# Patient Record
Sex: MALE | Race: WHITE | NOT HISPANIC OR LATINO | Employment: OTHER | URBAN - METROPOLITAN AREA
[De-identification: names, ages, dates, MRNs, and addresses within clinical notes are randomized per-mention and may not be internally consistent; named-entity substitution may affect disease eponyms.]

---

## 2020-01-31 ENCOUNTER — TELEPHONE (OUTPATIENT)
Dept: GASTROENTEROLOGY | Facility: CLINIC | Age: 70
End: 2020-01-31

## 2021-11-12 ENCOUNTER — TELEPHONE (OUTPATIENT)
Dept: GASTROENTEROLOGY | Facility: CLINIC | Age: 71
End: 2021-11-12

## 2021-12-03 ENCOUNTER — PREP FOR SURGERY (OUTPATIENT)
Dept: OTHER | Facility: HOSPITAL | Age: 71
End: 2021-12-03

## 2021-12-03 DIAGNOSIS — Z86.010 PERSONAL HISTORY OF COLONIC POLYPS: Primary | ICD-10-CM

## 2021-12-03 DIAGNOSIS — Z01.818 OTHER SPECIFIED PRE-OPERATIVE EXAMINATION: ICD-10-CM

## 2021-12-03 DIAGNOSIS — Z80.0 FAMILY HISTORY OF COLON CANCER: ICD-10-CM

## 2021-12-06 PROBLEM — Z80.0 FAMILY HISTORY OF COLON CANCER: Status: ACTIVE | Noted: 2021-12-06

## 2021-12-06 PROBLEM — Z86.0100 PERSONAL HISTORY OF COLONIC POLYPS: Status: ACTIVE | Noted: 2021-12-06

## 2021-12-06 PROBLEM — Z86.010 PERSONAL HISTORY OF COLONIC POLYPS: Status: ACTIVE | Noted: 2021-12-06

## 2021-12-06 NOTE — TELEPHONE ENCOUNTER
CALL FROM PATIENT.  SCHEDULED AT [employer] ON 04/29/2022 AT 10:15AM - ARRIVE 9AM.  WILL MAIL INSTRUCTIONS.    COVID TEST ON 04/27/2022, WILL CALL WITH TIME.  NEED TO SELF QUARANTINE UNTIL AFTER PROCEDURE.  HE UNDERSTANDS.

## 2022-01-21 ENCOUNTER — APPOINTMENT (OUTPATIENT)
Dept: GENERAL RADIOLOGY | Facility: HOSPITAL | Age: 72
End: 2022-01-21

## 2022-01-21 ENCOUNTER — HOSPITAL ENCOUNTER (INPATIENT)
Facility: HOSPITAL | Age: 72
LOS: 12 days | Discharge: HOME HEALTH | End: 2022-02-02
Attending: EMERGENCY MEDICINE | Admitting: INTERNAL MEDICINE

## 2022-01-21 ENCOUNTER — APPOINTMENT (OUTPATIENT)
Dept: ULTRASOUND IMAGING | Facility: HOSPITAL | Age: 72
End: 2022-01-21

## 2022-01-21 ENCOUNTER — APPOINTMENT (OUTPATIENT)
Dept: CT IMAGING | Facility: HOSPITAL | Age: 72
End: 2022-01-21

## 2022-01-21 DIAGNOSIS — R09.02 HYPOXIA: ICD-10-CM

## 2022-01-21 DIAGNOSIS — J12.82 PNEUMONIA DUE TO COVID-19 VIRUS: Primary | ICD-10-CM

## 2022-01-21 DIAGNOSIS — U07.1 PNEUMONIA DUE TO COVID-19 VIRUS: Primary | ICD-10-CM

## 2022-01-21 DIAGNOSIS — G47.33 OSA (OBSTRUCTIVE SLEEP APNEA): ICD-10-CM

## 2022-01-21 LAB
ALBUMIN SERPL-MCNC: 3.5 G/DL (ref 3.5–5.2)
ALBUMIN/GLOB SERPL: 1.1 G/DL
ALP SERPL-CCNC: 35 U/L (ref 39–117)
ALT SERPL W P-5'-P-CCNC: 39 U/L (ref 1–41)
ANION GAP SERPL CALCULATED.3IONS-SCNC: 14.8 MMOL/L (ref 5–15)
AST SERPL-CCNC: 62 U/L (ref 1–40)
BACTERIA UR QL AUTO: ABNORMAL /HPF
BASOPHILS # BLD AUTO: 0.01 10*3/MM3 (ref 0–0.2)
BASOPHILS NFR BLD AUTO: 0.1 % (ref 0–1.5)
BILIRUB SERPL-MCNC: 0.6 MG/DL (ref 0–1.2)
BILIRUB UR QL STRIP: ABNORMAL
BUN SERPL-MCNC: 20 MG/DL (ref 8–23)
BUN/CREAT SERPL: 25.3 (ref 7–25)
CALCIUM SPEC-SCNC: 9.1 MG/DL (ref 8.6–10.5)
CHLORIDE SERPL-SCNC: 96 MMOL/L (ref 98–107)
CLARITY UR: CLEAR
CO2 SERPL-SCNC: 25.2 MMOL/L (ref 22–29)
COLOR UR: YELLOW
CREAT SERPL-MCNC: 0.79 MG/DL (ref 0.76–1.27)
D DIMER PPP FEU-MCNC: 1.27 MCGFEU/ML (ref 0–0.46)
D-LACTATE SERPL-SCNC: 1.1 MMOL/L (ref 0.5–2)
D-LACTATE SERPL-SCNC: 3 MMOL/L (ref 0.5–2)
DEPRECATED RDW RBC AUTO: 45.1 FL (ref 37–54)
EOSINOPHIL # BLD AUTO: 0 10*3/MM3 (ref 0–0.4)
EOSINOPHIL NFR BLD AUTO: 0 % (ref 0.3–6.2)
ERYTHROCYTE [DISTWIDTH] IN BLOOD BY AUTOMATED COUNT: 13.2 % (ref 12.3–15.4)
FERRITIN SERPL-MCNC: 1800 NG/ML (ref 30–400)
FLUAV AG NPH QL: NEGATIVE
FLUBV AG NPH QL IA: NEGATIVE
GFR SERPL CREATININE-BSD FRML MDRD: 97 ML/MIN/1.73
GLOBULIN UR ELPH-MCNC: 3.2 GM/DL
GLUCOSE SERPL-MCNC: 154 MG/DL (ref 65–99)
GLUCOSE UR STRIP-MCNC: NEGATIVE MG/DL
HBA1C MFR BLD: 5.6 % (ref 4.8–5.6)
HCT VFR BLD AUTO: 44.3 % (ref 37.5–51)
HGB BLD-MCNC: 14.6 G/DL (ref 13–17.7)
HGB UR QL STRIP.AUTO: ABNORMAL
HOLD SPECIMEN: NORMAL
HOLD SPECIMEN: NORMAL
HYALINE CASTS UR QL AUTO: ABNORMAL /LPF
IMM GRANULOCYTES # BLD AUTO: 0.04 10*3/MM3 (ref 0–0.05)
IMM GRANULOCYTES NFR BLD AUTO: 0.5 % (ref 0–0.5)
KETONES UR QL STRIP: ABNORMAL
LEUKOCYTE ESTERASE UR QL STRIP.AUTO: NEGATIVE
LYMPHOCYTES # BLD AUTO: 0.39 10*3/MM3 (ref 0.7–3.1)
LYMPHOCYTES NFR BLD AUTO: 5.1 % (ref 19.6–45.3)
MCH RBC QN AUTO: 30.3 PG (ref 26.6–33)
MCHC RBC AUTO-ENTMCNC: 33 G/DL (ref 31.5–35.7)
MCV RBC AUTO: 91.9 FL (ref 79–97)
MONOCYTES # BLD AUTO: 0.84 10*3/MM3 (ref 0.1–0.9)
MONOCYTES NFR BLD AUTO: 11 % (ref 5–12)
MUCOUS THREADS URNS QL MICRO: ABNORMAL /HPF
NEUTROPHILS NFR BLD AUTO: 6.34 10*3/MM3 (ref 1.7–7)
NEUTROPHILS NFR BLD AUTO: 83.3 % (ref 42.7–76)
NITRITE UR QL STRIP: NEGATIVE
NT-PROBNP SERPL-MCNC: 226.3 PG/ML (ref 0–900)
PH UR STRIP.AUTO: 6 [PH] (ref 4.5–8)
PLATELET # BLD AUTO: 241 10*3/MM3 (ref 140–450)
PMV BLD AUTO: 9.5 FL (ref 6–12)
POTASSIUM SERPL-SCNC: 3.7 MMOL/L (ref 3.5–5.2)
PROCALCITONIN SERPL-MCNC: 0.06 NG/ML (ref 0–0.25)
PROT SERPL-MCNC: 6.7 G/DL (ref 6–8.5)
PROT UR QL STRIP: ABNORMAL
QT INTERVAL: 377 MS
RBC # BLD AUTO: 4.82 10*6/MM3 (ref 4.14–5.8)
RBC # UR STRIP: ABNORMAL /HPF
REF LAB TEST METHOD: ABNORMAL
SODIUM SERPL-SCNC: 136 MMOL/L (ref 136–145)
SP GR UR STRIP: 1.02 (ref 1–1.03)
SQUAMOUS #/AREA URNS HPF: ABNORMAL /HPF
TROPONIN T SERPL-MCNC: <0.01 NG/ML (ref 0–0.03)
UROBILINOGEN UR QL STRIP: ABNORMAL
WBC # UR STRIP: ABNORMAL /HPF
WBC NRBC COR # BLD: 7.62 10*3/MM3 (ref 3.4–10.8)
WHOLE BLOOD HOLD SPECIMEN: NORMAL
WHOLE BLOOD HOLD SPECIMEN: NORMAL

## 2022-01-21 PROCEDURE — 83036 HEMOGLOBIN GLYCOSYLATED A1C: CPT | Performed by: NURSE PRACTITIONER

## 2022-01-21 PROCEDURE — 93010 ELECTROCARDIOGRAM REPORT: CPT | Performed by: INTERNAL MEDICINE

## 2022-01-21 PROCEDURE — G0378 HOSPITAL OBSERVATION PER HR: HCPCS

## 2022-01-21 PROCEDURE — 93005 ELECTROCARDIOGRAM TRACING: CPT | Performed by: EMERGENCY MEDICINE

## 2022-01-21 PROCEDURE — 0 IOPAMIDOL PER 1 ML: Performed by: EMERGENCY MEDICINE

## 2022-01-21 PROCEDURE — 87804 INFLUENZA ASSAY W/OPTIC: CPT | Performed by: EMERGENCY MEDICINE

## 2022-01-21 PROCEDURE — 99283 EMERGENCY DEPT VISIT LOW MDM: CPT | Performed by: EMERGENCY MEDICINE

## 2022-01-21 PROCEDURE — 71275 CT ANGIOGRAPHY CHEST: CPT

## 2022-01-21 PROCEDURE — 87040 BLOOD CULTURE FOR BACTERIA: CPT | Performed by: EMERGENCY MEDICINE

## 2022-01-21 PROCEDURE — 80053 COMPREHEN METABOLIC PANEL: CPT | Performed by: EMERGENCY MEDICINE

## 2022-01-21 PROCEDURE — 81001 URINALYSIS AUTO W/SCOPE: CPT | Performed by: EMERGENCY MEDICINE

## 2022-01-21 PROCEDURE — 84145 PROCALCITONIN (PCT): CPT | Performed by: EMERGENCY MEDICINE

## 2022-01-21 PROCEDURE — 76775 US EXAM ABDO BACK WALL LIM: CPT

## 2022-01-21 PROCEDURE — 94761 N-INVAS EAR/PLS OXIMETRY MLT: CPT

## 2022-01-21 PROCEDURE — 85025 COMPLETE CBC W/AUTO DIFF WBC: CPT | Performed by: EMERGENCY MEDICINE

## 2022-01-21 PROCEDURE — 25010000002 ENOXAPARIN PER 10 MG: Performed by: NURSE PRACTITIONER

## 2022-01-21 PROCEDURE — 82728 ASSAY OF FERRITIN: CPT | Performed by: NURSE PRACTITIONER

## 2022-01-21 PROCEDURE — XW033E5 INTRODUCTION OF REMDESIVIR ANTI-INFECTIVE INTO PERIPHERAL VEIN, PERCUTANEOUS APPROACH, NEW TECHNOLOGY GROUP 5: ICD-10-PCS | Performed by: INTERNAL MEDICINE

## 2022-01-21 PROCEDURE — 71045 X-RAY EXAM CHEST 1 VIEW: CPT

## 2022-01-21 PROCEDURE — 25010000002 DEXAMETHASONE PER 1 MG

## 2022-01-21 PROCEDURE — 83880 ASSAY OF NATRIURETIC PEPTIDE: CPT | Performed by: EMERGENCY MEDICINE

## 2022-01-21 PROCEDURE — 25010000005 REMDESIVIR 100 MG/20ML SOLUTION 1 EACH VIAL: Performed by: NURSE PRACTITIONER

## 2022-01-21 PROCEDURE — 99223 1ST HOSP IP/OBS HIGH 75: CPT | Performed by: NURSE PRACTITIONER

## 2022-01-21 PROCEDURE — 85379 FIBRIN DEGRADATION QUANT: CPT | Performed by: EMERGENCY MEDICINE

## 2022-01-21 PROCEDURE — 84484 ASSAY OF TROPONIN QUANT: CPT | Performed by: EMERGENCY MEDICINE

## 2022-01-21 PROCEDURE — 83605 ASSAY OF LACTIC ACID: CPT | Performed by: EMERGENCY MEDICINE

## 2022-01-21 PROCEDURE — 99284 EMERGENCY DEPT VISIT MOD MDM: CPT

## 2022-01-21 PROCEDURE — 36415 COLL VENOUS BLD VENIPUNCTURE: CPT

## 2022-01-21 RX ORDER — ONDANSETRON 2 MG/ML
4 INJECTION INTRAMUSCULAR; INTRAVENOUS EVERY 6 HOURS PRN
Status: DISCONTINUED | OUTPATIENT
Start: 2022-01-21 | End: 2022-02-02 | Stop reason: HOSPADM

## 2022-01-21 RX ORDER — DEXAMETHASONE SODIUM PHOSPHATE 4 MG/ML
INJECTION, SOLUTION INTRA-ARTICULAR; INTRALESIONAL; INTRAMUSCULAR; INTRAVENOUS; SOFT TISSUE
Status: COMPLETED
Start: 2022-01-21 | End: 2022-01-21

## 2022-01-21 RX ORDER — SODIUM CHLORIDE 0.9 % (FLUSH) 0.9 %
10 SYRINGE (ML) INJECTION EVERY 12 HOURS SCHEDULED
Status: DISCONTINUED | OUTPATIENT
Start: 2022-01-21 | End: 2022-02-02 | Stop reason: HOSPADM

## 2022-01-21 RX ORDER — ALBUTEROL SULFATE 90 UG/1
2 AEROSOL, METERED RESPIRATORY (INHALATION) EVERY 4 HOURS PRN
Status: DISCONTINUED | OUTPATIENT
Start: 2022-01-21 | End: 2022-02-02 | Stop reason: HOSPADM

## 2022-01-21 RX ORDER — GUAIFENESIN 600 MG/1
1200 TABLET, EXTENDED RELEASE ORAL 2 TIMES DAILY
Status: DISCONTINUED | OUTPATIENT
Start: 2022-01-21 | End: 2022-02-02 | Stop reason: HOSPADM

## 2022-01-21 RX ORDER — DEXAMETHASONE 4 MG/1
6 TABLET ORAL
Status: DISCONTINUED | OUTPATIENT
Start: 2022-01-22 | End: 2022-01-22

## 2022-01-21 RX ORDER — ATORVASTATIN CALCIUM 40 MG/1
40 TABLET, FILM COATED ORAL DAILY
Status: DISCONTINUED | OUTPATIENT
Start: 2022-01-21 | End: 2022-01-29

## 2022-01-21 RX ORDER — LISINOPRIL 10 MG/1
10 TABLET ORAL DAILY
COMMUNITY
End: 2022-02-02 | Stop reason: HOSPADM

## 2022-01-21 RX ORDER — ACETAMINOPHEN 160 MG/5ML
650 SOLUTION ORAL EVERY 4 HOURS PRN
Status: DISCONTINUED | OUTPATIENT
Start: 2022-01-21 | End: 2022-02-02 | Stop reason: HOSPADM

## 2022-01-21 RX ORDER — ACETAMINOPHEN 325 MG/1
650 TABLET ORAL EVERY 4 HOURS PRN
Status: DISCONTINUED | OUTPATIENT
Start: 2022-01-21 | End: 2022-02-02 | Stop reason: HOSPADM

## 2022-01-21 RX ORDER — ZINC SULFATE 50(220)MG
220 CAPSULE ORAL DAILY
Status: DISCONTINUED | OUTPATIENT
Start: 2022-01-21 | End: 2022-02-02 | Stop reason: HOSPADM

## 2022-01-21 RX ORDER — BENZONATATE 100 MG/1
200 CAPSULE ORAL 3 TIMES DAILY PRN
Status: DISCONTINUED | OUTPATIENT
Start: 2022-01-21 | End: 2022-01-26

## 2022-01-21 RX ORDER — ACETAMINOPHEN 650 MG/1
650 SUPPOSITORY RECTAL EVERY 4 HOURS PRN
Status: DISCONTINUED | OUTPATIENT
Start: 2022-01-21 | End: 2022-02-02 | Stop reason: HOSPADM

## 2022-01-21 RX ORDER — SODIUM CHLORIDE 0.9 % (FLUSH) 0.9 %
10 SYRINGE (ML) INJECTION AS NEEDED
Status: DISCONTINUED | OUTPATIENT
Start: 2022-01-21 | End: 2022-02-02 | Stop reason: HOSPADM

## 2022-01-21 RX ORDER — ATORVASTATIN CALCIUM 40 MG/1
40 TABLET, FILM COATED ORAL DAILY
COMMUNITY

## 2022-01-21 RX ORDER — CHOLECALCIFEROL (VITAMIN D3) 125 MCG
5 CAPSULE ORAL NIGHTLY PRN
Status: DISCONTINUED | OUTPATIENT
Start: 2022-01-21 | End: 2022-02-02 | Stop reason: HOSPADM

## 2022-01-21 RX ORDER — SODIUM CHLORIDE 9 MG/ML
40 INJECTION, SOLUTION INTRAVENOUS AS NEEDED
Status: DISCONTINUED | OUTPATIENT
Start: 2022-01-21 | End: 2022-02-02 | Stop reason: HOSPADM

## 2022-01-21 RX ORDER — MELATONIN
1000 DAILY
Status: DISCONTINUED | OUTPATIENT
Start: 2022-01-21 | End: 2022-02-02 | Stop reason: HOSPADM

## 2022-01-21 RX ORDER — ASCORBIC ACID 500 MG
500 TABLET ORAL DAILY
Status: DISCONTINUED | OUTPATIENT
Start: 2022-01-21 | End: 2022-02-02 | Stop reason: HOSPADM

## 2022-01-21 RX ORDER — ONDANSETRON 4 MG/1
4 TABLET, FILM COATED ORAL EVERY 6 HOURS PRN
Status: DISCONTINUED | OUTPATIENT
Start: 2022-01-21 | End: 2022-02-02 | Stop reason: HOSPADM

## 2022-01-21 RX ADMIN — IOPAMIDOL 100 ML: 755 INJECTION, SOLUTION INTRAVENOUS at 12:43

## 2022-01-21 RX ADMIN — SODIUM CHLORIDE, PRESERVATIVE FREE 10 ML: 5 INJECTION INTRAVENOUS at 20:54

## 2022-01-21 RX ADMIN — ENOXAPARIN SODIUM 40 MG: 40 INJECTION SUBCUTANEOUS at 17:06

## 2022-01-21 RX ADMIN — CHOLECALCIFEROL TAB 25 MCG (1000 UNIT) 1000 UNITS: 25 TAB at 17:05

## 2022-01-21 RX ADMIN — GUAIFENESIN 1200 MG: 600 TABLET, EXTENDED RELEASE ORAL at 20:51

## 2022-01-21 RX ADMIN — DEXAMETHASONE SODIUM PHOSPHATE 10 MG: 4 INJECTION, SOLUTION INTRAMUSCULAR; INTRAVENOUS at 11:13

## 2022-01-21 RX ADMIN — ZINC SULFATE 220 MG (50 MG) CAPSULE 220 MG: CAPSULE at 17:05

## 2022-01-21 RX ADMIN — REMDESIVIR 200 MG: 100 INJECTION, POWDER, LYOPHILIZED, FOR SOLUTION INTRAVENOUS at 17:05

## 2022-01-21 RX ADMIN — MELATONIN TAB 5 MG 5 MG: 5 TAB at 20:51

## 2022-01-21 RX ADMIN — OXYCODONE HYDROCHLORIDE AND ACETAMINOPHEN 500 MG: 500 TABLET ORAL at 17:05

## 2022-01-21 RX ADMIN — ATORVASTATIN CALCIUM 40 MG: 40 TABLET, FILM COATED ORAL at 22:04

## 2022-01-22 LAB
ALBUMIN SERPL-MCNC: 3.1 G/DL (ref 3.5–5.2)
ALBUMIN/GLOB SERPL: 1.2 G/DL
ALP SERPL-CCNC: 32 U/L (ref 39–117)
ALT SERPL W P-5'-P-CCNC: 40 U/L (ref 1–41)
ANION GAP SERPL CALCULATED.3IONS-SCNC: 10.8 MMOL/L (ref 5–15)
AST SERPL-CCNC: 55 U/L (ref 1–40)
BASOPHILS # BLD AUTO: 0.01 10*3/MM3 (ref 0–0.2)
BASOPHILS NFR BLD AUTO: 0.1 % (ref 0–1.5)
BILIRUB SERPL-MCNC: 0.4 MG/DL (ref 0–1.2)
BUN SERPL-MCNC: 28 MG/DL (ref 8–23)
BUN/CREAT SERPL: 47.5 (ref 7–25)
CALCIUM SPEC-SCNC: 8.6 MG/DL (ref 8.6–10.5)
CHLORIDE SERPL-SCNC: 103 MMOL/L (ref 98–107)
CO2 SERPL-SCNC: 26.2 MMOL/L (ref 22–29)
CREAT SERPL-MCNC: 0.59 MG/DL (ref 0.76–1.27)
CRP SERPL-MCNC: 9.25 MG/DL (ref 0–0.5)
DEPRECATED RDW RBC AUTO: 45.7 FL (ref 37–54)
EOSINOPHIL # BLD AUTO: 0 10*3/MM3 (ref 0–0.4)
EOSINOPHIL NFR BLD AUTO: 0 % (ref 0.3–6.2)
ERYTHROCYTE [DISTWIDTH] IN BLOOD BY AUTOMATED COUNT: 13.5 % (ref 12.3–15.4)
FERRITIN SERPL-MCNC: 1621 NG/ML (ref 30–400)
GFR SERPL CREATININE-BSD FRML MDRD: 135 ML/MIN/1.73
GLOBULIN UR ELPH-MCNC: 2.5 GM/DL
GLUCOSE SERPL-MCNC: 176 MG/DL (ref 65–99)
HCT VFR BLD AUTO: 40.1 % (ref 37.5–51)
HGB BLD-MCNC: 13.6 G/DL (ref 13–17.7)
IMM GRANULOCYTES # BLD AUTO: 0.04 10*3/MM3 (ref 0–0.05)
IMM GRANULOCYTES NFR BLD AUTO: 0.4 % (ref 0–0.5)
LYMPHOCYTES # BLD AUTO: 0.55 10*3/MM3 (ref 0.7–3.1)
LYMPHOCYTES NFR BLD AUTO: 5.3 % (ref 19.6–45.3)
MCH RBC QN AUTO: 30.9 PG (ref 26.6–33)
MCHC RBC AUTO-ENTMCNC: 33.9 G/DL (ref 31.5–35.7)
MCV RBC AUTO: 91.1 FL (ref 79–97)
MONOCYTES # BLD AUTO: 1.18 10*3/MM3 (ref 0.1–0.9)
MONOCYTES NFR BLD AUTO: 11.3 % (ref 5–12)
NEUTROPHILS NFR BLD AUTO: 8.63 10*3/MM3 (ref 1.7–7)
NEUTROPHILS NFR BLD AUTO: 82.9 % (ref 42.7–76)
NRBC BLD AUTO-RTO: 0 /100 WBC (ref 0–0.2)
PLATELET # BLD AUTO: 240 10*3/MM3 (ref 140–450)
PMV BLD AUTO: 9.9 FL (ref 6–12)
POTASSIUM SERPL-SCNC: 3.7 MMOL/L (ref 3.5–5.2)
PROT SERPL-MCNC: 5.6 G/DL (ref 6–8.5)
RBC # BLD AUTO: 4.4 10*6/MM3 (ref 4.14–5.8)
SODIUM SERPL-SCNC: 140 MMOL/L (ref 136–145)
WBC NRBC COR # BLD: 10.41 10*3/MM3 (ref 3.4–10.8)

## 2022-01-22 PROCEDURE — 25010000002 REMDESIVIR 100 MG/20ML SOLUTION 1 EACH VIAL: Performed by: NURSE PRACTITIONER

## 2022-01-22 PROCEDURE — 82728 ASSAY OF FERRITIN: CPT | Performed by: NURSE PRACTITIONER

## 2022-01-22 PROCEDURE — 99232 SBSQ HOSP IP/OBS MODERATE 35: CPT | Performed by: INTERNAL MEDICINE

## 2022-01-22 PROCEDURE — 25010000002 ENOXAPARIN PER 10 MG: Performed by: NURSE PRACTITIONER

## 2022-01-22 PROCEDURE — 85025 COMPLETE CBC W/AUTO DIFF WBC: CPT | Performed by: NURSE PRACTITIONER

## 2022-01-22 PROCEDURE — 94799 UNLISTED PULMONARY SVC/PX: CPT

## 2022-01-22 PROCEDURE — 94761 N-INVAS EAR/PLS OXIMETRY MLT: CPT

## 2022-01-22 PROCEDURE — 86140 C-REACTIVE PROTEIN: CPT | Performed by: NURSE PRACTITIONER

## 2022-01-22 PROCEDURE — 80053 COMPREHEN METABOLIC PANEL: CPT | Performed by: NURSE PRACTITIONER

## 2022-01-22 PROCEDURE — 63710000001 DEXAMETHASONE PER 0.25 MG: Performed by: INTERNAL MEDICINE

## 2022-01-22 RX ORDER — DEXAMETHASONE 4 MG/1
6 TABLET ORAL EVERY 12 HOURS SCHEDULED
Status: DISCONTINUED | OUTPATIENT
Start: 2022-01-22 | End: 2022-02-02

## 2022-01-22 RX ADMIN — ZINC SULFATE 220 MG (50 MG) CAPSULE 220 MG: CAPSULE at 09:28

## 2022-01-22 RX ADMIN — REMDESIVIR 100 MG: 100 INJECTION, POWDER, LYOPHILIZED, FOR SOLUTION INTRAVENOUS at 16:40

## 2022-01-22 RX ADMIN — GUAIFENESIN 1200 MG: 600 TABLET, EXTENDED RELEASE ORAL at 20:31

## 2022-01-22 RX ADMIN — GUAIFENESIN 1200 MG: 600 TABLET, EXTENDED RELEASE ORAL at 09:28

## 2022-01-22 RX ADMIN — DEXAMETHASONE 6 MG: 4 TABLET ORAL at 20:31

## 2022-01-22 RX ADMIN — ATORVASTATIN CALCIUM 40 MG: 40 TABLET, FILM COATED ORAL at 09:28

## 2022-01-22 RX ADMIN — OXYCODONE HYDROCHLORIDE AND ACETAMINOPHEN 500 MG: 500 TABLET ORAL at 09:28

## 2022-01-22 RX ADMIN — SODIUM CHLORIDE, PRESERVATIVE FREE 10 ML: 5 INJECTION INTRAVENOUS at 09:28

## 2022-01-22 RX ADMIN — DEXAMETHASONE 6 MG: 4 TABLET ORAL at 09:28

## 2022-01-22 RX ADMIN — ENOXAPARIN SODIUM 40 MG: 40 INJECTION SUBCUTANEOUS at 16:40

## 2022-01-22 RX ADMIN — SODIUM CHLORIDE, PRESERVATIVE FREE 10 ML: 5 INJECTION INTRAVENOUS at 20:34

## 2022-01-22 RX ADMIN — CHOLECALCIFEROL TAB 25 MCG (1000 UNIT) 1000 UNITS: 25 TAB at 09:28

## 2022-01-23 PROCEDURE — 94799 UNLISTED PULMONARY SVC/PX: CPT

## 2022-01-23 PROCEDURE — 63710000001 DEXAMETHASONE PER 0.25 MG: Performed by: INTERNAL MEDICINE

## 2022-01-23 PROCEDURE — 94761 N-INVAS EAR/PLS OXIMETRY MLT: CPT

## 2022-01-23 PROCEDURE — 25010000002 REMDESIVIR 100 MG/20ML SOLUTION 1 EACH VIAL: Performed by: NURSE PRACTITIONER

## 2022-01-23 PROCEDURE — 99233 SBSQ HOSP IP/OBS HIGH 50: CPT | Performed by: INTERNAL MEDICINE

## 2022-01-23 PROCEDURE — 25010000002 ENOXAPARIN PER 10 MG: Performed by: NURSE PRACTITIONER

## 2022-01-23 RX ADMIN — GUAIFENESIN 1200 MG: 600 TABLET, EXTENDED RELEASE ORAL at 08:58

## 2022-01-23 RX ADMIN — CHOLECALCIFEROL TAB 25 MCG (1000 UNIT) 1000 UNITS: 25 TAB at 08:58

## 2022-01-23 RX ADMIN — ATORVASTATIN CALCIUM 40 MG: 40 TABLET, FILM COATED ORAL at 08:58

## 2022-01-23 RX ADMIN — SODIUM CHLORIDE, PRESERVATIVE FREE 10 ML: 5 INJECTION INTRAVENOUS at 08:58

## 2022-01-23 RX ADMIN — ZINC SULFATE 220 MG (50 MG) CAPSULE 220 MG: CAPSULE at 08:58

## 2022-01-23 RX ADMIN — REMDESIVIR 100 MG: 100 INJECTION, POWDER, LYOPHILIZED, FOR SOLUTION INTRAVENOUS at 16:21

## 2022-01-23 RX ADMIN — BENZONATATE 200 MG: 100 CAPSULE ORAL at 21:02

## 2022-01-23 RX ADMIN — DEXAMETHASONE 6 MG: 4 TABLET ORAL at 08:58

## 2022-01-23 RX ADMIN — ENOXAPARIN SODIUM 40 MG: 40 INJECTION SUBCUTANEOUS at 16:21

## 2022-01-23 RX ADMIN — GUAIFENESIN 1200 MG: 600 TABLET, EXTENDED RELEASE ORAL at 21:01

## 2022-01-23 RX ADMIN — SODIUM CHLORIDE, PRESERVATIVE FREE 10 ML: 5 INJECTION INTRAVENOUS at 21:09

## 2022-01-23 RX ADMIN — ACETAMINOPHEN 650 MG: 325 TABLET ORAL at 11:28

## 2022-01-23 RX ADMIN — OXYCODONE HYDROCHLORIDE AND ACETAMINOPHEN 500 MG: 500 TABLET ORAL at 08:58

## 2022-01-23 RX ADMIN — DEXAMETHASONE 6 MG: 4 TABLET ORAL at 21:02

## 2022-01-24 LAB
ALBUMIN SERPL-MCNC: 2.9 G/DL (ref 3.5–5.2)
ALBUMIN/GLOB SERPL: 0.9 G/DL
ALP SERPL-CCNC: 47 U/L (ref 39–117)
ALT SERPL W P-5'-P-CCNC: 55 U/L (ref 1–41)
ANION GAP SERPL CALCULATED.3IONS-SCNC: 13.1 MMOL/L (ref 5–15)
AST SERPL-CCNC: 52 U/L (ref 1–40)
BASOPHILS # BLD AUTO: 0.01 10*3/MM3 (ref 0–0.2)
BASOPHILS NFR BLD AUTO: 0.1 % (ref 0–1.5)
BILIRUB SERPL-MCNC: 0.5 MG/DL (ref 0–1.2)
BUN SERPL-MCNC: 24 MG/DL (ref 8–23)
BUN/CREAT SERPL: 34.3 (ref 7–25)
CALCIUM SPEC-SCNC: 8.9 MG/DL (ref 8.6–10.5)
CHLORIDE SERPL-SCNC: 100 MMOL/L (ref 98–107)
CO2 SERPL-SCNC: 22.9 MMOL/L (ref 22–29)
CREAT SERPL-MCNC: 0.7 MG/DL (ref 0.76–1.27)
CRP SERPL-MCNC: 2.83 MG/DL (ref 0–0.5)
D DIMER PPP FEU-MCNC: 1.39 MCGFEU/ML (ref 0–0.46)
DEPRECATED RDW RBC AUTO: 45.6 FL (ref 37–54)
EOSINOPHIL # BLD AUTO: 0 10*3/MM3 (ref 0–0.4)
EOSINOPHIL NFR BLD AUTO: 0 % (ref 0.3–6.2)
ERYTHROCYTE [DISTWIDTH] IN BLOOD BY AUTOMATED COUNT: 13.2 % (ref 12.3–15.4)
FERRITIN SERPL-MCNC: 1302 NG/ML (ref 30–400)
GFR SERPL CREATININE-BSD FRML MDRD: 111 ML/MIN/1.73
GLOBULIN UR ELPH-MCNC: 3.1 GM/DL
GLUCOSE SERPL-MCNC: 152 MG/DL (ref 65–99)
HCT VFR BLD AUTO: 42 % (ref 37.5–51)
HGB BLD-MCNC: 13.8 G/DL (ref 13–17.7)
IMM GRANULOCYTES # BLD AUTO: 0.1 10*3/MM3 (ref 0–0.05)
IMM GRANULOCYTES NFR BLD AUTO: 0.6 % (ref 0–0.5)
LYMPHOCYTES # BLD AUTO: 0.47 10*3/MM3 (ref 0.7–3.1)
LYMPHOCYTES NFR BLD AUTO: 2.9 % (ref 19.6–45.3)
MCH RBC QN AUTO: 30.6 PG (ref 26.6–33)
MCHC RBC AUTO-ENTMCNC: 32.9 G/DL (ref 31.5–35.7)
MCV RBC AUTO: 93.1 FL (ref 79–97)
MONOCYTES # BLD AUTO: 1.23 10*3/MM3 (ref 0.1–0.9)
MONOCYTES NFR BLD AUTO: 7.7 % (ref 5–12)
NEUTROPHILS NFR BLD AUTO: 14.13 10*3/MM3 (ref 1.7–7)
NEUTROPHILS NFR BLD AUTO: 88.7 % (ref 42.7–76)
NRBC BLD AUTO-RTO: 0 /100 WBC (ref 0–0.2)
PLATELET # BLD AUTO: 411 10*3/MM3 (ref 140–450)
PMV BLD AUTO: 10.5 FL (ref 6–12)
POTASSIUM SERPL-SCNC: 3.7 MMOL/L (ref 3.5–5.2)
PROCALCITONIN SERPL-MCNC: 0.03 NG/ML (ref 0–0.25)
PROT SERPL-MCNC: 6 G/DL (ref 6–8.5)
RBC # BLD AUTO: 4.51 10*6/MM3 (ref 4.14–5.8)
SODIUM SERPL-SCNC: 136 MMOL/L (ref 136–145)
WBC NRBC COR # BLD: 15.94 10*3/MM3 (ref 3.4–10.8)

## 2022-01-24 PROCEDURE — 99232 SBSQ HOSP IP/OBS MODERATE 35: CPT | Performed by: NURSE PRACTITIONER

## 2022-01-24 PROCEDURE — 94799 UNLISTED PULMONARY SVC/PX: CPT

## 2022-01-24 PROCEDURE — 85379 FIBRIN DEGRADATION QUANT: CPT | Performed by: NURSE PRACTITIONER

## 2022-01-24 PROCEDURE — 85025 COMPLETE CBC W/AUTO DIFF WBC: CPT | Performed by: NURSE PRACTITIONER

## 2022-01-24 PROCEDURE — 25010000002 ENOXAPARIN PER 10 MG: Performed by: NURSE PRACTITIONER

## 2022-01-24 PROCEDURE — 84145 PROCALCITONIN (PCT): CPT | Performed by: NURSE PRACTITIONER

## 2022-01-24 PROCEDURE — 94761 N-INVAS EAR/PLS OXIMETRY MLT: CPT

## 2022-01-24 PROCEDURE — 63710000001 DEXAMETHASONE PER 0.25 MG: Performed by: INTERNAL MEDICINE

## 2022-01-24 PROCEDURE — 25010000002 REMDESIVIR 100 MG/20ML SOLUTION 1 EACH VIAL: Performed by: NURSE PRACTITIONER

## 2022-01-24 PROCEDURE — 80053 COMPREHEN METABOLIC PANEL: CPT | Performed by: NURSE PRACTITIONER

## 2022-01-24 PROCEDURE — 82728 ASSAY OF FERRITIN: CPT | Performed by: NURSE PRACTITIONER

## 2022-01-24 PROCEDURE — XW0DXM6 INTRODUCTION OF BARICITINIB INTO MOUTH AND PHARYNX, EXTERNAL APPROACH, NEW TECHNOLOGY GROUP 6: ICD-10-PCS | Performed by: INTERNAL MEDICINE

## 2022-01-24 PROCEDURE — 86140 C-REACTIVE PROTEIN: CPT | Performed by: NURSE PRACTITIONER

## 2022-01-24 RX ORDER — LORAZEPAM 2 MG/ML
1 INJECTION INTRAMUSCULAR
Status: DISCONTINUED | OUTPATIENT
Start: 2022-01-24 | End: 2022-01-28

## 2022-01-24 RX ORDER — LORAZEPAM 0.5 MG/1
0.5 TABLET ORAL
Status: DISCONTINUED | OUTPATIENT
Start: 2022-01-24 | End: 2022-01-28

## 2022-01-24 RX ORDER — LORAZEPAM 2 MG/ML
0.5 INJECTION INTRAMUSCULAR
Status: DISCONTINUED | OUTPATIENT
Start: 2022-01-24 | End: 2022-01-28

## 2022-01-24 RX ORDER — LORAZEPAM 1 MG/1
1 TABLET ORAL
Status: DISCONTINUED | OUTPATIENT
Start: 2022-01-24 | End: 2022-01-28

## 2022-01-24 RX ADMIN — OXYCODONE HYDROCHLORIDE AND ACETAMINOPHEN 500 MG: 500 TABLET ORAL at 09:51

## 2022-01-24 RX ADMIN — SODIUM CHLORIDE, PRESERVATIVE FREE 10 ML: 5 INJECTION INTRAVENOUS at 22:04

## 2022-01-24 RX ADMIN — BARICITINIB 4 MG: 2 TABLET, FILM COATED ORAL at 10:02

## 2022-01-24 RX ADMIN — GUAIFENESIN 1200 MG: 600 TABLET, EXTENDED RELEASE ORAL at 22:01

## 2022-01-24 RX ADMIN — ATORVASTATIN CALCIUM 40 MG: 40 TABLET, FILM COATED ORAL at 09:51

## 2022-01-24 RX ADMIN — ENOXAPARIN SODIUM 40 MG: 40 INJECTION SUBCUTANEOUS at 09:50

## 2022-01-24 RX ADMIN — GUAIFENESIN 1200 MG: 600 TABLET, EXTENDED RELEASE ORAL at 09:52

## 2022-01-24 RX ADMIN — MELATONIN TAB 5 MG 5 MG: 5 TAB at 01:59

## 2022-01-24 RX ADMIN — CHOLECALCIFEROL TAB 25 MCG (1000 UNIT) 1000 UNITS: 25 TAB at 09:51

## 2022-01-24 RX ADMIN — SODIUM CHLORIDE, PRESERVATIVE FREE 10 ML: 5 INJECTION INTRAVENOUS at 09:56

## 2022-01-24 RX ADMIN — ENOXAPARIN SODIUM 40 MG: 40 INJECTION SUBCUTANEOUS at 22:01

## 2022-01-24 RX ADMIN — DEXAMETHASONE 6 MG: 4 TABLET ORAL at 22:01

## 2022-01-24 RX ADMIN — DEXAMETHASONE 6 MG: 4 TABLET ORAL at 09:51

## 2022-01-24 RX ADMIN — ZINC SULFATE 220 MG (50 MG) CAPSULE 220 MG: CAPSULE at 09:51

## 2022-01-24 RX ADMIN — REMDESIVIR 100 MG: 100 INJECTION, POWDER, LYOPHILIZED, FOR SOLUTION INTRAVENOUS at 16:52

## 2022-01-25 LAB
ALBUMIN SERPL-MCNC: 2.7 G/DL (ref 3.5–5.2)
ALBUMIN/GLOB SERPL: 1 G/DL
ALP SERPL-CCNC: 54 U/L (ref 39–117)
ALT SERPL W P-5'-P-CCNC: 67 U/L (ref 1–41)
ANION GAP SERPL CALCULATED.3IONS-SCNC: 10.6 MMOL/L (ref 5–15)
AST SERPL-CCNC: 65 U/L (ref 1–40)
BASOPHILS # BLD AUTO: 0.01 10*3/MM3 (ref 0–0.2)
BASOPHILS NFR BLD AUTO: 0.1 % (ref 0–1.5)
BILIRUB SERPL-MCNC: 0.6 MG/DL (ref 0–1.2)
BUN SERPL-MCNC: 21 MG/DL (ref 8–23)
BUN/CREAT SERPL: 36.8 (ref 7–25)
CALCIUM SPEC-SCNC: 8.5 MG/DL (ref 8.6–10.5)
CHLORIDE SERPL-SCNC: 103 MMOL/L (ref 98–107)
CO2 SERPL-SCNC: 24.4 MMOL/L (ref 22–29)
CREAT SERPL-MCNC: 0.57 MG/DL (ref 0.76–1.27)
CRP SERPL-MCNC: 1.95 MG/DL (ref 0–0.5)
D DIMER PPP FEU-MCNC: 1.73 MCGFEU/ML (ref 0–0.46)
DEPRECATED RDW RBC AUTO: 45.3 FL (ref 37–54)
EOSINOPHIL # BLD AUTO: 0 10*3/MM3 (ref 0–0.4)
EOSINOPHIL NFR BLD AUTO: 0 % (ref 0.3–6.2)
ERYTHROCYTE [DISTWIDTH] IN BLOOD BY AUTOMATED COUNT: 13.2 % (ref 12.3–15.4)
FERRITIN SERPL-MCNC: 1134 NG/ML (ref 30–400)
GFR SERPL CREATININE-BSD FRML MDRD: 141 ML/MIN/1.73
GLOBULIN UR ELPH-MCNC: 2.7 GM/DL
GLUCOSE SERPL-MCNC: 145 MG/DL (ref 65–99)
HCT VFR BLD AUTO: 37.7 % (ref 37.5–51)
HGB BLD-MCNC: 12.5 G/DL (ref 13–17.7)
IMM GRANULOCYTES # BLD AUTO: 0.04 10*3/MM3 (ref 0–0.05)
IMM GRANULOCYTES NFR BLD AUTO: 0.4 % (ref 0–0.5)
LYMPHOCYTES # BLD AUTO: 0.51 10*3/MM3 (ref 0.7–3.1)
LYMPHOCYTES NFR BLD AUTO: 4.7 % (ref 19.6–45.3)
MCH RBC QN AUTO: 30.7 PG (ref 26.6–33)
MCHC RBC AUTO-ENTMCNC: 33.2 G/DL (ref 31.5–35.7)
MCV RBC AUTO: 92.6 FL (ref 79–97)
MONOCYTES # BLD AUTO: 0.71 10*3/MM3 (ref 0.1–0.9)
MONOCYTES NFR BLD AUTO: 6.5 % (ref 5–12)
NEUTROPHILS NFR BLD AUTO: 88.3 % (ref 42.7–76)
NEUTROPHILS NFR BLD AUTO: 9.65 10*3/MM3 (ref 1.7–7)
NRBC BLD AUTO-RTO: 0 /100 WBC (ref 0–0.2)
PLATELET # BLD AUTO: 333 10*3/MM3 (ref 140–450)
PMV BLD AUTO: 10.9 FL (ref 6–12)
POTASSIUM SERPL-SCNC: 3.9 MMOL/L (ref 3.5–5.2)
PROT SERPL-MCNC: 5.4 G/DL (ref 6–8.5)
RBC # BLD AUTO: 4.07 10*6/MM3 (ref 4.14–5.8)
SODIUM SERPL-SCNC: 138 MMOL/L (ref 136–145)
WBC NRBC COR # BLD: 10.92 10*3/MM3 (ref 3.4–10.8)

## 2022-01-25 PROCEDURE — 94761 N-INVAS EAR/PLS OXIMETRY MLT: CPT

## 2022-01-25 PROCEDURE — 63710000001 DEXAMETHASONE PER 0.25 MG: Performed by: INTERNAL MEDICINE

## 2022-01-25 PROCEDURE — 94799 UNLISTED PULMONARY SVC/PX: CPT

## 2022-01-25 PROCEDURE — 99233 SBSQ HOSP IP/OBS HIGH 50: CPT | Performed by: NURSE PRACTITIONER

## 2022-01-25 PROCEDURE — 85379 FIBRIN DEGRADATION QUANT: CPT | Performed by: NURSE PRACTITIONER

## 2022-01-25 PROCEDURE — 25010000002 REMDESIVIR 100 MG/20ML SOLUTION 1 EACH VIAL: Performed by: NURSE PRACTITIONER

## 2022-01-25 PROCEDURE — 86140 C-REACTIVE PROTEIN: CPT | Performed by: NURSE PRACTITIONER

## 2022-01-25 PROCEDURE — 85025 COMPLETE CBC W/AUTO DIFF WBC: CPT | Performed by: NURSE PRACTITIONER

## 2022-01-25 PROCEDURE — 82728 ASSAY OF FERRITIN: CPT | Performed by: NURSE PRACTITIONER

## 2022-01-25 PROCEDURE — 80053 COMPREHEN METABOLIC PANEL: CPT | Performed by: NURSE PRACTITIONER

## 2022-01-25 PROCEDURE — 25010000002 LORAZEPAM PER 2 MG: Performed by: HOSPITALIST

## 2022-01-25 PROCEDURE — 25010000002 ENOXAPARIN PER 10 MG: Performed by: NURSE PRACTITIONER

## 2022-01-25 RX ORDER — OLANZAPINE 5 MG/1
2.5 TABLET ORAL NIGHTLY
Status: DISCONTINUED | OUTPATIENT
Start: 2022-01-25 | End: 2022-01-26

## 2022-01-25 RX ORDER — CHLORDIAZEPOXIDE HYDROCHLORIDE 5 MG/1
10 CAPSULE, GELATIN COATED ORAL EVERY 8 HOURS SCHEDULED
Status: DISCONTINUED | OUTPATIENT
Start: 2022-01-25 | End: 2022-01-26

## 2022-01-25 RX ORDER — OLANZAPINE 5 MG/1
5 TABLET ORAL NIGHTLY
Status: DISCONTINUED | OUTPATIENT
Start: 2022-01-25 | End: 2022-01-25

## 2022-01-25 RX ADMIN — CHOLECALCIFEROL TAB 25 MCG (1000 UNIT) 1000 UNITS: 25 TAB at 10:47

## 2022-01-25 RX ADMIN — OLANZAPINE 2.5 MG: 5 TABLET, FILM COATED ORAL at 20:38

## 2022-01-25 RX ADMIN — CHLORDIAZEPOXIDE HYDROCHLORIDE 10 MG: 5 CAPSULE ORAL at 20:38

## 2022-01-25 RX ADMIN — DEXAMETHASONE 6 MG: 4 TABLET ORAL at 20:38

## 2022-01-25 RX ADMIN — BENZONATATE 200 MG: 100 CAPSULE ORAL at 20:38

## 2022-01-25 RX ADMIN — GUAIFENESIN 1200 MG: 600 TABLET, EXTENDED RELEASE ORAL at 20:38

## 2022-01-25 RX ADMIN — OXYCODONE HYDROCHLORIDE AND ACETAMINOPHEN 500 MG: 500 TABLET ORAL at 10:47

## 2022-01-25 RX ADMIN — SODIUM CHLORIDE, PRESERVATIVE FREE 10 ML: 5 INJECTION INTRAVENOUS at 20:38

## 2022-01-25 RX ADMIN — BENZONATATE 200 MG: 100 CAPSULE ORAL at 10:59

## 2022-01-25 RX ADMIN — ENOXAPARIN SODIUM 40 MG: 40 INJECTION SUBCUTANEOUS at 20:37

## 2022-01-25 RX ADMIN — SODIUM CHLORIDE, PRESERVATIVE FREE 10 ML: 5 INJECTION INTRAVENOUS at 10:48

## 2022-01-25 RX ADMIN — LORAZEPAM 1 MG: 2 INJECTION INTRAMUSCULAR; INTRAVENOUS at 11:39

## 2022-01-25 RX ADMIN — BARICITINIB 4 MG: 2 TABLET, FILM COATED ORAL at 10:47

## 2022-01-25 RX ADMIN — REMDESIVIR 100 MG: 100 INJECTION, POWDER, LYOPHILIZED, FOR SOLUTION INTRAVENOUS at 16:42

## 2022-01-25 RX ADMIN — ZINC SULFATE 220 MG (50 MG) CAPSULE 220 MG: CAPSULE at 10:47

## 2022-01-25 RX ADMIN — LORAZEPAM 1 MG: 2 INJECTION INTRAMUSCULAR; INTRAVENOUS at 13:28

## 2022-01-25 RX ADMIN — DEXAMETHASONE 6 MG: 4 TABLET ORAL at 10:47

## 2022-01-25 RX ADMIN — ATORVASTATIN CALCIUM 40 MG: 40 TABLET, FILM COATED ORAL at 10:47

## 2022-01-25 RX ADMIN — GUAIFENESIN 1200 MG: 600 TABLET, EXTENDED RELEASE ORAL at 10:47

## 2022-01-25 RX ADMIN — CHLORDIAZEPOXIDE HYDROCHLORIDE 10 MG: 5 CAPSULE ORAL at 13:28

## 2022-01-25 RX ADMIN — ENOXAPARIN SODIUM 40 MG: 40 INJECTION SUBCUTANEOUS at 10:54

## 2022-01-26 ENCOUNTER — APPOINTMENT (OUTPATIENT)
Dept: CARDIOLOGY | Facility: HOSPITAL | Age: 72
End: 2022-01-26

## 2022-01-26 LAB
ALBUMIN SERPL-MCNC: 2.6 G/DL (ref 3.5–5.2)
ALBUMIN/GLOB SERPL: 1 G/DL
ALP SERPL-CCNC: 37 U/L (ref 39–117)
ALT SERPL W P-5'-P-CCNC: 61 U/L (ref 1–41)
ANION GAP SERPL CALCULATED.3IONS-SCNC: 9.5 MMOL/L (ref 5–15)
AST SERPL-CCNC: 47 U/L (ref 1–40)
BACTERIA SPEC AEROBE CULT: NORMAL
BACTERIA SPEC AEROBE CULT: NORMAL
BASOPHILS # BLD AUTO: 0 10*3/MM3 (ref 0–0.2)
BASOPHILS NFR BLD AUTO: 0 % (ref 0–1.5)
BILIRUB SERPL-MCNC: 0.6 MG/DL (ref 0–1.2)
BUN SERPL-MCNC: 23 MG/DL (ref 8–23)
BUN/CREAT SERPL: 43.4 (ref 7–25)
CALCIUM SPEC-SCNC: 8.4 MG/DL (ref 8.6–10.5)
CHLORIDE SERPL-SCNC: 104 MMOL/L (ref 98–107)
CO2 SERPL-SCNC: 25.5 MMOL/L (ref 22–29)
CREAT SERPL-MCNC: 0.53 MG/DL (ref 0.76–1.27)
CRP SERPL-MCNC: 1.36 MG/DL (ref 0–0.5)
DEPRECATED RDW RBC AUTO: 43.3 FL (ref 37–54)
EOSINOPHIL # BLD AUTO: 0 10*3/MM3 (ref 0–0.4)
EOSINOPHIL NFR BLD AUTO: 0 % (ref 0.3–6.2)
ERYTHROCYTE [DISTWIDTH] IN BLOOD BY AUTOMATED COUNT: 13 % (ref 12.3–15.4)
FERRITIN SERPL-MCNC: 991 NG/ML (ref 30–400)
GFR SERPL CREATININE-BSD FRML MDRD: >150 ML/MIN/1.73
GLOBULIN UR ELPH-MCNC: 2.7 GM/DL
GLUCOSE SERPL-MCNC: 157 MG/DL (ref 65–99)
HCT VFR BLD AUTO: 36.7 % (ref 37.5–51)
HGB BLD-MCNC: 12.3 G/DL (ref 13–17.7)
IMM GRANULOCYTES # BLD AUTO: 0.04 10*3/MM3 (ref 0–0.05)
IMM GRANULOCYTES NFR BLD AUTO: 0.5 % (ref 0–0.5)
LYMPHOCYTES # BLD AUTO: 0.51 10*3/MM3 (ref 0.7–3.1)
LYMPHOCYTES NFR BLD AUTO: 6.3 % (ref 19.6–45.3)
MCH RBC QN AUTO: 30.4 PG (ref 26.6–33)
MCHC RBC AUTO-ENTMCNC: 33.5 G/DL (ref 31.5–35.7)
MCV RBC AUTO: 90.8 FL (ref 79–97)
MONOCYTES # BLD AUTO: 0.6 10*3/MM3 (ref 0.1–0.9)
MONOCYTES NFR BLD AUTO: 7.5 % (ref 5–12)
NEUTROPHILS NFR BLD AUTO: 6.89 10*3/MM3 (ref 1.7–7)
NEUTROPHILS NFR BLD AUTO: 85.7 % (ref 42.7–76)
NRBC BLD AUTO-RTO: 0 /100 WBC (ref 0–0.2)
PLATELET # BLD AUTO: 338 10*3/MM3 (ref 140–450)
PMV BLD AUTO: 10.5 FL (ref 6–12)
POTASSIUM SERPL-SCNC: 4.4 MMOL/L (ref 3.5–5.2)
PROCALCITONIN SERPL-MCNC: 0.02 NG/ML (ref 0–0.25)
PROT SERPL-MCNC: 5.3 G/DL (ref 6–8.5)
RBC # BLD AUTO: 4.04 10*6/MM3 (ref 4.14–5.8)
SODIUM SERPL-SCNC: 139 MMOL/L (ref 136–145)
WBC NRBC COR # BLD: 8.04 10*3/MM3 (ref 3.4–10.8)

## 2022-01-26 PROCEDURE — 86140 C-REACTIVE PROTEIN: CPT | Performed by: NURSE PRACTITIONER

## 2022-01-26 PROCEDURE — 25010000002 PERFLUTREN (DEFINITY) 8.476 MG IN SODIUM CHLORIDE (PF) 0.9 % 10 ML INJECTION: Performed by: INTERNAL MEDICINE

## 2022-01-26 PROCEDURE — 99233 SBSQ HOSP IP/OBS HIGH 50: CPT | Performed by: NURSE PRACTITIONER

## 2022-01-26 PROCEDURE — 84145 PROCALCITONIN (PCT): CPT | Performed by: NURSE PRACTITIONER

## 2022-01-26 PROCEDURE — 80053 COMPREHEN METABOLIC PANEL: CPT | Performed by: NURSE PRACTITIONER

## 2022-01-26 PROCEDURE — 93306 TTE W/DOPPLER COMPLETE: CPT

## 2022-01-26 PROCEDURE — 82728 ASSAY OF FERRITIN: CPT | Performed by: NURSE PRACTITIONER

## 2022-01-26 PROCEDURE — 63710000001 DEXAMETHASONE PER 0.25 MG: Performed by: INTERNAL MEDICINE

## 2022-01-26 PROCEDURE — 94799 UNLISTED PULMONARY SVC/PX: CPT

## 2022-01-26 PROCEDURE — 94761 N-INVAS EAR/PLS OXIMETRY MLT: CPT

## 2022-01-26 PROCEDURE — 93306 TTE W/DOPPLER COMPLETE: CPT | Performed by: INTERNAL MEDICINE

## 2022-01-26 PROCEDURE — 85025 COMPLETE CBC W/AUTO DIFF WBC: CPT | Performed by: NURSE PRACTITIONER

## 2022-01-26 PROCEDURE — 92523 SPEECH SOUND LANG COMPREHEN: CPT

## 2022-01-26 PROCEDURE — 25010000002 LORAZEPAM PER 2 MG: Performed by: HOSPITALIST

## 2022-01-26 PROCEDURE — 25010000002 ENOXAPARIN PER 10 MG: Performed by: NURSE PRACTITIONER

## 2022-01-26 RX ORDER — BENZONATATE 100 MG/1
200 CAPSULE ORAL 3 TIMES DAILY
Status: DISCONTINUED | OUTPATIENT
Start: 2022-01-26 | End: 2022-02-02 | Stop reason: HOSPADM

## 2022-01-26 RX ORDER — OLANZAPINE 5 MG/1
5 TABLET ORAL NIGHTLY
Status: DISCONTINUED | OUTPATIENT
Start: 2022-01-26 | End: 2022-02-01

## 2022-01-26 RX ORDER — CHLORDIAZEPOXIDE HYDROCHLORIDE 25 MG/1
25 CAPSULE, GELATIN COATED ORAL EVERY 8 HOURS SCHEDULED
Status: DISCONTINUED | OUTPATIENT
Start: 2022-01-26 | End: 2022-01-27

## 2022-01-26 RX ADMIN — SODIUM CHLORIDE 2 ML: 9 INJECTION INTRAMUSCULAR; INTRAVENOUS; SUBCUTANEOUS at 18:02

## 2022-01-26 RX ADMIN — LORAZEPAM 1 MG: 2 INJECTION INTRAMUSCULAR; INTRAVENOUS at 21:43

## 2022-01-26 RX ADMIN — BENZONATATE 200 MG: 100 CAPSULE ORAL at 20:56

## 2022-01-26 RX ADMIN — LORAZEPAM 1 MG: 2 INJECTION INTRAMUSCULAR; INTRAVENOUS at 15:21

## 2022-01-26 RX ADMIN — CHLORDIAZEPOXIDE HYDROCHLORIDE 25 MG: 25 CAPSULE ORAL at 21:00

## 2022-01-26 RX ADMIN — ENOXAPARIN SODIUM 40 MG: 40 INJECTION SUBCUTANEOUS at 10:00

## 2022-01-26 RX ADMIN — OLANZAPINE 5 MG: 5 TABLET, FILM COATED ORAL at 20:56

## 2022-01-26 RX ADMIN — ENOXAPARIN SODIUM 40 MG: 40 INJECTION SUBCUTANEOUS at 20:56

## 2022-01-26 RX ADMIN — CHLORDIAZEPOXIDE HYDROCHLORIDE 10 MG: 5 CAPSULE ORAL at 05:58

## 2022-01-26 RX ADMIN — ATORVASTATIN CALCIUM 40 MG: 40 TABLET, FILM COATED ORAL at 10:00

## 2022-01-26 RX ADMIN — BENZONATATE 200 MG: 100 CAPSULE ORAL at 10:00

## 2022-01-26 RX ADMIN — CHOLECALCIFEROL TAB 25 MCG (1000 UNIT) 1000 UNITS: 25 TAB at 10:00

## 2022-01-26 RX ADMIN — GUAIFENESIN 1200 MG: 600 TABLET, EXTENDED RELEASE ORAL at 20:56

## 2022-01-26 RX ADMIN — SODIUM CHLORIDE, PRESERVATIVE FREE 10 ML: 5 INJECTION INTRAVENOUS at 20:56

## 2022-01-26 RX ADMIN — ZINC SULFATE 220 MG (50 MG) CAPSULE 220 MG: CAPSULE at 10:01

## 2022-01-26 RX ADMIN — BARICITINIB 4 MG: 2 TABLET, FILM COATED ORAL at 10:01

## 2022-01-26 RX ADMIN — GUAIFENESIN 1200 MG: 600 TABLET, EXTENDED RELEASE ORAL at 10:01

## 2022-01-26 RX ADMIN — OXYCODONE HYDROCHLORIDE AND ACETAMINOPHEN 500 MG: 500 TABLET ORAL at 10:00

## 2022-01-26 RX ADMIN — DEXAMETHASONE 6 MG: 4 TABLET ORAL at 20:57

## 2022-01-26 RX ADMIN — LORAZEPAM 0.5 MG: 2 INJECTION INTRAMUSCULAR; INTRAVENOUS at 03:25

## 2022-01-26 RX ADMIN — SODIUM CHLORIDE, PRESERVATIVE FREE 10 ML: 5 INJECTION INTRAVENOUS at 10:07

## 2022-01-26 RX ADMIN — BENZONATATE 200 MG: 100 CAPSULE ORAL at 16:21

## 2022-01-26 RX ADMIN — DEXAMETHASONE 6 MG: 4 TABLET ORAL at 10:00

## 2022-01-26 RX ADMIN — LORAZEPAM 0.5 MG: 2 INJECTION INTRAMUSCULAR; INTRAVENOUS at 00:16

## 2022-01-26 RX ADMIN — CHLORDIAZEPOXIDE HYDROCHLORIDE 25 MG: 25 CAPSULE ORAL at 13:35

## 2022-01-27 ENCOUNTER — APPOINTMENT (OUTPATIENT)
Dept: GENERAL RADIOLOGY | Facility: HOSPITAL | Age: 72
End: 2022-01-27

## 2022-01-27 ENCOUNTER — APPOINTMENT (OUTPATIENT)
Dept: CT IMAGING | Facility: HOSPITAL | Age: 72
End: 2022-01-27

## 2022-01-27 LAB
ALBUMIN SERPL-MCNC: 2.5 G/DL (ref 3.5–5.2)
ALBUMIN/GLOB SERPL: 1 G/DL
ALP SERPL-CCNC: 36 U/L (ref 39–117)
ALT SERPL W P-5'-P-CCNC: 59 U/L (ref 1–41)
ANION GAP SERPL CALCULATED.3IONS-SCNC: 7.6 MMOL/L (ref 5–15)
AST SERPL-CCNC: 38 U/L (ref 1–40)
BASOPHILS # BLD AUTO: 0.01 10*3/MM3 (ref 0–0.2)
BASOPHILS NFR BLD AUTO: 0.1 % (ref 0–1.5)
BH CV ECHO MEAS - BSA(HAYCOCK): 2 M^2
BH CV ECHO MEAS - BSA: 1.9 M^2
BH CV ECHO MEAS - BZI_BMI: 28.7 KILOGRAMS/M^2
BH CV ECHO MEAS - BZI_METRIC_HEIGHT: 167.6 CM
BH CV ECHO MEAS - BZI_METRIC_WEIGHT: 80.7 KG
BH CV ECHO MEAS - EDV(CUBED): 128 ML
BH CV ECHO MEAS - EDV(MOD-SP2): 89.9 ML
BH CV ECHO MEAS - EDV(MOD-SP4): 125 ML
BH CV ECHO MEAS - EDV(TEICH): 120.5 ML
BH CV ECHO MEAS - EF(CUBED): 76 %
BH CV ECHO MEAS - EF(MOD-BP): 61.6 %
BH CV ECHO MEAS - EF(MOD-SP2): 61.3 %
BH CV ECHO MEAS - EF(MOD-SP4): 60 %
BH CV ECHO MEAS - EF(TEICH): 67.8 %
BH CV ECHO MEAS - ESV(CUBED): 30.7 ML
BH CV ECHO MEAS - ESV(MOD-SP2): 34.8 ML
BH CV ECHO MEAS - ESV(MOD-SP4): 50 ML
BH CV ECHO MEAS - ESV(TEICH): 38.8 ML
BH CV ECHO MEAS - FS: 37.9 %
BH CV ECHO MEAS - IVS/LVPW: 1.1
BH CV ECHO MEAS - IVSD: 1.2 CM
BH CV ECHO MEAS - LAT PEAK E' VEL: 9.6 CM/SEC
BH CV ECHO MEAS - LV DIASTOLIC VOL/BSA (35-75): 65.7 ML/M^2
BH CV ECHO MEAS - LV MASS(C)D: 228.5 GRAMS
BH CV ECHO MEAS - LV MASS(C)DI: 120 GRAMS/M^2
BH CV ECHO MEAS - LV SYSTOLIC VOL/BSA (12-30): 26.3 ML/M^2
BH CV ECHO MEAS - LVIDD: 5 CM
BH CV ECHO MEAS - LVIDS: 3.1 CM
BH CV ECHO MEAS - LVLD AP2: 7.5 CM
BH CV ECHO MEAS - LVLD AP4: 8 CM
BH CV ECHO MEAS - LVLS AP2: 6 CM
BH CV ECHO MEAS - LVLS AP4: 5.9 CM
BH CV ECHO MEAS - LVPWD: 1.1 CM
BH CV ECHO MEAS - MED PEAK E' VEL: 6.3 CM/SEC
BH CV ECHO MEAS - MV A MAX VEL: 73.7 CM/SEC
BH CV ECHO MEAS - MV DEC SLOPE: 304 CM/SEC^2
BH CV ECHO MEAS - MV DEC TIME: 0.19 SEC
BH CV ECHO MEAS - MV E MAX VEL: 60.8 CM/SEC
BH CV ECHO MEAS - MV E/A: 0.82
BH CV ECHO MEAS - MV MEAN PG: 1 MMHG
BH CV ECHO MEAS - MV P1/2T MAX VEL: 93.7 CM/SEC
BH CV ECHO MEAS - MV P1/2T: 90.3 MSEC
BH CV ECHO MEAS - MV V2 MEAN: 55.5 CM/SEC
BH CV ECHO MEAS - MV V2 VTI: 27.7 CM
BH CV ECHO MEAS - MVA P1/2T LCG: 2.3 CM^2
BH CV ECHO MEAS - MVA(P1/2T): 2.4 CM^2
BH CV ECHO MEAS - SI(CUBED): 51.1 ML/M^2
BH CV ECHO MEAS - SI(MOD-SP2): 28.9 ML/M^2
BH CV ECHO MEAS - SI(MOD-SP4): 39.4 ML/M^2
BH CV ECHO MEAS - SI(TEICH): 42.9 ML/M^2
BH CV ECHO MEAS - SV(CUBED): 97.4 ML
BH CV ECHO MEAS - SV(MOD-SP2): 55.1 ML
BH CV ECHO MEAS - SV(MOD-SP4): 75 ML
BH CV ECHO MEAS - SV(TEICH): 81.6 ML
BH CV ECHO MEASUREMENTS AVERAGE E/E' RATIO: 7.65
BILIRUB SERPL-MCNC: 0.5 MG/DL (ref 0–1.2)
BUN SERPL-MCNC: 20 MG/DL (ref 8–23)
BUN/CREAT SERPL: 35.7 (ref 7–25)
CALCIUM SPEC-SCNC: 8.6 MG/DL (ref 8.6–10.5)
CHLORIDE SERPL-SCNC: 109 MMOL/L (ref 98–107)
CO2 SERPL-SCNC: 26.4 MMOL/L (ref 22–29)
CREAT SERPL-MCNC: 0.56 MG/DL (ref 0.76–1.27)
CRP SERPL-MCNC: 0.9 MG/DL (ref 0–0.5)
D DIMER PPP FEU-MCNC: 1.35 MCGFEU/ML (ref 0–0.46)
DEPRECATED RDW RBC AUTO: 44.3 FL (ref 37–54)
EOSINOPHIL # BLD AUTO: 0 10*3/MM3 (ref 0–0.4)
EOSINOPHIL NFR BLD AUTO: 0 % (ref 0.3–6.2)
ERYTHROCYTE [DISTWIDTH] IN BLOOD BY AUTOMATED COUNT: 13.1 % (ref 12.3–15.4)
FERRITIN SERPL-MCNC: 932 NG/ML (ref 30–400)
GFR SERPL CREATININE-BSD FRML MDRD: 144 ML/MIN/1.73
GLOBULIN UR ELPH-MCNC: 2.4 GM/DL
GLUCOSE SERPL-MCNC: 158 MG/DL (ref 65–99)
HCT VFR BLD AUTO: 36.5 % (ref 37.5–51)
HGB BLD-MCNC: 12 G/DL (ref 13–17.7)
IMM GRANULOCYTES # BLD AUTO: 0.09 10*3/MM3 (ref 0–0.05)
IMM GRANULOCYTES NFR BLD AUTO: 0.7 % (ref 0–0.5)
LYMPHOCYTES # BLD AUTO: 0.52 10*3/MM3 (ref 0.7–3.1)
LYMPHOCYTES NFR BLD AUTO: 4 % (ref 19.6–45.3)
MAXIMAL PREDICTED HEART RATE: 149 BPM
MCH RBC QN AUTO: 30.2 PG (ref 26.6–33)
MCHC RBC AUTO-ENTMCNC: 32.9 G/DL (ref 31.5–35.7)
MCV RBC AUTO: 91.7 FL (ref 79–97)
MONOCYTES # BLD AUTO: 0.79 10*3/MM3 (ref 0.1–0.9)
MONOCYTES NFR BLD AUTO: 6 % (ref 5–12)
NEUTROPHILS NFR BLD AUTO: 11.65 10*3/MM3 (ref 1.7–7)
NEUTROPHILS NFR BLD AUTO: 89.2 % (ref 42.7–76)
NRBC BLD AUTO-RTO: 0 /100 WBC (ref 0–0.2)
PLATELET # BLD AUTO: 364 10*3/MM3 (ref 140–450)
PMV BLD AUTO: 10.6 FL (ref 6–12)
POTASSIUM SERPL-SCNC: 4.8 MMOL/L (ref 3.5–5.2)
PROCALCITONIN SERPL-MCNC: 0.03 NG/ML (ref 0–0.25)
PROT SERPL-MCNC: 4.9 G/DL (ref 6–8.5)
RBC # BLD AUTO: 3.98 10*6/MM3 (ref 4.14–5.8)
SODIUM SERPL-SCNC: 143 MMOL/L (ref 136–145)
STRESS TARGET HR: 127 BPM
WBC NRBC COR # BLD: 13.06 10*3/MM3 (ref 3.4–10.8)

## 2022-01-27 PROCEDURE — 94799 UNLISTED PULMONARY SVC/PX: CPT

## 2022-01-27 PROCEDURE — 86140 C-REACTIVE PROTEIN: CPT | Performed by: HOSPITALIST

## 2022-01-27 PROCEDURE — 25010000002 ENOXAPARIN PER 10 MG: Performed by: HOSPITALIST

## 2022-01-27 PROCEDURE — 82728 ASSAY OF FERRITIN: CPT | Performed by: NURSE PRACTITIONER

## 2022-01-27 PROCEDURE — 85025 COMPLETE CBC W/AUTO DIFF WBC: CPT | Performed by: NURSE PRACTITIONER

## 2022-01-27 PROCEDURE — 85379 FIBRIN DEGRADATION QUANT: CPT | Performed by: NURSE PRACTITIONER

## 2022-01-27 PROCEDURE — 25010000002 FUROSEMIDE PER 20 MG: Performed by: NURSE PRACTITIONER

## 2022-01-27 PROCEDURE — 71250 CT THORAX DX C-: CPT

## 2022-01-27 PROCEDURE — 63710000001 DEXAMETHASONE PER 0.25 MG: Performed by: HOSPITALIST

## 2022-01-27 PROCEDURE — 99233 SBSQ HOSP IP/OBS HIGH 50: CPT | Performed by: NURSE PRACTITIONER

## 2022-01-27 PROCEDURE — 97130 THER IVNTJ EA ADDL 15 MIN: CPT

## 2022-01-27 PROCEDURE — 94761 N-INVAS EAR/PLS OXIMETRY MLT: CPT

## 2022-01-27 PROCEDURE — 97129 THER IVNTJ 1ST 15 MIN: CPT

## 2022-01-27 PROCEDURE — 71045 X-RAY EXAM CHEST 1 VIEW: CPT

## 2022-01-27 PROCEDURE — 84145 PROCALCITONIN (PCT): CPT | Performed by: NURSE PRACTITIONER

## 2022-01-27 PROCEDURE — 80053 COMPREHEN METABOLIC PANEL: CPT | Performed by: NURSE PRACTITIONER

## 2022-01-27 RX ORDER — CHLORDIAZEPOXIDE HYDROCHLORIDE 5 MG/1
10 CAPSULE, GELATIN COATED ORAL EVERY 8 HOURS SCHEDULED
Status: COMPLETED | OUTPATIENT
Start: 2022-01-27 | End: 2022-01-28

## 2022-01-27 RX ORDER — FUROSEMIDE 10 MG/ML
40 INJECTION INTRAMUSCULAR; INTRAVENOUS ONCE
Status: COMPLETED | OUTPATIENT
Start: 2022-01-27 | End: 2022-01-27

## 2022-01-27 RX ADMIN — CHLORDIAZEPOXIDE HYDROCHLORIDE 10 MG: 5 CAPSULE ORAL at 21:04

## 2022-01-27 RX ADMIN — BENZONATATE 200 MG: 100 CAPSULE ORAL at 15:05

## 2022-01-27 RX ADMIN — OLANZAPINE 5 MG: 5 TABLET, FILM COATED ORAL at 21:04

## 2022-01-27 RX ADMIN — GUAIFENESIN 1200 MG: 600 TABLET, EXTENDED RELEASE ORAL at 21:04

## 2022-01-27 RX ADMIN — BENZONATATE 200 MG: 100 CAPSULE ORAL at 21:04

## 2022-01-27 RX ADMIN — CHLORDIAZEPOXIDE HYDROCHLORIDE 25 MG: 25 CAPSULE ORAL at 15:05

## 2022-01-27 RX ADMIN — HYDROCODONE BITARTRATE AND HOMATROPINE METHYLBROMIDE 5 ML: 5; 1.5 SOLUTION ORAL at 10:37

## 2022-01-27 RX ADMIN — OXYCODONE HYDROCHLORIDE AND ACETAMINOPHEN 500 MG: 500 TABLET ORAL at 09:19

## 2022-01-27 RX ADMIN — DEXAMETHASONE 6 MG: 4 TABLET ORAL at 09:19

## 2022-01-27 RX ADMIN — DEXAMETHASONE 6 MG: 4 TABLET ORAL at 21:04

## 2022-01-27 RX ADMIN — ENOXAPARIN SODIUM 40 MG: 40 INJECTION SUBCUTANEOUS at 09:18

## 2022-01-27 RX ADMIN — ATORVASTATIN CALCIUM 40 MG: 40 TABLET, FILM COATED ORAL at 09:19

## 2022-01-27 RX ADMIN — BARICITINIB 4 MG: 2 TABLET, FILM COATED ORAL at 09:20

## 2022-01-27 RX ADMIN — BENZONATATE 200 MG: 100 CAPSULE ORAL at 09:18

## 2022-01-27 RX ADMIN — ZINC SULFATE 220 MG (50 MG) CAPSULE 220 MG: CAPSULE at 09:18

## 2022-01-27 RX ADMIN — CHLORDIAZEPOXIDE HYDROCHLORIDE 25 MG: 25 CAPSULE ORAL at 09:21

## 2022-01-27 RX ADMIN — SODIUM CHLORIDE, PRESERVATIVE FREE 10 ML: 5 INJECTION INTRAVENOUS at 21:00

## 2022-01-27 RX ADMIN — LORAZEPAM 0.5 MG: 0.5 TABLET ORAL at 15:07

## 2022-01-27 RX ADMIN — ENOXAPARIN SODIUM 40 MG: 40 INJECTION SUBCUTANEOUS at 21:04

## 2022-01-27 RX ADMIN — CHOLECALCIFEROL TAB 25 MCG (1000 UNIT) 1000 UNITS: 25 TAB at 09:18

## 2022-01-27 RX ADMIN — SODIUM CHLORIDE, PRESERVATIVE FREE 10 ML: 5 INJECTION INTRAVENOUS at 09:20

## 2022-01-27 RX ADMIN — GUAIFENESIN 1200 MG: 600 TABLET, EXTENDED RELEASE ORAL at 09:19

## 2022-01-27 RX ADMIN — FUROSEMIDE 40 MG: 10 INJECTION, SOLUTION INTRAMUSCULAR; INTRAVENOUS at 10:37

## 2022-01-27 RX ADMIN — ACETAMINOPHEN 650 MG: 325 TABLET ORAL at 15:05

## 2022-01-28 LAB
ALBUMIN SERPL-MCNC: 3 G/DL (ref 3.5–5.2)
ALBUMIN/GLOB SERPL: 1.4 G/DL
ALP SERPL-CCNC: 39 U/L (ref 39–117)
ALT SERPL W P-5'-P-CCNC: 57 U/L (ref 1–41)
ANION GAP SERPL CALCULATED.3IONS-SCNC: 8.4 MMOL/L (ref 5–15)
AST SERPL-CCNC: 34 U/L (ref 1–40)
BASOPHILS # BLD AUTO: 0.01 10*3/MM3 (ref 0–0.2)
BASOPHILS NFR BLD AUTO: 0.1 % (ref 0–1.5)
BILIRUB SERPL-MCNC: 0.5 MG/DL (ref 0–1.2)
BUN SERPL-MCNC: 30 MG/DL (ref 8–23)
BUN/CREAT SERPL: 42.9 (ref 7–25)
CALCIUM SPEC-SCNC: 8.6 MG/DL (ref 8.6–10.5)
CHLORIDE SERPL-SCNC: 105 MMOL/L (ref 98–107)
CO2 SERPL-SCNC: 27.6 MMOL/L (ref 22–29)
CREAT SERPL-MCNC: 0.7 MG/DL (ref 0.76–1.27)
CRP SERPL-MCNC: 0.63 MG/DL (ref 0–0.5)
DEPRECATED RDW RBC AUTO: 43.6 FL (ref 37–54)
EOSINOPHIL # BLD AUTO: 0 10*3/MM3 (ref 0–0.4)
EOSINOPHIL NFR BLD AUTO: 0 % (ref 0.3–6.2)
ERYTHROCYTE [DISTWIDTH] IN BLOOD BY AUTOMATED COUNT: 13 % (ref 12.3–15.4)
FERRITIN SERPL-MCNC: 1182 NG/ML (ref 30–400)
GFR SERPL CREATININE-BSD FRML MDRD: 111 ML/MIN/1.73
GLOBULIN UR ELPH-MCNC: 2.2 GM/DL
GLUCOSE SERPL-MCNC: 184 MG/DL (ref 65–99)
HCT VFR BLD AUTO: 40.2 % (ref 37.5–51)
HGB BLD-MCNC: 13.6 G/DL (ref 13–17.7)
IMM GRANULOCYTES # BLD AUTO: 0.13 10*3/MM3 (ref 0–0.05)
IMM GRANULOCYTES NFR BLD AUTO: 1 % (ref 0–0.5)
LYMPHOCYTES # BLD AUTO: 0.43 10*3/MM3 (ref 0.7–3.1)
LYMPHOCYTES NFR BLD AUTO: 3.4 % (ref 19.6–45.3)
MCH RBC QN AUTO: 30.9 PG (ref 26.6–33)
MCHC RBC AUTO-ENTMCNC: 33.8 G/DL (ref 31.5–35.7)
MCV RBC AUTO: 91.4 FL (ref 79–97)
MONOCYTES # BLD AUTO: 0.49 10*3/MM3 (ref 0.1–0.9)
MONOCYTES NFR BLD AUTO: 3.9 % (ref 5–12)
NEUTROPHILS NFR BLD AUTO: 11.54 10*3/MM3 (ref 1.7–7)
NEUTROPHILS NFR BLD AUTO: 91.6 % (ref 42.7–76)
NRBC BLD AUTO-RTO: 0 /100 WBC (ref 0–0.2)
PLATELET # BLD AUTO: 388 10*3/MM3 (ref 140–450)
PMV BLD AUTO: 10.3 FL (ref 6–12)
POTASSIUM SERPL-SCNC: 4.7 MMOL/L (ref 3.5–5.2)
PROCALCITONIN SERPL-MCNC: 0.03 NG/ML (ref 0–0.25)
PROT SERPL-MCNC: 5.2 G/DL (ref 6–8.5)
RBC # BLD AUTO: 4.4 10*6/MM3 (ref 4.14–5.8)
SODIUM SERPL-SCNC: 141 MMOL/L (ref 136–145)
WBC NRBC COR # BLD: 12.6 10*3/MM3 (ref 3.4–10.8)

## 2022-01-28 PROCEDURE — 99232 SBSQ HOSP IP/OBS MODERATE 35: CPT | Performed by: NURSE PRACTITIONER

## 2022-01-28 PROCEDURE — 80053 COMPREHEN METABOLIC PANEL: CPT | Performed by: HOSPITALIST

## 2022-01-28 PROCEDURE — 25010000002 LORAZEPAM PER 2 MG: Performed by: HOSPITALIST

## 2022-01-28 PROCEDURE — 94761 N-INVAS EAR/PLS OXIMETRY MLT: CPT

## 2022-01-28 PROCEDURE — 86140 C-REACTIVE PROTEIN: CPT | Performed by: HOSPITALIST

## 2022-01-28 PROCEDURE — 85025 COMPLETE CBC W/AUTO DIFF WBC: CPT | Performed by: HOSPITALIST

## 2022-01-28 PROCEDURE — 94799 UNLISTED PULMONARY SVC/PX: CPT

## 2022-01-28 PROCEDURE — 82728 ASSAY OF FERRITIN: CPT | Performed by: HOSPITALIST

## 2022-01-28 PROCEDURE — 84145 PROCALCITONIN (PCT): CPT | Performed by: NURSE PRACTITIONER

## 2022-01-28 PROCEDURE — 25010000002 ENOXAPARIN PER 10 MG: Performed by: HOSPITALIST

## 2022-01-28 PROCEDURE — 63710000001 DEXAMETHASONE PER 0.25 MG: Performed by: HOSPITALIST

## 2022-01-28 RX ORDER — HYDROXYZINE HYDROCHLORIDE 10 MG/1
25 TABLET, FILM COATED ORAL EVERY 6 HOURS PRN
Status: DISCONTINUED | OUTPATIENT
Start: 2022-01-28 | End: 2022-01-29

## 2022-01-28 RX ADMIN — HYDROCODONE BITARTRATE AND HOMATROPINE METHYLBROMIDE 5 ML: 5; 1.5 SOLUTION ORAL at 20:01

## 2022-01-28 RX ADMIN — GUAIFENESIN 1200 MG: 600 TABLET, EXTENDED RELEASE ORAL at 20:07

## 2022-01-28 RX ADMIN — ZINC SULFATE 220 MG (50 MG) CAPSULE 220 MG: CAPSULE at 08:21

## 2022-01-28 RX ADMIN — CHLORDIAZEPOXIDE HYDROCHLORIDE 10 MG: 5 CAPSULE ORAL at 14:15

## 2022-01-28 RX ADMIN — DEXAMETHASONE 6 MG: 4 TABLET ORAL at 20:04

## 2022-01-28 RX ADMIN — CHLORDIAZEPOXIDE HYDROCHLORIDE 10 MG: 5 CAPSULE ORAL at 06:22

## 2022-01-28 RX ADMIN — BENZONATATE 200 MG: 100 CAPSULE ORAL at 20:07

## 2022-01-28 RX ADMIN — ENOXAPARIN SODIUM 40 MG: 40 INJECTION SUBCUTANEOUS at 08:20

## 2022-01-28 RX ADMIN — ATORVASTATIN CALCIUM 40 MG: 40 TABLET, FILM COATED ORAL at 08:20

## 2022-01-28 RX ADMIN — ENOXAPARIN SODIUM 40 MG: 40 INJECTION SUBCUTANEOUS at 20:18

## 2022-01-28 RX ADMIN — LORAZEPAM 1 MG: 2 INJECTION INTRAMUSCULAR; INTRAVENOUS at 12:18

## 2022-01-28 RX ADMIN — LORAZEPAM 0.5 MG: 0.5 TABLET ORAL at 08:21

## 2022-01-28 RX ADMIN — HYDROXYZINE HYDROCHLORIDE 25 MG: 10 TABLET, FILM COATED ORAL at 20:05

## 2022-01-28 RX ADMIN — DEXAMETHASONE 6 MG: 4 TABLET ORAL at 08:21

## 2022-01-28 RX ADMIN — BENZONATATE 200 MG: 100 CAPSULE ORAL at 08:20

## 2022-01-28 RX ADMIN — SODIUM CHLORIDE, PRESERVATIVE FREE 10 ML: 5 INJECTION INTRAVENOUS at 08:20

## 2022-01-28 RX ADMIN — MELATONIN TAB 5 MG 5 MG: 5 TAB at 22:33

## 2022-01-28 RX ADMIN — GUAIFENESIN 1200 MG: 600 TABLET, EXTENDED RELEASE ORAL at 08:20

## 2022-01-28 RX ADMIN — CHLORDIAZEPOXIDE HYDROCHLORIDE 10 MG: 5 CAPSULE ORAL at 21:18

## 2022-01-28 RX ADMIN — OXYCODONE HYDROCHLORIDE AND ACETAMINOPHEN 500 MG: 500 TABLET ORAL at 08:21

## 2022-01-28 RX ADMIN — OLANZAPINE 5 MG: 5 TABLET, FILM COATED ORAL at 20:09

## 2022-01-28 RX ADMIN — CHOLECALCIFEROL TAB 25 MCG (1000 UNIT) 1000 UNITS: 25 TAB at 08:21

## 2022-01-28 RX ADMIN — BARICITINIB 4 MG: 2 TABLET, FILM COATED ORAL at 10:00

## 2022-01-29 ENCOUNTER — APPOINTMENT (OUTPATIENT)
Dept: GENERAL RADIOLOGY | Facility: HOSPITAL | Age: 72
End: 2022-01-29

## 2022-01-29 LAB
ALBUMIN SERPL-MCNC: 2.7 G/DL (ref 3.5–5.2)
ALBUMIN/GLOB SERPL: 1.2 G/DL
ALP SERPL-CCNC: 30 U/L (ref 39–117)
ALT SERPL W P-5'-P-CCNC: 52 U/L (ref 1–41)
ANION GAP SERPL CALCULATED.3IONS-SCNC: 7.1 MMOL/L (ref 5–15)
AST SERPL-CCNC: 25 U/L (ref 1–40)
BASOPHILS # BLD AUTO: 0.02 10*3/MM3 (ref 0–0.2)
BASOPHILS NFR BLD AUTO: 0.1 % (ref 0–1.5)
BILIRUB SERPL-MCNC: 0.6 MG/DL (ref 0–1.2)
BUN SERPL-MCNC: 23 MG/DL (ref 8–23)
BUN/CREAT SERPL: 39.7 (ref 7–25)
CALCIUM SPEC-SCNC: 8.8 MG/DL (ref 8.6–10.5)
CHLORIDE SERPL-SCNC: 107 MMOL/L (ref 98–107)
CO2 SERPL-SCNC: 24.9 MMOL/L (ref 22–29)
CREAT SERPL-MCNC: 0.58 MG/DL (ref 0.76–1.27)
CRP SERPL-MCNC: 0.34 MG/DL (ref 0–0.5)
D DIMER PPP FEU-MCNC: 1.29 MCGFEU/ML (ref 0–0.46)
DEPRECATED RDW RBC AUTO: 43.4 FL (ref 37–54)
EOSINOPHIL # BLD AUTO: 0 10*3/MM3 (ref 0–0.4)
EOSINOPHIL NFR BLD AUTO: 0 % (ref 0.3–6.2)
ERYTHROCYTE [DISTWIDTH] IN BLOOD BY AUTOMATED COUNT: 13.1 % (ref 12.3–15.4)
FERRITIN SERPL-MCNC: 1101 NG/ML (ref 30–400)
GFR SERPL CREATININE-BSD FRML MDRD: 138 ML/MIN/1.73
GLOBULIN UR ELPH-MCNC: 2.2 GM/DL
GLUCOSE SERPL-MCNC: 141 MG/DL (ref 65–99)
HCT VFR BLD AUTO: 38.4 % (ref 37.5–51)
HGB BLD-MCNC: 13 G/DL (ref 13–17.7)
IMM GRANULOCYTES # BLD AUTO: 0.23 10*3/MM3 (ref 0–0.05)
IMM GRANULOCYTES NFR BLD AUTO: 1.6 % (ref 0–0.5)
LYMPHOCYTES # BLD AUTO: 0.59 10*3/MM3 (ref 0.7–3.1)
LYMPHOCYTES NFR BLD AUTO: 4.1 % (ref 19.6–45.3)
MCH RBC QN AUTO: 30.9 PG (ref 26.6–33)
MCHC RBC AUTO-ENTMCNC: 33.9 G/DL (ref 31.5–35.7)
MCV RBC AUTO: 91.2 FL (ref 79–97)
MONOCYTES # BLD AUTO: 0.91 10*3/MM3 (ref 0.1–0.9)
MONOCYTES NFR BLD AUTO: 6.3 % (ref 5–12)
NEUTROPHILS NFR BLD AUTO: 12.75 10*3/MM3 (ref 1.7–7)
NEUTROPHILS NFR BLD AUTO: 87.9 % (ref 42.7–76)
NRBC BLD AUTO-RTO: 0 /100 WBC (ref 0–0.2)
PLATELET # BLD AUTO: 381 10*3/MM3 (ref 140–450)
PMV BLD AUTO: 10.3 FL (ref 6–12)
POTASSIUM SERPL-SCNC: 4.9 MMOL/L (ref 3.5–5.2)
PROT SERPL-MCNC: 4.9 G/DL (ref 6–8.5)
RBC # BLD AUTO: 4.21 10*6/MM3 (ref 4.14–5.8)
SODIUM SERPL-SCNC: 139 MMOL/L (ref 136–145)
WBC NRBC COR # BLD: 14.5 10*3/MM3 (ref 3.4–10.8)

## 2022-01-29 PROCEDURE — 80053 COMPREHEN METABOLIC PANEL: CPT | Performed by: HOSPITALIST

## 2022-01-29 PROCEDURE — 71045 X-RAY EXAM CHEST 1 VIEW: CPT

## 2022-01-29 PROCEDURE — 63710000001 DEXAMETHASONE PER 0.25 MG: Performed by: HOSPITALIST

## 2022-01-29 PROCEDURE — 25010000002 ENOXAPARIN PER 10 MG: Performed by: HOSPITALIST

## 2022-01-29 PROCEDURE — 94761 N-INVAS EAR/PLS OXIMETRY MLT: CPT

## 2022-01-29 PROCEDURE — 82728 ASSAY OF FERRITIN: CPT | Performed by: HOSPITALIST

## 2022-01-29 PROCEDURE — 85379 FIBRIN DEGRADATION QUANT: CPT | Performed by: NURSE PRACTITIONER

## 2022-01-29 PROCEDURE — 94799 UNLISTED PULMONARY SVC/PX: CPT

## 2022-01-29 PROCEDURE — 99233 SBSQ HOSP IP/OBS HIGH 50: CPT | Performed by: INTERNAL MEDICINE

## 2022-01-29 PROCEDURE — 86140 C-REACTIVE PROTEIN: CPT | Performed by: HOSPITALIST

## 2022-01-29 PROCEDURE — 85025 COMPLETE CBC W/AUTO DIFF WBC: CPT | Performed by: HOSPITALIST

## 2022-01-29 RX ORDER — CHLORDIAZEPOXIDE HYDROCHLORIDE 5 MG/1
5 CAPSULE, GELATIN COATED ORAL EVERY 6 HOURS PRN
Status: DISCONTINUED | OUTPATIENT
Start: 2022-01-29 | End: 2022-02-02 | Stop reason: HOSPADM

## 2022-01-29 RX ORDER — ATORVASTATIN CALCIUM 10 MG/1
10 TABLET, FILM COATED ORAL DAILY
Status: DISCONTINUED | OUTPATIENT
Start: 2022-01-30 | End: 2022-02-02 | Stop reason: HOSPADM

## 2022-01-29 RX ORDER — LORAZEPAM 2 MG/ML
1 INJECTION INTRAMUSCULAR EVERY 4 HOURS PRN
Status: DISCONTINUED | OUTPATIENT
Start: 2022-01-29 | End: 2022-01-29

## 2022-01-29 RX ORDER — CHLORDIAZEPOXIDE HYDROCHLORIDE 5 MG/1
5 CAPSULE, GELATIN COATED ORAL EVERY 12 HOURS SCHEDULED
Status: DISCONTINUED | OUTPATIENT
Start: 2022-01-29 | End: 2022-01-29

## 2022-01-29 RX ADMIN — ZINC SULFATE 220 MG (50 MG) CAPSULE 220 MG: CAPSULE at 09:33

## 2022-01-29 RX ADMIN — BENZONATATE 200 MG: 100 CAPSULE ORAL at 20:14

## 2022-01-29 RX ADMIN — ENOXAPARIN SODIUM 40 MG: 40 INJECTION SUBCUTANEOUS at 20:16

## 2022-01-29 RX ADMIN — DEXAMETHASONE 6 MG: 4 TABLET ORAL at 09:32

## 2022-01-29 RX ADMIN — MELATONIN TAB 5 MG 5 MG: 5 TAB at 20:14

## 2022-01-29 RX ADMIN — HYDROXYZINE HYDROCHLORIDE 25 MG: 10 TABLET, FILM COATED ORAL at 09:33

## 2022-01-29 RX ADMIN — ATORVASTATIN CALCIUM 40 MG: 40 TABLET, FILM COATED ORAL at 09:33

## 2022-01-29 RX ADMIN — OXYCODONE HYDROCHLORIDE AND ACETAMINOPHEN 500 MG: 500 TABLET ORAL at 09:32

## 2022-01-29 RX ADMIN — GUAIFENESIN 1200 MG: 600 TABLET, EXTENDED RELEASE ORAL at 20:14

## 2022-01-29 RX ADMIN — HYDROCODONE BITARTRATE AND HOMATROPINE METHYLBROMIDE 5 ML: 5; 1.5 SOLUTION ORAL at 21:35

## 2022-01-29 RX ADMIN — BARICITINIB 4 MG: 2 TABLET, FILM COATED ORAL at 09:40

## 2022-01-29 RX ADMIN — HYDROXYZINE HYDROCHLORIDE 25 MG: 10 TABLET, FILM COATED ORAL at 01:27

## 2022-01-29 RX ADMIN — OLANZAPINE 5 MG: 5 TABLET, FILM COATED ORAL at 20:14

## 2022-01-29 RX ADMIN — CHLORDIAZEPOXIDE HYDROCHLORIDE 5 MG: 5 CAPSULE ORAL at 15:59

## 2022-01-29 RX ADMIN — CHLORDIAZEPOXIDE HYDROCHLORIDE 5 MG: 5 CAPSULE ORAL at 21:35

## 2022-01-29 RX ADMIN — CHOLECALCIFEROL TAB 25 MCG (1000 UNIT) 1000 UNITS: 25 TAB at 09:33

## 2022-01-29 RX ADMIN — HYDROCODONE BITARTRATE AND HOMATROPINE METHYLBROMIDE 5 ML: 5; 1.5 SOLUTION ORAL at 09:33

## 2022-01-29 RX ADMIN — DEXAMETHASONE 6 MG: 4 TABLET ORAL at 20:14

## 2022-01-29 RX ADMIN — GUAIFENESIN 1200 MG: 600 TABLET, EXTENDED RELEASE ORAL at 09:33

## 2022-01-29 RX ADMIN — BENZONATATE 200 MG: 100 CAPSULE ORAL at 09:33

## 2022-01-29 RX ADMIN — ENOXAPARIN SODIUM 40 MG: 40 INJECTION SUBCUTANEOUS at 09:33

## 2022-01-29 RX ADMIN — BENZONATATE 200 MG: 100 CAPSULE ORAL at 15:59

## 2022-01-30 LAB
ALBUMIN SERPL-MCNC: 2.7 G/DL (ref 3.5–5.2)
ALBUMIN/GLOB SERPL: 1.1 G/DL
ALP SERPL-CCNC: 35 U/L (ref 39–117)
ALT SERPL W P-5'-P-CCNC: 56 U/L (ref 1–41)
ANION GAP SERPL CALCULATED.3IONS-SCNC: 7.5 MMOL/L (ref 5–15)
AST SERPL-CCNC: 24 U/L (ref 1–40)
BASOPHILS # BLD AUTO: 0.01 10*3/MM3 (ref 0–0.2)
BASOPHILS NFR BLD AUTO: 0.1 % (ref 0–1.5)
BILIRUB SERPL-MCNC: 0.5 MG/DL (ref 0–1.2)
BUN SERPL-MCNC: 22 MG/DL (ref 8–23)
BUN/CREAT SERPL: 35.5 (ref 7–25)
CALCIUM SPEC-SCNC: 8.8 MG/DL (ref 8.6–10.5)
CHLORIDE SERPL-SCNC: 103 MMOL/L (ref 98–107)
CO2 SERPL-SCNC: 24.5 MMOL/L (ref 22–29)
CREAT SERPL-MCNC: 0.62 MG/DL (ref 0.76–1.27)
CRP SERPL-MCNC: <0.3 MG/DL (ref 0–0.5)
DEPRECATED RDW RBC AUTO: 44.5 FL (ref 37–54)
EOSINOPHIL # BLD AUTO: 0 10*3/MM3 (ref 0–0.4)
EOSINOPHIL NFR BLD AUTO: 0 % (ref 0.3–6.2)
ERYTHROCYTE [DISTWIDTH] IN BLOOD BY AUTOMATED COUNT: 13.2 % (ref 12.3–15.4)
FERRITIN SERPL-MCNC: 1262 NG/ML (ref 30–400)
GFR SERPL CREATININE-BSD FRML MDRD: 128 ML/MIN/1.73
GLOBULIN UR ELPH-MCNC: 2.5 GM/DL
GLUCOSE SERPL-MCNC: 151 MG/DL (ref 65–99)
HCT VFR BLD AUTO: 40.6 % (ref 37.5–51)
HGB BLD-MCNC: 13.6 G/DL (ref 13–17.7)
IMM GRANULOCYTES # BLD AUTO: 0.37 10*3/MM3 (ref 0–0.05)
IMM GRANULOCYTES NFR BLD AUTO: 2.5 % (ref 0–0.5)
LYMPHOCYTES # BLD AUTO: 0.44 10*3/MM3 (ref 0.7–3.1)
LYMPHOCYTES NFR BLD AUTO: 2.9 % (ref 19.6–45.3)
MCH RBC QN AUTO: 30.7 PG (ref 26.6–33)
MCHC RBC AUTO-ENTMCNC: 33.5 G/DL (ref 31.5–35.7)
MCV RBC AUTO: 91.6 FL (ref 79–97)
MONOCYTES # BLD AUTO: 0.77 10*3/MM3 (ref 0.1–0.9)
MONOCYTES NFR BLD AUTO: 5.2 % (ref 5–12)
NEUTROPHILS NFR BLD AUTO: 13.34 10*3/MM3 (ref 1.7–7)
NEUTROPHILS NFR BLD AUTO: 89.3 % (ref 42.7–76)
NRBC BLD AUTO-RTO: 0 /100 WBC (ref 0–0.2)
PLATELET # BLD AUTO: 389 10*3/MM3 (ref 140–450)
PMV BLD AUTO: 10.5 FL (ref 6–12)
POTASSIUM SERPL-SCNC: 4.7 MMOL/L (ref 3.5–5.2)
PROT SERPL-MCNC: 5.2 G/DL (ref 6–8.5)
RBC # BLD AUTO: 4.43 10*6/MM3 (ref 4.14–5.8)
SODIUM SERPL-SCNC: 135 MMOL/L (ref 136–145)
WBC NRBC COR # BLD: 14.93 10*3/MM3 (ref 3.4–10.8)

## 2022-01-30 PROCEDURE — 94799 UNLISTED PULMONARY SVC/PX: CPT

## 2022-01-30 PROCEDURE — 94761 N-INVAS EAR/PLS OXIMETRY MLT: CPT

## 2022-01-30 PROCEDURE — 63710000001 DEXAMETHASONE PER 0.25 MG: Performed by: HOSPITALIST

## 2022-01-30 PROCEDURE — 99233 SBSQ HOSP IP/OBS HIGH 50: CPT | Performed by: INTERNAL MEDICINE

## 2022-01-30 PROCEDURE — 86140 C-REACTIVE PROTEIN: CPT | Performed by: HOSPITALIST

## 2022-01-30 PROCEDURE — 25010000002 ENOXAPARIN PER 10 MG: Performed by: HOSPITALIST

## 2022-01-30 PROCEDURE — 85025 COMPLETE CBC W/AUTO DIFF WBC: CPT | Performed by: HOSPITALIST

## 2022-01-30 PROCEDURE — 82728 ASSAY OF FERRITIN: CPT | Performed by: HOSPITALIST

## 2022-01-30 PROCEDURE — 80053 COMPREHEN METABOLIC PANEL: CPT | Performed by: HOSPITALIST

## 2022-01-30 RX ADMIN — OLANZAPINE 5 MG: 5 TABLET, FILM COATED ORAL at 20:50

## 2022-01-30 RX ADMIN — ENOXAPARIN SODIUM 40 MG: 40 INJECTION SUBCUTANEOUS at 09:18

## 2022-01-30 RX ADMIN — ZINC SULFATE 220 MG (50 MG) CAPSULE 220 MG: CAPSULE at 09:17

## 2022-01-30 RX ADMIN — OXYCODONE HYDROCHLORIDE AND ACETAMINOPHEN 500 MG: 500 TABLET ORAL at 09:16

## 2022-01-30 RX ADMIN — BENZONATATE 200 MG: 100 CAPSULE ORAL at 09:17

## 2022-01-30 RX ADMIN — HYDROCODONE BITARTRATE AND HOMATROPINE METHYLBROMIDE 5 ML: 5; 1.5 SOLUTION ORAL at 20:52

## 2022-01-30 RX ADMIN — BENZONATATE 200 MG: 100 CAPSULE ORAL at 20:51

## 2022-01-30 RX ADMIN — BENZONATATE 200 MG: 100 CAPSULE ORAL at 16:51

## 2022-01-30 RX ADMIN — CHLORDIAZEPOXIDE HYDROCHLORIDE 5 MG: 5 CAPSULE ORAL at 20:50

## 2022-01-30 RX ADMIN — CHLORDIAZEPOXIDE HYDROCHLORIDE 5 MG: 5 CAPSULE ORAL at 14:04

## 2022-01-30 RX ADMIN — GUAIFENESIN 1200 MG: 600 TABLET, EXTENDED RELEASE ORAL at 09:16

## 2022-01-30 RX ADMIN — ATORVASTATIN CALCIUM 10 MG: 10 TABLET, FILM COATED ORAL at 09:17

## 2022-01-30 RX ADMIN — BARICITINIB 4 MG: 2 TABLET, FILM COATED ORAL at 09:17

## 2022-01-30 RX ADMIN — CHOLECALCIFEROL TAB 25 MCG (1000 UNIT) 1000 UNITS: 25 TAB at 09:17

## 2022-01-30 RX ADMIN — GUAIFENESIN 1200 MG: 600 TABLET, EXTENDED RELEASE ORAL at 20:51

## 2022-01-30 RX ADMIN — DEXAMETHASONE 6 MG: 4 TABLET ORAL at 20:50

## 2022-01-30 RX ADMIN — MELATONIN TAB 5 MG 5 MG: 5 TAB at 20:52

## 2022-01-30 RX ADMIN — ENOXAPARIN SODIUM 40 MG: 40 INJECTION SUBCUTANEOUS at 20:52

## 2022-01-30 RX ADMIN — HYDROCODONE BITARTRATE AND HOMATROPINE METHYLBROMIDE 5 ML: 5; 1.5 SOLUTION ORAL at 10:17

## 2022-01-30 RX ADMIN — DEXAMETHASONE 6 MG: 4 TABLET ORAL at 09:17

## 2022-01-31 LAB
ALBUMIN SERPL-MCNC: 3 G/DL (ref 3.5–5.2)
ALBUMIN/GLOB SERPL: 1.4 G/DL
ALP SERPL-CCNC: 33 U/L (ref 39–117)
ALT SERPL W P-5'-P-CCNC: 60 U/L (ref 1–41)
ANION GAP SERPL CALCULATED.3IONS-SCNC: 7.9 MMOL/L (ref 5–15)
AST SERPL-CCNC: 28 U/L (ref 1–40)
BASOPHILS # BLD AUTO: 0.02 10*3/MM3 (ref 0–0.2)
BASOPHILS NFR BLD AUTO: 0.1 % (ref 0–1.5)
BILIRUB SERPL-MCNC: 0.6 MG/DL (ref 0–1.2)
BUN SERPL-MCNC: 24 MG/DL (ref 8–23)
BUN/CREAT SERPL: 42.1 (ref 7–25)
CALCIUM SPEC-SCNC: 8.7 MG/DL (ref 8.6–10.5)
CHLORIDE SERPL-SCNC: 103 MMOL/L (ref 98–107)
CO2 SERPL-SCNC: 23.1 MMOL/L (ref 22–29)
CREAT SERPL-MCNC: 0.57 MG/DL (ref 0.76–1.27)
CRP SERPL-MCNC: <0.3 MG/DL (ref 0–0.5)
D DIMER PPP FEU-MCNC: 0.97 MCGFEU/ML (ref 0–0.46)
DEPRECATED RDW RBC AUTO: 42.9 FL (ref 37–54)
EOSINOPHIL # BLD AUTO: 0.01 10*3/MM3 (ref 0–0.4)
EOSINOPHIL NFR BLD AUTO: 0.1 % (ref 0.3–6.2)
ERYTHROCYTE [DISTWIDTH] IN BLOOD BY AUTOMATED COUNT: 13.1 % (ref 12.3–15.4)
GFR SERPL CREATININE-BSD FRML MDRD: 141 ML/MIN/1.73
GLOBULIN UR ELPH-MCNC: 2.2 GM/DL
GLUCOSE SERPL-MCNC: 168 MG/DL (ref 65–99)
HCT VFR BLD AUTO: 40.7 % (ref 37.5–51)
HGB BLD-MCNC: 13.8 G/DL (ref 13–17.7)
IMM GRANULOCYTES # BLD AUTO: 0.38 10*3/MM3 (ref 0–0.05)
IMM GRANULOCYTES NFR BLD AUTO: 2.3 % (ref 0–0.5)
LYMPHOCYTES # BLD AUTO: 0.36 10*3/MM3 (ref 0.7–3.1)
LYMPHOCYTES NFR BLD AUTO: 2.2 % (ref 19.6–45.3)
MCH RBC QN AUTO: 30.8 PG (ref 26.6–33)
MCHC RBC AUTO-ENTMCNC: 33.9 G/DL (ref 31.5–35.7)
MCV RBC AUTO: 90.8 FL (ref 79–97)
MONOCYTES # BLD AUTO: 0.68 10*3/MM3 (ref 0.1–0.9)
MONOCYTES NFR BLD AUTO: 4.1 % (ref 5–12)
NEUTROPHILS NFR BLD AUTO: 15.07 10*3/MM3 (ref 1.7–7)
NEUTROPHILS NFR BLD AUTO: 91.2 % (ref 42.7–76)
NRBC BLD AUTO-RTO: 0 /100 WBC (ref 0–0.2)
PLATELET # BLD AUTO: 405 10*3/MM3 (ref 140–450)
PMV BLD AUTO: 10.4 FL (ref 6–12)
POTASSIUM SERPL-SCNC: 4.6 MMOL/L (ref 3.5–5.2)
PROCALCITONIN SERPL-MCNC: 0.04 NG/ML (ref 0–0.25)
PROT SERPL-MCNC: 5.2 G/DL (ref 6–8.5)
RBC # BLD AUTO: 4.48 10*6/MM3 (ref 4.14–5.8)
SODIUM SERPL-SCNC: 134 MMOL/L (ref 136–145)
WBC NRBC COR # BLD: 16.52 10*3/MM3 (ref 3.4–10.8)

## 2022-01-31 PROCEDURE — 97129 THER IVNTJ 1ST 15 MIN: CPT

## 2022-01-31 PROCEDURE — 86140 C-REACTIVE PROTEIN: CPT | Performed by: HOSPITALIST

## 2022-01-31 PROCEDURE — 97161 PT EVAL LOW COMPLEX 20 MIN: CPT

## 2022-01-31 PROCEDURE — 99233 SBSQ HOSP IP/OBS HIGH 50: CPT | Performed by: INTERNAL MEDICINE

## 2022-01-31 PROCEDURE — 94799 UNLISTED PULMONARY SVC/PX: CPT

## 2022-01-31 PROCEDURE — 84145 PROCALCITONIN (PCT): CPT | Performed by: INTERNAL MEDICINE

## 2022-01-31 PROCEDURE — 85025 COMPLETE CBC W/AUTO DIFF WBC: CPT | Performed by: HOSPITALIST

## 2022-01-31 PROCEDURE — 97165 OT EVAL LOW COMPLEX 30 MIN: CPT

## 2022-01-31 PROCEDURE — 25010000002 ENOXAPARIN PER 10 MG: Performed by: HOSPITALIST

## 2022-01-31 PROCEDURE — 80053 COMPREHEN METABOLIC PANEL: CPT | Performed by: HOSPITALIST

## 2022-01-31 PROCEDURE — 85379 FIBRIN DEGRADATION QUANT: CPT | Performed by: NURSE PRACTITIONER

## 2022-01-31 PROCEDURE — 63710000001 DEXAMETHASONE PER 0.25 MG: Performed by: HOSPITALIST

## 2022-01-31 PROCEDURE — 97130 THER IVNTJ EA ADDL 15 MIN: CPT

## 2022-01-31 PROCEDURE — 94761 N-INVAS EAR/PLS OXIMETRY MLT: CPT

## 2022-01-31 RX ADMIN — BENZONATATE 200 MG: 100 CAPSULE ORAL at 20:53

## 2022-01-31 RX ADMIN — OLANZAPINE 5 MG: 5 TABLET, FILM COATED ORAL at 20:54

## 2022-01-31 RX ADMIN — ENOXAPARIN SODIUM 40 MG: 40 INJECTION SUBCUTANEOUS at 08:54

## 2022-01-31 RX ADMIN — BARICITINIB 4 MG: 2 TABLET, FILM COATED ORAL at 08:55

## 2022-01-31 RX ADMIN — BENZONATATE 200 MG: 100 CAPSULE ORAL at 08:55

## 2022-01-31 RX ADMIN — CHOLECALCIFEROL TAB 25 MCG (1000 UNIT) 1000 UNITS: 25 TAB at 08:55

## 2022-01-31 RX ADMIN — DEXAMETHASONE 6 MG: 4 TABLET ORAL at 20:54

## 2022-01-31 RX ADMIN — GUAIFENESIN 1200 MG: 600 TABLET, EXTENDED RELEASE ORAL at 20:54

## 2022-01-31 RX ADMIN — BENZONATATE 200 MG: 100 CAPSULE ORAL at 17:10

## 2022-01-31 RX ADMIN — OXYCODONE HYDROCHLORIDE AND ACETAMINOPHEN 500 MG: 500 TABLET ORAL at 08:55

## 2022-01-31 RX ADMIN — DEXAMETHASONE 6 MG: 4 TABLET ORAL at 08:55

## 2022-01-31 RX ADMIN — SODIUM CHLORIDE, PRESERVATIVE FREE 10 ML: 5 INJECTION INTRAVENOUS at 20:54

## 2022-01-31 RX ADMIN — ATORVASTATIN CALCIUM 10 MG: 10 TABLET, FILM COATED ORAL at 08:55

## 2022-01-31 RX ADMIN — ZINC SULFATE 220 MG (50 MG) CAPSULE 220 MG: CAPSULE at 08:55

## 2022-01-31 RX ADMIN — GUAIFENESIN 1200 MG: 600 TABLET, EXTENDED RELEASE ORAL at 08:54

## 2022-01-31 RX ADMIN — ENOXAPARIN SODIUM 40 MG: 40 INJECTION SUBCUTANEOUS at 20:54

## 2022-02-01 ENCOUNTER — APPOINTMENT (OUTPATIENT)
Dept: CT IMAGING | Facility: HOSPITAL | Age: 72
End: 2022-02-01

## 2022-02-01 LAB
ALBUMIN SERPL-MCNC: 3 G/DL (ref 3.5–5.2)
ALBUMIN/GLOB SERPL: 1.2 G/DL
ALP SERPL-CCNC: 37 U/L (ref 39–117)
ALT SERPL W P-5'-P-CCNC: 55 U/L (ref 1–41)
ANION GAP SERPL CALCULATED.3IONS-SCNC: 9.8 MMOL/L (ref 5–15)
AST SERPL-CCNC: 25 U/L (ref 1–40)
BASOPHILS # BLD AUTO: 0.03 10*3/MM3 (ref 0–0.2)
BASOPHILS NFR BLD AUTO: 0.2 % (ref 0–1.5)
BILIRUB SERPL-MCNC: 0.6 MG/DL (ref 0–1.2)
BUN SERPL-MCNC: 24 MG/DL (ref 8–23)
BUN/CREAT SERPL: 38.7 (ref 7–25)
CALCIUM SPEC-SCNC: 9.1 MG/DL (ref 8.6–10.5)
CHLORIDE SERPL-SCNC: 106 MMOL/L (ref 98–107)
CO2 SERPL-SCNC: 23.2 MMOL/L (ref 22–29)
CREAT SERPL-MCNC: 0.62 MG/DL (ref 0.76–1.27)
CRP SERPL-MCNC: <0.3 MG/DL (ref 0–0.5)
DEPRECATED RDW RBC AUTO: 43.5 FL (ref 37–54)
EOSINOPHIL # BLD AUTO: 0 10*3/MM3 (ref 0–0.4)
EOSINOPHIL NFR BLD AUTO: 0 % (ref 0.3–6.2)
ERYTHROCYTE [DISTWIDTH] IN BLOOD BY AUTOMATED COUNT: 13.1 % (ref 12.3–15.4)
GFR SERPL CREATININE-BSD FRML MDRD: 128 ML/MIN/1.73
GLOBULIN UR ELPH-MCNC: 2.5 GM/DL
GLUCOSE SERPL-MCNC: 161 MG/DL (ref 65–99)
HCT VFR BLD AUTO: 41.3 % (ref 37.5–51)
HGB BLD-MCNC: 13.8 G/DL (ref 13–17.7)
IMM GRANULOCYTES # BLD AUTO: 0.36 10*3/MM3 (ref 0–0.05)
IMM GRANULOCYTES NFR BLD AUTO: 2.3 % (ref 0–0.5)
LYMPHOCYTES # BLD AUTO: 0.41 10*3/MM3 (ref 0.7–3.1)
LYMPHOCYTES NFR BLD AUTO: 2.6 % (ref 19.6–45.3)
MCH RBC QN AUTO: 30.5 PG (ref 26.6–33)
MCHC RBC AUTO-ENTMCNC: 33.4 G/DL (ref 31.5–35.7)
MCV RBC AUTO: 91.2 FL (ref 79–97)
MONOCYTES # BLD AUTO: 0.58 10*3/MM3 (ref 0.1–0.9)
MONOCYTES NFR BLD AUTO: 3.7 % (ref 5–12)
NEUTROPHILS NFR BLD AUTO: 14.43 10*3/MM3 (ref 1.7–7)
NEUTROPHILS NFR BLD AUTO: 91.2 % (ref 42.7–76)
NRBC BLD AUTO-RTO: 0 /100 WBC (ref 0–0.2)
PLATELET # BLD AUTO: 392 10*3/MM3 (ref 140–450)
PMV BLD AUTO: 10.1 FL (ref 6–12)
POTASSIUM SERPL-SCNC: 4.6 MMOL/L (ref 3.5–5.2)
PROT SERPL-MCNC: 5.5 G/DL (ref 6–8.5)
RBC # BLD AUTO: 4.53 10*6/MM3 (ref 4.14–5.8)
SARS-COV-2 RNA PNL SPEC NAA+PROBE: NOT DETECTED
SODIUM SERPL-SCNC: 139 MMOL/L (ref 136–145)
WBC NRBC COR # BLD: 15.81 10*3/MM3 (ref 3.4–10.8)

## 2022-02-01 PROCEDURE — 94799 UNLISTED PULMONARY SVC/PX: CPT

## 2022-02-01 PROCEDURE — 63710000001 DEXAMETHASONE PER 0.25 MG: Performed by: HOSPITALIST

## 2022-02-01 PROCEDURE — 86140 C-REACTIVE PROTEIN: CPT | Performed by: HOSPITALIST

## 2022-02-01 PROCEDURE — 87635 SARS-COV-2 COVID-19 AMP PRB: CPT | Performed by: INTERNAL MEDICINE

## 2022-02-01 PROCEDURE — 99222 1ST HOSP IP/OBS MODERATE 55: CPT | Performed by: PSYCHIATRY & NEUROLOGY

## 2022-02-01 PROCEDURE — 70450 CT HEAD/BRAIN W/O DYE: CPT

## 2022-02-01 PROCEDURE — 25010000002 ENOXAPARIN PER 10 MG: Performed by: HOSPITALIST

## 2022-02-01 PROCEDURE — 97116 GAIT TRAINING THERAPY: CPT

## 2022-02-01 PROCEDURE — 80053 COMPREHEN METABOLIC PANEL: CPT | Performed by: HOSPITALIST

## 2022-02-01 PROCEDURE — 99232 SBSQ HOSP IP/OBS MODERATE 35: CPT | Performed by: INTERNAL MEDICINE

## 2022-02-01 PROCEDURE — 97530 THERAPEUTIC ACTIVITIES: CPT

## 2022-02-01 PROCEDURE — 85025 COMPLETE CBC W/AUTO DIFF WBC: CPT | Performed by: HOSPITALIST

## 2022-02-01 PROCEDURE — 94618 PULMONARY STRESS TESTING: CPT

## 2022-02-01 PROCEDURE — 94761 N-INVAS EAR/PLS OXIMETRY MLT: CPT

## 2022-02-01 RX ORDER — OLANZAPINE 10 MG/1
5 INJECTION, POWDER, LYOPHILIZED, FOR SOLUTION INTRAMUSCULAR EVERY 4 HOURS PRN
Status: DISCONTINUED | OUTPATIENT
Start: 2022-02-01 | End: 2022-02-02 | Stop reason: HOSPADM

## 2022-02-01 RX ORDER — OLANZAPINE 5 MG/1
5 TABLET ORAL
Status: DISCONTINUED | OUTPATIENT
Start: 2022-02-01 | End: 2022-02-02 | Stop reason: HOSPADM

## 2022-02-01 RX ADMIN — BENZONATATE 200 MG: 100 CAPSULE ORAL at 17:00

## 2022-02-01 RX ADMIN — OLANZAPINE 5 MG: 5 TABLET, FILM COATED ORAL at 17:02

## 2022-02-01 RX ADMIN — ENOXAPARIN SODIUM 40 MG: 40 INJECTION SUBCUTANEOUS at 09:01

## 2022-02-01 RX ADMIN — CHOLECALCIFEROL TAB 25 MCG (1000 UNIT) 1000 UNITS: 25 TAB at 09:01

## 2022-02-01 RX ADMIN — ATORVASTATIN CALCIUM 10 MG: 10 TABLET, FILM COATED ORAL at 09:01

## 2022-02-01 RX ADMIN — ENOXAPARIN SODIUM 40 MG: 40 INJECTION SUBCUTANEOUS at 20:38

## 2022-02-01 RX ADMIN — ZINC SULFATE 220 MG (50 MG) CAPSULE 220 MG: CAPSULE at 09:01

## 2022-02-01 RX ADMIN — GUAIFENESIN 1200 MG: 600 TABLET, EXTENDED RELEASE ORAL at 09:01

## 2022-02-01 RX ADMIN — DEXAMETHASONE 6 MG: 4 TABLET ORAL at 09:01

## 2022-02-01 RX ADMIN — CHLORDIAZEPOXIDE HYDROCHLORIDE 5 MG: 5 CAPSULE ORAL at 00:55

## 2022-02-01 RX ADMIN — OXYCODONE HYDROCHLORIDE AND ACETAMINOPHEN 500 MG: 500 TABLET ORAL at 09:01

## 2022-02-01 RX ADMIN — BENZONATATE 200 MG: 100 CAPSULE ORAL at 20:38

## 2022-02-01 RX ADMIN — DEXAMETHASONE 6 MG: 4 TABLET ORAL at 20:38

## 2022-02-01 RX ADMIN — GUAIFENESIN 1200 MG: 600 TABLET, EXTENDED RELEASE ORAL at 20:38

## 2022-02-01 RX ADMIN — BENZONATATE 200 MG: 100 CAPSULE ORAL at 09:01

## 2022-02-01 RX ADMIN — THIAMINE HCL TAB 100 MG 200 MG: 100 TAB at 12:33

## 2022-02-01 RX ADMIN — BARICITINIB 4 MG: 2 TABLET, FILM COATED ORAL at 09:01

## 2022-02-01 NOTE — PLAN OF CARE
Goal Outcome Evaluation:  Plan of Care Reviewed With: patient        Progress: improving      Patient Seen in: [facility] 3w/sw    History   Patient presents with:  Swelling    Stated Complaint: tongue swelling, hives on body    HPI  27-year-old male with history of hypertension, hyperlipidemia, here with complaints of hives and tongue swe wheezing. Cardiovascular: Negative for chest pain. Gastrointestinal: Negative for abdominal pain, diarrhea, nausea and vomiting. Genitourinary: Negative for dysuria, flank pain and frequency. Musculoskeletal: Negative for back pain.    Skin: Positi normal sensation in all extremities, normal finger to nose b/l, normal gait, no facial asymmetry, normal speech     Skin: Skin is warm and dry. Rash (diffuse hives noted) noted. He is not diaphoretic. Psychiatric: He has a normal mood and affect.    Nursi 10.78 (H) 1.50 - 7.70 x10(3) uL    Lymphocyte Absolute 1.99 1.00 - 4.00 x10(3) uL    Monocyte Absolute 0.62 0.10 - 1.00 x10(3) uL    Eosinophil Absolute 0.04 0.00 - 0.70 x10(3) uL    Basophil Absolute 0.03 0.00 - 0.20 x10(3) uL    Immature Granulocyte Abso considered based on the presenting problem including tongue swelling, hives, arrhythmia.       Disposition and Plan     Clinical Impression:  Tongue swelling  (primary encounter diagnosis)    Disposition:  Admit  7/22/2019  7:48 am    Follow-up:  No follow-

## 2022-02-01 NOTE — NURSING NOTE
Pt up at bed side, bed alarm sounding, standing and urinating in urinal. Pt assisted back to bed when he was finished. Will cont to monitor.

## 2022-02-01 NOTE — NURSING NOTE
Pt has taken all his tele leads off and O2 and proceeded to try and get out of bed. Pt redirected back to bed.  Bed alarm in place and working.

## 2022-02-01 NOTE — DISCHARGE PLACEMENT REQUEST
"Troy Thompson JrRalph (71 y.o. Male)             Date of Birth Social Security Number Address Home Phone MRN    1950  [address] [address] [phone] [id number]    [place] Marital Status             [employer]        Admission Date Admission Type Admitting Provider Attending Provider Department, Room/Bed    1/21/22 Emergency Osvaldo Gibson DO [place], Michael R, DO [facility] MED SURG, 1408/1    Discharge Date Discharge Disposition Discharge Destination                         Attending Provider: Osvaldo Gibson DO    Allergies: Penicillins    Isolation: None   Infection: COVID (History) (02/01/22)   Code Status: CPR   Advance Care Planning Activity    Ht: 167.6 cm (66\")   Wt: 78.9 kg (174 lb)    Admission Cmt: None   Principal Problem: None                Active Insurance as of 1/21/2022     Primary Coverage     Payor Plan Insurance Group Employer/Plan Group    MEDICARE MEDICARE A & B      Payor Plan Address Payor Plan Phone Number Payor Plan Fax Number Effective Dates    [address] [phone]  9/1/2015 - None Entered    [facility] [id number]       Subscriber Name Subscriber Birth Date Member ID       TROY THOMPSON JR. 1950 [id number]           Secondary Coverage     Payor Plan Insurance Group Employer/Plan Group    [facility] PLAN N     Payor Plan Address Payor Plan Phone Number Payor Plan Fax Number Effective Dates    [facility] [phone]  9/1/2015 - None Entered    [address]       [place], [place] [address]       Subscriber Name Subscriber Birth Date Member ID       TROY THOMPSON JR. 1950 [id number]                 Emergency Contacts      (Rel.) Home Phone Work Phone Mobile Phone    Araseli Thompson (Spouse) -- -- [phone]    Troy Thompson (Son) -- [phone]              "

## 2022-02-01 NOTE — PROGRESS NOTES
Patient continues to steadily improve.  Continue weaning oxygen.  Discussed with nurse.  We will sign off.

## 2022-02-01 NOTE — PLAN OF CARE
Goal Outcome Evaluation:  Plan of Care Reviewed With: patient        Progress: improving  Outcome Summary: Pt VSS this shift. Pt appears more oriented this shift but continues with occasional periods of confusion. Pt with sitter at bedside this shift. Pt ambulating in room to bathroom with assistance several times this shift. Pt reports no pain. Bed/Chair alarm remains in place. Pt in recliner talking with sitter at this time.

## 2022-02-01 NOTE — THERAPY TREATMENT NOTE
Acute Care - Occupational Therapy Treatment Note  DILAN Ward     Patient Name: Dontae Bonds Jr.  : 1950  MRN: [id number]  Today's Date: 2022             Admit Date: 2022       ICD-10-CM ICD-9-CM   1. Pneumonia due to COVID-19 virus  U07.1 480.8    J12.82 079.89   2. Hypoxia  R09.02 799.02     Patient Active Problem List   Diagnosis   • Personal history of colonic polyps   • Family history of colon cancer   • Pneumonia due to COVID-19 virus     Past Medical History:   Diagnosis Date   • Hyperlipidemia    • Hypertension      History reviewed. No pertinent surgical history.      OT ASSESSMENT FLOWSHEET (last 12 hours)     OT Evaluation and Treatment     Row Name 22 0926                   OT Time and Intention    Subjective Information no complaints  -EN        Document Type therapy note (daily note)  -EN        Mode of Treatment occupational therapy  -EN        Patient Effort good  -EN        Symptoms Noted During/After Treatment shortness of breath  -EN        Comment Patient oriented X 3 however demonstrated confusion during functional tasks.  -EN                  General Information    Patient/Family/Caregiver Comments/Observations Patient seated EOB, sitter present. Patient agreeable to OT.  -EN        Existing Precautions/Restrictions fall; other (see comments)  confusion  -EN        Barriers to Rehab cognitive status  -EN                  Cognition    Orientation Status (Cognition) oriented x 3; other (see comments)  pleasantly confused during functional tasks  -EN        Personal Safety Interventions gait belt; nonskid shoes/slippers when out of bed  -EN                  Pain Scale: Numbers Pre/Post-Treatment    Pretreatment Pain Rating 0/10 - no pain  -EN        Posttreatment Pain Rating 0/10 - no pain  -EN                  Functional Mobility    Functional Mobility- Ind. Level contact guard assist; verbal cues required  -EN        Functional Mobility- Device rolling walker  -EN         Functional Mobility-Distance (Feet) 280  -EN                  Transfer Assessment/Treatment    Transfers sit-stand transfer; stand-sit transfer  -EN        Comment (Transfers) verbal cues for hand placement  -EN                  Transfers    Sit-Stand [place] (Transfers) contact guard; verbal cues  -EN        Stand-Sit [place] (Transfers) verbal cues; contact guard  -EN                  Sit-Stand Transfer    Assistive Device (Sit-Stand Transfers) walker, front-wheeled  -EN                  Stand-Sit Transfer    Assistive Device (Stand-Sit Transfers) walker, front-wheeled  -EN                  Safety Issues, Functional Mobility    Safety Issues Affecting Function (Mobility) impulsivity; insight into deficits/self-awareness  -EN        Impairments Affecting Function (Mobility) cognition  -EN        Comment, Safety Issues/Impairments (Mobility) cues for safety with management of O2 line  -EN                  Balance    Comment, Balance Ind with sitting balance. patient stood and adjusted pants with CGA and no LOB  -EN                  Plan of Care Review    Outcome Summary OT- Patient oriented X 3 however demonstrates confusion during functional tasks. Patient performed sit to stand transfers and mobility with FWW and CGA with cues for safety. Patient overall demonstrated improved activity tolerance however due to confusion will need 24/7 supervision at home. Patient would benefit from home health O2 at discharge.  -EN                  Vital Signs    Pre SpO2 (%) 91  -EN        O2 Delivery Pre Treatment supplemental O2  3.5L O2/NC  -EN        O2 Delivery Intra Treatment supplemental O2  4L  -EN        Post SpO2 (%) --  85% following mobility, increased to 90% within 30 seconds  -EN        O2 Delivery Post Treatment supplemental O2  -EN        Recovery Time 30 seconds  -EN                  ROM Goal 1 (OT)    ROM Goal 1 (OT) Patient will demonstrate independence with UE HEP.  -EN        Time Frame (ROM Goal 1,  OT) by discharge  -EN        Progress/Outcome (ROM Goal 1, OT) goal ongoing  -EN                  Problem Specific Goal 1 (OT)    Problem Specific Goal 1 (OT) Patient will perform dynamic standing balance activity with FWW and supervision 2-3 minutes with no LOB for improved safety during ADL routine.  -EN        Time Frame (Problem Specific Goal 1, OT) by discharge  -EN        Progress/Outcome (Problem Specific Goal 1, OT) goal ongoing  -EN                  Positioning and Restraints    Pre-Treatment Position in bed  -EN        Post Treatment Position chair  -EN        In Chair reclined; call light within reach; encouraged to call for assist; exit alarm on  with sitter  -EN                  Progress Summary (OT)    Daily Progress Summary (OT) improving  -EN              User Key  (r) = Recorded By, (t) = Taken By, (c) = Cosigned By    Initials Name Effective Dates    Diana Meyers OTR 06/16/21 -                  Occupational Therapy Education                 Title: PT OT SLP Therapies (In Progress)     Topic: Occupational Therapy (In Progress)     Point: ADL training (Done)     Description:   Instruct learner(s) on proper safety adaptation and remediation techniques during self care or transfers.   Instruct in proper use of assistive devices.              Learning Progress Summary           Patient Acceptance, E, VU by EN at 2/1/2022 1030    Comment: safety during mobility/transfers    Acceptance, E, VU by EN at 1/31/2022 1435    Comment: Safety during mobility, adaptive breathing                   Point: Home exercise program (Not Started)     Description:   Instruct learner(s) on appropriate technique for monitoring, assisting and/or progressing therapeutic exercises/activities.              Learner Progress:  Not documented in this visit.                      User Key     Initials Effective Dates Name Provider Type Discipline    EN 06/16/21 -  Diana Palacios OTR Occupational Therapist OT                   OT Recommendation and Plan  Planned Therapy Interventions (OT): activity tolerance training, BADL retraining, functional balance retraining, patient/caregiver education/training, transfer/mobility retraining, ROM/therapeutic exercise  Therapy Frequency (OT): 5 times/wk  Plan of Care Review  Plan of Care Reviewed With: patient  Outcome Summary: OT- Patient oriented X 3 however demonstrated confusion during functional tasks. Patient performed sit to stand transfers and mobility with FWW and CGA with cues for safety. Patient overall demonstrated improved activity tolerance however due to confusion will need 24/7 supervision at home. Patient would benefit from home health O2 at discharge.       Outcome Measures     Row Name 02/01/22 0925             How much help from another person do you currently need...    Turning from your back to your side while in flat bed without using bedrails? 4  -JW      Moving from lying on back to sitting on the side of a flat bed without bedrails? 4  -JW      Moving to and from a bed to a chair (including a wheelchair)? 3  -JW      Standing up from a chair using your arms (e.g., wheelchair, bedside chair)? 3  -JW      Climbing 3-5 steps with a railing? 3  -JW      To walk in hospital room? 3  -      AM-PAC 6 Clicks Score (PT) 20  -              Functional Assessment    Outcome Measure Options AM-PAC 6 Clicks Basic Mobility (PT)  -            User Key  (r) = Recorded By, (t) = Taken By, (c) = Cosigned By    Initials Name Provider Type    Tami Kitchen, PT Physical Therapist                Time Calculation:    Time Calculation- OT     Row Name 02/01/22 1030 02/01/22 1007          Time Calculation- OT    OT Start Time 0925  -EN --     OT Stop Time 0940  -EN --     OT Time Calculation (min) 15 min  -EN --            Timed Charges    05609 - Gait Training Minutes  -- 18  -JW     32730 - OT Therapeutic Activity Minutes 15  -EN --            Total Minutes    Timed Charges  Total Minutes 15  -EN 18  -JW      Total Minutes 15  -EN 18  -JW           User Key  (r) = Recorded By, (t) = Taken By, (c) = Cosigned By    Initials Name Provider Type    Diana Meyers OTR Occupational Therapist    Tami Kitchen, PT Physical Therapist              Therapy Charges for Today     Code Description Service Date Service Provider Modifiers Qty    38244925669 HC OT EVAL LOW COMPLEXITY 2 1/31/2022 Diana Palacios OTR GO 1    19758182968  OT THERAPEUTIC ACT EA 15 MIN 2/1/2022 Diana Palacios OTR GO 1               NILSA Rodriguez  2/1/2022

## 2022-02-01 NOTE — NURSING NOTE
Pt trying to get out of bed, pt confused trying to get to an appointment he has, pt has no appointment. Pt redirected back to bed.

## 2022-02-01 NOTE — THERAPY TREATMENT NOTE
Acute Care - Physical Therapy Treatment Note   Hazel Jackson     Patient Name: Dontae Bonds Jr.  : 1950  MRN: [id number]  Today's Date: 2022      Visit Dx:     ICD-10-CM ICD-9-CM   1. Pneumonia due to COVID-19 virus  U07.1 480.8    J12.82 079.89   2. Hypoxia  R09.02 799.02     Patient Active Problem List   Diagnosis   • Personal history of colonic polyps   • Family history of colon cancer   • Pneumonia due to COVID-19 virus     Past Medical History:   Diagnosis Date   • Hyperlipidemia    • Hypertension      History reviewed. No pertinent surgical history.  PT Assessment (last 12 hours)     PT Evaluation and Treatment     [facility] Name 22          Physical Therapy Time and Intention    Subjective Information no complaints  -     Document Type therapy note (daily note)  -     Mode of Treatment physical therapy  -     Patient Effort good  -     Symptoms Noted During/After Treatment shortness of breath  -     Comment pt c/o SOA following activity  -[facility] Name 22          General Information    Patient/Family/Caregiver Comments/Observations pt supine, sitter present. pt agreeable to therapy  -     Existing Precautions/Restrictions fall  confusion within task  -     Barriers to Rehab cognitive status  -[facility] Name 22          Cognition    Orientation Status (Cognition) oriented x 3  remains confused within task  -JW     Personal Safety Interventions gait belt; nonskid shoes/slippers when out of bed  -     Row Name 22          Pain Scale: Numbers Pre/Post-Treatment    Pretreatment Pain Rating 0/10 - no pain  -     Posttreatment Pain Rating 0/10 - no pain  -[facility] Name 22          Bed Mobility    Comment (Bed Mobility) pt sitting on EOB upon arrival  -[facility] Name 22          Transfers    Transfers sit-stand transfer; stand-sit transfer  -     Comment (Transfers) verbal cues for hand placement and controlled descent into  sitting  -JW     Sit-Stand [place] (Transfers) contact guard; verbal cues  -     Stand-Sit [place] (Transfers) contact guard; verbal cues  -     Row Name 02/01/22 0925          Sit-Stand Transfer    Assistive Device (Sit-Stand Transfers) walker, front-wheeled  -JW     Row Name 02/01/22 0925          Stand-Sit Transfer    Assistive Device (Stand-Sit Transfers) walker, front-wheeled  -JW     Row Name 02/01/22 0925          Gait/Stairs (Locomotion)    [place] Level (Gait) contact guard  -     Assistive Device (Gait) walker, front-wheeled  -     Distance in Feet (Gait) 280  -     Bilateral Gait Deviations forward flexed posture  -     Comment (Gait/Stairs) pt requires verbal cues for awareness of O2 line during direction changes  -     Row Name 02/01/22 0925          Safety Issues, Functional Mobility    Safety Issues Affecting Function (Mobility) impulsivity  -     Impairments Affecting Function (Mobility) cognition  -     Comment, Safety Issues/Impairments (Mobility) requirescues for awareness of O2 line  -     Row Name 02/01/22 0925          Plan of Care Review    Outcome Summary PT: Patient performs sit to stand with CGA and gait with rolling walker x280 feet with verbal cues for awareness of O2 line with direction changes.  Patient oriented x3, however displays confusion within functional task and general conversation.  Patient's O2 85% following mobility on 4 liters, recovers to 89% within 30 seconds.  Plan to see x1 additional visit to ensure consistency with mobility, patient will likely require 24 hour supervision at discharge due to cognition and impulsivity.  -     Row Name 02/01/22 0925          Vital Signs    Pre SpO2 (%) 91  -     O2 Delivery Pre Treatment nasal cannula  3.5 liters  -     Intra SpO2 (%) 85  -     O2 Delivery Intra Treatment supplemental O2  4 liters  -     Post SpO2 (%) 90  -     O2 Delivery Post Treatment nasal cannula  -     Recovery Time  30 seconds  -     Row Name 02/01/22 0925          Positioning and Restraints    Pre-Treatment Position in bed  -     Post Treatment Position chair  -     In Chair reclined; call light within reach; encouraged to call for assist; exit alarm on  sitter present  -     Row Name 02/01/22 0925          Progress Summary (PT)    Progress Toward Functional Goals (PT) progress toward functional goals is good  -     Barriers to Overall Progress (PT) cognition  -           User Key  (r) = Recorded By, (t) = Taken By, (c) = Cosigned By    Initials Name Provider Type    Tami Kitchen, MARY Physical Therapist                Physical Therapy Education                 Title: PT OT SLP Therapies (In Progress)     Topic: Physical Therapy (In Progress)     Point: Mobility training (Done)     Learning Progress Summary           Patient Acceptance, E,TB, VU by  at 2/1/2022 1006    Acceptance, E,TB, NR by BRIANA at 1/31/2022 1430                   Point: Home exercise program (Not Started)     Learner Progress:  Not documented in this visit.                      User Key     Initials Effective Dates Name Provider Type Discipline     06/16/21 -  Tami Hodgson, PT Physical Therapist PT              PT Recommendation and Plan  Anticipated Discharge Disposition (PT): other (see comments) (recommend 24 hour supervision due to cognition)  Planned Therapy Interventions (PT): balance training, bed mobility training, gait training, patient/family education, strengthening, transfer training  Therapy Frequency (PT): 6 times/wk  Progress Summary (PT)  Progress Toward Functional Goals (PT): progress toward functional goals is good  Barriers to Overall Progress (PT): cognition  Plan of Care Reviewed With: patient  Outcome Summary: PT: Patient performs sit to stand with CGA and gait with rolling walker x280 feet with verbal cues for awareness of O2 line with direction changes.  Patient oriented x3, however displays confusion within  functional task and general conversation.  Patient's O2 85% following mobility on 4 liters, recovers to 89% within 30 seconds.  Plan to see x1 additional visit to ensure consistency with mobility, patient will likely require 24 hour supervision at discharge due to cognition and impulsivity.   Outcome Measures     Row Name 02/01/22 0925             How much help from another person do you currently need...    Turning from your back to your side while in flat bed without using bedrails? 4  -JW      Moving from lying on back to sitting on the side of a flat bed without bedrails? 4  -JW      Moving to and from a bed to a chair (including a wheelchair)? 3  -JW      Standing up from a chair using your arms (e.g., wheelchair, bedside chair)? 3  -JW      Climbing 3-5 steps with a railing? 3  -JW      To walk in hospital room? 3  -JW      AM-PAC 6 Clicks Score (PT) 20  -JW              Functional Assessment    Outcome Measure Options AM-PAC 6 Clicks Basic Mobility (PT)  -JW            User Key  (r) = Recorded By, (t) = Taken By, (c) = Cosigned By    Initials Name Provider Type    Tami Kitchen PT Physical Therapist                 Time Calculation:    PT Charges     Row Name 02/01/22 1007             Time Calculation    Start Time 0925  -JW      Stop Time 0943  -JW      Time Calculation (min) 18 min  -JW      PT Received On 02/01/22  -      PT - Next Appointment 02/02/22  -              Timed Charges    09966 - Gait Training Minutes  18  -JW              Total Minutes    Timed Charges Total Minutes 18  -JW       Total Minutes 18  -JW            User Key  (r) = Recorded By, (t) = Taken By, (c) = Cosigned By    Initials Name Provider Type    Tami Kitchen PT Physical Therapist              Therapy Charges for Today     Code Description Service Date Service Provider Modifiers Qty    45389260409 HC PT EVAL LOW COMPLEXITY 2 1/31/2022 Tami Hodgson, PT GP 1    22862063501 HC GAIT TRAINING EA 15 MIN 2/1/2022  Tami Hodgson, PT GP 1          PT G-Codes  Outcome Measure Options: AM-PAC 6 Clicks Basic Mobility (PT)  AM-PAC 6 Clicks Score (PT): 20  AM-PAC 6 Clicks Score (OT): 21    Tami Hodgson, PT  2/1/2022

## 2022-02-01 NOTE — PLAN OF CARE
Goal Outcome Evaluation:  Plan of Care Reviewed With: patient           Outcome Summary: PT: Patient performs sit to stand with CGA and gait with rolling walker x280 feet with verbal cues for awareness of O2 line with direction changes.  Patient oriented x3, however displays confusion within functional task and general conversation.  Patient's O2 85% following mobility on 4 liters, recovers to 89% within 30 seconds.  Plan to see x1 additional visit to ensure consistency with mobility, patient will likely require 24 hour supervision at discharge due to cognition and impulsivity.

## 2022-02-01 NOTE — PROGRESS NOTES
"SERVICE: [facility] HOSPITALIST    CONSULTANTS: Pulmonary     CHIEF COMPLAINT: Shortness of breath    SUBJECTIVE: Patient seen and examined at bedside. Patient reports no acute issues.  Nursing staff reports he attempted to get up this morning without assistance again and bumped into the bathroom door, but did not fall and did not injure himself.  They also report he remains intermittently confused.  Patient denies any acute complaints patient denies headache, fever or chills, nausea, vomiting, diarrhea, abdominal pain, chest pain or palpitations, shortness of breath, wheezing or coughing, leg pain or weakness.     OBJECTIVE:    Physical exam is largely unchanged from previous exam in previous note, except where documented below.     Physical Exam:  General: Patient is awake and alert. Well developed and well nourished. No acute distress noted.   HENT: Head is atraumatic, normocephalic. Hearing is grossly intact. Nose is without obvious congestion and appears patent. Neck is supple and trachea is midline.   Eyes: Vision is grossly intact. Pupils appear equal and round.   Cardiovascular: Heart has regular rate and rhythm with no murmurs, rubs or gallops noted.   Respiratory: Slightly coarse breath sounds but otherwise lungs are clear to ausculation without wheezes, rhonchi or rales.   Abdominal/GI: Soft, nontender, bowel sounds present. No rebound or guarding present.   Extremities: No peripheral edema noted.   Musculoskeletal: Spontaneous movement of bilateral upper and lower extremities against gravity noted. No signs of injury or deformity noted.   Skin: Warm and dry.   Psych: Mood and affect are appropriate. Cooperative with exam.   Neuro: No facial asymmetry noted. No focal deficits noted, hearing and vision are grossly intact.     /80 (BP Location: Left arm, Patient Position: Lying)   Pulse 63   Temp 96.5 °F (35.8 °C) (Oral)   Resp 18   Ht 167.6 cm (66\")   Wt 78.9 kg (174 lb)   " SpO2 92%   BMI 28.08 kg/m²     MEDS/LABS REVIEWED AND ORDERED    ascorbic acid, 500 mg, Oral, Daily  atorvastatin, 10 mg, Oral, Daily  baricitinib, 4 mg, Oral, Daily  benzonatate, 200 mg, Oral, TID  cholecalciferol, 1,000 Units, Oral, Daily  dexamethasone, 6 mg, Oral, Q12H  enoxaparin, 40 mg, Subcutaneous, Q12H  guaiFENesin, 1,200 mg, Oral, BID  OLANZapine, 5 mg, Oral, Nightly  sodium chloride, 10 mL, Intravenous, Q12H  zinc sulfate, 220 mg, Oral, Daily        LAB/DIAGNOSTICS:    Lab Results (last 24 hours)     Procedure Component Value Units Date/Time    Comprehensive Metabolic Panel [[id number]]  (Abnormal) Collected: 02/01/22 0431    Specimen: Blood Updated: 02/01/22 0505     Glucose 161 mg/dL      BUN 24 mg/dL      Creatinine 0.62 mg/dL      Sodium 139 mmol/L      Potassium 4.6 mmol/L      Chloride 106 mmol/L      CO2 23.2 mmol/L      Calcium 9.1 mg/dL      Total Protein 5.5 g/dL      Albumin 3.00 g/dL      ALT (SGPT) 55 U/L      AST (SGOT) 25 U/L      Alkaline Phosphatase 37 U/L      Total Bilirubin 0.6 mg/dL      eGFR Non African Amer 128 mL/min/1.73      Globulin 2.5 gm/dL      A/G Ratio 1.2 g/dL      BUN/Creatinine Ratio 38.7     Anion Gap 9.8 mmol/L     Narrative:      GFR Normal >60  Chronic Kidney Disease <60  Kidney Failure <15      COVID PRE-OP / PRE-PROCEDURE SCREENING ORDER (NO ISOLATION) - Swab, Nasal Cavity [[id number]]  (Normal) Collected: 02/01/22 0424    Specimen: Swab from Nasal Cavity Updated: 02/01/22 0458    Narrative:      The following orders were created for panel order COVID PRE-OP / PRE-PROCEDURE SCREENING ORDER (NO ISOLATION) - Swab, Nasal Cavity.  Procedure                               Abnormality         Status                     ---------                               -----------         ------                     COVID-19,[facility] IN-ERIKA...[[id number]]  Normal              Final result                 Please view results for these tests on the individual orders.    COVID-19,[facility]  IN-HOUSE,Nasal Swab No Transport Media 3-4 HR TAT - Swab, Nasal Cavity [[id number]]  (Normal) Collected: 02/01/22 0424    Specimen: Swab from Nasal Cavity Updated: 02/01/22 0458     COVID19 Not Detected    Narrative:      Fact sheet for providers: https://www.fda.gov/media/108246/download     Fact sheet for patients: https://www.fda.gov/media/910806/download    Test performed by PCR.    Consider negative results in combination with clinical observations, patient history, and epidemiological information.    CBC & Differential [[id number]]  (Abnormal) Collected: 02/01/22 0431    Specimen: Blood Updated: 02/01/22 0437    Narrative:      The following orders were created for panel order CBC & Differential.  Procedure                               Abnormality         Status                     ---------                               -----------         ------                     CBC Auto Differential[[id number]]        Abnormal            Final result                 Please view results for these tests on the individual orders.    CBC Auto Differential [[id number]]  (Abnormal) Collected: 02/01/22 0431    Specimen: Blood Updated: 02/01/22 0437     WBC 15.81 10*3/mm3      RBC 4.53 10*6/mm3      Hemoglobin 13.8 g/dL      Hematocrit 41.3 %      MCV 91.2 fL      MCH 30.5 pg      MCHC 33.4 g/dL      RDW 13.1 %      RDW-SD 43.5 fl      MPV 10.1 fL      Platelets 392 10*3/mm3      Neutrophil % 91.2 %      Lymphocyte % 2.6 %      Monocyte % 3.7 %      Eosinophil % 0.0 %      Basophil % 0.2 %      Immature Grans % 2.3 %      Neutrophils, Absolute 14.43 10*3/mm3      Lymphocytes, Absolute 0.41 10*3/mm3      Monocytes, Absolute 0.58 10*3/mm3      Eosinophils, Absolute 0.00 10*3/mm3      Basophils, Absolute 0.03 10*3/mm3      Immature Grans, Absolute 0.36 10*3/mm3      nRBC 0.0 /100 WBC     C-reactive Protein [[id number]] Collected: 02/01/22 0431    Specimen: Blood Updated: 02/01/22 0433    C-reactive Protein [[id number]]  (Normal)  Collected: 01/31/22 0523    Specimen: Blood Updated: 01/31/22 1211     C-Reactive Protein <0.30 mg/dL         ECG 12 Lead   Final Result   HEART RATE= 91  bpm   RR Interval= 656  ms   NC Interval= 153  ms   P Horizontal Axis= 25  deg   P Front Axis= 10  deg   QRSD Interval= 137  ms   QT Interval= 377  ms   QRS Axis= -36  deg   T Wave Axis= -15  deg   - ABNORMAL ECG -   Sinus rhythm   Nonspecific IVCD with LAD   Inferior infarct, old   Electronically Signed By: Mike Haddad ([facility]) 21-Jan-2022 12:18:12   Date and Time of Study: 2022-01-21 11:11:59        Results for orders placed during the hospital encounter of 01/21/22    Adult Transthoracic Echo Complete w/ Color, Spectral and Contrast if Necessary Per Protocol    Interpretation Summary  · Calculated left ventricular EF = 61.6% Estimated left ventricular EF was in agreement with the calculated left ventricular EF. Left ventricular systolic function is normal.Left ventricular wall thickness is consistent with mild concentric hypertrophy.  · Left ventricular diastolic function was normal.  · Right ventricular systolic function not assessed but visually looks decreased.    No radiology results for the last day      ASSESSMENT/PLAN:  Please not portions of this assessment/plan may have been copied and pasted, but I have personally seen this patient and reviewed each line of this assessment and plan for accuracy and made updates to reflect my necessary changes on 02/01/22 .     Acute metabolic encephalopathy secondary to suspected alcohol withdrawal vs infectious encephalopathy  -Patient also suspected to have underlying cognitive deficits  -he continues to be intermittently confused. I'm wondering if this is Wernicke's? I've asked Neurology to see.   -Withdrawal symptoms improving patient now receiving as needed Librium  -He also continues on nightly Zyprexa    Acute hypoxic respiratory failure secondary to COVID-19 pneumonia  -Patient also with new  "pneumomediastinum  -Steroid-induced leukocytosis  -Transaminitis secondary to Covid and alcohol use, improving  -Patient continues on baricitinib, Decadron and Lovenox for DVT prophylaxis  -He has completed remdesivir course  -Patient currently on 4L nasal cannula, with saturation in the mid to upper 90s.  Continue to wean as tolerated.     Hematuria/proteinuria  -Patient will have scheduled follow-up with his PCP at discharge for further monitoring/referrals  -Renal ultrasound showed enlarged kidneys with indeterminant significance and no obstruction    Hypertension-blood pressure has been stable with holding of antihypertensives    Suspected TWAN  -Plan for outpatient referral to sleep lab at discharge    PLAN FOR DISPOSITION: Short-term rehab versus discharge home with close follow-up with PCP and and home companion whether this to be his wife or son, will need to be determined.     Osvaldo Gibson DO  Hospitalist, [facility]  02/01/22  07:44 EST    \"Dictated utilizing Dragon dictation\"    "

## 2022-02-01 NOTE — CONSULTS
Patient Identification:  NAME:  Dontae Bonds Jr.  Age:  71 y.o.   Sex:  male   :  1950   MRN:  [id number]       Chief complaint: He does not have one, reason for consult change in mental status    History of present illness: This patient is 71-year-old right-handed white male with history of hyperlipidemia hypertension who got Covid infection with Covid pneumonia he has been confused somewhat psychotic seeing ants on the floor and things like that.  Nonetheless according to notes his mentation is gradually improving today he is up in a chair denies any complaints or that he is hallucinating his nurse notes that he did see some ants earlier on the floor.  Location is not pertinent durations been several days he came to the hospital on 2022 so that duration.  Context the patient who states he has never had a stroke or seizure and is a retired .  Modifying factors oxygen via nasal cannula.  He is also been treated with Zyprexa at nighttime that may be helping really no other associated symptoms he denies anything to suggest a stroke no double vision no focal paresthesias no paralysis      Past medical history:  Past Medical History:   Diagnosis Date   • Hyperlipidemia    • Hypertension        Past surgical history:  History reviewed. No pertinent surgical history.    Allergies:  Penicillins    Home medications:  Medications Prior to Admission   Medication Sig Dispense Refill Last Dose   • atorvastatin (LIPITOR) 40 MG tablet Take 40 mg by mouth Daily.   2022 at Unknown time   • lisinopril (PRINIVIL,ZESTRIL) 10 MG tablet Take 10 mg by mouth Daily.   2022 at Unknown time        Hospital medications:  ascorbic acid, 500 mg, Oral, Daily  atorvastatin, 10 mg, Oral, Daily  baricitinib, 4 mg, Oral, Daily  benzonatate, 200 mg, Oral, TID  cholecalciferol, 1,000 Units, Oral, Daily  dexamethasone, 6 mg, Oral, Q12H  enoxaparin, 40 mg, Subcutaneous, Q12H  guaiFENesin, 1,200 mg, Oral,  "BID  OLANZapine, 5 mg, Oral, Nightly  sodium chloride, 10 mL, Intravenous, Q12H  zinc sulfate, 220 mg, Oral, Daily      Pharmacy to Dose Baricitinib (COVID-19),   Pharmacy to Dose enoxaparin (LOVENOX),       •  acetaminophen **OR** acetaminophen **OR** acetaminophen  •  albuterol sulfate HFA  •  chlordiazePOXIDE  •  HYDROcodone-homatropine  •  melatonin  •  ondansetron **OR** ondansetron  •  Pharmacy to Dose Baricitinib (COVID-19)  •  Pharmacy to Dose enoxaparin (LOVENOX)  •  [COMPLETED] Insert peripheral IV **AND** sodium chloride  •  sodium chloride  •  sodium chloride    Family history:  History reviewed. No pertinent family history.    Social history:  Social History     Tobacco Use   • Smoking status: Former Smoker   • Smokeless tobacco: Never Used   • Tobacco comment: quit 35 years ago    Substance Use Topics   • Alcohol use: Yes     Comment: 2-bourbon daily    • Drug use: Never       Review of systems:    He states he has seen some things and his sitter notes he was just talked about seeing ants the patient denies hair eyes ears nose throat skin bone joint  lymphatic hematologic oncologic complaints no neck pain chest pain abdominal pain bowel bladder incontinence fever chills or rash he states \"I have had Covid\"  Objective:  Vitals Ranges:   Temp:  [95.8 °F (35.4 °C)-97 °F (36.1 °C)] 96.5 °F (35.8 °C)  Heart Rate:  [63-76] 63  Resp:  [18-20] 18  BP: (115-123)/(74-90) 123/80      Physical Exam:  He is awake alert and eventually oriented x3 he recalls 3 out of 3 objects at 3minutes.  Fund of knowledge poor attention span concentration fair recent remote memory fair language function normal well-developed well-nourished in no distress pupils 3 constricting to 2 not able to visualize disc retinas extraocular movements full without nystagmus nasolabial folds palate tongue symmetrical normal hearing facial sensation head turning shoulder shrug motor 5 out of 5 x 4 extremities no atrophy fasciculations rigidity " or bradykinesia reflexes absent throughout toes downgoing bilaterally sensation normal light touch face both arms both legs coordination normal in the upper extremities Station and gait was not tested I was afraid that he would fall with me heart is regular without murmur neck supple without bruits extremities no clubbing cyanosis or edema visual acuity normal at 3 feet  Results review:   I reviewed the patient's new clinical results.    Data review:  Lab Results (last 24 hours)     Procedure Component Value Units Date/Time    Comprehensive Metabolic Panel [[id number]]  (Abnormal) Collected: 02/01/22 0431    Specimen: Blood Updated: 02/01/22 0505     Glucose 161 mg/dL      BUN 24 mg/dL      Creatinine 0.62 mg/dL      Sodium 139 mmol/L      Potassium 4.6 mmol/L      Chloride 106 mmol/L      CO2 23.2 mmol/L      Calcium 9.1 mg/dL      Total Protein 5.5 g/dL      Albumin 3.00 g/dL      ALT (SGPT) 55 U/L      AST (SGOT) 25 U/L      Alkaline Phosphatase 37 U/L      Total Bilirubin 0.6 mg/dL      eGFR Non African Amer 128 mL/min/1.73      Globulin 2.5 gm/dL      A/G Ratio 1.2 g/dL      BUN/Creatinine Ratio 38.7     Anion Gap 9.8 mmol/L     Narrative:      GFR Normal >60  Chronic Kidney Disease <60  Kidney Failure <15      COVID PRE-OP / PRE-PROCEDURE SCREENING ORDER (NO ISOLATION) - Swab, Nasal Cavity [[id number]]  (Normal) Collected: 02/01/22 0424    Specimen: Swab from Nasal Cavity Updated: 02/01/22 0458    Narrative:      The following orders were created for panel order COVID PRE-OP / PRE-PROCEDURE SCREENING ORDER (NO ISOLATION) - Swab, Nasal Cavity.  Procedure                               Abnormality         Status                     ---------                               -----------         ------                     COVID-19,[facility] IN-ERIKA...[[id number]]  Normal              Final result                 Please view results for these tests on the individual orders.    COVID-19,[facility] IN-HOUSE,Nasal Swab No  Transport Media 3-4 HR TAT - Swab, Nasal Cavity [[id number]]  (Normal) Collected: 02/01/22 0424    Specimen: Swab from Nasal Cavity Updated: 02/01/22 0458     COVID19 Not Detected    Narrative:      Fact sheet for providers: https://www.fda.gov/media/306019/download     Fact sheet for patients: https://www.fda.gov/media/983406/download    Test performed by PCR.    Consider negative results in combination with clinical observations, patient history, and epidemiological information.    CBC & Differential [[id number]]  (Abnormal) Collected: 02/01/22 0431    Specimen: Blood Updated: 02/01/22 0437    Narrative:      The following orders were created for panel order CBC & Differential.  Procedure                               Abnormality         Status                     ---------                               -----------         ------                     CBC Auto Differential[[id number]]        Abnormal            Final result                 Please view results for these tests on the individual orders.    CBC Auto Differential [[id number]]  (Abnormal) Collected: 02/01/22 0431    Specimen: Blood Updated: 02/01/22 0437     WBC 15.81 10*3/mm3      RBC 4.53 10*6/mm3      Hemoglobin 13.8 g/dL      Hematocrit 41.3 %      MCV 91.2 fL      MCH 30.5 pg      MCHC 33.4 g/dL      RDW 13.1 %      RDW-SD 43.5 fl      MPV 10.1 fL      Platelets 392 10*3/mm3      Neutrophil % 91.2 %      Lymphocyte % 2.6 %      Monocyte % 3.7 %      Eosinophil % 0.0 %      Basophil % 0.2 %      Immature Grans % 2.3 %      Neutrophils, Absolute 14.43 10*3/mm3      Lymphocytes, Absolute 0.41 10*3/mm3      Monocytes, Absolute 0.58 10*3/mm3      Eosinophils, Absolute 0.00 10*3/mm3      Basophils, Absolute 0.03 10*3/mm3      Immature Grans, Absolute 0.36 10*3/mm3      nRBC 0.0 /100 WBC     C-reactive Protein [[id number]] Collected: 02/01/22 0431    Specimen: Blood Updated: 02/01/22 0433    C-reactive Protein [[id number]]  (Normal) Collected: 01/31/22 0523     "Specimen: Blood Updated: 01/31/22 1211     C-Reactive Protein <0.30 mg/dL            Imaging:  Imaging Results (Last 24 Hours)     ** No results found for the last 24 hours. **         PPE worn at all times washed before washed up afterwards disposed of everything properly is now within 6 feet of them for more than few minutes during my exam no aerosols used at any point  Assessment and Plan:     Pneumonia secondary to Covid with some definite Covid encephalopathy as well as metabolic encephalopathy related to the pneumonia.  He has also had metabolic encephalopathy associated with an alcohol withdrawal syndrome I am confident.  He is a \"bourbon drinker\".  He has some psychosis as he was just describing \"ants on the floor\" and I certainly like the Zyprexa that he is getting 5 mg every night at 9 PM I will change that to suppertime that might help with any sundowning  In short I do not think this patient has suffered an acute stroke TIA seizure or that he has some specific central nervous system infection.  I will check a plain CAT scan of his brain although he does not look like an acute stroke syndrome  Therefore, continue the Zyprexa 5 mg p.o. q. supper and according to notes his confusion does appear to be getting better and he is currently oriented x3.  Note that Zyprexa is the only class of medications that will help his psychosis and visual hallucinations.  The benefits greatly outweigh any risks and he is already tolerating it nicely.  It is a very friendly QT interval neuroleptic also.  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  The Zyprexa is appropriate for that as well and it will improve when the Decadron is DC'd.    Lastly, note he recall 3 out of 3 objects at 3 minutes and has no extraocular movements abnormalities, so I do not think he has Wernicke encephalopathy but I will give him some extra thiamine  Thanks      Dontae SETHI" MD Dimitrios  02/01/22  11:28 EST

## 2022-02-01 NOTE — PLAN OF CARE
Goal Outcome Evaluation:  Plan of Care Reviewed With: patient           Outcome Summary: OT- Patient oriented X 3 however demonstrated confusion during functional tasks. Patient performed sit to stand transfers and mobility with FWW and CGA with cues for safety. Patient overall demonstrated improved activity tolerance however due to confusion will need 24/7 supervision at home. Patient would benefit from home health O2 at discharge.

## 2022-02-01 NOTE — PROCEDURES
Exercise Oximetry    Patient Name:Dontae Bonds Jr.   MRN: [id number]   Date: 02/01/22             ROOM AIR BASELINE   SpO2%           88   Heart Rate      92   Blood Pressure      EXERCISE ON ROOM AIR SpO2% EXERCISE ON O2 @      LPM SpO2%   1 MINUTE  1 MINUTE                    4 lpm       88   2 MINUTES  2 MINUTES                  4 lpm       87   3 MINUTES  3 MINUTES                  6 lpm       88    4 MINUTES  4 MINUTES                  6  lpm       86   5 MINUTES  5 MINUTES    6 MINUTES  6 MINUTES               Distance Walked   Distance Walked             176    Dyspnea (Hammad Scale)   Dyspnea (Hammad Scale)    Pre 5   Fatigue (Hammad Scale)   Fatigue (Hammad Scale)     Pre 7   SpO2% Post Exercise   SpO2% Post Exercise       91   HR Post Exercise   HR Post Exercise                104   Time to Recovery   Time to Recovery           2 min     Comments: Recovered to 92% on 4 lpm & began test. After 2 minutes took a 3 min.rest break.When back in chair dec. O2 to 4 lpm to recover pt.

## 2022-02-01 NOTE — NURSING NOTE
Pt up on side of bed stating that he has to get ready to go. Bed alarm sounding, pt redirected back to bed. Will cont to monitor.

## 2022-02-01 NOTE — CASE MANAGEMENT/SOCIAL WORK
Continued Stay Note  DILAN Ward     Patient Name: Dontae Bonds Jr.  MRN: [id number]  Today's Date: 2/1/2022    Admit Date: 1/21/2022     Discharge Plan     Row Name 02/01/22 1102       Plan    Plan home w HH vs STR    Plan Comments Per Kirit, patient is close to being ready for dc. Covid + 1/14/22 - now out of isolation with Covid negative result on chart 2/1/22. Plan walking oximetry today. Spoke with patients son, Dontae Bonds Jr, via phone. Discussion of plan for dc. Family has expressed interest in STR at dc and was informed patient may need to dc to a facility with a covid unit. Explained that patient is no longer in covid isolation at this facility. Referrals can be made to area facilities and depending on the facility protocol, may not need to be placed in a covid unit. He requests facilities close to [place]. Discussion of [facility] and [facility] ([place], [place]) as closest facilities. Explained that facility will review for ability to accept. Per PT notes, recommend 24/7 care. Dontae states that his mother is home with patient 24/7 and can assist, but could not manage him if he were to fall and therefore would prefer STR at dc If patient does not qualify for STR- other options would include HH for Nsg/PT, but they are not in the home 24/7. CM can provide a listing of private pay in home care givers if needed. Dontae is agreeable to referrals to [facility] and [facility] ([place], [place]) and will speak with his mother regarding a preference of HH agenices and return call.  Spoke with Esme/[facility] ([place], [place]) and MALACHI Mcqueen/[facility] to inform of in basket referral.  Per RT, patient required 6L 02 w exertion. Return call from patients son, Dontae who states they would like [facility] if needed at dc. Update of patient walking ox results given, CM will continue to follow for dc needs.               Discharge Codes    No documentation.                     Garret Mcwilliams RN

## 2022-02-02 ENCOUNTER — NURSE TRIAGE (OUTPATIENT)
Dept: CALL CENTER | Facility: HOSPITAL | Age: 72
End: 2022-02-02

## 2022-02-02 ENCOUNTER — READMISSION MANAGEMENT (OUTPATIENT)
Dept: CALL CENTER | Facility: HOSPITAL | Age: 72
End: 2022-02-02

## 2022-02-02 VITALS
SYSTOLIC BLOOD PRESSURE: 116 MMHG | RESPIRATION RATE: 16 BRPM | OXYGEN SATURATION: 95 % | BODY MASS INDEX: 27.97 KG/M2 | TEMPERATURE: 97.5 F | WEIGHT: 174 LBS | HEART RATE: 76 BPM | DIASTOLIC BLOOD PRESSURE: 74 MMHG | HEIGHT: 66 IN

## 2022-02-02 LAB
ALBUMIN SERPL-MCNC: 2.7 G/DL (ref 3.5–5.2)
ALBUMIN/GLOB SERPL: 1.3 G/DL
ALP SERPL-CCNC: 33 U/L (ref 39–117)
ALT SERPL W P-5'-P-CCNC: 48 U/L (ref 1–41)
ANION GAP SERPL CALCULATED.3IONS-SCNC: 6.7 MMOL/L (ref 5–15)
AST SERPL-CCNC: 22 U/L (ref 1–40)
BASOPHILS # BLD AUTO: 0.02 10*3/MM3 (ref 0–0.2)
BASOPHILS NFR BLD AUTO: 0.1 % (ref 0–1.5)
BILIRUB SERPL-MCNC: 0.5 MG/DL (ref 0–1.2)
BUN SERPL-MCNC: 28 MG/DL (ref 8–23)
BUN/CREAT SERPL: 50.9 (ref 7–25)
CALCIUM SPEC-SCNC: 8.7 MG/DL (ref 8.6–10.5)
CHLORIDE SERPL-SCNC: 106 MMOL/L (ref 98–107)
CO2 SERPL-SCNC: 25.3 MMOL/L (ref 22–29)
CREAT SERPL-MCNC: 0.55 MG/DL (ref 0.76–1.27)
CRP SERPL-MCNC: <0.3 MG/DL (ref 0–0.5)
D DIMER PPP FEU-MCNC: 0.71 MCGFEU/ML (ref 0–0.46)
DEPRECATED RDW RBC AUTO: 45.1 FL (ref 37–54)
EOSINOPHIL # BLD AUTO: 0.01 10*3/MM3 (ref 0–0.4)
EOSINOPHIL NFR BLD AUTO: 0.1 % (ref 0.3–6.2)
ERYTHROCYTE [DISTWIDTH] IN BLOOD BY AUTOMATED COUNT: 13.3 % (ref 12.3–15.4)
GFR SERPL CREATININE-BSD FRML MDRD: 147 ML/MIN/1.73
GLOBULIN UR ELPH-MCNC: 2.1 GM/DL
GLUCOSE SERPL-MCNC: 151 MG/DL (ref 65–99)
HCT VFR BLD AUTO: 37.3 % (ref 37.5–51)
HGB BLD-MCNC: 12.3 G/DL (ref 13–17.7)
IMM GRANULOCYTES # BLD AUTO: 0.38 10*3/MM3 (ref 0–0.05)
IMM GRANULOCYTES NFR BLD AUTO: 2.3 % (ref 0–0.5)
LYMPHOCYTES # BLD AUTO: 0.44 10*3/MM3 (ref 0.7–3.1)
LYMPHOCYTES NFR BLD AUTO: 2.6 % (ref 19.6–45.3)
MCH RBC QN AUTO: 30.4 PG (ref 26.6–33)
MCHC RBC AUTO-ENTMCNC: 33 G/DL (ref 31.5–35.7)
MCV RBC AUTO: 92.3 FL (ref 79–97)
MONOCYTES # BLD AUTO: 0.65 10*3/MM3 (ref 0.1–0.9)
MONOCYTES NFR BLD AUTO: 3.9 % (ref 5–12)
NEUTROPHILS NFR BLD AUTO: 15.24 10*3/MM3 (ref 1.7–7)
NEUTROPHILS NFR BLD AUTO: 91 % (ref 42.7–76)
NRBC BLD AUTO-RTO: 0 /100 WBC (ref 0–0.2)
PLATELET # BLD AUTO: 343 10*3/MM3 (ref 140–450)
PMV BLD AUTO: 10.8 FL (ref 6–12)
POTASSIUM SERPL-SCNC: 4.8 MMOL/L (ref 3.5–5.2)
PROCALCITONIN SERPL-MCNC: <0.02 NG/ML (ref 0–0.25)
PROT SERPL-MCNC: 4.8 G/DL (ref 6–8.5)
RBC # BLD AUTO: 4.04 10*6/MM3 (ref 4.14–5.8)
SODIUM SERPL-SCNC: 138 MMOL/L (ref 136–145)
WBC NRBC COR # BLD: 16.74 10*3/MM3 (ref 3.4–10.8)

## 2022-02-02 PROCEDURE — 85379 FIBRIN DEGRADATION QUANT: CPT | Performed by: NURSE PRACTITIONER

## 2022-02-02 PROCEDURE — 94761 N-INVAS EAR/PLS OXIMETRY MLT: CPT

## 2022-02-02 PROCEDURE — 80053 COMPREHEN METABOLIC PANEL: CPT | Performed by: HOSPITALIST

## 2022-02-02 PROCEDURE — 85025 COMPLETE CBC W/AUTO DIFF WBC: CPT | Performed by: HOSPITALIST

## 2022-02-02 PROCEDURE — 94799 UNLISTED PULMONARY SVC/PX: CPT

## 2022-02-02 PROCEDURE — 25010000002 ENOXAPARIN PER 10 MG: Performed by: HOSPITALIST

## 2022-02-02 PROCEDURE — 86140 C-REACTIVE PROTEIN: CPT | Performed by: HOSPITALIST

## 2022-02-02 PROCEDURE — 84145 PROCALCITONIN (PCT): CPT | Performed by: INTERNAL MEDICINE

## 2022-02-02 PROCEDURE — 63710000001 DEXAMETHASONE PER 0.25 MG: Performed by: INTERNAL MEDICINE

## 2022-02-02 PROCEDURE — 97116 GAIT TRAINING THERAPY: CPT

## 2022-02-02 PROCEDURE — 99239 HOSP IP/OBS DSCHRG MGMT >30: CPT | Performed by: INTERNAL MEDICINE

## 2022-02-02 RX ORDER — DEXAMETHASONE 4 MG/1
4 TABLET ORAL EVERY 12 HOURS SCHEDULED
Qty: 6 TABLET | Refills: 0 | Status: SHIPPED | OUTPATIENT
Start: 2022-02-02 | End: 2022-03-14 | Stop reason: HOSPADM

## 2022-02-02 RX ORDER — DEXAMETHASONE 4 MG/1
4 TABLET ORAL EVERY 12 HOURS SCHEDULED
Status: DISCONTINUED | OUTPATIENT
Start: 2022-02-02 | End: 2022-02-02 | Stop reason: HOSPADM

## 2022-02-02 RX ORDER — METHYLPREDNISOLONE 4 MG/1
TABLET ORAL
Qty: 21 EACH | Refills: 0 | Status: SHIPPED | OUTPATIENT
Start: 2022-02-02 | End: 2022-03-14 | Stop reason: HOSPADM

## 2022-02-02 RX ORDER — OLANZAPINE 5 MG/1
5 TABLET ORAL
Qty: 30 TABLET | Refills: 0 | Status: ON HOLD | OUTPATIENT
Start: 2022-02-03 | End: 2022-03-14 | Stop reason: SDUPTHER

## 2022-02-02 RX ORDER — BENZONATATE 200 MG/1
200 CAPSULE ORAL 3 TIMES DAILY
Qty: 30 CAPSULE | Refills: 0 | Status: ON HOLD | OUTPATIENT
Start: 2022-02-02 | End: 2022-10-26

## 2022-02-02 RX ADMIN — BENZONATATE 200 MG: 100 CAPSULE ORAL at 16:58

## 2022-02-02 RX ADMIN — BARICITINIB 4 MG: 2 TABLET, FILM COATED ORAL at 08:24

## 2022-02-02 RX ADMIN — GUAIFENESIN 1200 MG: 600 TABLET, EXTENDED RELEASE ORAL at 08:23

## 2022-02-02 RX ADMIN — THIAMINE HCL TAB 100 MG 200 MG: 100 TAB at 08:23

## 2022-02-02 RX ADMIN — ENOXAPARIN SODIUM 40 MG: 40 INJECTION SUBCUTANEOUS at 08:24

## 2022-02-02 RX ADMIN — BENZONATATE 200 MG: 100 CAPSULE ORAL at 08:23

## 2022-02-02 RX ADMIN — OLANZAPINE 5 MG: 5 TABLET, FILM COATED ORAL at 17:00

## 2022-02-02 RX ADMIN — ZINC SULFATE 220 MG (50 MG) CAPSULE 220 MG: CAPSULE at 08:23

## 2022-02-02 RX ADMIN — CHOLECALCIFEROL TAB 25 MCG (1000 UNIT) 1000 UNITS: 25 TAB at 08:23

## 2022-02-02 RX ADMIN — DEXAMETHASONE 4 MG: 4 TABLET ORAL at 08:24

## 2022-02-02 RX ADMIN — ATORVASTATIN CALCIUM 10 MG: 10 TABLET, FILM COATED ORAL at 08:23

## 2022-02-02 RX ADMIN — OXYCODONE HYDROCHLORIDE AND ACETAMINOPHEN 500 MG: 500 TABLET ORAL at 08:24

## 2022-02-02 NOTE — SIGNIFICANT NOTE
02/02/22 0711   Oxygen Therapy   SpO2 (!) 88 %   Pulse Oximetry Type Continuous   Device (Oxygen Therapy) humidified; nasal cannula   Flow (L/min) 4       Going from laying to sitting on side of bed

## 2022-02-02 NOTE — NURSING NOTE
Prolonged recover phase after exertion that were noted previously have seemed to resolve with PT/OT sessions, noted desaturation to 83% but recovers in less than 1 minute with rest.    Félix Winn RN

## 2022-02-02 NOTE — DISCHARGE INSTR - APPOINTMENTS
Follow up with Dr. Burciaga in 1 week. [phone]    Follow up with pulmonary in 2 weeks. [phone]

## 2022-02-02 NOTE — SIGNIFICANT NOTE
02/02/22 0718   Oxygen Therapy   SpO2 (!) 83 %   Pulse Oximetry Type Continuous   Device (Oxygen Therapy) humidified; nasal cannula   Flow (L/min) 5       Laying in bed comfortably

## 2022-02-02 NOTE — SIGNIFICANT NOTE
02/02/22 0802   Oxygen Therapy   SpO2 93 %   Pulse Oximetry Type Continuous   Device (Oxygen Therapy) humidified; nasal cannula   Flow (L/min) 4     Resting in bed

## 2022-02-02 NOTE — DISCHARGE SUMMARY
"Dontae Bonds Jr.  1950  [id number]    Hospitalists Discharge Summary    Date of Admission: 1/21/2022  Date of Discharge:  2/2/2022    History of Present Illness from \[facility] [place] [facility]\"" on admit: Patient is a 71-year-old male who presented through the ER secondary to SOA. He notes symptoms started 8 days ago, with diagnosis per Dr. Tiffany Burciaga, his pcp, 7 days ago. He completed a full course of ivermectin without improvement. He notes non productive cough, fever/chills/soa/decreased appetite, however feels hungry now. His wife is ill as well. He is not vaccinated. He states \"I'd like to know who in the [facility], decided to discontinue monoclonal antibody infusions\". He is agreeable to therapies here but notes wants to go home tomorrow.      Diagnostics in ER included CTA chest without evidence of PE. Glucose 154, AST 62, alk phos 35, d dimer 1.27, lactate 3.0.     He has a known history of HTN, HLD.      He otherwise currently denies f/c/headache/rhinorrhea/nasal congestion/lightheadedness/syncopal sensation/n/v/d/chest pain/abdominal pain/recent illness/sick exposures/change in bowel or bladder habits/no weight change/bloody emesis or bloody stools/change in medications or any other new concerns.    Primary Discharge diagnoses/Hospital course: Patient admitted on 1/21/2022 with acute hypoxic respiratory failure secondary to COVID-19 pneumonia, as well as presumed obstructive sleep apnea.  Patient was treated with remdesivir as well as baricitinib and was provided IV Decadron.  He had gradual improvement throughout his hospitalization and ultimately on day of discharge required 3 L of oxygen with exertion and no oxygen at rest.  He was provided oxygen therapy prior to discharge.  His stay was complicated by acute alcohol withdrawal, and had to be moved to ICU for careful monitoring.  He initially received IV Ativan and then was changed over to Librium.  He was also given vitamin replacement.  He continued to be " encephalopathic despite this treatment and neurology saw the patient.  They adjusted timing of his nightly Zyprexa and patient had vast improvement the next day.  He was mentating well on day of discharge.  He was no longer requiring Librium.  Patient was encouraged to discontinue alcohol use.  I have made an ambulatory referral to sleep medicine for his sleep apnea.  Patient will also have follow-up with pulmonary.  He was discharged with 3 additional days of Decadron to be followed by Medrol Dosepak.     Secondary Discharge Diagnoses: Patient also noted to have hematuria/proteinuria but renal ultrasound showed only enlarged kidneys with undetermined significance and no obstruction will defer to his PCP for further management.  Patient did not require his home antihypertensive medicines and I therefore discontinued these on discharge.      On 2/2/2022 patient's condition had improved. They were deemed stable for discharge. They were advised to take all medications as prescribed, follow up with PCP within 1 week. If there are any issues patient should contact PCP and/or return to the ED for follow up. Patient was agreeable to the plan and subsequently discharged at this time.     PCP  Patient Care Team:  Tiffany Burciaga MD as PCP - General (Internal Medicine)    Consults:   Consults     Date and Time Order Name Status Description    2/1/2022  7:36 AM Inpatient Neurology Consult General Completed     1/27/2022  9:23 AM Inpatient Pulmonology Consult Completed           Operations and Procedures Performed:    02/02 1355 Walking Oximetry  02/01 1039 Walking Oximetry  Adult Transthoracic Echo Complete w/ Color, Spectral and Contrast if Necessary Per Protocol    Result Date: 1/27/2022  Narrative: · Calculated left ventricular EF = 61.6% Estimated left ventricular EF was in agreement with the calculated left ventricular EF. Left ventricular systolic function is normal.Left ventricular wall thickness is consistent with  mild concentric hypertrophy. · Left ventricular diastolic function was normal. · Right ventricular systolic function not assessed but visually looks decreased.      Walking Oximetry    Result Date: 2/2/2022  Narrative: Edilberto Bird, CRT     2/2/2022  3:29 PM Exercise Oximetry Patient Name:Dontae Bonds Jr. MRN: [id number] Date: 02/02/22         ROOM AIR BASELINE SpO2%                      91 Heart Rate                  88 Blood Pressure  EXERCISE ON ROOM AIR SpO2% EXERCISE ON O2 @     LPM SpO2% 1 MINUTE       88 1 MINUTE  2 MINUTES  2 MINUTES                 2 lpm      90 3 MINUTES  3 MINUTES                 3 lpm      88 4 MINUTES  4 MINUTES                 6 lpm      90 5 MINUTES  5 MINUTES                 6  lpm      87 6 MINUTES  6 MINUTES           Distance Walked   Distance Walked                     120 ft Dyspnea (Hammad Scale)   Dyspnea (Hammad Scale)         Pre 0 Fatigue (Hammad Scale)   Fatigue (Hammad Scale)            Pre 5 SpO2% Post Exercise   SpO2% Post Exercise            92 HR Post Exercise   HR Post Exercise                     96 Time to Recovery   Time to Recovery                   3 min Comments: Took 20 min for study with many rest breaks.    Walking Oximetry    Result Date: 2/1/2022  Narrative: Edilberto Bird, CRT     2/1/2022 11:37 AM Exercise Oximetry Patient Name:Dontae Bonds Jr. MRN: [id number] Date: 02/01/22         ROOM AIR BASELINE SpO2%           88 Heart Rate      92 Blood Pressure  EXERCISE ON ROOM AIR SpO2% EXERCISE ON O2 @      LPM SpO2% 1 MINUTE  1 MINUTE                    4 lpm       88 2 MINUTES  2 MINUTES                  4 lpm       87 3 MINUTES  3 MINUTES                  6 lpm       88  4 MINUTES  4 MINUTES                  6  lpm       86 5 MINUTES  5 MINUTES  6 MINUTES  6 MINUTES           Distance Walked   Distance Walked             176  Dyspnea (Hammad Scale)   Dyspnea (Hammad Scale)    Pre 5 Fatigue (Hammad Scale)   Fatigue (Hmamad Scale)     Pre 7 SpO2% Post Exercise   SpO2% Post  Exercise       91 HR Post Exercise   HR Post Exercise                104 Time to Recovery   Time to Recovery           2 min Comments: Recovered to 92% on 4 lpm & began test. After 2 minutes took a 3 min.rest break.When back in chair dec. O2 to 4 lpm to recover pt.    CT Head Without Contrast    Result Date: 2/1/2022  Narrative: CT Head WO HISTORY: Mental status changes. Covid pneumonia with hypoxemia. Alcohol withdrawal. TECHNIQUE: Axial unenhanced head CT. Radiation dose reduction techniques included automated exposure control or exposure modulation based on body size. Count of known CT and cardiac nuc med studies performed in previous 12 months: 2. Time of scan: 1206 hours COMPARISON: None. FINDINGS: No intracranial hemorrhage, mass, or infarct. No hydrocephalus or extra-axial fluid collection. Diffuse cerebral and cerebellar atrophy likely a combination of advancing age and reported alcohol abuse. The skull base, calvarium, and extracranial soft tissues are normal.     Impression: No acute intracranial abnormality. Cortical and cerebellar atrophy. Signer Name: ASIF Thomas MD  Signed: 2/1/2022 12:21 PM  Workstation Name: [email or web address]  [facility]    CT Chest Without Contrast Diagnostic    Result Date: 1/27/2022  Narrative: CT Chest WO INDICATION: Respiratory failure with covid pneumonia. Hypoxemia. Patient on nonrebreather. TECHNIQUE: CT of the thorax without IV contrast. Coronal and sagittal reconstructions were obtained.  Radiation dose reduction techniques included automated exposure control or exposure modulation based on body size. Count of known CT and cardiac nuc med studies performed in previous 12 months: 1. COMPARISON: CTA chest 1/21/2022 and chest radiograph 1/27/2022 FINDINGS: Extensive bilateral alveolar infiltrates with increasing consolidation. Overall extent of disease not significantly changed and estimated to involve all about 50% of the lung parenchyma. This is most  severe within the dependent portions of both lungs right greater than left. Intervening lung parenchyma appears normal. Trachea and bronchi are unremarkable. No effusions. Interval development of pneumomediastinum with the majority of air noted within the right supraclavicular soft tissues extending into the right neck. No fluid collection. Some scattered deep subcutaneous gas noted within the left neck. No pneumothorax. No pneumopericardium. Stable cardiomegaly. Aorta and pulmonary vessels are unremarkable. Visualized upper abdomen appears normal. Osseous structures unremarkable.     Impression: Continued extensive bilateral alveolar infiltrates with increasing consolidation but overall extent of disease not significantly changed. Findings compatible with covid pneumonia. Interval development of pneumomediastinum primarily within the right and superior mediastinum and extending into the right supraclavicular and right neck soft tissues. Minimal air noted within the left deep neck tissues. There is also a moderate amount of subcutaneous gas in the right anterior chest. Etiology unclear but may be related to ventilatory efforts or severe coughing associated with covid. No appreciable pneumothorax. Findings called and discussed with the patient's nurse at the time of this dictation. Signer Name: ASIF Thomas MD  Signed: 1/27/2022 11:25 AM  Workstation Name: [email or web address]  [facility]    XR Chest 1 View    Result Date: 1/29/2022  Narrative: CR Chest 1 Vw INDICATION: Pneumonia COMPARISON:  1/27/2022 FINDINGS: Portable AP view(s) of the chest. Subcutaneous emphysema at the base of the neck is again seen, greater on the right. This has improved. The heart and mediastinum are stable. The inspiratory effort overall is better than on the previous examination. Bilateral infiltrates have cleared partially, especially on the right. No effusions are seen and no new infiltrates are noted.     Impression:  Pneumonia shows partial clearing since the previous examination. Overall the inspiratory effort is better. Signer Name: Corbin Nicholas MD  Signed: 1/29/2022 6:56 AM  Workstation Name: [email or web address]  [facility]    XR Chest 1 View    Result Date: 1/27/2022  Narrative: XR CHEST 1 VW-: 1/27/2022 9:29 AM  INDICATION: Increasing shortness of air and cough. Covid 19 positive.  COMPARISON:  01/21/2022.  FINDINGS: Single Portable AP view(s) of the chest. Shallow lung expansion. Cardiac silhouette stable. Interval worsening of consolidation in the perihilar and midlung zone on the left and probably the left base as well. Interval worsening of moderately extensive consolidation in the right lung. No new effusion. There is new subcutaneous gas in the neck soft tissue on the right tracking into the right flank. There are also new serpiginous densities projecting over the right heart border and right paratracheal stripe suggestive of pneumomediastinum. A distinct pneumothorax is not identified, however, further evaluation with chest CT recommended.      Impression:  1. Worsening appearance of the chest with lower lung volumes and worsening consolidation bilaterally along with new subcutaneous gas and probable pneumomediastinum. No distinct pneumothorax but further evaluation with chest CT is recommended. 2. FINDINGS personally discussed by telephone with Olya Read at time of study interpretation.  This report was finalized on 1/27/2022 9:57 AM by Dr. Raghu Huang MD.      XR Chest 1 View    Result Date: 1/21/2022  Narrative: CHEST X-RAY, 01/21/2022     HISTORY: 71-year-old male in the ED with positive COVID-19 testing. One week history of cough. Decreased oxygen saturation.  TECHNIQUE: AP portable chest x-ray.  FINDINGS: Moderately dense patchy airspace infiltrates scattered diffusely throughout both lungs. The pattern is typical for COVID-19 pneumonia. Lung volumes are low.  Mild cardiomegaly.  Pulmonary vascularity is normal. No visible pleural effusion.      Impression: Moderately dense diffuse patchy airspace infiltrates in a pattern typical for COVID-19 pneumonia.  This report was finalized on 1/21/2022 11:45 AM by Dr. Obey Lindsey MD.      US Renal Bilateral    Result Date: 1/21/2022  Narrative: US Renal Comp HISTORY: Covid 19 positive with gross hematuria for 2 days COMPARISON:  None TECHNIQUE: Grayscale ultrasound imaging of both kidneys and the urinary bladder was performed. FINDINGS: Both kidneys are normal in size and ultrasound appearance. No evidence of urinary obstruction. No visible nephrolithiasis, worrisome renal mass, perinephric fluid collection or renal parenchymal scarring. Simple cysts are identified in both the right and left kidney, superiorly on the right measuring 5.7 x 5.7 x 5.5 cm and inferiorly on the right measuring 4.9 x 4.0 x 4.7 cm. Small cyst inferiorly in the left kidney measures no more than 11 mm. The urinary bladder is within normal limits. Renal length measures 13.9 cm on the right (though somewhat likely over measured due to the presence of the cysts at the renal poles closed. Renal length on the left measures 14.8 cm.     Impression: Both kidneys are larger than that typically seen. There are a few simple cysts in both kidneys. Otherwise essentially normal sonographic appearance of the kidneys. Signer Name: CHAPARRITA ROUSE MD  Signed: 1/21/2022 4:36 PM  Workstation Name: [email or web address]  [facility] [place] [facility]    CT Angiogram Chest    Result Date: 1/21/2022  Narrative: CTA Chest INDICATION: Diagnosed with Covid one week ago with cough and shortness of breath for 3 days. Low oxygen saturation TECHNIQUE: CT angiogram of the chest with IV contrast. 3-D reconstructions were obtained and reviewed.   Radiation dose reduction techniques included automated exposure control or exposure modulation based on body size. Count of known CT and cardiac nuc med studies  performed in previous 12 months: 0. COMPARISON: None available. FINDINGS: There are extensive groundglass infiltrates typical of Covid 19 pneumonia. These involve about 50% the lung parenchyma. There are no effusions. Pulmonary arteries are optimally opacified. There is no CT evidence of pulmonary embolus. Aorta is normal in size. There is no adenopathy. Thyroid gland is normal. Upper abdominal images are unremarkable. Bones are unremarkable.     Impression: No CT evidence pulmonary embolus Extensive groundglass infiltrates typical of Covid 19 pneumonia Signer Name: Nahum Lester MD  Signed: 1/21/2022 12:54 PM  Workstation Name: [facility]  [facility] [place]      Allergies:  is allergic to penicillins.    Lalo  Reviewed    Discharge Medications:     Discharge Medications      New Medications      Instructions Start Date   benzonatate 200 MG capsule  Commonly known as: TESSALON   200 mg, Oral, 3 Times Daily      dexamethasone 4 MG tablet  Commonly known as: DECADRON   4 mg, Oral, Every 12 Hours Scheduled      methylPREDNISolone 4 MG dose pack  Commonly known as: MEDROL   Take as directed on package instructions.      OLANZapine 5 MG tablet  Commonly known as: zyPREXA   5 mg, Oral, Daily With Dinner   Start Date: February 3, 2022     thiamine 100 MG tablet  tablet  Commonly known as: VITAMIN B-1   100 mg, Oral, Daily   Start Date: February 3, 2022        Continue These Medications      Instructions Start Date   atorvastatin 40 MG tablet  Commonly known as: LIPITOR   40 mg, Oral, Daily         Stop These Medications    lisinopril 10 MG tablet  Commonly known as: PRINIVIL,ZESTRIL            Last Lab Results:   Lab Results (most recent)     Procedure Component Value Units Date/Time    C-reactive Protein [[id number]]  (Normal) Collected: 02/02/22 0429    Specimen: Blood Updated: 02/02/22 1126     C-Reactive Protein <0.30 mg/dL     Procalcitonin [[id number]]  (Normal) Collected: 02/02/22 0428    Specimen:  Blood Updated: 02/02/22 0853     Procalcitonin <0.02 ng/mL     Comprehensive Metabolic Panel [[id number]]  (Abnormal) Collected: 02/02/22 0428    Specimen: Blood Updated: 02/02/22 0551     Glucose 151 mg/dL      BUN 28 mg/dL      Creatinine 0.55 mg/dL      Sodium 138 mmol/L      Potassium 4.8 mmol/L      Chloride 106 mmol/L      CO2 25.3 mmol/L      Calcium 8.7 mg/dL      Total Protein 4.8 g/dL      Albumin 2.70 g/dL      ALT (SGPT) 48 U/L      AST (SGOT) 22 U/L      Alkaline Phosphatase 33 U/L      Total Bilirubin 0.5 mg/dL      eGFR Non African Amer 147 mL/min/1.73      Globulin 2.1 gm/dL      A/G Ratio 1.3 g/dL      BUN/Creatinine Ratio 50.9     Anion Gap 6.7 mmol/L     Narrative:      GFR Normal >60  Chronic Kidney Disease <60  Kidney Failure <15      D-dimer, Quantitative [[id number]]  (Abnormal) Collected: 02/02/22 0429    Specimen: Blood Updated: 02/02/22 0543     D-Dimer, Quantitative 0.71 MCGFEU/mL     Narrative:      Can be elevated in, but is not diagnostic for deep vein thrombosis (DVT) or pulmonary embolis (PE).  It is also elevated in other medical conditions.  Clinical correlation is required.  The negative cut-off value for the D-Dimer is 0.50 mcg FEU/mL for DVT and PE.      CBC & Differential [[id number]]  (Abnormal) Collected: 02/02/22 0428    Specimen: Blood Updated: 02/02/22 0525    Narrative:      The following orders were created for panel order CBC & Differential.  Procedure                               Abnormality         Status                     ---------                               -----------         ------                     CBC Auto Differential[[id number]]        Abnormal            Final result                 Please view results for these tests on the individual orders.    CBC Auto Differential [[id number]]  (Abnormal) Collected: 02/02/22 0428    Specimen: Blood Updated: 02/02/22 0525     WBC 16.74 10*3/mm3      RBC 4.04 10*6/mm3      Hemoglobin 12.3 g/dL      Hematocrit 37.3 %       MCV 92.3 fL      MCH 30.4 pg      MCHC 33.0 g/dL      RDW 13.3 %      RDW-SD 45.1 fl      MPV 10.8 fL      Platelets 343 10*3/mm3      Neutrophil % 91.0 %      Lymphocyte % 2.6 %      Monocyte % 3.9 %      Eosinophil % 0.1 %      Basophil % 0.1 %      Immature Grans % 2.3 %      Neutrophils, Absolute 15.24 10*3/mm3      Lymphocytes, Absolute 0.44 10*3/mm3      Monocytes, Absolute 0.65 10*3/mm3      Eosinophils, Absolute 0.01 10*3/mm3      Basophils, Absolute 0.02 10*3/mm3      Immature Grans, Absolute 0.38 10*3/mm3      nRBC 0.0 /100 WBC     C-reactive Protein [[id number]]  (Normal) Collected: 02/01/22 0431    Specimen: Blood Updated: 02/01/22 1148     C-Reactive Protein <0.30 mg/dL     Comprehensive Metabolic Panel [[id number]]  (Abnormal) Collected: 02/01/22 0431    Specimen: Blood Updated: 02/01/22 0505     Glucose 161 mg/dL      BUN 24 mg/dL      Creatinine 0.62 mg/dL      Sodium 139 mmol/L      Potassium 4.6 mmol/L      Chloride 106 mmol/L      CO2 23.2 mmol/L      Calcium 9.1 mg/dL      Total Protein 5.5 g/dL      Albumin 3.00 g/dL      ALT (SGPT) 55 U/L      AST (SGOT) 25 U/L      Alkaline Phosphatase 37 U/L      Total Bilirubin 0.6 mg/dL      eGFR Non African Amer 128 mL/min/1.73      Globulin 2.5 gm/dL      A/G Ratio 1.2 g/dL      BUN/Creatinine Ratio 38.7     Anion Gap 9.8 mmol/L     Narrative:      GFR Normal >60  Chronic Kidney Disease <60  Kidney Failure <15      COVID PRE-OP / PRE-PROCEDURE SCREENING ORDER (NO ISOLATION) - Swab, Nasal Cavity [[id number]]  (Normal) Collected: 02/01/22 0424    Specimen: Swab from Nasal Cavity Updated: 02/01/22 0458    Narrative:      The following orders were created for panel order COVID PRE-OP / PRE-PROCEDURE SCREENING ORDER (NO ISOLATION) - Swab, Nasal Cavity.  Procedure                               Abnormality         Status                     ---------                               -----------         ------                     COVID-19,[facility]...[[id number]]   Normal              Final result                 Please view results for these tests on the individual orders.    COVID-19,[facility] [facility]HOUSE,Nasal Swab No Transport Media 3-4 HR TAT - Swab, Nasal Cavity [[id number]]  (Normal) Collected: 02/01/22 0424    Specimen: Swab from Nasal Cavity Updated: 02/01/22 0458     COVID19 Not Detected    Narrative:      Fact sheet for providers: https://www.fda.gov/media/061646/download     Fact sheet for patients: https://www.fda.gov/media/476712/download    Test performed by PCR.    Consider negative results in combination with clinical observations, patient history, and epidemiological information.    CBC & Differential [[id number]]  (Abnormal) Collected: 02/01/22 0431    Specimen: Blood Updated: 02/01/22 0437    Narrative:      The following orders were created for panel order CBC & Differential.  Procedure                               Abnormality         Status                     ---------                               -----------         ------                     CBC Auto Differential[[id number]]        Abnormal            Final result                 Please view results for these tests on the individual orders.    CBC Auto Differential [[id number]]  (Abnormal) Collected: 02/01/22 0431    Specimen: Blood Updated: 02/01/22 0437     WBC 15.81 10*3/mm3      RBC 4.53 10*6/mm3      Hemoglobin 13.8 g/dL      Hematocrit 41.3 %      MCV 91.2 fL      MCH 30.5 pg      MCHC 33.4 g/dL      RDW 13.1 %      RDW-SD 43.5 fl      MPV 10.1 fL      Platelets 392 10*3/mm3      Neutrophil % 91.2 %      Lymphocyte % 2.6 %      Monocyte % 3.7 %      Eosinophil % 0.0 %      Basophil % 0.2 %      Immature Grans % 2.3 %      Neutrophils, Absolute 14.43 10*3/mm3      Lymphocytes, Absolute 0.41 10*3/mm3      Monocytes, Absolute 0.58 10*3/mm3      Eosinophils, Absolute 0.00 10*3/mm3      Basophils, Absolute 0.03 10*3/mm3      Immature Grans, Absolute 0.36 10*3/mm3      nRBC 0.0 /100 WBC     Procalcitonin  [[id number]]  (Normal) Collected: 01/31/22 0523    Specimen: Blood Updated: 01/31/22 0602     Procalcitonin 0.04 ng/mL     D-dimer, Quantitative [[id number]]  (Abnormal) Collected: 01/31/22 0523    Specimen: Blood Updated: 01/31/22 0549     D-Dimer, Quantitative 0.97 MCGFEU/mL     Narrative:      Can be elevated in, but is not diagnostic for deep vein thrombosis (DVT) or pulmonary embolis (PE).  It is also elevated in other medical conditions.  Clinical correlation is required.  The negative cut-off value for the D-Dimer is 0.50 mcg FEU/mL for DVT and PE.      Ferritin [[id number]]  (Abnormal) Collected: 01/30/22 0647    Specimen: Blood Updated: 01/30/22 0729     Ferritin 1,262.00 ng/mL     Narrative:      Results may be falsely decreased if patient taking Biotin.      Ferritin [[id number]]  (Abnormal) Collected: 01/29/22 0614    Specimen: Blood Updated: 01/29/22 0703     Ferritin 1,101.00 ng/mL     Narrative:      Results may be falsely decreased if patient taking Biotin.      Blood Culture - Blood, Arm, Left [[id number]]  (Normal) Collected: 01/21/22 1113    Specimen: Blood from Arm, Left Updated: 01/26/22 1130     Blood Culture No growth at 5 days    Blood Culture - Blood, Arm, Right [[id number]]  (Normal) Collected: 01/21/22 1113    Specimen: Blood from Arm, Right Updated: 01/26/22 1130     Blood Culture No growth at 5 days    Hemoglobin A1c [[id number]]  (Normal) Collected: 01/21/22 1048    Specimen: Blood Updated: 01/21/22 1553     Hemoglobin A1C 5.60 %     Narrative:      Hemoglobin A1C Ranges:    Increased Risk for Diabetes  5.7% to 6.4%  Diabetes                     >= 6.5%  Diabetic Goal                < 7.0%    STAT Lactic Acid, Reflex [[id number]]  (Normal) Collected: 01/21/22 1353    Specimen: Blood Updated: 01/21/22 1417     Lactate 1.1 mmol/L     Urinalysis, Microscopic Only - Urine, Clean Catch [[id number]]  (Abnormal) Collected: 01/21/22 1238    Specimen: Urine, Clean Catch Updated: 01/21/22 1254     RBC, UA  3-5 /HPF      WBC, UA None Seen /HPF      Bacteria, UA Trace /HPF      Squamous Epithelial Cells, UA 0-2 /HPF      Hyaline Casts, UA None Seen /LPF      Mucus, UA Small/1+ /HPF      Methodology Manual Light Microscopy    Urinalysis With Microscopic If Indicated (No Culture) - Urine, Clean Catch [[id number]]  (Abnormal) Collected: 01/21/22 1238    Specimen: Urine, Clean Catch Updated: 01/21/22 1250     Color, UA Yellow     Appearance, UA Clear     pH, UA 6.0     Specific Gravity, UA 1.025     Glucose, UA Negative     Ketones, UA 80 mg/dL (3+)     Bilirubin, UA Small (1+)     Blood, UA Moderate (2+)     Protein, UA 30 mg/dL (1+)     Leuk Esterase, UA Negative     Nitrite, UA Negative     Urobilinogen, UA 0.2 E.U./dL    [place] Draw [[id number]] Collected: 01/21/22 1048    Specimen: Blood Updated: 01/21/22 1201    Narrative:      The following orders were created for panel order [place] Draw.  Procedure                               Abnormality         Status                     ---------                               -----------         ------                     Green Top (Gel)[[id number]]                                  Final result               Lavender Top[[id number]]                                     Final result               Gold Top - SST[[id number]]                                   Final result               Light Blue Top[[id number]]                                   Final result                 Please view results for these tests on the individual orders.    Lavender Top [[id number]] Collected: 01/21/22 1048    Specimen: Blood Updated: 01/21/22 1201     Extra Tube hold for add-on     Comment: Auto resulted       Green Top (Gel) [[id number]] Collected: 01/21/22 1048    Specimen: Blood Updated: 01/21/22 1201     Extra Tube Hold for add-ons.     Comment: Auto resulted.       Gold Top - SST [[id number]] Collected: 01/21/22 1048    Specimen: Blood Updated: 01/21/22 1201     Extra Tube Hold for add-ons.     Comment:  Auto resulted.       Light Blue Top [[id number]] Collected: 01/21/22 1048    Specimen: Blood Updated: 01/21/22 1201     Extra Tube hold for add-on     Comment: Auto resulted       Lactic Acid, Plasma [[id number]]  (Abnormal) Collected: 01/21/22 1048    Specimen: Blood Updated: 01/21/22 1200     Lactate 3.0 mmol/L     Influenza Antigen, Rapid - Swab, Nasopharynx [[id number]]  (Normal) Collected: 01/21/22 1114    Specimen: Swab from Nasopharynx Updated: 01/21/22 1145     Influenza A Ag, EIA Negative     Influenza B Ag, EIA Negative    BNP [[id number]]  (Normal) Collected: 01/21/22 1048    Specimen: Blood Updated: 01/21/22 1134     proBNP 226.3 pg/mL     Narrative:      Among patients with dyspnea, NT-proBNP is highly sensitive for the detection of acute congestive heart failure. In addition NT-proBNP of <300 pg/ml effectively rules out acute congestive heart failure with 99% negative predictive value.    Results may be falsely decreased if patient taking Biotin.      Troponin [[id number]]  (Normal) Collected: 01/21/22 1048    Specimen: Blood Updated: 01/21/22 1134     Troponin T <0.010 ng/mL     Narrative:      Troponin T Reference Range:  <= 0.03 ng/mL-   Negative for AMI  >0.03 ng/mL-     Abnormal for myocardial necrosis.  Clinicians would have to utilize clinical acumen, EKG, Troponin and serial changes to determine if it is an Acute Myocardial Infarction or myocardial injury due to an underlying chronic condition.       Results may be falsely decreased if patient taking Biotin.          Imaging Results (Most Recent)     Procedure Component Value Units Date/Time    CT Head Without Contrast [[id number]] Collected: 02/01/22 1221     Updated: 02/01/22 1223    Narrative:      CT Head WO    HISTORY:   Mental status changes. Covid pneumonia with hypoxemia. Alcohol withdrawal.    TECHNIQUE:   Axial unenhanced head CT. Radiation dose reduction techniques included automated exposure control or exposure modulation based on body  size. Count of known CT and cardiac nuc med studies performed in previous 12 months: 2.     Time of scan: 1206 hours    COMPARISON:   None.    FINDINGS:   No intracranial hemorrhage, mass, or infarct. No hydrocephalus or extra-axial fluid collection. Diffuse cerebral and cerebellar atrophy likely a combination of advancing age and reported alcohol abuse. The skull base, calvarium, and extracranial soft  tissues are normal.      Impression:      No acute intracranial abnormality. Cortical and cerebellar atrophy.          Signer Name: ASIF Thomas MD   Signed: 2/1/2022 12:21 PM   Workstation Name: [id number]    [facility]    XR Chest 1 View [[id number]] Collected: 01/29/22 0656     Updated: 01/29/22 0658    Narrative:      CR Chest 1 Vw    INDICATION:   Pneumonia     COMPARISON:    1/27/2022    FINDINGS:  Portable AP view(s) of the chest.        Subcutaneous emphysema at the base of the neck is again seen, greater on the right. This has improved. The heart and mediastinum are stable. The inspiratory effort overall is better than on the previous examination. Bilateral infiltrates have cleared  partially, especially on the right. No effusions are seen and no new infiltrates are noted.       Impression:      Pneumonia shows partial clearing since the previous examination. Overall the inspiratory effort is better.    Signer Name: Corbin Nicholas MD   Signed: 1/29/2022 6:56 AM   Workstation Name: [id number]    [facility]    CT Chest Without Contrast Diagnostic [[id number]] Collected: 01/27/22 1125     Updated: 01/27/22 1127    Narrative:      CT Chest WO    INDICATION:   Respiratory failure with covid pneumonia. Hypoxemia. Patient on nonrebreather.    TECHNIQUE:   CT of the thorax without IV contrast. Coronal and sagittal reconstructions were obtained.  Radiation dose reduction techniques included automated exposure control or exposure modulation based on body size. Count of  known CT and cardiac nuc med studies  performed in previous 12 months: 1.     COMPARISON:   CTA chest 1/21/2022 and chest radiograph 1/27/2022    FINDINGS:  Extensive bilateral alveolar infiltrates with increasing consolidation. Overall extent of disease not significantly changed and estimated to involve all about 50% of the lung parenchyma. This is most severe within the dependent portions of both lungs  right greater than left. Intervening lung parenchyma appears normal. Trachea and bronchi are unremarkable. No effusions.    Interval development of pneumomediastinum with the majority of air noted within the right supraclavicular soft tissues extending into the right neck. No fluid collection. Some scattered deep subcutaneous gas noted within the left neck. No pneumothorax.  No pneumopericardium.    Stable cardiomegaly. Aorta and pulmonary vessels are unremarkable. Visualized upper abdomen appears normal. Osseous structures unremarkable.      Impression:      Continued extensive bilateral alveolar infiltrates with increasing consolidation but overall extent of disease not significantly changed. Findings compatible with covid pneumonia.    Interval development of pneumomediastinum primarily within the right and superior mediastinum and extending into the right supraclavicular and right neck soft tissues. Minimal air noted within the left deep neck tissues. There is also a moderate amount  of subcutaneous gas in the right anterior chest. Etiology unclear but may be related to ventilatory efforts or severe coughing associated with covid. No appreciable pneumothorax.     Findings called and discussed with the patient's nurse at the time of this dictation.    Signer Name: ASIF Thomas MD   Signed: 1/27/2022 11:25 AM   Workstation Name: [id number]    [facility]    XR Chest 1 View [[id number]] Collected: 01/27/22 0951     Updated: 01/27/22 0959    Narrative:      XR CHEST 1 VW-: 1/27/2022 9:29 AM      INDICATION:   Increasing shortness of air and cough. Covid 19 positive.      COMPARISON:    01/21/2022.     FINDINGS:  Single Portable AP view(s) of the chest. Shallow lung expansion. Cardiac  silhouette stable. Interval worsening of consolidation in the perihilar  and midlung zone on the left and probably the left base as well.  Interval worsening of moderately extensive consolidation in the right  lung. No new effusion. There is new subcutaneous gas in the neck soft  tissue on the right tracking into the right flank. There are also new  serpiginous densities projecting over the right heart border and right  paratracheal stripe suggestive of pneumomediastinum. A distinct  pneumothorax is not identified, however, further evaluation with chest  CT recommended.       Impression:         1. Worsening appearance of the chest with lower lung volumes and  worsening consolidation bilaterally along with new subcutaneous gas and  probable pneumomediastinum. No distinct pneumothorax but further  evaluation with chest CT is recommended.  2. FINDINGS personally discussed by telephone with Olya Read at  time of study interpretation.     This report was finalized on 1/27/2022 9:57 AM by Dr. Raghu Huang MD.       US Renal Bilateral [[id number]] Collected: 01/21/22 1636     Updated: 01/21/22 1639    Narrative:      US Renal Comp    HISTORY:  Covid 19 positive with gross hematuria for 2 days    COMPARISON:    None    TECHNIQUE:  Grayscale ultrasound imaging of both kidneys and the urinary bladder was performed.    FINDINGS:  Both kidneys are normal in size and ultrasound appearance. No evidence of urinary obstruction. No visible nephrolithiasis, worrisome renal mass, perinephric fluid collection or renal parenchymal scarring. Simple cysts are identified in both the right and  left kidney, superiorly on the right measuring 5.7 x 5.7 x 5.5 cm and inferiorly on the right measuring 4.9 x 4.0 x 4.7 cm. Small cyst inferiorly in  the left kidney measures no more than 11 mm. The urinary bladder is within normal limits. Renal length  measures 13.9 cm on the right (though somewhat likely over measured due to the presence of the cysts at the renal poles closed. Renal length on the left measures 14.8 cm.      Impression:      Both kidneys are larger than that typically seen. There are a few simple cysts in both kidneys. Otherwise essentially normal sonographic appearance of the kidneys.    Signer Name: CHAPARRITA ROUSE MD   Signed: 1/21/2022 4:36 PM   Workstation Name: [facility]    [facility]    CT Angiogram Chest [[id number]] Collected: 01/21/22 1254     Updated: 01/21/22 1319    Narrative:      CTA Chest    INDICATION:   Diagnosed with Covid one week ago with cough and shortness of breath for 3 days. Low oxygen saturation    TECHNIQUE:   CT angiogram of the chest with IV contrast. 3-D reconstructions were obtained and reviewed.   Radiation dose reduction techniques included automated exposure control or exposure modulation based on body size. Count of known CT and cardiac nuc med studies  performed in previous 12 months: 0.     COMPARISON:   None available.    FINDINGS:   There are extensive groundglass infiltrates typical of Covid 19 pneumonia. These involve about 50% the lung parenchyma. There are no effusions. Pulmonary arteries are optimally opacified. There is no CT evidence of pulmonary embolus. Aorta is normal in  size. There is no adenopathy. Thyroid gland is normal. Upper abdominal images are unremarkable. Bones are unremarkable.          Impression:      No CT evidence pulmonary embolus    Extensive groundglass infiltrates typical of Covid 19 pneumonia    Signer Name: Nahum Lester MD   Signed: 1/21/2022 12:54 PM   Workstation Name: [email or web address]    [facility]    XR Chest 1 View [[id number]] Collected: 01/21/22 1144     Updated: 01/21/22 1232    Narrative:      CHEST X-RAY, 01/21/2022          HISTORY:  71-year-old male in the ED with positive COVID-19 testing. One week  history of cough. Decreased oxygen saturation.     TECHNIQUE:  AP portable chest x-ray.     FINDINGS:  Moderately dense patchy airspace infiltrates scattered diffusely  throughout both lungs. The pattern is typical for COVID-19 pneumonia.  Lung volumes are low.     Mild cardiomegaly. Pulmonary vascularity is normal. No visible pleural  effusion.       Impression:      Moderately dense diffuse patchy airspace infiltrates in a pattern  typical for COVID-19 pneumonia.     This report was finalized on 1/21/2022 11:45 AM by Dr. Obey Lindsey MD.             PROCEDURES      Condition on Discharge:  Stable, Improved.     Physical Exam at Discharge  Vital Signs  Temp:  [97 °F (36.1 °C)-97.5 °F (36.4 °C)] 97.5 °F (36.4 °C)  Heart Rate:  [52-76] 76  Resp:  [16-20] 16  BP: (112-116)/(72-74) 116/74    Physical Exam  Please see today's progress note    Discharge Disposition  To home    Visiting Nurse:    No    Home PT/OT:  No    Home Safety Evaluation:  No    DME  Home oxygen    Discharge Diet:      Dietary Orders (From admission, onward)     Start     Ordered    01/30/22 0854  Diet Regular  Diet Effective Now        Comments: Boost 2x/ day   Question:  Diet Texture / Consistency  Answer:  Regular    01/30/22 0855                Activity at Discharge:  As tolerated    Pre-discharge education  None      Follow-up Appointments  No future appointments.  Additional Instructions for the Follow-ups that You Need to Schedule     Discharge Follow-up with PCP   As directed       Currently Documented PCP:    Tiffany Burciaga MD    PCP Phone Number:    [phone]     Follow Up Details: 1 week         Discharge Follow-up with Specialty: pulmonary; 2 Weeks   As directed      Specialty: pulmonary    Follow Up: 2 Weeks    Follow Up Details: covid hypoxia               Test Results Pending at Discharge      Discharge over 30 min (if over 30 minutes give  explanation as to why it took greater than 30 minutes)  Secondary to:   Coordination of care/follow up  Medication reconciliation  D/W patient

## 2022-02-02 NOTE — THERAPY DISCHARGE NOTE
Acute Care - Physical Therapy Treatment Note/Discharge   [place]     Patient Name: Dontae Bonds Jr.  : 1950  MRN: [id number]  Today's Date: 2022                Admit Date: 2022    Visit Dx:    ICD-10-CM ICD-9-CM   1. Pneumonia due to COVID-19 virus  U07.1 480.8    J12.82 079.89   2. Hypoxia  R09.02 799.02     Patient Active Problem List   Diagnosis   • Personal history of colonic polyps   • Family history of colon cancer   • Pneumonia due to COVID-19 virus     Past Medical History:   Diagnosis Date   • Hyperlipidemia    • Hypertension      History reviewed. No pertinent surgical history.    PT Assessment (last 12 hours)     PT Evaluation and Treatment     Row Name 22 1035          Physical Therapy Time and Intention    Subjective Information no complaints  -     Document Type therapy note (daily note)  -     Mode of Treatment physical therapy  -     Patient Effort good  -     Symptoms Noted During/After Treatment shortness of breath  -     Comment pt with improved cognition and ability to follow commands  -     Row Name 22 1035          General Information    Patient Observations alert; cooperative; agree to therapy  -     Patient/Family/Caregiver Comments/Observations pt sitting in recliner, agrees to therapy  -     Existing Precautions/Restrictions fall; oxygen therapy device and L/min  3.5 L/min at rest  -     Barriers to Rehab cognitive status  -     Row Name 22 1035          Cognition    Personal Safety Interventions gait belt; nonskid shoes/slippers when out of bed  -     Row Name 22 1035          Pain Scale: Numbers Pre/Post-Treatment    Pretreatment Pain Rating 0/10 - no pain  -     Posttreatment Pain Rating 0/10 - no pain  -     Row Name 22 1035          Bed Mobility    Comment (Bed Mobility) deferred-up in chair  -     Row Name 22 1035          Transfers    Transfers sit-stand transfer; stand-sit transfer  -     Sit-Stand  [place] (Transfers) modified independence  -     Stand-Sit [place] (Transfers) modified independence  -     Row Name 02/02/22 1035          Sit-Stand Transfer    Assistive Device (Sit-Stand Transfers) walker, front-wheeled  -JW     Row Name 02/02/22 1035          Stand-Sit Transfer    Assistive Device (Stand-Sit Transfers) walker, front-wheeled  -JW     Row Name 02/02/22 1035          Gait/Stairs (Locomotion)    [place] Level (Gait) standby assist  -     Assistive Device (Gait) walker, front-wheeled  -JW     Distance in Feet (Gait) 320  -JW     Pattern (Gait) swing-through  -JW     Deviations/Abnormal Patterns (Gait) base of support, narrow  -JW     Bilateral Gait Deviations forward flexed posture  -JW     Comment (Gait/Stairs) pt able to navigate external obstacles without difficulty.  pt requires assistance for O2 line management during direction changes  -     Row Name 02/02/22 1035          Safety Issues, Functional Mobility    Impairments Affecting Function (Mobility) cognition  -     Row Name 02/02/22 1035          Plan of Care Review    Outcome Summary PT: Patient performs sit to stand with modified independence and gait x320 feet with rolling walker, SBA.  Patient able to navigate external obstacles with direction changes without balance loss.  Patient on 6 liters O2 during mobility, O2 sats 85% upon completion of gait, requires 45 seconds to recover to 90%.  Patient displays consistency with overall mobility/assistance levels during session today, no further skilled PT needs at this time.  Continue to recommend 24 hour supervision upon discharge, however do not anticipate patient requiring any physical assistance with funcitonal mobility tasks.  -     Row Name 02/02/22 1035          Vital Signs    Pre SpO2 (%) 92  3.5 liters  -     O2 Delivery Pre Treatment supplemental O2  -     Intra SpO2 (%) 85  -     O2 Delivery Intra Treatment supplemental O2  6 liters  -     O2  Delivery Post Treatment supplemental O2  -     Recovery Time 45 seconds  -     Row Name 02/02/22 1035          Positioning and Restraints    Pre-Treatment Position sitting in chair/recliner  -     Post Treatment Position chair  -JW     In Chair reclined; call light within reach; encouraged to call for assist  -     Row Name 02/02/22 1035          Progress Summary (PT)    Progress Toward Functional Goals (PT) progress toward functional goals is good; prepare for discharge  -     Barriers to Overall Progress (PT) cognition  -           User Key  (r) = Recorded By, (t) = Taken By, (c) = Cosigned By    Initials Name Provider Type     Tami Hodgson, MARY Physical Therapist                  Physical Therapy Education                 Title: PT OT SLP Therapies (Done)     Topic: Physical Therapy (Resolved)     Point: Mobility training (Resolved)     Learning Progress Summary           Patient Acceptance, E,TB, VU by  at 2/2/2022 1137    Acceptance, E,TB, VU by  at 2/1/2022 1006    Acceptance, E,TB, NR by  at 1/31/2022 1430                   Point: Home exercise program (Resolved)     Learner Progress:  Not documented in this visit.                      User Key     Initials Effective Dates Name Provider Type Discipline     06/16/21 -  Tami Hodgson, PT Physical Therapist PT                PT Recommendation and Plan  Anticipated Discharge Disposition (PT): other (see comments) (recommend 24 hour supervision)  Planned Therapy Interventions (PT): balance training, bed mobility training, gait training, patient/family education, strengthening, transfer training  Therapy Frequency (PT): 6 times/wk  Progress Summary (PT)  Progress Toward Functional Goals (PT): progress toward functional goals is good, prepare for discharge  Barriers to Overall Progress (PT): cognition  Plan of Care Reviewed With: patient  Outcome Summary: PT: Patient performs sit to stand with modified independence and gait x320 feet with  rolling walker, SBA.  Patient able to navigate external obstacles with direction changes without balance loss.  Patient on 6 liters O2 during mobility, O2 sats 85% upon completion of gait, requires 45 seconds to recover to 90%.  Patient displays consistency with overall mobility/assistance levels during session today, no further skilled PT needs at this time.  Continue to recommend 24 hour supervision upon discharge, however do not anticipate patient requiring any physical assistance with funcitonal mobility tasks.     Outcome Measures     Row Name 02/02/22 1100 02/02/22 1035 02/01/22 0925       How much help from another person do you currently need...    Turning from your back to your side while in flat bed without using bedrails? -- 4  -JW 4  -JW    Moving from lying on back to sitting on the side of a flat bed without bedrails? -- 4  -JW 4  -JW    Moving to and from a bed to a chair (including a wheelchair)? -- 4  -JW 3  -JW    Standing up from a chair using your arms (e.g., wheelchair, bedside chair)? -- 4  -JW 3  -JW    Climbing 3-5 steps with a railing? -- 3  -JW 3  -JW    To walk in hospital room? -- 3  -JW 3  -JW    AM-PAC 6 Clicks Score (PT) -- 22  -JW 20  -JW       How much help from another is currently needed...    Putting on and taking off regular lower body clothing? 3  -EN -- --    Bathing (including washing, rinsing, and drying) 3  -EN -- --    Toileting (which includes using toilet bed pan or urinal) 3  -EN -- --    Putting on and taking off regular upper body clothing 4  -EN -- --    Taking care of personal grooming (such as brushing teeth) 4  -EN -- --    Eating meals 4  -EN -- --    AM-PAC 6 Clicks Score (OT) 21  -EN -- --       Functional Assessment    Outcome Measure Options -- AM-PAC 6 Clicks Basic Mobility (PT)  -JW AM-PAC 6 Clicks Basic Mobility (PT)  -          User Key  (r) = Recorded By, (t) = Taken By, (c) = Cosigned By    Initials Name Provider Type    Diana Meyers, OTR  Occupational Therapist    Tami Kitchen, PT Physical Therapist                 Time Calculation:    PT Charges     Row Name 02/02/22 1138             Time Calculation    Start Time 1035  -JW      Stop Time 1050  -JW      Time Calculation (min) 15 min  -JW      PT Received On 02/02/22  -JW              Timed Charges    66966 - Gait Training Minutes  15  -JW              Total Minutes    Timed Charges Total Minutes 15  -JW       Total Minutes 15  -JW            User Key  (r) = Recorded By, (t) = Taken By, (c) = Cosigned By    Initials Name Provider Type    Tami Kitchen, PT Physical Therapist              Therapy Charges for Today     Code Description Service Date Service Provider Modifiers Qty    28387009938 HC GAIT TRAINING EA 15 MIN 2/1/2022 Tami Hodgson, PT GP 1    70982526064 HC GAIT TRAINING EA 15 MIN 2/2/2022 Tami Hodgson, PT GP 1          PT G-Codes  Outcome Measure Options: AM-PAC 6 Clicks Basic Mobility (PT)  AM-PAC 6 Clicks Score (PT): 22  AM-PAC 6 Clicks Score (OT): 21    PT Discharge Summary  Anticipated Discharge Disposition (PT): other (see comments) (recommend 24 hour supervision)    Tami Hodgson PT  2/2/2022

## 2022-02-02 NOTE — PLAN OF CARE
Goal Outcome Evaluation:  Plan of Care Reviewed With: patient        Progress: improving  Outcome Summary: pt resting in bed tonight with no c/o pain or discomfort noted. pt meds given as ordered. pt confused tonight, will cont to monitor

## 2022-02-02 NOTE — SIGNIFICANT NOTE
02/02/22 0753   Oxygen Therapy   SpO2 93 %   Pulse Oximetry Type Continuous   Device (Oxygen Therapy) humidified; nasal cannula   Flow (L/min) 5       Resting in bed

## 2022-02-02 NOTE — CASE MANAGEMENT/SOCIAL WORK
Continued Stay Note  DILAN Ward     Patient Name: Dontae Bonds Jr.  MRN: [id number]  Today's Date: 2/2/2022    Admit Date: 1/21/2022     Discharge Plan     Row Name 02/02/22 1034       Plan    Plan Comments I received a call from Esme with [facility] ([facility]) who advised that she doesn't have a bed available at [facility] but may have a bed at another [facility] facility.  She asked if the family would consider other [facility] locations.  I advised that I would reach out to the patients son which I did and left a message to return my call. CM will continue to follow.               Discharge Codes    No documentation.                     Jasmin Yan RN

## 2022-02-02 NOTE — PLAN OF CARE
Goal Outcome Evaluation:  Plan of Care Reviewed With: patient        Progress: improving  Outcome Summary: Pt VSS this shift. Pt calling appropriately at all times this shift. Pt requiring increased O2 with mobility. Pt tolerating diet well. Pt has no c/o pain this shift. Pt in recliner watching TV at this time.

## 2022-02-02 NOTE — SIGNIFICANT NOTE
02/02/22 0808   Oxygen Therapy   SpO2 (!) 83 %   Pulse Oximetry Type Continuous   Device (Oxygen Therapy) humidified; nasal cannula   Flow (L/min) 4       Going from bed, ambulating 5 feet to chair

## 2022-02-02 NOTE — PLAN OF CARE
Goal Outcome Evaluation:  Plan of Care Reviewed With: patient           Outcome Summary: PT: Patient performs sit to stand with modified independence and gait x320 feet with rolling walker, SBA.  Patient able to navigate external obstacles with direction changes without balance loss.  Patient on 6 liters O2 during mobility, O2 sats 85% upon completion of gait, requires 45 seconds to recover to 90%.  Patient displays consistency with overall mobility/assistance levels during session today, no further skilled PT needs at this time.  Continue to recommend increased supervision upon discharge, however do not anticipate patient requiring any physical assistance with funcitonal mobility tasks.

## 2022-02-02 NOTE — THERAPY TREATMENT NOTE
Acute Care - Occupational Therapy Treatment Note  DILAN Ward     Patient Name: Dontae Bonds Jr.  : 1950  MRN: [id number]  Today's Date: 2022             Admit Date: 2022       ICD-10-CM ICD-9-CM   1. Pneumonia due to COVID-19 virus  U07.1 480.8    J12.82 079.89   2. Hypoxia  R09.02 799.02     Patient Active Problem List   Diagnosis   • Personal history of colonic polyps   • Family history of colon cancer   • Pneumonia due to COVID-19 virus     Past Medical History:   Diagnosis Date   • Hyperlipidemia    • Hypertension      History reviewed. No pertinent surgical history.      OT ASSESSMENT FLOWSHEET (last 12 hours)     OT Evaluation and Treatment     Row Name 22 0943                   OT Time and Intention    Subjective Information no complaints  -EN        Document Type therapy note (daily note)  -EN        Mode of Treatment occupational therapy  -EN        Patient Effort good  -EN        Comment Patient alert and oriented. Sitter present. Patient on 4L O2.  -EN                  General Information    Patient/Family/Caregiver Comments/Observations Patient reclined in chair, agreed to OT.  -EN        Risks Reviewed patient:; change in vital signs  -EN        Benefits Reviewed patient:; improve function; increase independence; increase strength  -EN                  Cognition    Personal Safety Interventions gait belt; nonskid shoes/slippers when out of bed  -EN                  Pain Scale: Numbers Pre/Post-Treatment    Pretreatment Pain Rating 0/10 - no pain  -EN        Posttreatment Pain Rating 0/10 - no pain  -EN                  Functional Mobility    Functional Mobility- Ind. Level standby assist  -EN        Functional Mobility- Device rolling walker  -EN        Functional Mobility-Distance (Feet) 40  -EN        Functional Mobility- Comment Patient performed in room and bathroom mobility with FWW and SBA, no LOB  -EN                  Transfer Assessment/Treatment    Transfers sit-stand  transfer; toilet transfer  -EN                  Transfers    Sit-Stand [place] (Transfers) standby assist  -EN        Stand-Sit [place] (Transfers) standby assist  -EN        [place] Level (Toilet Transfer) standby assist  -EN        Assistive Device (Toilet Transfer) walker, front-wheeled  -EN                  Sit-Stand Transfer    Assistive Device (Sit-Stand Transfers) walker, front-wheeled  -EN                  Stand-Sit Transfer    Assistive Device (Stand-Sit Transfers) walker, front-wheeled  -EN                  Toilet Transfer    Type (Toilet Transfer) stand pivot/stand step  -EN                  Motor Skills    Therapeutic Exercise shoulder; elbow/forearm  -EN                  Shoulder (Therapeutic Exercise)    Shoulder (Therapeutic Exercise) AROM (active range of motion)  -EN        Shoulder AROM (Therapeutic Exercise) bilateral; flexion; aBduction; 10 repetitions  -EN                  Elbow/Forearm (Therapeutic Exercise)    Elbow/Forearm (Therapeutic Exercise) AROM (active range of motion)  -EN        Elbow/Forearm AROM (Therapeutic Exercise) bilateral; flexion; extension  -EN                  Balance    Comment, Balance Patient performed dynamic standing balance activities with SBA for 3 minutes. Patient's O2 sats dropped to 86% however recovered to 90% within 30 seconds. RN notifed  -EN                  Plan of Care Review    Plan of Care Reviewed With patient  -EN        Outcome Summary OT- Patient oriented X 3 and followed commands WFL.  Patient performed functional transfers and mobility with FWW and SBA. Patient's O2 sats dropped to 83% following mobility however recovered to 90% in 45 seconds. Patient participated in dynamic standing balance activites and UE HEP. Patient demonstrated improved cognition/safety and overall strength.  -EN                  Vital Signs    Pre SpO2 (%) 92  -EN        O2 Delivery Pre Treatment supplemental O2  4L O2/NC  -EN        O2 Delivery Intra Treatment  supplemental O2  -EN        Post SpO2 (%) --  decreased to 83% following mobility, recoverd in 45 seconds to 90% (4L)  -EN        O2 Delivery Post Treatment supplemental O2  -EN                  ROM Goal 1 (OT)    ROM Goal 1 (OT) Patient will demonstrate independence with UE HEP.  -EN        Time Frame (ROM Goal 1, OT) by discharge  -EN        Progress/Outcome (ROM Goal 1, OT) goal ongoing; good progress toward goal  -EN                  Problem Specific Goal 1 (OT)    Problem Specific Goal 1 (OT) Patient will perform dynamic standing balance activity with FWW and supervision 2-3 minutes with no LOB for improved safety during ADL routine.  -EN        Time Frame (Problem Specific Goal 1, OT) by discharge  -EN        Progress/Outcome (Problem Specific Goal 1, OT) goal met  performed with SBA with no LOB 3 minutes  -EN                  Positioning and Restraints    Pre-Treatment Position sitting in chair/recliner  -EN        Post Treatment Position chair  -EN        In Chair reclined; call light within reach; encouraged to call for assist; exit alarm on  with sitter  -EN                  Progress Summary (OT)    Daily Progress Summary (OT) improving  -EN              User Key  (r) = Recorded By, (t) = Taken By, (c) = Cosigned By    Initials Name Effective Dates    LEXIE Palacios Diana E, OTR 06/16/21 -                  Occupational Therapy Education                 Title: PT OT SLP Therapies (In Progress)     Topic: Occupational Therapy (Done)     Point: ADL training (Done)     Description:   Instruct learner(s) on proper safety adaptation and remediation techniques during self care or transfers.   Instruct in proper use of assistive devices.              Learning Progress Summary           Patient Acceptance, E, VU by EN at 2/2/2022 1135    Comment: Ed on UE HEP, safety during transfers/mobility    Acceptance, E, VU by EN at 2/1/2022 1030    Comment: safety during mobility/transfers    Acceptance, E, VU by EN at  1/31/2022 1435    Comment: Safety during mobility, adaptive breathing                   Point: Home exercise program (Done)     Description:   Instruct learner(s) on appropriate technique for monitoring, assisting and/or progressing therapeutic exercises/activities.              Learning Progress Summary           Patient Acceptance, CHENCHO VU by LEXIE at 2/2/2022 1135    Comment: Ed on UE HEP, safety during transfers/mobility                               User Key     Initials Effective Dates Name Provider Type Discipline    EN 06/16/21 -  Diana Palacios, OTR Occupational Therapist OT                  OT Recommendation and Plan  Planned Therapy Interventions (OT): activity tolerance training, BADL retraining, functional balance retraining, patient/caregiver education/training, transfer/mobility retraining, ROM/therapeutic exercise  Therapy Frequency (OT): 5 times/wk  Plan of Care Review  Plan of Care Reviewed With: patient  Outcome Summary: OT- Patient oriented X 3 and followed commands WFL.  Patient performed functional transfers and mobility with FWW and SBA. Patient's O2 sats dropped to 83% following mobility however recovered to 90% in 45 seconds. Patient participated in dynamic standing balance activites and UE HEP. Patient demonstrated improved cognition/safety and overall strength.       Outcome Measures     Row Name 02/02/22 1100 02/01/22 0925          How much help from another person do you currently need...    Turning from your back to your side while in flat bed without using bedrails? -- 4  -JW     Moving from lying on back to sitting on the side of a flat bed without bedrails? -- 4  -JW     Moving to and from a bed to a chair (including a wheelchair)? -- 3  -JW     Standing up from a chair using your arms (e.g., wheelchair, bedside chair)? -- 3  -JW     Climbing 3-5 steps with a railing? -- 3  -JW     To walk in hospital room? -- 3  -JW     AM-PAC 6 Clicks Score (PT) -- 20  -JW            How much help  from another is currently needed...    Putting on and taking off regular lower body clothing? 3  -EN --     Bathing (including washing, rinsing, and drying) 3  -EN --     Toileting (which includes using toilet bed pan or urinal) 3  -EN --     Putting on and taking off regular upper body clothing 4  -EN --     Taking care of personal grooming (such as brushing teeth) 4  -EN --     Eating meals 4  -EN --     AM-PAC 6 Clicks Score (OT) 21  -EN --            Functional Assessment    Outcome Measure Options -- AM-PAC 6 Clicks Basic Mobility (PT)  -           User Key  (r) = Recorded By, (t) = Taken By, (c) = Cosigned By    Initials Name Provider Type    Diana Meyers OTR Occupational Therapist    Tami Kitchen, PT Physical Therapist                Time Calculation:    Time Calculation- OT     Row Name 02/02/22 1136             Time Calculation- OT    OT Start Time 0943  -EN      OT Stop Time 1003  -EN      OT Time Calculation (min) 20 min  -EN              Timed Charges    02991 - OT Therapeutic Activity Minutes 20  -EN              Total Minutes    Timed Charges Total Minutes 20  -EN       Total Minutes 20  -EN            User Key  (r) = Recorded By, (t) = Taken By, (c) = Cosigned By    Initials Name Provider Type    Diana Meyers OTR Occupational Therapist              Therapy Charges for Today     Code Description Service Date Service Provider Modifiers Qty    [id number]  OT THERAPEUTIC ACT EA 15 MIN 2/1/2022 Diana Palaciso OTR GO 1               NILSA Rodriguez  2/2/2022

## 2022-02-02 NOTE — PROGRESS NOTES
Exercise Oximetry    Patient Name:Dontae Bonds Jr.   MRN: [id number]   Date: 02/02/22             ROOM AIR BASELINE   SpO2% 97   Heart Rate 102   Blood Pressure 116/74     EXERCISE ON ROOM AIR SpO2% EXERCISE ON O2 @ 3 LPM SpO2%   1 MINUTE 88 1 MINUTE    2 MINUTES  2 MINUTES         2 lpm 88   3 MINUTES  3 MINUTES 91   4 MINUTES  4 MINUTES 90   5 MINUTES  5 MINUTES   Increased to 3lpm 87   6 MINUTES  6 MINUTES 90              Distance Walked  Distance Walked  320 feet   Dyspnea (Hammad Scale)   Dyspnea (Hammad Scale)  2   Fatigue (Hammad Scale)   Fatigue (Hammad Scale)    2   SpO2% Post Exercise   SpO2% Post Exercise  92   HR Post Exercise   HR Post Exercise  87   Time to Recovery   Time to Recovery 1 min     Comments:

## 2022-02-02 NOTE — PLAN OF CARE
Goal Outcome Evaluation:  Plan of Care Reviewed With: patient           Outcome Summary: OT- Patient oriented X 3 and followed commands WFL.  Patient performed functional transfers and mobility with FWW and SBA. Patient's O2 sats dropped to 83% following mobility however recovered to 90% in 45 seconds. Patient participated in dynamic standing balance activites and UE HEP. Patient demonstrated improved cognition/safety and overall strength.

## 2022-02-02 NOTE — PROCEDURES
Exercise Oximetry    Patient Name:Dontae Bonds Jr.   MRN: [id number]   Date: 02/02/22             ROOM AIR BASELINE   SpO2%                      91   Heart Rate                  88   Blood Pressure      EXERCISE ON ROOM AIR SpO2% EXERCISE ON O2 @     LPM SpO2%   1 MINUTE       88 1 MINUTE    2 MINUTES  2 MINUTES                 2 lpm      90   3 MINUTES  3 MINUTES                 3 lpm      88   4 MINUTES  4 MINUTES                 6 lpm      90   5 MINUTES  5 MINUTES                 6  lpm      87   6 MINUTES  6 MINUTES               Distance Walked   Distance Walked                     120 ft   Dyspnea (Hammad Scale)   Dyspnea (Hammad Scale)         Pre 0   Fatigue (Hammad Scale)   Fatigue (Hammad Scale)            Pre 5   SpO2% Post Exercise   SpO2% Post Exercise            92   HR Post Exercise   HR Post Exercise                     96   Time to Recovery   Time to Recovery                   3 min     Comments: Took 20 min for study with many rest breaks.

## 2022-02-02 NOTE — SIGNIFICANT NOTE
02/02/22 0717   Oxygen Therapy   SpO2 (!) 76 %   Pulse Oximetry Type Continuous   Device (Oxygen Therapy) humidified; nasal cannula   Flow (L/min) 4       After sitting and urinating and walking 5 feet back to bed

## 2022-02-02 NOTE — PROGRESS NOTES
Discussed with Dr. Gibson.  Patient is requiring a little more oxygen.  They will try ambulatory oximetry determination today and see if he can be discharged home with home concentrator which can reach up to 5 or 6 L of oxygen at home.  We will be available if needed.

## 2022-02-02 NOTE — SIGNIFICANT NOTE
02/02/22 0710   Oxygen Therapy   SpO2 92 %   Pulse Oximetry Type Continuous   Device (Oxygen Therapy) humidified; nasal cannula   Flow (L/min) 4     Laying in bed asleep

## 2022-02-03 ENCOUNTER — NURSE TRIAGE (OUTPATIENT)
Dept: CALL CENTER | Facility: HOSPITAL | Age: 72
End: 2022-02-03

## 2022-02-03 ENCOUNTER — READMISSION MANAGEMENT (OUTPATIENT)
Dept: CALL CENTER | Facility: HOSPITAL | Age: 72
End: 2022-02-03

## 2022-02-03 NOTE — CASE MANAGEMENT/SOCIAL WORK
"Continued Stay Note   [place]     Patient Name: Dontae Bonds Jr.  MRN: [id number]  Today's Date: 2/3/2022    Admit Date: 1/21/2022     Discharge Plan     Row Name 02/03/22 0805       Plan    Plan AL home with     Plan Comments I received a call from the patient.  He advised that he had been discharged last night.  He explained that [facility] had delivered the oxygen to his home and all was well there.  He then asked about the \"cough medicine pills\" he was taking in the hospital  and wanted to know what the name of the cough medicine was and stated that he would call his PCP for a prescription of same.  I checked his discharge summary and explained that the benzonatate or Tessalon Perels were what he was taking and a prescription had been sent to [facility] in [place].  He stated that he had not picked his medications up yet because the pharmacy was closed when he was discharged and he was relieved to find that he had the \"cough medicine pills\".  He further stated that he had not heard from the home health agency and I explained that I had made the referral yesterday and was waiting for a response due to the conversation that I had with his son and the referrals to SNF's.  I advised that I would reach out to [facility] and advise them of his discharge.  I then told him that I would call him back once I had an answer from [facility] which is his preferred home health provider.  I then called Emerita with [facility] and she will call me back with a determination.  CM will then follow up with the patient.               Discharge Codes    No documentation.               Expected Discharge Date and Time     Expected Discharge Date Expected Discharge Time    Feb 2, 2022             Jasmin Yan RN    "

## 2022-02-03 NOTE — TELEPHONE ENCOUNTER
His Dad was d/c this afternoon, and when he got to pharmacy it was closed he was wondering what to do if cannot get to pharmacy in AM,for the new meds,  called [facility] Nurse Daniela at [facility], says weather is not going to be that bad, ok to miss dose tonight of steroid and pick meds up in AM, when pharmacy opens.  cannot call it in somewhere else tonight.     Reason for Disposition  • [1] Caller has URGENT medicine question about med that PCP or specialist prescribed AND [2] triager unable to answer question    Additional Information  • Negative: [1] Intentional drug overdose AND [2] suicidal thoughts or ideas  • Negative: Drug overdose and triager unable to answer question  • Negative: Caller requesting information unrelated to medicine  • Negative: Caller requesting information about COVID-19 Vaccine  • Negative: Caller requesting information about Emergency Contraception  • Negative: Caller requesting information about Combined Birth Control Pills  • Negative: Caller requesting information about Progestin Birth Control Pills  • Negative: Caller requesting information about Post-Op pain or medicines  • Negative: Caller requesting a prescription antibiotic (such as Penicillin) for Strep throat and has a positive culture result  • Negative: Caller requesting a prescription anti-viral med (such as Tamiflu) and has influenza (flu)  symptoms  • Negative: Immunization reaction suspected  • Negative: Rash while taking a medicine or within 3 days of stopping it  • Negative: [1] Asthma and [2] having symptoms of asthma (cough, wheezing, etc.)  • Negative: [1] Symptom of illness (e.g., headache, abdominal pain, earache, vomiting) AND [2] more than mild  • Negative: Breastfeeding questions about mother's medicines and diet  • Negative: MORE THAN A DOUBLE DOSE of a prescription or over-the-counter (OTC) drug  • Negative: [1] DOUBLE DOSE (an extra dose or lesser amount) of prescription drug AND [2] any symptoms (e.g., dizziness,  "nausea, pain, sleepiness)  • Negative: [1] DOUBLE DOSE (an extra dose or lesser amount) of over-the-counter (OTC) drug AND [2] any symptoms (e.g., dizziness, nausea, pain, sleepiness)  • Negative: Took another person's prescription drug  • Negative: [1] DOUBLE DOSE (an extra dose or lesser amount) of prescription drug AND [2] NO symptoms (Exception: a double dose of antibiotics)  • Negative: Diabetes drug error or overdose (e.g., took wrong type of insulin or took extra dose)  • Negative: [1] Prescription refill request for ESSENTIAL medicine (i.e., likelihood of harm to patient if not taken) AND [2] triager unable to refill per department policy  • Negative: [1] Prescription not at pharmacy AND [2] was prescribed by PCP recently (Exception: triager has access to EMR and prescription is recorded there. Go to Home Care and confirm for pharmacy.)  • Negative: [1] Pharmacy calling with prescription question AND [2] triager unable to answer question  • Negative: Medicine patch causing local rash or itching  • Negative: [1] Caller has medicine question about med NOT prescribed by PCP AND [2] triager unable to answer question (e.g., compatibility with other med, storage)    Answer Assessment - Initial Assessment Questions  1. NAME of MEDICATION: \"What medicine are you calling about?\"      D/c Meds Decadron,  And 3 other ones he says  2. QUESTION: \"What is your question?\" (e.g., medication refill, side effect)      Pharmacy was closed, can they be called in somewhere else   3. PRESCRIBING HCP: \"Who prescribed it?\" Reason: if prescribed by specialist, call should be referred to that group.      [facility]  4. SYMPTOMS: \"Do you have any symptoms?\"     no  5. SEVERITY: If symptoms are present, ask \"Are they mild, moderate or severe?\"      no  6. PREGNANCY:  \"Is there any chance that you are pregnant?\" \"When was your last menstrual period?\"    Protocols used: MEDICATION QUESTION CALL-ADULT-AH      "

## 2022-02-03 NOTE — OUTREACH NOTE
Prep Survey      Responses   [facility] patient discharged from? [facility]   Is LACE score < 7 ? No   Emergency Room discharge w/ pulse ox? No   Eligibility Readm Mgmt   Discharge diagnosis  acute hypoxic respiratory failure secondary to COVID-19 pneumonia   Does the patient have one of the following disease processes/diagnoses(primary or secondary)? COVID-19   Does the patient have Home health ordered? Yes   What is the Home health agency?  [facility] pending at d/c   Is there a DME ordered? No   Prep survey completed? Yes          Cady Workman RN

## 2022-02-03 NOTE — OUTREACH NOTE
COVID-19 Week 1 Survey      Responses   [facility] patient discharged from? [facility]   Does the patient have one of the following disease processes/diagnoses(primary or secondary)? COVID-19   COVID-19 underlying condition? None   Call Number Call 1   Week 1 Call successful? No   Discharge diagnosis  acute hypoxic respiratory failure secondary to COVID-19 pneumonia          Karli Mathis RN

## 2022-02-04 ENCOUNTER — NURSE TRIAGE (OUTPATIENT)
Dept: CALL CENTER | Facility: HOSPITAL | Age: 72
End: 2022-02-04

## 2022-02-04 ENCOUNTER — READMISSION MANAGEMENT (OUTPATIENT)
Dept: CALL CENTER | Facility: HOSPITAL | Age: 72
End: 2022-02-04

## 2022-02-04 NOTE — OUTREACH NOTE
COVID-19 Week 1 Survey      Responses   [facility] patient discharged from? [facility]   Does the patient have one of the following disease processes/diagnoses(primary or secondary)? COVID-19   COVID-19 underlying condition? None   Call Number Call 2   Week 1 Call successful? No   Discharge diagnosis  acute hypoxic respiratory failure secondary to COVID-19 pneumonia          Christy Braun RN

## 2022-02-04 NOTE — TELEPHONE ENCOUNTER
Additional Information  • Negative: [1] Intentional drug overdose AND [2] suicidal thoughts or ideas  • Negative: Drug overdose and triager unable to answer question  • Negative: Caller requesting information unrelated to medicine  • Negative: Caller requesting information about COVID-19 Vaccine  • Negative: Caller requesting information about Emergency Contraception  • Negative: Caller requesting information about Combined Birth Control Pills  • Negative: Caller requesting information about Progestin Birth Control Pills  • Negative: Caller requesting information about Post-Op pain or medicines  • Negative: Caller requesting a prescription antibiotic (such as Penicillin) for Strep throat and has a positive culture result  • Negative: Caller requesting a prescription anti-viral med (such as Tamiflu) and has influenza (flu)  symptoms  • Negative: Immunization reaction suspected  • Negative: Rash while taking a medicine or within 3 days of stopping it  • Negative: [1] Asthma and [2] having symptoms of asthma (cough, wheezing, etc.)  • Negative: [1] Symptom of illness (e.g., headache, abdominal pain, earache, vomiting) AND [2] more than mild  • Negative: Breastfeeding questions about mother's medicines and diet  • Negative: MORE THAN A DOUBLE DOSE of a prescription or over-the-counter (OTC) drug  • Negative: [1] DOUBLE DOSE (an extra dose or lesser amount) of prescription drug AND [2] any symptoms (e.g., dizziness, nausea, pain, sleepiness)  • Negative: [1] DOUBLE DOSE (an extra dose or lesser amount) of over-the-counter (OTC) drug AND [2] any symptoms (e.g., dizziness, nausea, pain, sleepiness)  • Negative: Took another person's prescription drug  • Negative: [1] DOUBLE DOSE (an extra dose or lesser amount) of prescription drug AND [2] NO symptoms (Exception: a double dose of antibiotics)  • Negative: Diabetes drug error or overdose (e.g., took wrong type of insulin or took extra dose)  • Negative: [1] Prescription  "refill request for ESSENTIAL medicine (i.e., likelihood of harm to patient if not taken) AND [2] triager unable to refill per department policy  • Negative: [1] Prescription not at pharmacy AND [2] was prescribed by PCP recently (Exception: triager has access to EMR and prescription is recorded there. Go to Home Care and confirm for pharmacy.)  • Negative: [1] Pharmacy calling with prescription question AND [2] triager unable to answer question  • Negative: [1] Caller has URGENT medicine question about med that PCP or specialist prescribed AND [2] triager unable to answer question  • Negative: Medicine patch causing local rash or itching  • Negative: [1] Caller has medicine question about med NOT prescribed by PCP AND [2] triager unable to answer question (e.g., compatibility with other med, storage)  • Negative: Prescription request for new medicine (not a refill)  • Negative: Prescription refill request for a CONTROLLED substance (e.g., narcotics, ADHD medicines)  • Negative: [1] Prescription refill request for NON-ESSENTIAL medicine (i.e., no harm to patient if med not taken) AND [2] triager unable to refill per department policy  • Negative: [1] Caller has NON-URGENT medicine question about med that PCP prescribed AND [2] triager unable to answer question  • Negative: Caller wants to use a complementary or alternative medicine    Answer Assessment - Initial Assessment Questions  1. NAME of MEDICATION: \"What medicine are you calling about?\"      Dexamethasone   2. QUESTION: \"What is your question?\" (e.g., medication refill, side effect)      Caller asking how many doses he was prescribed.   3. PRESCRIBING HCP: \"Who prescribed it?\" Reason: if prescribed by specialist, call should be referred to that group.      *No Answer*  4. SYMPTOMS: \"Do you have any symptoms?\"      *No Answer*  5. SEVERITY: If symptoms are present, ask \"Are they mild, moderate or severe?\"      *No Answer*  6. PREGNANCY:  \"Is there any chance " "that you are pregnant?\" \"When was your last menstrual period?\"      *No Answer*    Protocols used: MEDICATION QUESTION CALL-ADULT-      "

## 2022-02-05 ENCOUNTER — READMISSION MANAGEMENT (OUTPATIENT)
Dept: CALL CENTER | Facility: HOSPITAL | Age: 72
End: 2022-02-05

## 2022-02-05 NOTE — OUTREACH NOTE
COVID-19 Week 1 Survey      Responses   [facility] patient discharged from? [place]   Does the patient have one of the following disease processes/diagnoses(primary or secondary)? COVID-19   COVID-19 underlying condition? None   Call Number Call 3   Week 1 Call successful? No   Discharge diagnosis  acute hypoxic respiratory failure secondary to COVID-19 pneumonia          Makayla Pang RN

## 2022-02-05 NOTE — CASE MANAGEMENT/SOCIAL WORK
Case Management Discharge Note      Final Note: Discharged home with [facility]         Selected Continued Care - Discharged on 2/2/2022 Admission date: 1/21/2022 - Discharge disposition: Home or Self Care    Destination    No services have been selected for the patient.              Durable Medical Equipment    No services have been selected for the patient.              Dialysis/Infusion    No services have been selected for the patient.              Home Medical Care Coordination complete.    Service Provider Selected Services Address Phone Fax Patient Preferred    [facility] [address] [facility] [place] [address] [phone] --          Therapy    No services have been selected for the patient.              Community Resources    No services have been selected for the patient.              Community & DME    No services have been selected for the patient.                       Final Discharge Disposition Code: 06 - home with home health care

## 2022-02-21 ENCOUNTER — HOSPITAL ENCOUNTER (INPATIENT)
Facility: HOSPITAL | Age: 72
LOS: 21 days | Discharge: HOME HEALTH | End: 2022-03-14
Attending: EMERGENCY MEDICINE | Admitting: INTERNAL MEDICINE

## 2022-02-21 ENCOUNTER — APPOINTMENT (OUTPATIENT)
Dept: CT IMAGING | Facility: HOSPITAL | Age: 72
End: 2022-02-21

## 2022-02-21 ENCOUNTER — APPOINTMENT (OUTPATIENT)
Dept: GENERAL RADIOLOGY | Facility: HOSPITAL | Age: 72
End: 2022-02-21

## 2022-02-21 DIAGNOSIS — J18.9 PNEUMONIA OF BOTH LUNGS DUE TO INFECTIOUS ORGANISM, UNSPECIFIED PART OF LUNG: Primary | ICD-10-CM

## 2022-02-21 DIAGNOSIS — J96.00: ICD-10-CM

## 2022-02-21 LAB
ALBUMIN SERPL-MCNC: 2.8 G/DL (ref 3.5–5.2)
ALBUMIN/GLOB SERPL: 0.7 G/DL
ALP SERPL-CCNC: 59 U/L (ref 39–117)
ALT SERPL W P-5'-P-CCNC: 44 U/L (ref 1–41)
ANION GAP SERPL CALCULATED.3IONS-SCNC: 12.3 MMOL/L (ref 5–15)
ARTERIAL PATENCY WRIST A: ABNORMAL
ARTERIAL PATENCY WRIST A: ABNORMAL
AST SERPL-CCNC: 36 U/L (ref 1–40)
ATMOSPHERIC PRESS: 738 MMHG
ATMOSPHERIC PRESS: 738 MMHG
BASE EXCESS BLDA CALC-SCNC: 4 MMOL/L (ref 0–2)
BASE EXCESS BLDA CALC-SCNC: 6.4 MMOL/L (ref 0–2)
BASOPHILS # BLD AUTO: 0.04 10*3/MM3 (ref 0–0.2)
BASOPHILS NFR BLD AUTO: 0.4 % (ref 0–1.5)
BDY SITE: ABNORMAL
BDY SITE: ABNORMAL
BILIRUB SERPL-MCNC: 0.5 MG/DL (ref 0–1.2)
BODY TEMPERATURE: 37 C
BODY TEMPERATURE: 37 C
BUN SERPL-MCNC: 14 MG/DL (ref 8–23)
BUN/CREAT SERPL: 22.2 (ref 7–25)
CALCIUM SPEC-SCNC: 9.2 MG/DL (ref 8.6–10.5)
CHLORIDE SERPL-SCNC: 97 MMOL/L (ref 98–107)
CO2 SERPL-SCNC: 24.7 MMOL/L (ref 22–29)
CREAT SERPL-MCNC: 0.63 MG/DL (ref 0.76–1.27)
D DIMER PPP FEU-MCNC: 4.54 MCGFEU/ML (ref 0–0.46)
DEPRECATED RDW RBC AUTO: 47.6 FL (ref 37–54)
EOSINOPHIL # BLD AUTO: 0.22 10*3/MM3 (ref 0–0.4)
EOSINOPHIL NFR BLD AUTO: 2 % (ref 0.3–6.2)
ERYTHROCYTE [DISTWIDTH] IN BLOOD BY AUTOMATED COUNT: 13.8 % (ref 12.3–15.4)
FLUAV RNA RESP QL NAA+PROBE: NOT DETECTED
FLUBV RNA RESP QL NAA+PROBE: NOT DETECTED
GAS FLOW AIRWAY: 60 LPM
GFR SERPL CREATININE-BSD FRML MDRD: 126 ML/MIN/1.73
GLOBULIN UR ELPH-MCNC: 4.1 GM/DL
GLUCOSE SERPL-MCNC: 143 MG/DL (ref 65–99)
HCO3 BLDA-SCNC: 29.7 MMOL/L (ref 20–26)
HCO3 BLDA-SCNC: 30.5 MMOL/L (ref 20–26)
HCT VFR BLD AUTO: 38.3 % (ref 37.5–51)
HGB BLD-MCNC: 12 G/DL (ref 13–17.7)
HGB BLDA-MCNC: 10.6 G/DL (ref 14–18)
HGB BLDA-MCNC: 10.9 G/DL (ref 14–18)
IMM GRANULOCYTES # BLD AUTO: 0.09 10*3/MM3 (ref 0–0.05)
IMM GRANULOCYTES NFR BLD AUTO: 0.8 % (ref 0–0.5)
INHALED O2 CONCENTRATION: 100 %
INHALED O2 CONCENTRATION: 100 %
LYMPHOCYTES # BLD AUTO: 1.56 10*3/MM3 (ref 0.7–3.1)
LYMPHOCYTES NFR BLD AUTO: 14.2 % (ref 19.6–45.3)
Lab: ABNORMAL
Lab: ABNORMAL
MCH RBC QN AUTO: 29.7 PG (ref 26.6–33)
MCHC RBC AUTO-ENTMCNC: 31.3 G/DL (ref 31.5–35.7)
MCV RBC AUTO: 94.8 FL (ref 79–97)
MODALITY: ABNORMAL
MODALITY: ABNORMAL
MONOCYTES # BLD AUTO: 1.42 10*3/MM3 (ref 0.1–0.9)
MONOCYTES NFR BLD AUTO: 12.9 % (ref 5–12)
NEUTROPHILS NFR BLD AUTO: 69.7 % (ref 42.7–76)
NEUTROPHILS NFR BLD AUTO: 7.64 10*3/MM3 (ref 1.7–7)
NRBC BLD AUTO-RTO: 0 /100 WBC (ref 0–0.2)
NT-PROBNP SERPL-MCNC: 665.8 PG/ML (ref 0–900)
PCO2 BLDA: 36.9 MM HG (ref 35–45)
PCO2 BLDA: 54.6 MM HG (ref 35–45)
PCO2 TEMP ADJ BLD: 36.9 MM HG (ref 35–45)
PCO2 TEMP ADJ BLD: 54.6 MM HG (ref 35–45)
PEEP RESPIRATORY: 5 CM[H2O]
PH BLDA: 7.36 PH UNITS (ref 7.35–7.45)
PH BLDA: 7.51 PH UNITS (ref 7.35–7.45)
PH, TEMP CORRECTED: 7.36 PH UNITS (ref 7.35–7.45)
PH, TEMP CORRECTED: 7.51 PH UNITS (ref 7.35–7.45)
PLATELET # BLD AUTO: 764 10*3/MM3 (ref 140–450)
PMV BLD AUTO: 9.2 FL (ref 6–12)
PO2 BLDA: 165 MM HG (ref 83–108)
PO2 BLDA: 92.4 MM HG (ref 83–108)
PO2 TEMP ADJ BLD: 165 MM HG (ref 83–108)
PO2 TEMP ADJ BLD: 92.4 MM HG (ref 83–108)
POTASSIUM SERPL-SCNC: 3.2 MMOL/L (ref 3.5–5.2)
PROCALCITONIN SERPL-MCNC: 0.12 NG/ML (ref 0–0.25)
PROT SERPL-MCNC: 6.9 G/DL (ref 6–8.5)
QT INTERVAL: 334 MS
RBC # BLD AUTO: 4.04 10*6/MM3 (ref 4.14–5.8)
SAO2 % BLDCOA: 98.1 % (ref 94–99)
SAO2 % BLDCOA: 99.5 % (ref 94–99)
SARS-COV-2 RNA RESP QL NAA+PROBE: NOT DETECTED
SET MECH RESP RATE: 16
SODIUM SERPL-SCNC: 134 MMOL/L (ref 136–145)
TROPONIN T SERPL-MCNC: <0.01 NG/ML (ref 0–0.03)
VENTILATOR MODE: ABNORMAL
VENTILATOR MODE: ABNORMAL
WBC NRBC COR # BLD: 10.97 10*3/MM3 (ref 3.4–10.8)

## 2022-02-21 PROCEDURE — 82803 BLOOD GASES ANY COMBINATION: CPT

## 2022-02-21 PROCEDURE — 83880 ASSAY OF NATRIURETIC PEPTIDE: CPT | Performed by: EMERGENCY MEDICINE

## 2022-02-21 PROCEDURE — 84484 ASSAY OF TROPONIN QUANT: CPT | Performed by: EMERGENCY MEDICINE

## 2022-02-21 PROCEDURE — 87636 SARSCOV2 & INF A&B AMP PRB: CPT | Performed by: EMERGENCY MEDICINE

## 2022-02-21 PROCEDURE — 0BH17EZ INSERTION OF ENDOTRACHEAL AIRWAY INTO TRACHEA, VIA NATURAL OR ARTIFICIAL OPENING: ICD-10-PCS | Performed by: INTERNAL MEDICINE

## 2022-02-21 PROCEDURE — 80053 COMPREHEN METABOLIC PANEL: CPT | Performed by: EMERGENCY MEDICINE

## 2022-02-21 PROCEDURE — 71275 CT ANGIOGRAPHY CHEST: CPT

## 2022-02-21 PROCEDURE — 94640 AIRWAY INHALATION TREATMENT: CPT

## 2022-02-21 PROCEDURE — 84145 PROCALCITONIN (PCT): CPT | Performed by: INTERNAL MEDICINE

## 2022-02-21 PROCEDURE — 94799 UNLISTED PULMONARY SVC/PX: CPT

## 2022-02-21 PROCEDURE — 5A1945Z RESPIRATORY VENTILATION, 24-96 CONSECUTIVE HOURS: ICD-10-PCS | Performed by: INTERNAL MEDICINE

## 2022-02-21 PROCEDURE — 25010000002 AZITHROMYCIN PER 500 MG: Performed by: EMERGENCY MEDICINE

## 2022-02-21 PROCEDURE — 99285 EMERGENCY DEPT VISIT HI MDM: CPT | Performed by: EMERGENCY MEDICINE

## 2022-02-21 PROCEDURE — 71045 X-RAY EXAM CHEST 1 VIEW: CPT

## 2022-02-21 PROCEDURE — 25010000002 PROPOFOL 10 MG/ML EMULSION

## 2022-02-21 PROCEDURE — 87040 BLOOD CULTURE FOR BACTERIA: CPT | Performed by: EMERGENCY MEDICINE

## 2022-02-21 PROCEDURE — 99285 EMERGENCY DEPT VISIT HI MDM: CPT

## 2022-02-21 PROCEDURE — 94002 VENT MGMT INPAT INIT DAY: CPT

## 2022-02-21 PROCEDURE — 93005 ELECTROCARDIOGRAM TRACING: CPT | Performed by: EMERGENCY MEDICINE

## 2022-02-21 PROCEDURE — 25010000002 MIDAZOLAM PER 1MG

## 2022-02-21 PROCEDURE — 31500 INSERT EMERGENCY AIRWAY: CPT

## 2022-02-21 PROCEDURE — 25010000002 CEFTRIAXONE SODIUM-DEXTROSE 1-3.74 GM-%(50ML) RECONSTITUTED SOLUTION: Performed by: EMERGENCY MEDICINE

## 2022-02-21 PROCEDURE — 99291 CRITICAL CARE FIRST HOUR: CPT | Performed by: INTERNAL MEDICINE

## 2022-02-21 PROCEDURE — 31500 INSERT EMERGENCY AIRWAY: CPT | Performed by: EMERGENCY MEDICINE

## 2022-02-21 PROCEDURE — 25010000002 SUCCINYLCHOLINE PER 20 MG

## 2022-02-21 PROCEDURE — 93010 ELECTROCARDIOGRAM REPORT: CPT | Performed by: INTERNAL MEDICINE

## 2022-02-21 PROCEDURE — 85025 COMPLETE CBC W/AUTO DIFF WBC: CPT | Performed by: EMERGENCY MEDICINE

## 2022-02-21 PROCEDURE — 36600 WITHDRAWAL OF ARTERIAL BLOOD: CPT

## 2022-02-21 PROCEDURE — 94761 N-INVAS EAR/PLS OXIMETRY MLT: CPT

## 2022-02-21 PROCEDURE — 85379 FIBRIN DEGRADATION QUANT: CPT | Performed by: EMERGENCY MEDICINE

## 2022-02-21 PROCEDURE — 83735 ASSAY OF MAGNESIUM: CPT | Performed by: INTERNAL MEDICINE

## 2022-02-21 PROCEDURE — 0 IOPAMIDOL PER 1 ML: Performed by: EMERGENCY MEDICINE

## 2022-02-21 RX ORDER — SODIUM CHLORIDE 0.9 % (FLUSH) 0.9 %
10 SYRINGE (ML) INJECTION AS NEEDED
Status: DISCONTINUED | OUTPATIENT
Start: 2022-02-21 | End: 2022-03-03

## 2022-02-21 RX ORDER — ETOMIDATE 2 MG/ML
20 INJECTION INTRAVENOUS ONCE
Status: COMPLETED | OUTPATIENT
Start: 2022-02-21 | End: 2022-02-21

## 2022-02-21 RX ORDER — ONDANSETRON 4 MG/1
4 TABLET, FILM COATED ORAL EVERY 6 HOURS PRN
Status: DISCONTINUED | OUTPATIENT
Start: 2022-02-21 | End: 2022-03-14 | Stop reason: HOSPADM

## 2022-02-21 RX ORDER — ACETAMINOPHEN 650 MG/1
650 SUPPOSITORY RECTAL EVERY 4 HOURS PRN
Status: DISCONTINUED | OUTPATIENT
Start: 2022-02-21 | End: 2022-03-14 | Stop reason: HOSPADM

## 2022-02-21 RX ORDER — CEFTRIAXONE 1 G/50ML
1 INJECTION, SOLUTION INTRAVENOUS ONCE
Status: COMPLETED | OUTPATIENT
Start: 2022-02-21 | End: 2022-02-21

## 2022-02-21 RX ORDER — MIDAZOLAM HYDROCHLORIDE 1 MG/ML
4 INJECTION INTRAMUSCULAR; INTRAVENOUS ONCE
Status: COMPLETED | OUTPATIENT
Start: 2022-02-21 | End: 2022-02-21

## 2022-02-21 RX ORDER — SUCCINYLCHOLINE CHLORIDE 20 MG/ML
80 INJECTION INTRAMUSCULAR; INTRAVENOUS ONCE
Status: COMPLETED | OUTPATIENT
Start: 2022-02-21 | End: 2022-02-21

## 2022-02-21 RX ORDER — ONDANSETRON 2 MG/ML
4 INJECTION INTRAMUSCULAR; INTRAVENOUS EVERY 6 HOURS PRN
Status: DISCONTINUED | OUTPATIENT
Start: 2022-02-21 | End: 2022-03-14 | Stop reason: HOSPADM

## 2022-02-21 RX ORDER — ETOMIDATE 2 MG/ML
INJECTION INTRAVENOUS
Status: COMPLETED
Start: 2022-02-21 | End: 2022-02-21

## 2022-02-21 RX ORDER — IPRATROPIUM BROMIDE AND ALBUTEROL SULFATE 2.5; .5 MG/3ML; MG/3ML
3 SOLUTION RESPIRATORY (INHALATION)
Status: DISCONTINUED | OUTPATIENT
Start: 2022-02-22 | End: 2022-03-06

## 2022-02-21 RX ORDER — ACETAMINOPHEN 325 MG/1
650 TABLET ORAL EVERY 4 HOURS PRN
Status: DISCONTINUED | OUTPATIENT
Start: 2022-02-21 | End: 2022-03-14 | Stop reason: HOSPADM

## 2022-02-21 RX ORDER — SODIUM CHLORIDE 9 MG/ML
40 INJECTION, SOLUTION INTRAVENOUS AS NEEDED
Status: DISCONTINUED | OUTPATIENT
Start: 2022-02-21 | End: 2022-03-14 | Stop reason: HOSPADM

## 2022-02-21 RX ORDER — SODIUM CHLORIDE 9 MG/ML
INJECTION, SOLUTION INTRAVENOUS
Status: COMPLETED
Start: 2022-02-21 | End: 2022-02-21

## 2022-02-21 RX ORDER — SODIUM CHLORIDE 0.9 % (FLUSH) 0.9 %
10 SYRINGE (ML) INJECTION AS NEEDED
Status: DISCONTINUED | OUTPATIENT
Start: 2022-02-21 | End: 2022-03-14 | Stop reason: HOSPADM

## 2022-02-21 RX ORDER — MIDAZOLAM HYDROCHLORIDE 2 MG/2ML
INJECTION, SOLUTION INTRAMUSCULAR; INTRAVENOUS
Status: COMPLETED
Start: 2022-02-21 | End: 2022-02-21

## 2022-02-21 RX ORDER — SUCCINYLCHOLINE CHLORIDE 20 MG/ML
INJECTION INTRAMUSCULAR; INTRAVENOUS
Status: COMPLETED
Start: 2022-02-21 | End: 2022-02-21

## 2022-02-21 RX ORDER — IPRATROPIUM BROMIDE AND ALBUTEROL SULFATE 2.5; .5 MG/3ML; MG/3ML
3 SOLUTION RESPIRATORY (INHALATION) EVERY 6 HOURS PRN
Status: DISCONTINUED | OUTPATIENT
Start: 2022-02-21 | End: 2022-03-14 | Stop reason: HOSPADM

## 2022-02-21 RX ORDER — SODIUM CHLORIDE, SODIUM LACTATE, POTASSIUM CHLORIDE, CALCIUM CHLORIDE 600; 310; 30; 20 MG/100ML; MG/100ML; MG/100ML; MG/100ML
100 INJECTION, SOLUTION INTRAVENOUS CONTINUOUS
Status: DISCONTINUED | OUTPATIENT
Start: 2022-02-22 | End: 2022-02-24

## 2022-02-21 RX ORDER — SODIUM CHLORIDE 0.9 % (FLUSH) 0.9 %
10 SYRINGE (ML) INJECTION EVERY 12 HOURS SCHEDULED
Status: DISCONTINUED | OUTPATIENT
Start: 2022-02-22 | End: 2022-03-03

## 2022-02-21 RX ORDER — ALBUTEROL SULFATE 90 UG/1
2 AEROSOL, METERED RESPIRATORY (INHALATION) ONCE
Status: COMPLETED | OUTPATIENT
Start: 2022-02-21 | End: 2022-02-21

## 2022-02-21 RX ORDER — ACETAMINOPHEN 650 MG/1
SUPPOSITORY RECTAL
Status: COMPLETED
Start: 2022-02-21 | End: 2022-02-21

## 2022-02-21 RX ORDER — PROPOFOL 10 MG/ML
VIAL (ML) INTRAVENOUS
Status: COMPLETED
Start: 2022-02-21 | End: 2022-02-21

## 2022-02-21 RX ORDER — NOREPINEPHRINE BITARTRATE/D5W 8 MG/250ML
.02-.3 PLASTIC BAG, INJECTION (ML) INTRAVENOUS
Status: DISCONTINUED | OUTPATIENT
Start: 2022-02-22 | End: 2022-02-24

## 2022-02-21 RX ORDER — DEXMEDETOMIDINE HYDROCHLORIDE 4 UG/ML
.2-1.5 INJECTION, SOLUTION INTRAVENOUS
Status: DISCONTINUED | OUTPATIENT
Start: 2022-02-22 | End: 2022-02-22

## 2022-02-21 RX ORDER — ACETAMINOPHEN 650 MG/1
650 SUPPOSITORY RECTAL ONCE
Status: COMPLETED | OUTPATIENT
Start: 2022-02-21 | End: 2022-02-21

## 2022-02-21 RX ADMIN — ETOMIDATE 20 MG: 2 INJECTION INTRAVENOUS at 21:33

## 2022-02-21 RX ADMIN — CEFTRIAXONE 1 G: 1 INJECTION, SOLUTION INTRAVENOUS at 22:02

## 2022-02-21 RX ADMIN — NOREPINEPHRINE BITARTRATE 0.02 MCG/KG/MIN: 8 INJECTION, SOLUTION INTRAVENOUS at 23:49

## 2022-02-21 RX ADMIN — SUCCINYLCHOLINE CHLORIDE 80 MG: 20 INJECTION, SOLUTION INTRAMUSCULAR; INTRAVENOUS at 21:34

## 2022-02-21 RX ADMIN — MIDAZOLAM HYDROCHLORIDE 4 MG: 1 INJECTION, SOLUTION INTRAMUSCULAR; INTRAVENOUS at 21:32

## 2022-02-21 RX ADMIN — PROPOFOL 5 MCG/KG/MIN: 10 INJECTION, EMULSION INTRAVENOUS at 21:41

## 2022-02-21 RX ADMIN — ACETAMINOPHEN 650 MG: 650 SUPPOSITORY RECTAL at 22:17

## 2022-02-21 RX ADMIN — SUCCINYLCHOLINE CHLORIDE 80 MG: 20 INJECTION INTRAMUSCULAR; INTRAVENOUS at 21:34

## 2022-02-21 RX ADMIN — MIDAZOLAM HYDROCHLORIDE 4 MG: 1 INJECTION INTRAMUSCULAR; INTRAVENOUS at 21:32

## 2022-02-21 RX ADMIN — SODIUM CHLORIDE 20 ML: 9 INJECTION, SOLUTION INTRAVENOUS at 23:59

## 2022-02-21 RX ADMIN — MIDAZOLAM HYDROCHLORIDE 4 MG: 1 INJECTION, SOLUTION INTRAMUSCULAR; INTRAVENOUS at 22:00

## 2022-02-21 RX ADMIN — SODIUM CHLORIDE 500 MG: 900 INJECTION, SOLUTION INTRAVENOUS at 22:45

## 2022-02-21 RX ADMIN — ALBUTEROL SULFATE 2 PUFF: 90 AEROSOL, METERED RESPIRATORY (INHALATION) at 18:36

## 2022-02-21 RX ADMIN — MIDAZOLAM HYDROCHLORIDE 4 MG: 1 INJECTION INTRAMUSCULAR; INTRAVENOUS at 22:00

## 2022-02-21 RX ADMIN — IOPAMIDOL 100 ML: 755 INJECTION, SOLUTION INTRAVENOUS at 19:42

## 2022-02-22 ENCOUNTER — APPOINTMENT (OUTPATIENT)
Dept: GENERAL RADIOLOGY | Facility: HOSPITAL | Age: 72
End: 2022-02-22

## 2022-02-22 ENCOUNTER — APPOINTMENT (OUTPATIENT)
Dept: CT IMAGING | Facility: HOSPITAL | Age: 72
End: 2022-02-22

## 2022-02-22 ENCOUNTER — APPOINTMENT (OUTPATIENT)
Dept: ULTRASOUND IMAGING | Facility: HOSPITAL | Age: 72
End: 2022-02-22

## 2022-02-22 LAB
ALBUMIN SERPL-MCNC: 2.3 G/DL (ref 3.5–5.2)
ALBUMIN SERPL-MCNC: 2.4 G/DL (ref 3.5–5.2)
ALBUMIN/GLOB SERPL: 0.6 G/DL
ALBUMIN/GLOB SERPL: 0.7 G/DL
ALP SERPL-CCNC: 48 U/L (ref 39–117)
ALP SERPL-CCNC: 62 U/L (ref 39–117)
ALT SERPL W P-5'-P-CCNC: 33 U/L (ref 1–41)
ALT SERPL W P-5'-P-CCNC: 33 U/L (ref 1–41)
AMPHET+METHAMPHET UR QL: NEGATIVE
AMPHETAMINES UR QL: NEGATIVE
ANION GAP SERPL CALCULATED.3IONS-SCNC: 11.5 MMOL/L (ref 5–15)
ANION GAP SERPL CALCULATED.3IONS-SCNC: 7.6 MMOL/L (ref 5–15)
APAP SERPL-MCNC: <5 MCG/ML (ref 0–30)
AST SERPL-CCNC: 20 U/L (ref 1–40)
AST SERPL-CCNC: 24 U/L (ref 1–40)
B PARAPERT DNA SPEC QL NAA+PROBE: NOT DETECTED
B PERT DNA SPEC QL NAA+PROBE: NOT DETECTED
BARBITURATES UR QL SCN: NEGATIVE
BASOPHILS # BLD AUTO: 0.04 10*3/MM3 (ref 0–0.2)
BASOPHILS NFR BLD AUTO: 0.4 % (ref 0–1.5)
BENZODIAZ UR QL SCN: POSITIVE
BILIRUB SERPL-MCNC: 0.4 MG/DL (ref 0–1.2)
BILIRUB SERPL-MCNC: 0.4 MG/DL (ref 0–1.2)
BUN SERPL-MCNC: 12 MG/DL (ref 8–23)
BUN SERPL-MCNC: 15 MG/DL (ref 8–23)
BUN/CREAT SERPL: 24.5 (ref 7–25)
BUN/CREAT SERPL: 25.9 (ref 7–25)
BUPRENORPHINE SERPL-MCNC: NEGATIVE NG/ML
C PNEUM DNA NPH QL NAA+NON-PROBE: NOT DETECTED
CALCIUM SPEC-SCNC: 8.4 MG/DL (ref 8.6–10.5)
CALCIUM SPEC-SCNC: 8.7 MG/DL (ref 8.6–10.5)
CANNABINOIDS SERPL QL: NEGATIVE
CHLORIDE SERPL-SCNC: 104 MMOL/L (ref 98–107)
CHLORIDE SERPL-SCNC: 104 MMOL/L (ref 98–107)
CO2 SERPL-SCNC: 25.4 MMOL/L (ref 22–29)
CO2 SERPL-SCNC: 25.5 MMOL/L (ref 22–29)
COCAINE UR QL: NEGATIVE
CREAT SERPL-MCNC: 0.49 MG/DL (ref 0.76–1.27)
CREAT SERPL-MCNC: 0.58 MG/DL (ref 0.76–1.27)
CRP SERPL-MCNC: 20.18 MG/DL (ref 0–0.5)
CRP SERPL-MCNC: 23.93 MG/DL (ref 0–0.5)
D DIMER PPP FEU-MCNC: 3.44 MCGFEU/ML (ref 0–0.46)
D-LACTATE SERPL-SCNC: 1.4 MMOL/L (ref 0.5–2)
DEPRECATED RDW RBC AUTO: 47 FL (ref 37–54)
DEPRECATED RDW RBC AUTO: 47 FL (ref 37–54)
EOSINOPHIL # BLD AUTO: 0.35 10*3/MM3 (ref 0–0.4)
EOSINOPHIL NFR BLD AUTO: 3.6 % (ref 0.3–6.2)
ERYTHROCYTE [DISTWIDTH] IN BLOOD BY AUTOMATED COUNT: 13.5 % (ref 12.3–15.4)
ERYTHROCYTE [DISTWIDTH] IN BLOOD BY AUTOMATED COUNT: 13.7 % (ref 12.3–15.4)
ERYTHROCYTE [SEDIMENTATION RATE] IN BLOOD: 56 MM/HR (ref 0–20)
ETHANOL BLD-MCNC: <10 MG/DL (ref 0–10)
ETHANOL UR QL: <0.01 %
FERRITIN SERPL-MCNC: 1110 NG/ML (ref 30–400)
FLUAV SUBTYP SPEC NAA+PROBE: NOT DETECTED
FLUBV RNA ISLT QL NAA+PROBE: NOT DETECTED
FOLATE SERPL-MCNC: 18.1 NG/ML (ref 4.78–24.2)
GFR SERPL CREATININE-BSD FRML MDRD: 138 ML/MIN/1.73
GFR SERPL CREATININE-BSD FRML MDRD: >150 ML/MIN/1.73
GLOBULIN UR ELPH-MCNC: 3.2 GM/DL
GLOBULIN UR ELPH-MCNC: 3.9 GM/DL
GLUCOSE BLDC GLUCOMTR-MCNC: 161 MG/DL (ref 70–130)
GLUCOSE BLDC GLUCOMTR-MCNC: 165 MG/DL (ref 70–130)
GLUCOSE BLDC GLUCOMTR-MCNC: 192 MG/DL (ref 70–130)
GLUCOSE SERPL-MCNC: 148 MG/DL (ref 65–99)
GLUCOSE SERPL-MCNC: 189 MG/DL (ref 65–99)
HADV DNA SPEC NAA+PROBE: NOT DETECTED
HCOV 229E RNA SPEC QL NAA+PROBE: NOT DETECTED
HCOV HKU1 RNA SPEC QL NAA+PROBE: NOT DETECTED
HCOV NL63 RNA SPEC QL NAA+PROBE: NOT DETECTED
HCOV OC43 RNA SPEC QL NAA+PROBE: NOT DETECTED
HCT VFR BLD AUTO: 32 % (ref 37.5–51)
HCT VFR BLD AUTO: 34.6 % (ref 37.5–51)
HGB BLD-MCNC: 10.3 G/DL (ref 13–17.7)
HGB BLD-MCNC: 10.8 G/DL (ref 13–17.7)
HMPV RNA NPH QL NAA+NON-PROBE: NOT DETECTED
HPIV1 RNA ISLT QL NAA+PROBE: NOT DETECTED
HPIV2 RNA SPEC QL NAA+PROBE: NOT DETECTED
HPIV3 RNA NPH QL NAA+PROBE: NOT DETECTED
HPIV4 P GENE NPH QL NAA+PROBE: NOT DETECTED
IMM GRANULOCYTES # BLD AUTO: 0.1 10*3/MM3 (ref 0–0.05)
IMM GRANULOCYTES NFR BLD AUTO: 1 % (ref 0–0.5)
IRON 24H UR-MRATE: 14 MCG/DL (ref 59–158)
IRON SATN MFR SERPL: 12 % (ref 20–50)
L PNEUMO1 AG UR QL IA: NEGATIVE
LYMPHOCYTES # BLD AUTO: 1.02 10*3/MM3 (ref 0.7–3.1)
LYMPHOCYTES NFR BLD AUTO: 10.5 % (ref 19.6–45.3)
M PNEUMO IGG SER IA-ACNC: NOT DETECTED
MAGNESIUM SERPL-MCNC: 2.1 MG/DL (ref 1.6–2.4)
MAGNESIUM SERPL-MCNC: 2.2 MG/DL (ref 1.6–2.4)
MCH RBC QN AUTO: 29.7 PG (ref 26.6–33)
MCH RBC QN AUTO: 30.4 PG (ref 26.6–33)
MCHC RBC AUTO-ENTMCNC: 31.2 G/DL (ref 31.5–35.7)
MCHC RBC AUTO-ENTMCNC: 32.2 G/DL (ref 31.5–35.7)
MCV RBC AUTO: 94.4 FL (ref 79–97)
MCV RBC AUTO: 95.1 FL (ref 79–97)
METHADONE UR QL SCN: NEGATIVE
MONOCYTES # BLD AUTO: 1.27 10*3/MM3 (ref 0.1–0.9)
MONOCYTES NFR BLD AUTO: 13.1 % (ref 5–12)
NEUTROPHILS NFR BLD AUTO: 6.92 10*3/MM3 (ref 1.7–7)
NEUTROPHILS NFR BLD AUTO: 71.4 % (ref 42.7–76)
NRBC BLD AUTO-RTO: 0 /100 WBC (ref 0–0.2)
OPIATES UR QL: NEGATIVE
OXYCODONE UR QL SCN: NEGATIVE
PCP UR QL SCN: NEGATIVE
PLATELET # BLD AUTO: 565 10*3/MM3 (ref 140–450)
PLATELET # BLD AUTO: 590 10*3/MM3 (ref 140–450)
PMV BLD AUTO: 8.9 FL (ref 6–12)
PMV BLD AUTO: 9.1 FL (ref 6–12)
POTASSIUM SERPL-SCNC: 3.6 MMOL/L (ref 3.5–5.2)
POTASSIUM SERPL-SCNC: 4.4 MMOL/L (ref 3.5–5.2)
POTASSIUM SERPL-SCNC: 4.4 MMOL/L (ref 3.5–5.2)
POTASSIUM SERPL-SCNC: 5 MMOL/L (ref 3.5–5.2)
PROCALCITONIN SERPL-MCNC: 0.14 NG/ML (ref 0–0.25)
PROPOXYPH UR QL: NEGATIVE
PROT SERPL-MCNC: 5.5 G/DL (ref 6–8.5)
PROT SERPL-MCNC: 6.3 G/DL (ref 6–8.5)
RBC # BLD AUTO: 3.39 10*6/MM3 (ref 4.14–5.8)
RBC # BLD AUTO: 3.64 10*6/MM3 (ref 4.14–5.8)
RHINOVIRUS RNA SPEC NAA+PROBE: NOT DETECTED
RSV RNA NPH QL NAA+NON-PROBE: NOT DETECTED
S PNEUM AG SPEC QL LA: NEGATIVE
SALICYLATES SERPL-MCNC: <0.3 MG/DL
SARS-COV-2 RNA NPH QL NAA+NON-PROBE: DETECTED
SODIUM SERPL-SCNC: 137 MMOL/L (ref 136–145)
SODIUM SERPL-SCNC: 141 MMOL/L (ref 136–145)
TIBC SERPL-MCNC: 114 MCG/DL (ref 298–536)
TRICYCLICS UR QL SCN: NEGATIVE
TROPONIN T SERPL-MCNC: <0.01 NG/ML (ref 0–0.03)
UIBC SERPL-MCNC: 100 MCG/DL (ref 112–346)
VIT B12 BLD-MCNC: 810 PG/ML (ref 211–946)
WBC NRBC COR # BLD: 9.09 10*3/MM3 (ref 3.4–10.8)
WBC NRBC COR # BLD: 9.7 10*3/MM3 (ref 3.4–10.8)

## 2022-02-22 PROCEDURE — 80179 DRUG ASSAY SALICYLATE: CPT | Performed by: NURSE PRACTITIONER

## 2022-02-22 PROCEDURE — 83550 IRON BINDING TEST: CPT | Performed by: NURSE PRACTITIONER

## 2022-02-22 PROCEDURE — 86140 C-REACTIVE PROTEIN: CPT | Performed by: NURSE PRACTITIONER

## 2022-02-22 PROCEDURE — 94640 AIRWAY INHALATION TREATMENT: CPT

## 2022-02-22 PROCEDURE — 85379 FIBRIN DEGRADATION QUANT: CPT | Performed by: NURSE PRACTITIONER

## 2022-02-22 PROCEDURE — 94799 UNLISTED PULMONARY SVC/PX: CPT

## 2022-02-22 PROCEDURE — 70450 CT HEAD/BRAIN W/O DYE: CPT

## 2022-02-22 PROCEDURE — 80306 DRUG TEST PRSMV INSTRMNT: CPT | Performed by: NURSE PRACTITIONER

## 2022-02-22 PROCEDURE — 25010000002 LORAZEPAM PER 2 MG: Performed by: INTERNAL MEDICINE

## 2022-02-22 PROCEDURE — 82962 GLUCOSE BLOOD TEST: CPT

## 2022-02-22 PROCEDURE — 25010000002 VANCOMYCIN 1 G RECONSTITUTED SOLUTION 1 EACH VIAL: Performed by: INTERNAL MEDICINE

## 2022-02-22 PROCEDURE — 02HV33Z INSERTION OF INFUSION DEVICE INTO SUPERIOR VENA CAVA, PERCUTANEOUS APPROACH: ICD-10-PCS | Performed by: INTERNAL MEDICINE

## 2022-02-22 PROCEDURE — 83605 ASSAY OF LACTIC ACID: CPT | Performed by: EMERGENCY MEDICINE

## 2022-02-22 PROCEDURE — 25010000002 PROPOFOL 10 MG/ML EMULSION: Performed by: INTERNAL MEDICINE

## 2022-02-22 PROCEDURE — 93010 ELECTROCARDIOGRAM REPORT: CPT | Performed by: INTERNAL MEDICINE

## 2022-02-22 PROCEDURE — 85652 RBC SED RATE AUTOMATED: CPT | Performed by: NURSE PRACTITIONER

## 2022-02-22 PROCEDURE — 99222 1ST HOSP IP/OBS MODERATE 55: CPT | Performed by: PSYCHIATRY & NEUROLOGY

## 2022-02-22 PROCEDURE — 93005 ELECTROCARDIOGRAM TRACING: CPT | Performed by: NURSE PRACTITIONER

## 2022-02-22 PROCEDURE — 71045 X-RAY EXAM CHEST 1 VIEW: CPT

## 2022-02-22 PROCEDURE — 86140 C-REACTIVE PROTEIN: CPT | Performed by: INTERNAL MEDICINE

## 2022-02-22 PROCEDURE — 87899 AGENT NOS ASSAY W/OPTIC: CPT | Performed by: INTERNAL MEDICINE

## 2022-02-22 PROCEDURE — 25010000002 THIAMINE PER 100 MG: Performed by: INTERNAL MEDICINE

## 2022-02-22 PROCEDURE — 83735 ASSAY OF MAGNESIUM: CPT | Performed by: INTERNAL MEDICINE

## 2022-02-22 PROCEDURE — 84132 ASSAY OF SERUM POTASSIUM: CPT | Performed by: NURSE PRACTITIONER

## 2022-02-22 PROCEDURE — 83540 ASSAY OF IRON: CPT | Performed by: NURSE PRACTITIONER

## 2022-02-22 PROCEDURE — 99291 CRITICAL CARE FIRST HOUR: CPT | Performed by: NURSE PRACTITIONER

## 2022-02-22 PROCEDURE — 93970 EXTREMITY STUDY: CPT

## 2022-02-22 PROCEDURE — 99233 SBSQ HOSP IP/OBS HIGH 50: CPT | Performed by: NURSE PRACTITIONER

## 2022-02-22 PROCEDURE — 63710000001 INSULIN ASPART PER 5 UNITS: Performed by: INTERNAL MEDICINE

## 2022-02-22 PROCEDURE — 25010000002 ENOXAPARIN PER 10 MG: Performed by: INTERNAL MEDICINE

## 2022-02-22 PROCEDURE — 25010000002 METHYLPREDNISOLONE PER 40 MG: Performed by: INTERNAL MEDICINE

## 2022-02-22 PROCEDURE — 94762 N-INVAS EAR/PLS OXIMTRY CONT: CPT

## 2022-02-22 PROCEDURE — 25010000002 FENTANYL CITRATE (PF) 500 MCG/10ML SOLUTION: Performed by: INTERNAL MEDICINE

## 2022-02-22 PROCEDURE — 82077 ASSAY SPEC XCP UR&BREATH IA: CPT | Performed by: INTERNAL MEDICINE

## 2022-02-22 PROCEDURE — 85025 COMPLETE CBC W/AUTO DIFF WBC: CPT | Performed by: INTERNAL MEDICINE

## 2022-02-22 PROCEDURE — 0202U NFCT DS 22 TRGT SARS-COV-2: CPT | Performed by: INTERNAL MEDICINE

## 2022-02-22 PROCEDURE — 82728 ASSAY OF FERRITIN: CPT | Performed by: NURSE PRACTITIONER

## 2022-02-22 PROCEDURE — 25010000002 CEFEPIME-DEXTROSE 2-5 GM-%(50ML) RECONSTITUTED SOLUTION: Performed by: INTERNAL MEDICINE

## 2022-02-22 PROCEDURE — 82607 VITAMIN B-12: CPT | Performed by: NURSE PRACTITIONER

## 2022-02-22 PROCEDURE — 25010000002 VANCOMYCIN HCL 1.25 G RECONSTITUTED SOLUTION 1 EACH VIAL: Performed by: INTERNAL MEDICINE

## 2022-02-22 PROCEDURE — 84145 PROCALCITONIN (PCT): CPT | Performed by: INTERNAL MEDICINE

## 2022-02-22 PROCEDURE — 94761 N-INVAS EAR/PLS OXIMETRY MLT: CPT

## 2022-02-22 PROCEDURE — 80053 COMPREHEN METABOLIC PANEL: CPT | Performed by: INTERNAL MEDICINE

## 2022-02-22 PROCEDURE — 0 POTASSIUM CHLORIDE 10 MEQ/100ML SOLUTION: Performed by: INTERNAL MEDICINE

## 2022-02-22 PROCEDURE — C1751 CATH, INF, PER/CENT/MIDLINE: HCPCS

## 2022-02-22 PROCEDURE — 85027 COMPLETE CBC AUTOMATED: CPT | Performed by: NURSE PRACTITIONER

## 2022-02-22 PROCEDURE — 82746 ASSAY OF FOLIC ACID SERUM: CPT | Performed by: NURSE PRACTITIONER

## 2022-02-22 PROCEDURE — 25010000002 ENOXAPARIN PER 10 MG: Performed by: NURSE PRACTITIONER

## 2022-02-22 PROCEDURE — 80143 DRUG ASSAY ACETAMINOPHEN: CPT | Performed by: NURSE PRACTITIONER

## 2022-02-22 PROCEDURE — 80053 COMPREHEN METABOLIC PANEL: CPT | Performed by: NURSE PRACTITIONER

## 2022-02-22 PROCEDURE — 84484 ASSAY OF TROPONIN QUANT: CPT | Performed by: NURSE PRACTITIONER

## 2022-02-22 PROCEDURE — 94003 VENT MGMT INPAT SUBQ DAY: CPT

## 2022-02-22 RX ORDER — LORAZEPAM 0.5 MG/1
0.5 TABLET ORAL
Status: DISCONTINUED | OUTPATIENT
Start: 2022-02-22 | End: 2022-02-22

## 2022-02-22 RX ORDER — CEFEPIME HYDROCHLORIDE 2 G/50ML
2 INJECTION, SOLUTION INTRAVENOUS EVERY 8 HOURS
Status: DISCONTINUED | OUTPATIENT
Start: 2022-02-22 | End: 2022-02-26

## 2022-02-22 RX ORDER — VANCOMYCIN HYDROCHLORIDE 500 MG/10ML
INJECTION, POWDER, LYOPHILIZED, FOR SOLUTION INTRAVENOUS
Status: DISPENSED
Start: 2022-02-22 | End: 2022-02-22

## 2022-02-22 RX ORDER — LORAZEPAM 2 MG/ML
1 INJECTION INTRAMUSCULAR
Status: DISCONTINUED | OUTPATIENT
Start: 2022-02-22 | End: 2022-02-22

## 2022-02-22 RX ORDER — SODIUM CHLORIDE 0.9 % (FLUSH) 0.9 %
10 SYRINGE (ML) INJECTION EVERY 12 HOURS SCHEDULED
Status: DISCONTINUED | OUTPATIENT
Start: 2022-02-22 | End: 2022-03-01

## 2022-02-22 RX ORDER — LORAZEPAM 2 MG/ML
2 INJECTION INTRAMUSCULAR
Status: DISCONTINUED | OUTPATIENT
Start: 2022-02-22 | End: 2022-02-22

## 2022-02-22 RX ORDER — SODIUM CHLORIDE 0.9 % (FLUSH) 0.9 %
10 SYRINGE (ML) INJECTION AS NEEDED
Status: DISCONTINUED | OUTPATIENT
Start: 2022-02-22 | End: 2022-03-01

## 2022-02-22 RX ORDER — MAGNESIUM SULFATE HEPTAHYDRATE 40 MG/ML
4 INJECTION, SOLUTION INTRAVENOUS AS NEEDED
Status: DISCONTINUED | OUTPATIENT
Start: 2022-02-22 | End: 2022-03-14 | Stop reason: HOSPADM

## 2022-02-22 RX ORDER — MAGNESIUM SULFATE HEPTAHYDRATE 40 MG/ML
2 INJECTION, SOLUTION INTRAVENOUS AS NEEDED
Status: DISCONTINUED | OUTPATIENT
Start: 2022-02-22 | End: 2022-03-14 | Stop reason: HOSPADM

## 2022-02-22 RX ORDER — LORAZEPAM 1 MG/1
1 TABLET ORAL
Status: DISCONTINUED | OUTPATIENT
Start: 2022-02-22 | End: 2022-02-22

## 2022-02-22 RX ORDER — SODIUM CHLORIDE 0.9 % (FLUSH) 0.9 %
20 SYRINGE (ML) INJECTION AS NEEDED
Status: DISCONTINUED | OUTPATIENT
Start: 2022-02-22 | End: 2022-03-01

## 2022-02-22 RX ORDER — SODIUM CHLORIDE 9 MG/ML
INJECTION, SOLUTION INTRAVENOUS
Status: DISPENSED
Start: 2022-02-22 | End: 2022-02-22

## 2022-02-22 RX ORDER — LORAZEPAM 2 MG/ML
1 INJECTION INTRAMUSCULAR EVERY 4 HOURS PRN
Status: DISPENSED | OUTPATIENT
Start: 2022-02-22 | End: 2022-03-01

## 2022-02-22 RX ORDER — POTASSIUM CHLORIDE 1.5 G/1.77G
40 POWDER, FOR SOLUTION ORAL AS NEEDED
Status: DISCONTINUED | OUTPATIENT
Start: 2022-02-22 | End: 2022-03-14 | Stop reason: HOSPADM

## 2022-02-22 RX ORDER — LORAZEPAM 1 MG/1
2 TABLET ORAL
Status: DISCONTINUED | OUTPATIENT
Start: 2022-02-22 | End: 2022-02-22

## 2022-02-22 RX ORDER — OLANZAPINE 5 MG/1
5 TABLET ORAL NIGHTLY
Status: DISCONTINUED | OUTPATIENT
Start: 2022-02-22 | End: 2022-02-28

## 2022-02-22 RX ORDER — VANCOMYCIN HYDROCHLORIDE 1 G/20ML
INJECTION, POWDER, LYOPHILIZED, FOR SOLUTION INTRAVENOUS
Status: DISPENSED
Start: 2022-02-22 | End: 2022-02-22

## 2022-02-22 RX ORDER — POTASSIUM CHLORIDE 20 MEQ/1
40 TABLET, EXTENDED RELEASE ORAL AS NEEDED
Status: DISCONTINUED | OUTPATIENT
Start: 2022-02-22 | End: 2022-03-14 | Stop reason: HOSPADM

## 2022-02-22 RX ORDER — LORAZEPAM 2 MG/ML
0.5 INJECTION INTRAMUSCULAR
Status: DISCONTINUED | OUTPATIENT
Start: 2022-02-22 | End: 2022-02-22

## 2022-02-22 RX ORDER — NICOTINE POLACRILEX 4 MG
15 LOZENGE BUCCAL
Status: DISCONTINUED | OUTPATIENT
Start: 2022-02-22 | End: 2022-03-14 | Stop reason: HOSPADM

## 2022-02-22 RX ORDER — METHYLPREDNISOLONE SODIUM SUCCINATE 40 MG/ML
40 INJECTION, POWDER, LYOPHILIZED, FOR SOLUTION INTRAMUSCULAR; INTRAVENOUS EVERY 12 HOURS
Status: DISCONTINUED | OUTPATIENT
Start: 2022-02-22 | End: 2022-02-25

## 2022-02-22 RX ORDER — THIAMINE HYDROCHLORIDE 100 MG/ML
INJECTION, SOLUTION INTRAMUSCULAR; INTRAVENOUS
Status: DISPENSED
Start: 2022-02-22 | End: 2022-02-22

## 2022-02-22 RX ORDER — DEXTROSE MONOHYDRATE 25 G/50ML
25 INJECTION, SOLUTION INTRAVENOUS
Status: DISCONTINUED | OUTPATIENT
Start: 2022-02-22 | End: 2022-03-14 | Stop reason: HOSPADM

## 2022-02-22 RX ORDER — FENTANYL CITRATE 50 UG/ML
50 INJECTION, SOLUTION INTRAMUSCULAR; INTRAVENOUS
Status: DISCONTINUED | OUTPATIENT
Start: 2022-02-22 | End: 2022-02-24

## 2022-02-22 RX ORDER — METHYLPREDNISOLONE SODIUM SUCCINATE 40 MG/ML
40 INJECTION, POWDER, LYOPHILIZED, FOR SOLUTION INTRAMUSCULAR; INTRAVENOUS EVERY 8 HOURS
Status: DISCONTINUED | OUTPATIENT
Start: 2022-02-22 | End: 2022-02-22

## 2022-02-22 RX ORDER — POTASSIUM CHLORIDE 7.45 MG/ML
10 INJECTION INTRAVENOUS
Status: DISCONTINUED | OUTPATIENT
Start: 2022-02-22 | End: 2022-03-14 | Stop reason: HOSPADM

## 2022-02-22 RX ORDER — LORAZEPAM 2 MG/ML
1 INJECTION INTRAMUSCULAR EVERY 4 HOURS PRN
Status: DISCONTINUED | OUTPATIENT
Start: 2022-02-22 | End: 2022-02-22

## 2022-02-22 RX ADMIN — Medication 10 ML: at 20:28

## 2022-02-22 RX ADMIN — IPRATROPIUM BROMIDE AND ALBUTEROL SULFATE 3 ML: .5; 2.5 SOLUTION RESPIRATORY (INHALATION) at 07:39

## 2022-02-22 RX ADMIN — ENOXAPARIN SODIUM 80 MG: 80 INJECTION SUBCUTANEOUS at 21:01

## 2022-02-22 RX ADMIN — PROPOFOL 40 MCG/KG/MIN: 10 INJECTION, EMULSION INTRAVENOUS at 01:04

## 2022-02-22 RX ADMIN — ENOXAPARIN SODIUM 40 MG: 40 INJECTION SUBCUTANEOUS at 00:23

## 2022-02-22 RX ADMIN — LORAZEPAM 1 MG: 2 INJECTION INTRAMUSCULAR; INTRAVENOUS at 14:44

## 2022-02-22 RX ADMIN — FENTANYL CITRATE 25 MCG/HR: 50 INJECTION, SOLUTION INTRAMUSCULAR; INTRAVENOUS at 16:16

## 2022-02-22 RX ADMIN — POTASSIUM CHLORIDE 10 MEQ: 7.46 INJECTION, SOLUTION INTRAVENOUS at 06:11

## 2022-02-22 RX ADMIN — Medication 10 ML: at 20:29

## 2022-02-22 RX ADMIN — Medication 10 ML: at 12:43

## 2022-02-22 RX ADMIN — SODIUM CHLORIDE, POTASSIUM CHLORIDE, SODIUM LACTATE AND CALCIUM CHLORIDE 100 ML/HR: 600; 310; 30; 20 INJECTION, SOLUTION INTRAVENOUS at 20:29

## 2022-02-22 RX ADMIN — POTASSIUM CHLORIDE 10 MEQ: 7.46 INJECTION, SOLUTION INTRAVENOUS at 08:27

## 2022-02-22 RX ADMIN — POTASSIUM CHLORIDE 10 MEQ: 7.46 INJECTION, SOLUTION INTRAVENOUS at 04:48

## 2022-02-22 RX ADMIN — CEFEPIME HYDROCHLORIDE 2 G: 2 INJECTION, SOLUTION INTRAVENOUS at 18:03

## 2022-02-22 RX ADMIN — SODIUM CHLORIDE 1.5 MG/HR: 9 INJECTION, SOLUTION INTRAVENOUS at 22:51

## 2022-02-22 RX ADMIN — IPRATROPIUM BROMIDE AND ALBUTEROL SULFATE 3 ML: .5; 2.5 SOLUTION RESPIRATORY (INHALATION) at 01:09

## 2022-02-22 RX ADMIN — CEFEPIME HYDROCHLORIDE 2 G: 2 INJECTION, SOLUTION INTRAVENOUS at 08:27

## 2022-02-22 RX ADMIN — IPRATROPIUM BROMIDE AND ALBUTEROL SULFATE 3 ML: .5; 2.5 SOLUTION RESPIRATORY (INHALATION) at 19:10

## 2022-02-22 RX ADMIN — Medication 10 ML: at 20:26

## 2022-02-22 RX ADMIN — SODIUM CHLORIDE 0.5 MG/HR: 9 INJECTION, SOLUTION INTRAVENOUS at 18:09

## 2022-02-22 RX ADMIN — PROPOFOL 25 MCG/KG/MIN: 10 INJECTION, EMULSION INTRAVENOUS at 11:11

## 2022-02-22 RX ADMIN — DEXMEDETOMIDINE HYDROCHLORIDE 0.8 MCG/KG/HR: 400 INJECTION, SOLUTION INTRAVENOUS at 08:38

## 2022-02-22 RX ADMIN — Medication 10 ML: at 11:29

## 2022-02-22 RX ADMIN — VANCOMYCIN HYDROCHLORIDE 1250 MG: 1.25 INJECTION, POWDER, LYOPHILIZED, FOR SOLUTION INTRAVENOUS at 16:21

## 2022-02-22 RX ADMIN — VANCOMYCIN HYDROCHLORIDE 1500 MG: 1 INJECTION, POWDER, LYOPHILIZED, FOR SOLUTION INTRAVENOUS at 02:30

## 2022-02-22 RX ADMIN — DEXMEDETOMIDINE HYDROCHLORIDE 0.2 MCG/KG/HR: 400 INJECTION, SOLUTION INTRAVENOUS at 00:20

## 2022-02-22 RX ADMIN — INSULIN ASPART 4 UNITS: 100 INJECTION, SOLUTION INTRAVENOUS; SUBCUTANEOUS at 18:06

## 2022-02-22 RX ADMIN — POTASSIUM CHLORIDE 10 MEQ: 7.46 INJECTION, SOLUTION INTRAVENOUS at 09:36

## 2022-02-22 RX ADMIN — DEXMEDETOMIDINE HYDROCHLORIDE 0.6 MCG/KG/HR: 400 INJECTION, SOLUTION INTRAVENOUS at 02:43

## 2022-02-22 RX ADMIN — THIAMINE HYDROCHLORIDE 100 MG: 100 INJECTION, SOLUTION INTRAMUSCULAR; INTRAVENOUS at 00:41

## 2022-02-22 RX ADMIN — Medication 10 ML: at 11:17

## 2022-02-22 RX ADMIN — DEXMEDETOMIDINE HYDROCHLORIDE 0.4 MCG/KG/HR: 400 INJECTION, SOLUTION INTRAVENOUS at 02:24

## 2022-02-22 RX ADMIN — SODIUM CHLORIDE, POTASSIUM CHLORIDE, SODIUM LACTATE AND CALCIUM CHLORIDE 100 ML/HR: 600; 310; 30; 20 INJECTION, SOLUTION INTRAVENOUS at 00:23

## 2022-02-22 RX ADMIN — INSULIN ASPART 4 UNITS: 100 INJECTION, SOLUTION INTRAVENOUS; SUBCUTANEOUS at 12:33

## 2022-02-22 RX ADMIN — METHYLPREDNISOLONE SODIUM SUCCINATE 40 MG: 40 INJECTION, POWDER, FOR SOLUTION INTRAMUSCULAR; INTRAVENOUS at 08:27

## 2022-02-22 RX ADMIN — Medication 10 ML: at 12:33

## 2022-02-22 RX ADMIN — PROPOFOL 50 MCG/KG/MIN: 10 INJECTION, EMULSION INTRAVENOUS at 05:55

## 2022-02-22 RX ADMIN — NOREPINEPHRINE BITARTRATE 0.08 MCG/KG/MIN: 8 INJECTION, SOLUTION INTRAVENOUS at 22:09

## 2022-02-22 RX ADMIN — IPRATROPIUM BROMIDE AND ALBUTEROL SULFATE 3 ML: .5; 2.5 SOLUTION RESPIRATORY (INHALATION) at 15:55

## 2022-02-22 RX ADMIN — ENOXAPARIN SODIUM 80 MG: 80 INJECTION SUBCUTANEOUS at 09:37

## 2022-02-22 RX ADMIN — NOREPINEPHRINE BITARTRATE 0.18 MCG/KG/MIN: 8 INJECTION, SOLUTION INTRAVENOUS at 09:30

## 2022-02-22 RX ADMIN — METHYLPREDNISOLONE SODIUM SUCCINATE 40 MG: 40 INJECTION, POWDER, FOR SOLUTION INTRAMUSCULAR; INTRAVENOUS at 20:23

## 2022-02-22 RX ADMIN — Medication 10 ML: at 20:27

## 2022-02-22 RX ADMIN — SODIUM CHLORIDE, POTASSIUM CHLORIDE, SODIUM LACTATE AND CALCIUM CHLORIDE 100 ML/HR: 600; 310; 30; 20 INJECTION, SOLUTION INTRAVENOUS at 09:29

## 2022-02-23 ENCOUNTER — APPOINTMENT (OUTPATIENT)
Dept: GENERAL RADIOLOGY | Facility: HOSPITAL | Age: 72
End: 2022-02-23

## 2022-02-23 LAB
ALBUMIN SERPL-MCNC: 2.3 G/DL (ref 3.5–5.2)
ALBUMIN/GLOB SERPL: 0.7 G/DL
ALP SERPL-CCNC: 48 U/L (ref 39–117)
ALT SERPL W P-5'-P-CCNC: 24 U/L (ref 1–41)
ANION GAP SERPL CALCULATED.3IONS-SCNC: 9.9 MMOL/L (ref 5–15)
AST SERPL-CCNC: 15 U/L (ref 1–40)
BILIRUB SERPL-MCNC: 0.2 MG/DL (ref 0–1.2)
BUN SERPL-MCNC: 15 MG/DL (ref 8–23)
BUN/CREAT SERPL: 37.5 (ref 7–25)
CALCIUM SPEC-SCNC: 8.7 MG/DL (ref 8.6–10.5)
CHLORIDE SERPL-SCNC: 105 MMOL/L (ref 98–107)
CO2 SERPL-SCNC: 25.1 MMOL/L (ref 22–29)
CREAT SERPL-MCNC: 0.4 MG/DL (ref 0.76–1.27)
DEPRECATED RDW RBC AUTO: 47 FL (ref 37–54)
ERYTHROCYTE [DISTWIDTH] IN BLOOD BY AUTOMATED COUNT: 13.4 % (ref 12.3–15.4)
GFR SERPL CREATININE-BSD FRML MDRD: >150 ML/MIN/1.73
GLOBULIN UR ELPH-MCNC: 3.2 GM/DL
GLUCOSE BLDC GLUCOMTR-MCNC: 155 MG/DL (ref 70–130)
GLUCOSE BLDC GLUCOMTR-MCNC: 163 MG/DL (ref 70–130)
GLUCOSE BLDC GLUCOMTR-MCNC: 183 MG/DL (ref 70–130)
GLUCOSE SERPL-MCNC: 153 MG/DL (ref 65–99)
HCT VFR BLD AUTO: 32.4 % (ref 37.5–51)
HGB BLD-MCNC: 10.1 G/DL (ref 13–17.7)
MCH RBC QN AUTO: 29.8 PG (ref 26.6–33)
MCHC RBC AUTO-ENTMCNC: 31.2 G/DL (ref 31.5–35.7)
MCV RBC AUTO: 95.6 FL (ref 79–97)
PLATELET # BLD AUTO: 572 10*3/MM3 (ref 140–450)
PMV BLD AUTO: 9.5 FL (ref 6–12)
POTASSIUM SERPL-SCNC: 4.2 MMOL/L (ref 3.5–5.2)
PROCALCITONIN SERPL-MCNC: 0.05 NG/ML (ref 0–0.25)
PROT SERPL-MCNC: 5.5 G/DL (ref 6–8.5)
QT INTERVAL: 468 MS
RBC # BLD AUTO: 3.39 10*6/MM3 (ref 4.14–5.8)
SODIUM SERPL-SCNC: 140 MMOL/L (ref 136–145)
TRANSFERRIN SERPL-MCNC: 85 MG/DL (ref 200–360)
VANCOMYCIN PEAK SERPL-MCNC: 19.9 MCG/ML (ref 20–40)
VANCOMYCIN TROUGH SERPL-MCNC: 8.7 MCG/ML (ref 5–20)
WBC NRBC COR # BLD: 12.19 10*3/MM3 (ref 3.4–10.8)

## 2022-02-23 PROCEDURE — 87081 CULTURE SCREEN ONLY: CPT | Performed by: INTERNAL MEDICINE

## 2022-02-23 PROCEDURE — 94799 UNLISTED PULMONARY SVC/PX: CPT

## 2022-02-23 PROCEDURE — 74018 RADEX ABDOMEN 1 VIEW: CPT

## 2022-02-23 PROCEDURE — 71045 X-RAY EXAM CHEST 1 VIEW: CPT

## 2022-02-23 PROCEDURE — 80202 ASSAY OF VANCOMYCIN: CPT | Performed by: INTERNAL MEDICINE

## 2022-02-23 PROCEDURE — 25010000002 VANCOMYCIN HCL 1.25 G RECONSTITUTED SOLUTION 1 EACH VIAL: Performed by: INTERNAL MEDICINE

## 2022-02-23 PROCEDURE — 25010000002 CEFEPIME-DEXTROSE 2-5 GM-%(50ML) RECONSTITUTED SOLUTION: Performed by: INTERNAL MEDICINE

## 2022-02-23 PROCEDURE — 84466 ASSAY OF TRANSFERRIN: CPT | Performed by: NURSE PRACTITIONER

## 2022-02-23 PROCEDURE — 80053 COMPREHEN METABOLIC PANEL: CPT | Performed by: NURSE PRACTITIONER

## 2022-02-23 PROCEDURE — 85027 COMPLETE CBC AUTOMATED: CPT | Performed by: INTERNAL MEDICINE

## 2022-02-23 PROCEDURE — 25010000002 METHYLPREDNISOLONE PER 40 MG: Performed by: INTERNAL MEDICINE

## 2022-02-23 PROCEDURE — 94003 VENT MGMT INPAT SUBQ DAY: CPT

## 2022-02-23 PROCEDURE — 25010000002 LORAZEPAM PER 2 MG: Performed by: PSYCHIATRY & NEUROLOGY

## 2022-02-23 PROCEDURE — 63710000001 INSULIN ASPART PER 5 UNITS: Performed by: INTERNAL MEDICINE

## 2022-02-23 PROCEDURE — 99233 SBSQ HOSP IP/OBS HIGH 50: CPT | Performed by: NURSE PRACTITIONER

## 2022-02-23 PROCEDURE — 25010000002 ENOXAPARIN PER 10 MG: Performed by: NURSE PRACTITIONER

## 2022-02-23 PROCEDURE — 82962 GLUCOSE BLOOD TEST: CPT

## 2022-02-23 PROCEDURE — 94761 N-INVAS EAR/PLS OXIMETRY MLT: CPT

## 2022-02-23 PROCEDURE — 84145 PROCALCITONIN (PCT): CPT | Performed by: NURSE PRACTITIONER

## 2022-02-23 RX ORDER — DOCUSATE SODIUM 50 MG/5 ML
100 LIQUID (ML) ORAL 2 TIMES DAILY
Status: DISCONTINUED | OUTPATIENT
Start: 2022-02-23 | End: 2022-02-28

## 2022-02-23 RX ORDER — FERROUS SULFATE 300 MG/5ML
300 LIQUID (ML) ORAL DAILY
Status: DISCONTINUED | OUTPATIENT
Start: 2022-02-23 | End: 2022-02-28

## 2022-02-23 RX ORDER — POLYETHYLENE GLYCOL 3350 17 G/17G
17 POWDER, FOR SOLUTION ORAL DAILY
Status: DISCONTINUED | OUTPATIENT
Start: 2022-02-23 | End: 2022-02-28

## 2022-02-23 RX ADMIN — SODIUM CHLORIDE, POTASSIUM CHLORIDE, SODIUM LACTATE AND CALCIUM CHLORIDE 100 ML/HR: 600; 310; 30; 20 INJECTION, SOLUTION INTRAVENOUS at 08:12

## 2022-02-23 RX ADMIN — NOREPINEPHRINE BITARTRATE 0.06 MCG/KG/MIN: 8 INJECTION, SOLUTION INTRAVENOUS at 06:48

## 2022-02-23 RX ADMIN — Medication 10 ML: at 09:42

## 2022-02-23 RX ADMIN — INSULIN ASPART 4 UNITS: 100 INJECTION, SOLUTION INTRAVENOUS; SUBCUTANEOUS at 09:10

## 2022-02-23 RX ADMIN — VANCOMYCIN HYDROCHLORIDE 1250 MG: 1.25 INJECTION, POWDER, LYOPHILIZED, FOR SOLUTION INTRAVENOUS at 16:22

## 2022-02-23 RX ADMIN — IPRATROPIUM BROMIDE AND ALBUTEROL SULFATE 3 ML: .5; 2.5 SOLUTION RESPIRATORY (INHALATION) at 08:13

## 2022-02-23 RX ADMIN — Medication 10 ML: at 21:15

## 2022-02-23 RX ADMIN — ENOXAPARIN SODIUM 80 MG: 80 INJECTION SUBCUTANEOUS at 09:09

## 2022-02-23 RX ADMIN — LORAZEPAM 1 MG: 2 INJECTION INTRAMUSCULAR; INTRAVENOUS at 09:42

## 2022-02-23 RX ADMIN — Medication 10 ML: at 21:16

## 2022-02-23 RX ADMIN — CEFEPIME HYDROCHLORIDE 2 G: 2 INJECTION, SOLUTION INTRAVENOUS at 01:36

## 2022-02-23 RX ADMIN — IPRATROPIUM BROMIDE AND ALBUTEROL SULFATE 3 ML: .5; 2.5 SOLUTION RESPIRATORY (INHALATION) at 13:10

## 2022-02-23 RX ADMIN — Medication 10 ML: at 13:18

## 2022-02-23 RX ADMIN — CEFEPIME HYDROCHLORIDE 2 G: 2 INJECTION, SOLUTION INTRAVENOUS at 18:09

## 2022-02-23 RX ADMIN — DOCUSATE SODIUM 100 MG: 50 LIQUID ORAL at 21:14

## 2022-02-23 RX ADMIN — Medication 10 ML: at 08:08

## 2022-02-23 RX ADMIN — ENOXAPARIN SODIUM 80 MG: 80 INJECTION SUBCUTANEOUS at 21:15

## 2022-02-23 RX ADMIN — Medication 10 ML: at 08:07

## 2022-02-23 RX ADMIN — OLANZAPINE 5 MG: 5 TABLET, FILM COATED ORAL at 21:14

## 2022-02-23 RX ADMIN — INSULIN ASPART 4 UNITS: 100 INJECTION, SOLUTION INTRAVENOUS; SUBCUTANEOUS at 13:20

## 2022-02-23 RX ADMIN — VANCOMYCIN HYDROCHLORIDE 1250 MG: 1.25 INJECTION, POWDER, LYOPHILIZED, FOR SOLUTION INTRAVENOUS at 01:36

## 2022-02-23 RX ADMIN — IPRATROPIUM BROMIDE AND ALBUTEROL SULFATE 3 ML: .5; 2.5 SOLUTION RESPIRATORY (INHALATION) at 21:23

## 2022-02-23 RX ADMIN — METHYLPREDNISOLONE SODIUM SUCCINATE 40 MG: 40 INJECTION, POWDER, FOR SOLUTION INTRAMUSCULAR; INTRAVENOUS at 18:07

## 2022-02-23 RX ADMIN — METHYLPREDNISOLONE SODIUM SUCCINATE 40 MG: 40 INJECTION, POWDER, FOR SOLUTION INTRAMUSCULAR; INTRAVENOUS at 08:08

## 2022-02-23 RX ADMIN — IPRATROPIUM BROMIDE AND ALBUTEROL SULFATE 3 ML: .5; 2.5 SOLUTION RESPIRATORY (INHALATION) at 00:55

## 2022-02-23 RX ADMIN — Medication 10 ML: at 18:07

## 2022-02-23 RX ADMIN — CEFEPIME HYDROCHLORIDE 2 G: 2 INJECTION, SOLUTION INTRAVENOUS at 08:09

## 2022-02-24 ENCOUNTER — APPOINTMENT (OUTPATIENT)
Dept: GENERAL RADIOLOGY | Facility: HOSPITAL | Age: 72
End: 2022-02-24

## 2022-02-24 ENCOUNTER — APPOINTMENT (OUTPATIENT)
Dept: CARDIOLOGY | Facility: HOSPITAL | Age: 72
End: 2022-02-24

## 2022-02-24 LAB
ALBUMIN SERPL-MCNC: 2.2 G/DL (ref 3.5–5.2)
ALBUMIN/GLOB SERPL: 0.7 G/DL
ALP SERPL-CCNC: 53 U/L (ref 39–117)
ALT SERPL W P-5'-P-CCNC: 20 U/L (ref 1–41)
ANION GAP SERPL CALCULATED.3IONS-SCNC: 5.1 MMOL/L (ref 5–15)
AORTIC DIMENSIONLESS INDEX: 0.4 (DI)
AST SERPL-CCNC: 12 U/L (ref 1–40)
BASOPHILS # BLD AUTO: 0.01 10*3/MM3 (ref 0–0.2)
BASOPHILS NFR BLD AUTO: 0.1 % (ref 0–1.5)
BH CV ECHO MEAS - AO MAX PG: 33.2 MMHG
BH CV ECHO MEAS - AO MEAN PG: 18.4 MMHG
BH CV ECHO MEAS - AO ROOT DIAM: 3.8 CM
BH CV ECHO MEAS - AO V2 MAX: 288.1 CM/SEC
BH CV ECHO MEAS - AO V2 VTI: 54 CM
BH CV ECHO MEAS - AVA(I,D): 1.64 CM2
BH CV ECHO MEAS - EDV(CUBED): 192.6 ML
BH CV ECHO MEAS - EDV(MOD-SP2): 50.6 ML
BH CV ECHO MEAS - EDV(MOD-SP4): 71.2 ML
BH CV ECHO MEAS - EF(MOD-BP): 60.5 %
BH CV ECHO MEAS - EF(MOD-SP2): 57.1 %
BH CV ECHO MEAS - EF(MOD-SP4): 65.4 %
BH CV ECHO MEAS - ESV(CUBED): 66.2 ML
BH CV ECHO MEAS - ESV(MOD-SP2): 21.7 ML
BH CV ECHO MEAS - ESV(MOD-SP4): 24.6 ML
BH CV ECHO MEAS - FS: 30 %
BH CV ECHO MEAS - IVS/LVPW: 0.71 CM
BH CV ECHO MEAS - IVSD: 0.69 CM
BH CV ECHO MEAS - LAT PEAK E' VEL: 11.2 CM/SEC
BH CV ECHO MEAS - LV DIASTOLIC VOL/BSA (35-75): 38.3 CM2
BH CV ECHO MEAS - LV MASS(C)D: 181.7 GRAMS
BH CV ECHO MEAS - LV MAX PG: 4.8 MMHG
BH CV ECHO MEAS - LV MEAN PG: 2.7 MMHG
BH CV ECHO MEAS - LV SYSTOLIC VOL/BSA (12-30): 13.2 CM2
BH CV ECHO MEAS - LV V1 MAX: 109.9 CM/SEC
BH CV ECHO MEAS - LV V1 VTI: 21.5 CM
BH CV ECHO MEAS - LVIDD: 5.8 CM
BH CV ECHO MEAS - LVIDS: 4 CM
BH CV ECHO MEAS - LVOT AREA: 4.1 CM2
BH CV ECHO MEAS - LVOT DIAM: 2.29 CM
BH CV ECHO MEAS - LVPWD: 0.97 CM
BH CV ECHO MEAS - MED PEAK E' VEL: 9.6 CM/SEC
BH CV ECHO MEAS - MV A DUR: 0.11 SEC
BH CV ECHO MEAS - MV A MAX VEL: 91.3 CM/SEC
BH CV ECHO MEAS - MV DEC SLOPE: 545 CM/SEC2
BH CV ECHO MEAS - MV DEC TIME: 0.19 MSEC
BH CV ECHO MEAS - MV E MAX VEL: 93.5 CM/SEC
BH CV ECHO MEAS - MV E/A: 1.02
BH CV ECHO MEAS - MV MEAN PG: 2.3 MMHG
BH CV ECHO MEAS - MV V2 VTI: 31.5 CM
BH CV ECHO MEAS - MVA(VTI): 2.8 CM2
BH CV ECHO MEAS - PA ACC TIME: 0.14 SEC
BH CV ECHO MEAS - PA PR(ACCEL): 14.8 MMHG
BH CV ECHO MEAS - PA V2 MAX: 97.7 CM/SEC
BH CV ECHO MEAS - RAP SYSTOLE: 8 MMHG
BH CV ECHO MEAS - RV V1 VTI: 13.5 CM
BH CV ECHO MEAS - RVSP: 37 MMHG
BH CV ECHO MEAS - SI(MOD-SP2): 15.6 ML/M2
BH CV ECHO MEAS - SI(MOD-SP4): 25.1 ML/M2
BH CV ECHO MEAS - SV(LVOT): 88.7 ML
BH CV ECHO MEAS - SV(MOD-SP2): 28.9 ML
BH CV ECHO MEAS - SV(MOD-SP4): 46.6 ML
BH CV ECHO MEAS - TAPSE (>1.6): 3.2 CM
BH CV ECHO MEAS - TR MAX PG: 29.5 MMHG
BH CV ECHO MEAS - TR MAX VEL: 270.7 CM/SEC
BH CV ECHO MEASUREMENTS AVERAGE E/E' RATIO: 8.99
BH CV XLRA - RV BASE: 3.9 CM
BH CV XLRA - RV LENGTH: 5.8 CM
BH CV XLRA - TDI S': 18.8 CM/SEC
BILIRUB SERPL-MCNC: 0.2 MG/DL (ref 0–1.2)
BUN SERPL-MCNC: 20 MG/DL (ref 8–23)
BUN/CREAT SERPL: 50 (ref 7–25)
CALCIUM SPEC-SCNC: 8.7 MG/DL (ref 8.6–10.5)
CHLORIDE SERPL-SCNC: 106 MMOL/L (ref 98–107)
CO2 SERPL-SCNC: 28.9 MMOL/L (ref 22–29)
CREAT SERPL-MCNC: 0.4 MG/DL (ref 0.76–1.27)
CRP SERPL-MCNC: 9.14 MG/DL (ref 0–0.5)
D DIMER PPP FEU-MCNC: 2.38 MCGFEU/ML (ref 0–0.46)
DEPRECATED RDW RBC AUTO: 47.2 FL (ref 37–54)
EOSINOPHIL # BLD AUTO: 0 10*3/MM3 (ref 0–0.4)
EOSINOPHIL NFR BLD AUTO: 0 % (ref 0.3–6.2)
ERYTHROCYTE [DISTWIDTH] IN BLOOD BY AUTOMATED COUNT: 13.9 % (ref 12.3–15.4)
GFR SERPL CREATININE-BSD FRML MDRD: >150 ML/MIN/1.73
GLOBULIN UR ELPH-MCNC: 3.1 GM/DL
GLUCOSE BLDC GLUCOMTR-MCNC: 147 MG/DL (ref 70–130)
GLUCOSE BLDC GLUCOMTR-MCNC: 161 MG/DL (ref 70–130)
GLUCOSE BLDC GLUCOMTR-MCNC: 171 MG/DL (ref 70–130)
GLUCOSE BLDC GLUCOMTR-MCNC: 64 MG/DL (ref 70–130)
GLUCOSE SERPL-MCNC: 190 MG/DL (ref 65–99)
HCT VFR BLD AUTO: 32.9 % (ref 37.5–51)
HGB BLD-MCNC: 10.3 G/DL (ref 13–17.7)
IMM GRANULOCYTES # BLD AUTO: 0.09 10*3/MM3 (ref 0–0.05)
IMM GRANULOCYTES NFR BLD AUTO: 0.9 % (ref 0–0.5)
LEFT ATRIUM VOLUME INDEX: 24.9 ML/M2
LV EF 2D ECHO EST: 60 %
LYMPHOCYTES # BLD AUTO: 0.6 10*3/MM3 (ref 0.7–3.1)
LYMPHOCYTES NFR BLD AUTO: 6.1 % (ref 19.6–45.3)
MAXIMAL PREDICTED HEART RATE: 149 BPM
MCH RBC QN AUTO: 29.6 PG (ref 26.6–33)
MCHC RBC AUTO-ENTMCNC: 31.3 G/DL (ref 31.5–35.7)
MCV RBC AUTO: 94.5 FL (ref 79–97)
MONOCYTES # BLD AUTO: 0.58 10*3/MM3 (ref 0.1–0.9)
MONOCYTES NFR BLD AUTO: 5.9 % (ref 5–12)
MRSA SPEC QL CULT: NORMAL
NEUTROPHILS NFR BLD AUTO: 8.56 10*3/MM3 (ref 1.7–7)
NEUTROPHILS NFR BLD AUTO: 87 % (ref 42.7–76)
NRBC BLD AUTO-RTO: 0 /100 WBC (ref 0–0.2)
PLATELET # BLD AUTO: 479 10*3/MM3 (ref 140–450)
PMV BLD AUTO: 9.6 FL (ref 6–12)
POTASSIUM SERPL-SCNC: 4 MMOL/L (ref 3.5–5.2)
PROT SERPL-MCNC: 5.3 G/DL (ref 6–8.5)
RBC # BLD AUTO: 3.48 10*6/MM3 (ref 4.14–5.8)
RETICS # AUTO: 0.07 10*6/MM3 (ref 0.02–0.13)
RETICS/RBC NFR AUTO: 1.95 % (ref 0.7–1.9)
SODIUM SERPL-SCNC: 140 MMOL/L (ref 136–145)
STRESS TARGET HR: 127 BPM
WBC NRBC COR # BLD: 9.84 10*3/MM3 (ref 3.4–10.8)

## 2022-02-24 PROCEDURE — 94799 UNLISTED PULMONARY SVC/PX: CPT

## 2022-02-24 PROCEDURE — 82962 GLUCOSE BLOOD TEST: CPT

## 2022-02-24 PROCEDURE — 25010000002 ENOXAPARIN PER 10 MG: Performed by: NURSE PRACTITIONER

## 2022-02-24 PROCEDURE — 85379 FIBRIN DEGRADATION QUANT: CPT | Performed by: NURSE PRACTITIONER

## 2022-02-24 PROCEDURE — 94640 AIRWAY INHALATION TREATMENT: CPT

## 2022-02-24 PROCEDURE — 25010000002 VANCOMYCIN HCL 1.25 G RECONSTITUTED SOLUTION 1 EACH VIAL: Performed by: INTERNAL MEDICINE

## 2022-02-24 PROCEDURE — 25010000002 METHYLPREDNISOLONE PER 40 MG: Performed by: INTERNAL MEDICINE

## 2022-02-24 PROCEDURE — 63710000001 INSULIN ASPART PER 5 UNITS: Performed by: INTERNAL MEDICINE

## 2022-02-24 PROCEDURE — 82803 BLOOD GASES ANY COMBINATION: CPT

## 2022-02-24 PROCEDURE — 25010000002 LORAZEPAM PER 2 MG: Performed by: PSYCHIATRY & NEUROLOGY

## 2022-02-24 PROCEDURE — 85045 AUTOMATED RETICULOCYTE COUNT: CPT | Performed by: NURSE PRACTITIONER

## 2022-02-24 PROCEDURE — 25010000002 CEFEPIME-DEXTROSE 2-5 GM-%(50ML) RECONSTITUTED SOLUTION: Performed by: INTERNAL MEDICINE

## 2022-02-24 PROCEDURE — 86140 C-REACTIVE PROTEIN: CPT | Performed by: NURSE PRACTITIONER

## 2022-02-24 PROCEDURE — 71045 X-RAY EXAM CHEST 1 VIEW: CPT

## 2022-02-24 PROCEDURE — 36600 WITHDRAWAL OF ARTERIAL BLOOD: CPT

## 2022-02-24 PROCEDURE — 94761 N-INVAS EAR/PLS OXIMETRY MLT: CPT

## 2022-02-24 PROCEDURE — 94003 VENT MGMT INPAT SUBQ DAY: CPT

## 2022-02-24 PROCEDURE — 80053 COMPREHEN METABOLIC PANEL: CPT | Performed by: NURSE PRACTITIONER

## 2022-02-24 PROCEDURE — 85025 COMPLETE CBC W/AUTO DIFF WBC: CPT | Performed by: NURSE PRACTITIONER

## 2022-02-24 PROCEDURE — 93306 TTE W/DOPPLER COMPLETE: CPT

## 2022-02-24 PROCEDURE — 93306 TTE W/DOPPLER COMPLETE: CPT | Performed by: INTERNAL MEDICINE

## 2022-02-24 PROCEDURE — 99233 SBSQ HOSP IP/OBS HIGH 50: CPT | Performed by: NURSE PRACTITIONER

## 2022-02-24 RX ORDER — ATORVASTATIN CALCIUM 40 MG/1
40 TABLET, FILM COATED ORAL NIGHTLY
Status: DISCONTINUED | OUTPATIENT
Start: 2022-02-24 | End: 2022-02-28

## 2022-02-24 RX ADMIN — INSULIN ASPART 4 UNITS: 100 INJECTION, SOLUTION INTRAVENOUS; SUBCUTANEOUS at 08:45

## 2022-02-24 RX ADMIN — CEFEPIME HYDROCHLORIDE 2 G: 2 INJECTION, SOLUTION INTRAVENOUS at 08:56

## 2022-02-24 RX ADMIN — Medication 10 ML: at 21:27

## 2022-02-24 RX ADMIN — Medication 300 MG: at 08:57

## 2022-02-24 RX ADMIN — DOCUSATE SODIUM 100 MG: 50 LIQUID ORAL at 21:26

## 2022-02-24 RX ADMIN — Medication 10 ML: at 07:46

## 2022-02-24 RX ADMIN — ENOXAPARIN SODIUM 80 MG: 80 INJECTION SUBCUTANEOUS at 11:33

## 2022-02-24 RX ADMIN — IPRATROPIUM BROMIDE AND ALBUTEROL SULFATE 3 ML: .5; 2.5 SOLUTION RESPIRATORY (INHALATION) at 06:59

## 2022-02-24 RX ADMIN — Medication 10 ML: at 08:58

## 2022-02-24 RX ADMIN — Medication 10 ML: at 18:36

## 2022-02-24 RX ADMIN — VANCOMYCIN HYDROCHLORIDE 1250 MG: 1.25 INJECTION, POWDER, LYOPHILIZED, FOR SOLUTION INTRAVENOUS at 16:46

## 2022-02-24 RX ADMIN — IPRATROPIUM BROMIDE AND ALBUTEROL SULFATE 3 ML: .5; 2.5 SOLUTION RESPIRATORY (INHALATION) at 15:10

## 2022-02-24 RX ADMIN — IPRATROPIUM BROMIDE AND ALBUTEROL SULFATE 3 ML: .5; 2.5 SOLUTION RESPIRATORY (INHALATION) at 19:57

## 2022-02-24 RX ADMIN — ACETAMINOPHEN 650 MG: 325 TABLET ORAL at 21:26

## 2022-02-24 RX ADMIN — CEFEPIME HYDROCHLORIDE 2 G: 2 INJECTION, SOLUTION INTRAVENOUS at 01:40

## 2022-02-24 RX ADMIN — OLANZAPINE 5 MG: 5 TABLET, FILM COATED ORAL at 21:26

## 2022-02-24 RX ADMIN — CEFEPIME HYDROCHLORIDE 2 G: 2 INJECTION, SOLUTION INTRAVENOUS at 16:37

## 2022-02-24 RX ADMIN — IPRATROPIUM BROMIDE AND ALBUTEROL SULFATE 3 ML: .5; 2.5 SOLUTION RESPIRATORY (INHALATION) at 03:05

## 2022-02-24 RX ADMIN — METHYLPREDNISOLONE SODIUM SUCCINATE 40 MG: 40 INJECTION, POWDER, FOR SOLUTION INTRAMUSCULAR; INTRAVENOUS at 18:36

## 2022-02-24 RX ADMIN — LORAZEPAM 1 MG: 2 INJECTION INTRAMUSCULAR; INTRAVENOUS at 03:23

## 2022-02-24 RX ADMIN — VANCOMYCIN HYDROCHLORIDE 1250 MG: 1.25 INJECTION, POWDER, LYOPHILIZED, FOR SOLUTION INTRAVENOUS at 05:26

## 2022-02-24 RX ADMIN — ENOXAPARIN SODIUM 80 MG: 80 INJECTION SUBCUTANEOUS at 08:46

## 2022-02-24 RX ADMIN — ENOXAPARIN SODIUM 80 MG: 80 INJECTION SUBCUTANEOUS at 21:26

## 2022-02-24 RX ADMIN — ATORVASTATIN CALCIUM 40 MG: 40 TABLET, FILM COATED ORAL at 21:26

## 2022-02-24 RX ADMIN — METHYLPREDNISOLONE SODIUM SUCCINATE 40 MG: 40 INJECTION, POWDER, FOR SOLUTION INTRAMUSCULAR; INTRAVENOUS at 07:46

## 2022-02-25 ENCOUNTER — APPOINTMENT (OUTPATIENT)
Dept: GENERAL RADIOLOGY | Facility: HOSPITAL | Age: 72
End: 2022-02-25

## 2022-02-25 LAB
ALBUMIN SERPL-MCNC: 2.3 G/DL (ref 3.5–5.2)
ALBUMIN/GLOB SERPL: 0.8 G/DL
ALP SERPL-CCNC: 41 U/L (ref 39–117)
ALT SERPL W P-5'-P-CCNC: 26 U/L (ref 1–41)
ANION GAP SERPL CALCULATED.3IONS-SCNC: 7.7 MMOL/L (ref 5–15)
ARTERIAL PATENCY WRIST A: POSITIVE
AST SERPL-CCNC: 26 U/L (ref 1–40)
ATMOSPHERIC PRESS: 745 MMHG
BASE EXCESS BLDA CALC-SCNC: 5.8 MMOL/L (ref 0–2)
BASOPHILS # BLD AUTO: 0.01 10*3/MM3 (ref 0–0.2)
BASOPHILS NFR BLD AUTO: 0.1 % (ref 0–1.5)
BDY SITE: ABNORMAL
BILIRUB SERPL-MCNC: 0.3 MG/DL (ref 0–1.2)
BODY TEMPERATURE: 37 C
BUN SERPL-MCNC: 13 MG/DL (ref 8–23)
BUN/CREAT SERPL: 28.3 (ref 7–25)
CALCIUM SPEC-SCNC: 8.6 MG/DL (ref 8.6–10.5)
CHLORIDE SERPL-SCNC: 109 MMOL/L (ref 98–107)
CO2 SERPL-SCNC: 26.3 MMOL/L (ref 22–29)
CREAT SERPL-MCNC: 0.46 MG/DL (ref 0.76–1.27)
CRP SERPL-MCNC: 4.23 MG/DL (ref 0–0.5)
DEPRECATED RDW RBC AUTO: 47.3 FL (ref 37–54)
EOSINOPHIL # BLD AUTO: 0 10*3/MM3 (ref 0–0.4)
EOSINOPHIL NFR BLD AUTO: 0 % (ref 0.3–6.2)
ERYTHROCYTE [DISTWIDTH] IN BLOOD BY AUTOMATED COUNT: 13.6 % (ref 12.3–15.4)
ERYTHROCYTE [SEDIMENTATION RATE] IN BLOOD: 50 MM/HR (ref 0–20)
GFR SERPL CREATININE-BSD FRML MDRD: >150 ML/MIN/1.73
GLOBULIN UR ELPH-MCNC: 2.9 GM/DL
GLUCOSE BLDC GLUCOMTR-MCNC: 124 MG/DL (ref 70–130)
GLUCOSE BLDC GLUCOMTR-MCNC: 124 MG/DL (ref 70–130)
GLUCOSE SERPL-MCNC: 116 MG/DL (ref 65–99)
HCO3 BLDA-SCNC: 30.4 MMOL/L (ref 20–26)
HCT VFR BLD AUTO: 30 % (ref 37.5–51)
HGB BLD-MCNC: 9.6 G/DL (ref 13–17.7)
HGB BLDA-MCNC: 9.7 G/DL (ref 14–18)
IMM GRANULOCYTES # BLD AUTO: 0.45 10*3/MM3 (ref 0–0.05)
IMM GRANULOCYTES NFR BLD AUTO: 3.7 % (ref 0–0.5)
INHALED O2 CONCENTRATION: 40 %
LYMPHOCYTES # BLD AUTO: 1.1 10*3/MM3 (ref 0.7–3.1)
LYMPHOCYTES NFR BLD AUTO: 8.9 % (ref 19.6–45.3)
Lab: ABNORMAL
MCH RBC QN AUTO: 30.4 PG (ref 26.6–33)
MCHC RBC AUTO-ENTMCNC: 32 G/DL (ref 31.5–35.7)
MCV RBC AUTO: 94.9 FL (ref 79–97)
MODALITY: ABNORMAL
MONOCYTES # BLD AUTO: 1.14 10*3/MM3 (ref 0.1–0.9)
MONOCYTES NFR BLD AUTO: 9.3 % (ref 5–12)
NEUTROPHILS NFR BLD AUTO: 78 % (ref 42.7–76)
NEUTROPHILS NFR BLD AUTO: 9.62 10*3/MM3 (ref 1.7–7)
NRBC BLD AUTO-RTO: 0 /100 WBC (ref 0–0.2)
PCO2 BLDA: 43.9 MM HG (ref 35–45)
PCO2 TEMP ADJ BLD: 43.9 MM HG (ref 35–45)
PEEP RESPIRATORY: 5 CM[H2O]
PH BLDA: 7.45 PH UNITS (ref 7.35–7.45)
PH, TEMP CORRECTED: 7.45 PH UNITS (ref 7.35–7.45)
PLATELET # BLD AUTO: 492 10*3/MM3 (ref 140–450)
PMV BLD AUTO: 9.5 FL (ref 6–12)
PO2 BLDA: 75.4 MM HG (ref 83–108)
PO2 TEMP ADJ BLD: 75.4 MM HG (ref 83–108)
POTASSIUM SERPL-SCNC: 3.7 MMOL/L (ref 3.5–5.2)
PROT SERPL-MCNC: 5.2 G/DL (ref 6–8.5)
PSV: 7 CMH2O
RBC # BLD AUTO: 3.16 10*6/MM3 (ref 4.14–5.8)
SAO2 % BLDCOA: 96 % (ref 94–99)
SODIUM SERPL-SCNC: 143 MMOL/L (ref 136–145)
VANCOMYCIN TROUGH SERPL-MCNC: 14.1 MCG/ML (ref 5–20)
VENTILATOR MODE: ABNORMAL
WBC NRBC COR # BLD: 12.32 10*3/MM3 (ref 3.4–10.8)

## 2022-02-25 PROCEDURE — 74018 RADEX ABDOMEN 1 VIEW: CPT

## 2022-02-25 PROCEDURE — 94799 UNLISTED PULMONARY SVC/PX: CPT

## 2022-02-25 PROCEDURE — 99231 SBSQ HOSP IP/OBS SF/LOW 25: CPT | Performed by: PSYCHIATRY & NEUROLOGY

## 2022-02-25 PROCEDURE — 87070 CULTURE OTHR SPECIMN AEROBIC: CPT | Performed by: NURSE PRACTITIONER

## 2022-02-25 PROCEDURE — 25010000002 CEFEPIME-DEXTROSE 2-5 GM-%(50ML) RECONSTITUTED SOLUTION: Performed by: INTERNAL MEDICINE

## 2022-02-25 PROCEDURE — 80202 ASSAY OF VANCOMYCIN: CPT | Performed by: INTERNAL MEDICINE

## 2022-02-25 PROCEDURE — 87205 SMEAR GRAM STAIN: CPT | Performed by: NURSE PRACTITIONER

## 2022-02-25 PROCEDURE — 99233 SBSQ HOSP IP/OBS HIGH 50: CPT | Performed by: NURSE PRACTITIONER

## 2022-02-25 PROCEDURE — 85025 COMPLETE CBC W/AUTO DIFF WBC: CPT | Performed by: NURSE PRACTITIONER

## 2022-02-25 PROCEDURE — 80053 COMPREHEN METABOLIC PANEL: CPT | Performed by: NURSE PRACTITIONER

## 2022-02-25 PROCEDURE — 82962 GLUCOSE BLOOD TEST: CPT

## 2022-02-25 PROCEDURE — 92610 EVALUATE SWALLOWING FUNCTION: CPT

## 2022-02-25 PROCEDURE — 25010000002 ENOXAPARIN PER 10 MG: Performed by: NURSE PRACTITIONER

## 2022-02-25 PROCEDURE — 71045 X-RAY EXAM CHEST 1 VIEW: CPT

## 2022-02-25 PROCEDURE — 25010000002 LORAZEPAM PER 2 MG: Performed by: PSYCHIATRY & NEUROLOGY

## 2022-02-25 PROCEDURE — 94761 N-INVAS EAR/PLS OXIMETRY MLT: CPT

## 2022-02-25 PROCEDURE — 25010000002 METHYLPREDNISOLONE PER 40 MG: Performed by: INTERNAL MEDICINE

## 2022-02-25 PROCEDURE — 86140 C-REACTIVE PROTEIN: CPT | Performed by: NURSE PRACTITIONER

## 2022-02-25 PROCEDURE — 85652 RBC SED RATE AUTOMATED: CPT | Performed by: NURSE PRACTITIONER

## 2022-02-25 RX ORDER — METHYLPREDNISOLONE SODIUM SUCCINATE 40 MG/ML
20 INJECTION, POWDER, LYOPHILIZED, FOR SOLUTION INTRAMUSCULAR; INTRAVENOUS EVERY 12 HOURS
Status: DISCONTINUED | OUTPATIENT
Start: 2022-02-25 | End: 2022-03-01

## 2022-02-25 RX ADMIN — IPRATROPIUM BROMIDE AND ALBUTEROL SULFATE 3 ML: .5; 2.5 SOLUTION RESPIRATORY (INHALATION) at 12:23

## 2022-02-25 RX ADMIN — ACETAMINOPHEN 650 MG: 325 TABLET ORAL at 22:27

## 2022-02-25 RX ADMIN — CEFEPIME HYDROCHLORIDE 2 G: 2 INJECTION, SOLUTION INTRAVENOUS at 01:22

## 2022-02-25 RX ADMIN — Medication 10 ML: at 08:11

## 2022-02-25 RX ADMIN — IPRATROPIUM BROMIDE AND ALBUTEROL SULFATE 3 ML: .5; 2.5 SOLUTION RESPIRATORY (INHALATION) at 18:48

## 2022-02-25 RX ADMIN — DOCUSATE SODIUM 100 MG: 50 LIQUID ORAL at 22:27

## 2022-02-25 RX ADMIN — LORAZEPAM 1 MG: 2 INJECTION INTRAMUSCULAR; INTRAVENOUS at 07:56

## 2022-02-25 RX ADMIN — ENOXAPARIN SODIUM 80 MG: 80 INJECTION SUBCUTANEOUS at 22:27

## 2022-02-25 RX ADMIN — Medication 10 ML: at 22:29

## 2022-02-25 RX ADMIN — OLANZAPINE 5 MG: 5 TABLET, FILM COATED ORAL at 22:27

## 2022-02-25 RX ADMIN — METHYLPREDNISOLONE SODIUM SUCCINATE 20 MG: 40 INJECTION, POWDER, FOR SOLUTION INTRAMUSCULAR; INTRAVENOUS at 17:53

## 2022-02-25 RX ADMIN — ATORVASTATIN CALCIUM 40 MG: 40 TABLET, FILM COATED ORAL at 22:26

## 2022-02-25 RX ADMIN — ENOXAPARIN SODIUM 80 MG: 80 INJECTION SUBCUTANEOUS at 12:12

## 2022-02-25 RX ADMIN — IPRATROPIUM BROMIDE AND ALBUTEROL SULFATE 3 ML: .5; 2.5 SOLUTION RESPIRATORY (INHALATION) at 07:42

## 2022-02-25 RX ADMIN — LORAZEPAM 1 MG: 2 INJECTION INTRAMUSCULAR; INTRAVENOUS at 16:49

## 2022-02-25 RX ADMIN — CEFEPIME HYDROCHLORIDE 2 G: 2 INJECTION, SOLUTION INTRAVENOUS at 17:53

## 2022-02-25 RX ADMIN — CEFEPIME HYDROCHLORIDE 2 G: 2 INJECTION, SOLUTION INTRAVENOUS at 08:08

## 2022-02-25 RX ADMIN — IPRATROPIUM BROMIDE AND ALBUTEROL SULFATE 3 ML: .5; 2.5 SOLUTION RESPIRATORY (INHALATION) at 01:58

## 2022-02-25 RX ADMIN — METHYLPREDNISOLONE SODIUM SUCCINATE 20 MG: 40 INJECTION, POWDER, FOR SOLUTION INTRAMUSCULAR; INTRAVENOUS at 07:59

## 2022-02-26 LAB
ALBUMIN SERPL-MCNC: 2.7 G/DL (ref 3.5–5.2)
ALBUMIN/GLOB SERPL: 1 G/DL
ALP SERPL-CCNC: 53 U/L (ref 39–117)
ALT SERPL W P-5'-P-CCNC: 28 U/L (ref 1–41)
ANION GAP SERPL CALCULATED.3IONS-SCNC: 4.4 MMOL/L (ref 5–15)
AST SERPL-CCNC: 29 U/L (ref 1–40)
BACTERIA SPEC AEROBE CULT: NORMAL
BACTERIA SPEC AEROBE CULT: NORMAL
BASOPHILS # BLD AUTO: 0.02 10*3/MM3 (ref 0–0.2)
BASOPHILS NFR BLD AUTO: 0.2 % (ref 0–1.5)
BILIRUB SERPL-MCNC: 0.3 MG/DL (ref 0–1.2)
BUN SERPL-MCNC: 13 MG/DL (ref 8–23)
BUN/CREAT SERPL: 31.7 (ref 7–25)
CALCIUM SPEC-SCNC: 8.9 MG/DL (ref 8.6–10.5)
CHLORIDE SERPL-SCNC: 104 MMOL/L (ref 98–107)
CO2 SERPL-SCNC: 32.6 MMOL/L (ref 22–29)
CREAT SERPL-MCNC: 0.41 MG/DL (ref 0.76–1.27)
CRP SERPL-MCNC: 6.78 MG/DL (ref 0–0.5)
DEPRECATED RDW RBC AUTO: 46.7 FL (ref 37–54)
EOSINOPHIL # BLD AUTO: 0.06 10*3/MM3 (ref 0–0.4)
EOSINOPHIL NFR BLD AUTO: 0.5 % (ref 0.3–6.2)
ERYTHROCYTE [DISTWIDTH] IN BLOOD BY AUTOMATED COUNT: 13.8 % (ref 12.3–15.4)
GFR SERPL CREATININE-BSD FRML MDRD: >150 ML/MIN/1.73
GLOBULIN UR ELPH-MCNC: 2.7 GM/DL
GLUCOSE BLDC GLUCOMTR-MCNC: 118 MG/DL (ref 70–130)
GLUCOSE BLDC GLUCOMTR-MCNC: 141 MG/DL (ref 70–130)
GLUCOSE BLDC GLUCOMTR-MCNC: 152 MG/DL (ref 70–130)
GLUCOSE BLDC GLUCOMTR-MCNC: 153 MG/DL (ref 70–130)
GLUCOSE BLDC GLUCOMTR-MCNC: 210 MG/DL (ref 70–130)
GLUCOSE SERPL-MCNC: 148 MG/DL (ref 65–99)
HCT VFR BLD AUTO: 29.5 % (ref 37.5–51)
HGB BLD-MCNC: 9.3 G/DL (ref 13–17.7)
IMM GRANULOCYTES # BLD AUTO: 0.53 10*3/MM3 (ref 0–0.05)
IMM GRANULOCYTES NFR BLD AUTO: 4.8 % (ref 0–0.5)
LYMPHOCYTES # BLD AUTO: 1.27 10*3/MM3 (ref 0.7–3.1)
LYMPHOCYTES NFR BLD AUTO: 11.5 % (ref 19.6–45.3)
MCH RBC QN AUTO: 29.8 PG (ref 26.6–33)
MCHC RBC AUTO-ENTMCNC: 31.5 G/DL (ref 31.5–35.7)
MCV RBC AUTO: 94.6 FL (ref 79–97)
MONOCYTES # BLD AUTO: 0.77 10*3/MM3 (ref 0.1–0.9)
MONOCYTES NFR BLD AUTO: 6.9 % (ref 5–12)
NEUTROPHILS NFR BLD AUTO: 76.1 % (ref 42.7–76)
NEUTROPHILS NFR BLD AUTO: 8.43 10*3/MM3 (ref 1.7–7)
NRBC BLD AUTO-RTO: 0 /100 WBC (ref 0–0.2)
PLATELET # BLD AUTO: 395 10*3/MM3 (ref 140–450)
PMV BLD AUTO: 9.7 FL (ref 6–12)
POTASSIUM SERPL-SCNC: 4 MMOL/L (ref 3.5–5.2)
PROT SERPL-MCNC: 5.4 G/DL (ref 6–8.5)
RBC # BLD AUTO: 3.12 10*6/MM3 (ref 4.14–5.8)
SODIUM SERPL-SCNC: 141 MMOL/L (ref 136–145)
WBC NRBC COR # BLD: 11.08 10*3/MM3 (ref 3.4–10.8)

## 2022-02-26 PROCEDURE — 82962 GLUCOSE BLOOD TEST: CPT

## 2022-02-26 PROCEDURE — 80053 COMPREHEN METABOLIC PANEL: CPT | Performed by: NURSE PRACTITIONER

## 2022-02-26 PROCEDURE — 63710000001 INSULIN ASPART PER 5 UNITS: Performed by: INTERNAL MEDICINE

## 2022-02-26 PROCEDURE — 25010000002 CEFEPIME-DEXTROSE 2-5 GM-%(50ML) RECONSTITUTED SOLUTION: Performed by: INTERNAL MEDICINE

## 2022-02-26 PROCEDURE — 94799 UNLISTED PULMONARY SVC/PX: CPT

## 2022-02-26 PROCEDURE — 25010000002 LORAZEPAM PER 2 MG: Performed by: PSYCHIATRY & NEUROLOGY

## 2022-02-26 PROCEDURE — 25010000002 ENOXAPARIN PER 10 MG: Performed by: NURSE PRACTITIONER

## 2022-02-26 PROCEDURE — 85025 COMPLETE CBC W/AUTO DIFF WBC: CPT | Performed by: NURSE PRACTITIONER

## 2022-02-26 PROCEDURE — 25010000002 METHYLPREDNISOLONE PER 40 MG: Performed by: INTERNAL MEDICINE

## 2022-02-26 PROCEDURE — 86140 C-REACTIVE PROTEIN: CPT | Performed by: NURSE PRACTITIONER

## 2022-02-26 PROCEDURE — 94761 N-INVAS EAR/PLS OXIMETRY MLT: CPT

## 2022-02-26 PROCEDURE — 99233 SBSQ HOSP IP/OBS HIGH 50: CPT | Performed by: INTERNAL MEDICINE

## 2022-02-26 PROCEDURE — 92610 EVALUATE SWALLOWING FUNCTION: CPT

## 2022-02-26 RX ORDER — CEFEPIME HYDROCHLORIDE 2 G/50ML
2 INJECTION, SOLUTION INTRAVENOUS EVERY 12 HOURS SCHEDULED
Status: DISCONTINUED | OUTPATIENT
Start: 2022-02-26 | End: 2022-02-28

## 2022-02-26 RX ORDER — CEFEPIME HYDROCHLORIDE 2 G/50ML
2 INJECTION, SOLUTION INTRAVENOUS EVERY 12 HOURS SCHEDULED
Status: DISCONTINUED | OUTPATIENT
Start: 2022-02-26 | End: 2022-02-26

## 2022-02-26 RX ADMIN — INSULIN ASPART 8 UNITS: 100 INJECTION, SOLUTION INTRAVENOUS; SUBCUTANEOUS at 13:09

## 2022-02-26 RX ADMIN — Medication 10 ML: at 21:00

## 2022-02-26 RX ADMIN — ATORVASTATIN CALCIUM 40 MG: 40 TABLET, FILM COATED ORAL at 20:47

## 2022-02-26 RX ADMIN — Medication 10 ML: at 20:59

## 2022-02-26 RX ADMIN — ACETAMINOPHEN 650 MG: 325 TABLET ORAL at 23:30

## 2022-02-26 RX ADMIN — ENOXAPARIN SODIUM 80 MG: 80 INJECTION SUBCUTANEOUS at 22:17

## 2022-02-26 RX ADMIN — DOCUSATE SODIUM 100 MG: 50 LIQUID ORAL at 08:29

## 2022-02-26 RX ADMIN — Medication 300 MG: at 08:29

## 2022-02-26 RX ADMIN — METHYLPREDNISOLONE SODIUM SUCCINATE 20 MG: 40 INJECTION, POWDER, FOR SOLUTION INTRAMUSCULAR; INTRAVENOUS at 08:29

## 2022-02-26 RX ADMIN — ENOXAPARIN SODIUM 80 MG: 80 INJECTION SUBCUTANEOUS at 09:58

## 2022-02-26 RX ADMIN — CEFEPIME HYDROCHLORIDE 2 G: 2 INJECTION, SOLUTION INTRAVENOUS at 20:47

## 2022-02-26 RX ADMIN — CEFEPIME HYDROCHLORIDE 2 G: 2 INJECTION, SOLUTION INTRAVENOUS at 01:42

## 2022-02-26 RX ADMIN — IPRATROPIUM BROMIDE AND ALBUTEROL SULFATE 3 ML: .5; 2.5 SOLUTION RESPIRATORY (INHALATION) at 13:33

## 2022-02-26 RX ADMIN — IPRATROPIUM BROMIDE AND ALBUTEROL SULFATE 3 ML: .5; 2.5 SOLUTION RESPIRATORY (INHALATION) at 08:10

## 2022-02-26 RX ADMIN — Medication 10 ML: at 08:30

## 2022-02-26 RX ADMIN — CEFEPIME HYDROCHLORIDE 2 G: 2 INJECTION, SOLUTION INTRAVENOUS at 08:28

## 2022-02-26 RX ADMIN — DOCUSATE SODIUM 100 MG: 50 LIQUID ORAL at 20:46

## 2022-02-26 RX ADMIN — IPRATROPIUM BROMIDE AND ALBUTEROL SULFATE 3 ML: .5; 2.5 SOLUTION RESPIRATORY (INHALATION) at 01:03

## 2022-02-26 RX ADMIN — OLANZAPINE 5 MG: 5 TABLET, FILM COATED ORAL at 20:47

## 2022-02-26 RX ADMIN — IPRATROPIUM BROMIDE AND ALBUTEROL SULFATE 3 ML: .5; 2.5 SOLUTION RESPIRATORY (INHALATION) at 19:25

## 2022-02-26 RX ADMIN — ACETAMINOPHEN 650 MG: 325 TABLET ORAL at 19:12

## 2022-02-26 RX ADMIN — POLYETHYLENE GLYCOL 3350 17 G: 17 POWDER, FOR SOLUTION ORAL at 08:29

## 2022-02-26 RX ADMIN — METHYLPREDNISOLONE SODIUM SUCCINATE 20 MG: 40 INJECTION, POWDER, FOR SOLUTION INTRAMUSCULAR; INTRAVENOUS at 19:13

## 2022-02-26 RX ADMIN — LORAZEPAM 1 MG: 2 INJECTION INTRAMUSCULAR; INTRAVENOUS at 22:58

## 2022-02-27 LAB
ALBUMIN SERPL-MCNC: 2.3 G/DL (ref 3.5–5.2)
ALBUMIN/GLOB SERPL: 0.9 G/DL
ALP SERPL-CCNC: 43 U/L (ref 39–117)
ALT SERPL W P-5'-P-CCNC: 35 U/L (ref 1–41)
ANION GAP SERPL CALCULATED.3IONS-SCNC: 4.9 MMOL/L (ref 5–15)
ARTERIAL PATENCY WRIST A: POSITIVE
AST SERPL-CCNC: 26 U/L (ref 1–40)
ATMOSPHERIC PRESS: 744 MMHG
BACTERIA SPEC RESP CULT: NORMAL
BASE EXCESS BLDA CALC-SCNC: 8.8 MMOL/L (ref 0–2)
BASOPHILS # BLD AUTO: 0.01 10*3/MM3 (ref 0–0.2)
BASOPHILS NFR BLD AUTO: 0.1 % (ref 0–1.5)
BDY SITE: ABNORMAL
BILIRUB SERPL-MCNC: 0.3 MG/DL (ref 0–1.2)
BODY TEMPERATURE: 37 C
BUN SERPL-MCNC: 12 MG/DL (ref 8–23)
BUN/CREAT SERPL: 33.3 (ref 7–25)
CALCIUM SPEC-SCNC: 8.7 MG/DL (ref 8.6–10.5)
CHLORIDE SERPL-SCNC: 104 MMOL/L (ref 98–107)
CO2 SERPL-SCNC: 31.1 MMOL/L (ref 22–29)
CREAT SERPL-MCNC: 0.36 MG/DL (ref 0.76–1.27)
CRP SERPL-MCNC: 5.33 MG/DL (ref 0–0.5)
DEPRECATED RDW RBC AUTO: 46.8 FL (ref 37–54)
EOSINOPHIL # BLD AUTO: 0.05 10*3/MM3 (ref 0–0.4)
EOSINOPHIL NFR BLD AUTO: 0.4 % (ref 0.3–6.2)
ERYTHROCYTE [DISTWIDTH] IN BLOOD BY AUTOMATED COUNT: 13.6 % (ref 12.3–15.4)
GAS FLOW AIRWAY: 60 LPM
GFR SERPL CREATININE-BSD FRML MDRD: >150 ML/MIN/1.73
GLOBULIN UR ELPH-MCNC: 2.5 GM/DL
GLUCOSE BLDC GLUCOMTR-MCNC: 144 MG/DL (ref 70–130)
GLUCOSE BLDC GLUCOMTR-MCNC: 147 MG/DL (ref 70–130)
GLUCOSE SERPL-MCNC: 154 MG/DL (ref 65–99)
GRAM STN SPEC: NORMAL
HCO3 BLDA-SCNC: 34.6 MMOL/L (ref 20–26)
HCT VFR BLD AUTO: 31.6 % (ref 37.5–51)
HGB BLD-MCNC: 10.1 G/DL (ref 13–17.7)
HGB BLDA-MCNC: 12.1 G/DL (ref 14–18)
IMM GRANULOCYTES # BLD AUTO: 0.54 10*3/MM3 (ref 0–0.05)
IMM GRANULOCYTES NFR BLD AUTO: 4 % (ref 0–0.5)
INHALED O2 CONCENTRATION: 100 %
LYMPHOCYTES # BLD AUTO: 0.99 10*3/MM3 (ref 0.7–3.1)
LYMPHOCYTES NFR BLD AUTO: 7.3 % (ref 19.6–45.3)
Lab: ABNORMAL
MCH RBC QN AUTO: 30.1 PG (ref 26.6–33)
MCHC RBC AUTO-ENTMCNC: 32 G/DL (ref 31.5–35.7)
MCV RBC AUTO: 94 FL (ref 79–97)
MODALITY: ABNORMAL
MONOCYTES # BLD AUTO: 0.76 10*3/MM3 (ref 0.1–0.9)
MONOCYTES NFR BLD AUTO: 5.6 % (ref 5–12)
NEUTROPHILS NFR BLD AUTO: 11.3 10*3/MM3 (ref 1.7–7)
NEUTROPHILS NFR BLD AUTO: 82.6 % (ref 42.7–76)
NRBC BLD AUTO-RTO: 0 /100 WBC (ref 0–0.2)
PCO2 BLDA: 51.5 MM HG (ref 35–45)
PCO2 TEMP ADJ BLD: 51.5 MM HG (ref 35–45)
PH BLDA: 7.43 PH UNITS (ref 7.35–7.45)
PH, TEMP CORRECTED: 7.43 PH UNITS (ref 7.35–7.45)
PLATELET # BLD AUTO: 378 10*3/MM3 (ref 140–450)
PMV BLD AUTO: 9.7 FL (ref 6–12)
PO2 BLDA: 65.5 MM HG (ref 83–108)
PO2 TEMP ADJ BLD: 65.5 MM HG (ref 83–108)
POTASSIUM SERPL-SCNC: 4.5 MMOL/L (ref 3.5–5.2)
PROT SERPL-MCNC: 4.8 G/DL (ref 6–8.5)
RBC # BLD AUTO: 3.36 10*6/MM3 (ref 4.14–5.8)
SAO2 % BLDCOA: 92.6 % (ref 94–99)
SODIUM SERPL-SCNC: 140 MMOL/L (ref 136–145)
VENTILATOR MODE: ABNORMAL
WBC NRBC COR # BLD: 13.65 10*3/MM3 (ref 3.4–10.8)

## 2022-02-27 PROCEDURE — 25010000002 METHYLPREDNISOLONE PER 40 MG: Performed by: INTERNAL MEDICINE

## 2022-02-27 PROCEDURE — 36600 WITHDRAWAL OF ARTERIAL BLOOD: CPT

## 2022-02-27 PROCEDURE — 94761 N-INVAS EAR/PLS OXIMETRY MLT: CPT

## 2022-02-27 PROCEDURE — 82962 GLUCOSE BLOOD TEST: CPT

## 2022-02-27 PROCEDURE — 94799 UNLISTED PULMONARY SVC/PX: CPT

## 2022-02-27 PROCEDURE — 86140 C-REACTIVE PROTEIN: CPT | Performed by: NURSE PRACTITIONER

## 2022-02-27 PROCEDURE — 82803 BLOOD GASES ANY COMBINATION: CPT

## 2022-02-27 PROCEDURE — 99233 SBSQ HOSP IP/OBS HIGH 50: CPT | Performed by: INTERNAL MEDICINE

## 2022-02-27 PROCEDURE — 25010000002 ALTEPLASE 2 MG RECONSTITUTED SOLUTION 1 EACH VIAL: Performed by: INTERNAL MEDICINE

## 2022-02-27 PROCEDURE — 25010000002 ENOXAPARIN PER 10 MG: Performed by: NURSE PRACTITIONER

## 2022-02-27 PROCEDURE — 80053 COMPREHEN METABOLIC PANEL: CPT | Performed by: NURSE PRACTITIONER

## 2022-02-27 PROCEDURE — 25010000002 CEFEPIME-DEXTROSE 2-5 GM-%(50ML) RECONSTITUTED SOLUTION: Performed by: INTERNAL MEDICINE

## 2022-02-27 PROCEDURE — 85025 COMPLETE CBC W/AUTO DIFF WBC: CPT | Performed by: NURSE PRACTITIONER

## 2022-02-27 RX ADMIN — Medication 10 ML: at 20:21

## 2022-02-27 RX ADMIN — Medication 300 MG: at 08:22

## 2022-02-27 RX ADMIN — Medication 10 ML: at 08:23

## 2022-02-27 RX ADMIN — IPRATROPIUM BROMIDE AND ALBUTEROL SULFATE 3 ML: .5; 2.5 SOLUTION RESPIRATORY (INHALATION) at 19:04

## 2022-02-27 RX ADMIN — ENOXAPARIN SODIUM 80 MG: 80 INJECTION SUBCUTANEOUS at 21:10

## 2022-02-27 RX ADMIN — ACETAMINOPHEN 650 MG: 325 TABLET ORAL at 03:11

## 2022-02-27 RX ADMIN — DOCUSATE SODIUM 100 MG: 50 LIQUID ORAL at 20:18

## 2022-02-27 RX ADMIN — DOCUSATE SODIUM 100 MG: 50 LIQUID ORAL at 08:22

## 2022-02-27 RX ADMIN — ALTEPLASE: 2.2 INJECTION, POWDER, LYOPHILIZED, FOR SOLUTION INTRAVENOUS at 13:43

## 2022-02-27 RX ADMIN — ATORVASTATIN CALCIUM 40 MG: 40 TABLET, FILM COATED ORAL at 20:18

## 2022-02-27 RX ADMIN — IPRATROPIUM BROMIDE AND ALBUTEROL SULFATE 3 ML: .5; 2.5 SOLUTION RESPIRATORY (INHALATION) at 07:23

## 2022-02-27 RX ADMIN — METHYLPREDNISOLONE SODIUM SUCCINATE 20 MG: 40 INJECTION, POWDER, FOR SOLUTION INTRAMUSCULAR; INTRAVENOUS at 08:23

## 2022-02-27 RX ADMIN — OLANZAPINE 5 MG: 5 TABLET, FILM COATED ORAL at 20:18

## 2022-02-27 RX ADMIN — IPRATROPIUM BROMIDE AND ALBUTEROL SULFATE 3 ML: .5; 2.5 SOLUTION RESPIRATORY (INHALATION) at 01:07

## 2022-02-27 RX ADMIN — CEFEPIME HYDROCHLORIDE 2 G: 2 INJECTION, SOLUTION INTRAVENOUS at 20:19

## 2022-02-27 RX ADMIN — ENOXAPARIN SODIUM 80 MG: 80 INJECTION SUBCUTANEOUS at 10:36

## 2022-02-27 RX ADMIN — METHYLPREDNISOLONE SODIUM SUCCINATE 20 MG: 40 INJECTION, POWDER, FOR SOLUTION INTRAMUSCULAR; INTRAVENOUS at 20:18

## 2022-02-27 RX ADMIN — CEFEPIME HYDROCHLORIDE 2 G: 2 INJECTION, SOLUTION INTRAVENOUS at 08:22

## 2022-02-27 RX ADMIN — Medication 10 ML: at 08:24

## 2022-02-27 RX ADMIN — IPRATROPIUM BROMIDE AND ALBUTEROL SULFATE 3 ML: .5; 2.5 SOLUTION RESPIRATORY (INHALATION) at 13:18

## 2022-02-27 RX ADMIN — POLYETHYLENE GLYCOL 3350 17 G: 17 POWDER, FOR SOLUTION ORAL at 08:23

## 2022-02-28 ENCOUNTER — APPOINTMENT (OUTPATIENT)
Dept: GENERAL RADIOLOGY | Facility: HOSPITAL | Age: 72
End: 2022-02-28

## 2022-02-28 LAB
ALBUMIN SERPL-MCNC: 3 G/DL (ref 3.5–5.2)
ALBUMIN/GLOB SERPL: 1 G/DL
ALP SERPL-CCNC: 50 U/L (ref 39–117)
ALT SERPL W P-5'-P-CCNC: 42 U/L (ref 1–41)
ANION GAP SERPL CALCULATED.3IONS-SCNC: 5.5 MMOL/L (ref 5–15)
AST SERPL-CCNC: 28 U/L (ref 1–40)
BASOPHILS # BLD AUTO: 0.04 10*3/MM3 (ref 0–0.2)
BASOPHILS NFR BLD AUTO: 0.2 % (ref 0–1.5)
BILIRUB SERPL-MCNC: 0.3 MG/DL (ref 0–1.2)
BUN SERPL-MCNC: 13 MG/DL (ref 8–23)
BUN/CREAT SERPL: 31 (ref 7–25)
CALCIUM SPEC-SCNC: 9.7 MG/DL (ref 8.6–10.5)
CHLORIDE SERPL-SCNC: 100 MMOL/L (ref 98–107)
CO2 SERPL-SCNC: 33.5 MMOL/L (ref 22–29)
CREAT SERPL-MCNC: 0.42 MG/DL (ref 0.76–1.27)
CRP SERPL-MCNC: 3.39 MG/DL (ref 0–0.5)
DEPRECATED RDW RBC AUTO: 45.9 FL (ref 37–54)
EOSINOPHIL # BLD AUTO: 0.07 10*3/MM3 (ref 0–0.4)
EOSINOPHIL NFR BLD AUTO: 0.4 % (ref 0.3–6.2)
ERYTHROCYTE [DISTWIDTH] IN BLOOD BY AUTOMATED COUNT: 13.6 % (ref 12.3–15.4)
GFR SERPL CREATININE-BSD FRML MDRD: >150 ML/MIN/1.73
GLOBULIN UR ELPH-MCNC: 2.9 GM/DL
GLUCOSE BLDC GLUCOMTR-MCNC: 105 MG/DL (ref 70–130)
GLUCOSE BLDC GLUCOMTR-MCNC: 126 MG/DL (ref 70–130)
GLUCOSE BLDC GLUCOMTR-MCNC: 128 MG/DL (ref 70–130)
GLUCOSE BLDC GLUCOMTR-MCNC: 149 MG/DL (ref 70–130)
GLUCOSE SERPL-MCNC: 125 MG/DL (ref 65–99)
HCT VFR BLD AUTO: 35 % (ref 37.5–51)
HGB BLD-MCNC: 11.2 G/DL (ref 13–17.7)
IMM GRANULOCYTES # BLD AUTO: 0.67 10*3/MM3 (ref 0–0.05)
IMM GRANULOCYTES NFR BLD AUTO: 3.8 % (ref 0–0.5)
LYMPHOCYTES # BLD AUTO: 1.14 10*3/MM3 (ref 0.7–3.1)
LYMPHOCYTES NFR BLD AUTO: 6.5 % (ref 19.6–45.3)
MCH RBC QN AUTO: 30 PG (ref 26.6–33)
MCHC RBC AUTO-ENTMCNC: 32 G/DL (ref 31.5–35.7)
MCV RBC AUTO: 93.8 FL (ref 79–97)
MONOCYTES # BLD AUTO: 1.05 10*3/MM3 (ref 0.1–0.9)
MONOCYTES NFR BLD AUTO: 6 % (ref 5–12)
NEUTROPHILS NFR BLD AUTO: 14.51 10*3/MM3 (ref 1.7–7)
NEUTROPHILS NFR BLD AUTO: 83.1 % (ref 42.7–76)
NRBC BLD AUTO-RTO: 0 /100 WBC (ref 0–0.2)
PLATELET # BLD AUTO: 518 10*3/MM3 (ref 140–450)
PMV BLD AUTO: 9.5 FL (ref 6–12)
POTASSIUM SERPL-SCNC: 4.6 MMOL/L (ref 3.5–5.2)
PROT SERPL-MCNC: 5.9 G/DL (ref 6–8.5)
RBC # BLD AUTO: 3.73 10*6/MM3 (ref 4.14–5.8)
SODIUM SERPL-SCNC: 139 MMOL/L (ref 136–145)
WBC NRBC COR # BLD: 17.48 10*3/MM3 (ref 3.4–10.8)

## 2022-02-28 PROCEDURE — 25010000002 ENOXAPARIN PER 10 MG: Performed by: NURSE PRACTITIONER

## 2022-02-28 PROCEDURE — 92526 ORAL FUNCTION THERAPY: CPT

## 2022-02-28 PROCEDURE — 80053 COMPREHEN METABOLIC PANEL: CPT | Performed by: NURSE PRACTITIONER

## 2022-02-28 PROCEDURE — 25010000002 LORAZEPAM PER 2 MG: Performed by: PSYCHIATRY & NEUROLOGY

## 2022-02-28 PROCEDURE — 85025 COMPLETE CBC W/AUTO DIFF WBC: CPT | Performed by: NURSE PRACTITIONER

## 2022-02-28 PROCEDURE — 86140 C-REACTIVE PROTEIN: CPT | Performed by: NURSE PRACTITIONER

## 2022-02-28 PROCEDURE — 94761 N-INVAS EAR/PLS OXIMETRY MLT: CPT

## 2022-02-28 PROCEDURE — 92611 MOTION FLUOROSCOPY/SWALLOW: CPT

## 2022-02-28 PROCEDURE — 99233 SBSQ HOSP IP/OBS HIGH 50: CPT | Performed by: INTERNAL MEDICINE

## 2022-02-28 PROCEDURE — 25010000002 METHYLPREDNISOLONE PER 40 MG: Performed by: INTERNAL MEDICINE

## 2022-02-28 PROCEDURE — 25010000002 CEFEPIME-DEXTROSE 2-5 GM-%(50ML) RECONSTITUTED SOLUTION: Performed by: INTERNAL MEDICINE

## 2022-02-28 PROCEDURE — 82962 GLUCOSE BLOOD TEST: CPT

## 2022-02-28 PROCEDURE — 94799 UNLISTED PULMONARY SVC/PX: CPT

## 2022-02-28 PROCEDURE — 74230 X-RAY XM SWLNG FUNCJ C+: CPT

## 2022-02-28 RX ORDER — FERROUS SULFATE TAB EC 324 MG (65 MG FE EQUIVALENT) 324 (65 FE) MG
324 TABLET DELAYED RESPONSE ORAL
Status: DISCONTINUED | OUTPATIENT
Start: 2022-03-01 | End: 2022-03-14 | Stop reason: HOSPADM

## 2022-02-28 RX ORDER — OLANZAPINE 5 MG/1
5 TABLET ORAL NIGHTLY
Status: DISCONTINUED | OUTPATIENT
Start: 2022-03-01 | End: 2022-03-03

## 2022-02-28 RX ORDER — OLANZAPINE 10 MG/1
5 INJECTION, POWDER, LYOPHILIZED, FOR SOLUTION INTRAMUSCULAR EVERY 8 HOURS PRN
Status: DISCONTINUED | OUTPATIENT
Start: 2022-02-28 | End: 2022-03-14 | Stop reason: HOSPADM

## 2022-02-28 RX ORDER — CEFEPIME HYDROCHLORIDE 2 G/50ML
2 INJECTION, SOLUTION INTRAVENOUS EVERY 8 HOURS
Status: DISCONTINUED | OUTPATIENT
Start: 2022-02-28 | End: 2022-03-01

## 2022-02-28 RX ORDER — ATORVASTATIN CALCIUM 40 MG/1
40 TABLET, FILM COATED ORAL NIGHTLY
Status: DISCONTINUED | OUTPATIENT
Start: 2022-03-01 | End: 2022-03-14 | Stop reason: HOSPADM

## 2022-02-28 RX ORDER — DOCUSATE SODIUM 50 MG/5 ML
100 LIQUID (ML) ORAL 2 TIMES DAILY
Status: DISCONTINUED | OUTPATIENT
Start: 2022-03-01 | End: 2022-03-14

## 2022-02-28 RX ORDER — POLYETHYLENE GLYCOL 3350 17 G/17G
17 POWDER, FOR SOLUTION ORAL DAILY
Status: DISCONTINUED | OUTPATIENT
Start: 2022-03-01 | End: 2022-03-14 | Stop reason: HOSPADM

## 2022-02-28 RX ORDER — ECHINACEA PURPUREA EXTRACT 125 MG
2 TABLET ORAL AS NEEDED
Status: DISCONTINUED | OUTPATIENT
Start: 2022-02-28 | End: 2022-03-14 | Stop reason: HOSPADM

## 2022-02-28 RX ADMIN — IPRATROPIUM BROMIDE AND ALBUTEROL SULFATE 3 ML: .5; 2.5 SOLUTION RESPIRATORY (INHALATION) at 01:06

## 2022-02-28 RX ADMIN — CEFEPIME HYDROCHLORIDE 2 G: 2 INJECTION, SOLUTION INTRAVENOUS at 17:21

## 2022-02-28 RX ADMIN — SALINE NASAL SPRAY 2 SPRAY: 1.5 SOLUTION NASAL at 16:21

## 2022-02-28 RX ADMIN — IPRATROPIUM BROMIDE AND ALBUTEROL SULFATE 3 ML: .5; 2.5 SOLUTION RESPIRATORY (INHALATION) at 19:01

## 2022-02-28 RX ADMIN — METHYLPREDNISOLONE SODIUM SUCCINATE 20 MG: 40 INJECTION, POWDER, FOR SOLUTION INTRAMUSCULAR; INTRAVENOUS at 20:08

## 2022-02-28 RX ADMIN — Medication 10 ML: at 09:51

## 2022-02-28 RX ADMIN — HYDROCODONE BITARTRATE AND HOMATROPINE METHYLBROMIDE 5 ML: 5; 1.5 SOLUTION ORAL at 14:20

## 2022-02-28 RX ADMIN — ENOXAPARIN SODIUM 80 MG: 80 INJECTION SUBCUTANEOUS at 21:15

## 2022-02-28 RX ADMIN — OLANZAPINE 5 MG: 10 INJECTION, POWDER, FOR SOLUTION INTRAMUSCULAR at 17:09

## 2022-02-28 RX ADMIN — IPRATROPIUM BROMIDE AND ALBUTEROL SULFATE 3 ML: .5; 2.5 SOLUTION RESPIRATORY (INHALATION) at 07:26

## 2022-02-28 RX ADMIN — DOCUSATE SODIUM 100 MG: 50 LIQUID ORAL at 21:15

## 2022-02-28 RX ADMIN — IPRATROPIUM BROMIDE AND ALBUTEROL SULFATE 3 ML: .5; 2.5 SOLUTION RESPIRATORY (INHALATION) at 14:02

## 2022-02-28 RX ADMIN — Medication 10 ML: at 21:15

## 2022-02-28 RX ADMIN — LORAZEPAM 1 MG: 2 INJECTION INTRAMUSCULAR; INTRAVENOUS at 15:55

## 2022-02-28 RX ADMIN — METHYLPREDNISOLONE SODIUM SUCCINATE 20 MG: 40 INJECTION, POWDER, FOR SOLUTION INTRAMUSCULAR; INTRAVENOUS at 09:51

## 2022-02-28 RX ADMIN — CEFEPIME HYDROCHLORIDE 2 G: 2 INJECTION, SOLUTION INTRAVENOUS at 09:51

## 2022-03-01 ENCOUNTER — APPOINTMENT (OUTPATIENT)
Dept: GENERAL RADIOLOGY | Facility: HOSPITAL | Age: 72
End: 2022-03-01

## 2022-03-01 LAB
ALBUMIN SERPL-MCNC: 2.8 G/DL (ref 3.5–5.2)
ALBUMIN/GLOB SERPL: 0.8 G/DL
ALP SERPL-CCNC: 48 U/L (ref 39–117)
ALT SERPL W P-5'-P-CCNC: 41 U/L (ref 1–41)
ANION GAP SERPL CALCULATED.3IONS-SCNC: 6 MMOL/L (ref 5–15)
AST SERPL-CCNC: 24 U/L (ref 1–40)
BASOPHILS # BLD AUTO: 0.03 10*3/MM3 (ref 0–0.2)
BASOPHILS NFR BLD AUTO: 0.2 % (ref 0–1.5)
BILIRUB SERPL-MCNC: 0.4 MG/DL (ref 0–1.2)
BUN SERPL-MCNC: 22 MG/DL (ref 8–23)
BUN/CREAT SERPL: 50 (ref 7–25)
CALCIUM SPEC-SCNC: 9.5 MG/DL (ref 8.6–10.5)
CHLORIDE SERPL-SCNC: 103 MMOL/L (ref 98–107)
CO2 SERPL-SCNC: 32 MMOL/L (ref 22–29)
CREAT SERPL-MCNC: 0.44 MG/DL (ref 0.76–1.27)
CRP SERPL-MCNC: 7.22 MG/DL (ref 0–0.5)
DEPRECATED RDW RBC AUTO: 47.8 FL (ref 37–54)
EGFRCR SERPLBLD CKD-EPI 2021: 113.3 ML/MIN/1.73
EOSINOPHIL # BLD AUTO: 0.09 10*3/MM3 (ref 0–0.4)
EOSINOPHIL NFR BLD AUTO: 0.6 % (ref 0.3–6.2)
ERYTHROCYTE [DISTWIDTH] IN BLOOD BY AUTOMATED COUNT: 14 % (ref 12.3–15.4)
GLOBULIN UR ELPH-MCNC: 3.7 GM/DL
GLUCOSE BLDC GLUCOMTR-MCNC: 102 MG/DL (ref 70–130)
GLUCOSE BLDC GLUCOMTR-MCNC: 127 MG/DL (ref 70–130)
GLUCOSE BLDC GLUCOMTR-MCNC: 192 MG/DL (ref 70–130)
GLUCOSE BLDC GLUCOMTR-MCNC: 277 MG/DL (ref 70–130)
GLUCOSE BLDC GLUCOMTR-MCNC: 94 MG/DL (ref 70–130)
GLUCOSE SERPL-MCNC: 118 MG/DL (ref 65–99)
HCT VFR BLD AUTO: 37.7 % (ref 37.5–51)
HGB BLD-MCNC: 11.8 G/DL (ref 13–17.7)
IMM GRANULOCYTES # BLD AUTO: 0.75 10*3/MM3 (ref 0–0.05)
IMM GRANULOCYTES NFR BLD AUTO: 4.9 % (ref 0–0.5)
LYMPHOCYTES # BLD AUTO: 1.15 10*3/MM3 (ref 0.7–3.1)
LYMPHOCYTES NFR BLD AUTO: 7.6 % (ref 19.6–45.3)
MCH RBC QN AUTO: 29.5 PG (ref 26.6–33)
MCHC RBC AUTO-ENTMCNC: 31.3 G/DL (ref 31.5–35.7)
MCV RBC AUTO: 94.3 FL (ref 79–97)
MONOCYTES # BLD AUTO: 0.91 10*3/MM3 (ref 0.1–0.9)
MONOCYTES NFR BLD AUTO: 6 % (ref 5–12)
NEUTROPHILS NFR BLD AUTO: 12.29 10*3/MM3 (ref 1.7–7)
NEUTROPHILS NFR BLD AUTO: 80.7 % (ref 42.7–76)
NRBC BLD AUTO-RTO: 0 /100 WBC (ref 0–0.2)
PLATELET # BLD AUTO: 516 10*3/MM3 (ref 140–450)
PMV BLD AUTO: 9.6 FL (ref 6–12)
POTASSIUM SERPL-SCNC: 4.6 MMOL/L (ref 3.5–5.2)
PROT SERPL-MCNC: 6.5 G/DL (ref 6–8.5)
RBC # BLD AUTO: 4 10*6/MM3 (ref 4.14–5.8)
SODIUM SERPL-SCNC: 141 MMOL/L (ref 136–145)
WBC NRBC COR # BLD: 15.22 10*3/MM3 (ref 3.4–10.8)

## 2022-03-01 PROCEDURE — 99233 SBSQ HOSP IP/OBS HIGH 50: CPT | Performed by: INTERNAL MEDICINE

## 2022-03-01 PROCEDURE — 86140 C-REACTIVE PROTEIN: CPT | Performed by: NURSE PRACTITIONER

## 2022-03-01 PROCEDURE — 94799 UNLISTED PULMONARY SVC/PX: CPT

## 2022-03-01 PROCEDURE — 94669 MECHANICAL CHEST WALL OSCILL: CPT

## 2022-03-01 PROCEDURE — 94761 N-INVAS EAR/PLS OXIMETRY MLT: CPT

## 2022-03-01 PROCEDURE — 82962 GLUCOSE BLOOD TEST: CPT

## 2022-03-01 PROCEDURE — 92526 ORAL FUNCTION THERAPY: CPT

## 2022-03-01 PROCEDURE — 63710000001 INSULIN ASPART PER 5 UNITS: Performed by: INTERNAL MEDICINE

## 2022-03-01 PROCEDURE — 25010000002 METHYLPREDNISOLONE PER 40 MG: Performed by: INTERNAL MEDICINE

## 2022-03-01 PROCEDURE — 71045 X-RAY EXAM CHEST 1 VIEW: CPT

## 2022-03-01 PROCEDURE — 25010000002 CEFEPIME PER 500 MG: Performed by: INTERNAL MEDICINE

## 2022-03-01 PROCEDURE — 85025 COMPLETE CBC W/AUTO DIFF WBC: CPT | Performed by: NURSE PRACTITIONER

## 2022-03-01 PROCEDURE — 80053 COMPREHEN METABOLIC PANEL: CPT | Performed by: NURSE PRACTITIONER

## 2022-03-01 PROCEDURE — 25010000002 CEFEPIME-DEXTROSE 2-5 GM-%(50ML) RECONSTITUTED SOLUTION: Performed by: INTERNAL MEDICINE

## 2022-03-01 RX ORDER — SODIUM CHLORIDE FOR INHALATION 7 %
4 VIAL, NEBULIZER (ML) INHALATION ONCE
Status: COMPLETED | OUTPATIENT
Start: 2022-03-01 | End: 2022-03-01

## 2022-03-01 RX ORDER — METHYLPREDNISOLONE SODIUM SUCCINATE 40 MG/ML
20 INJECTION, POWDER, LYOPHILIZED, FOR SOLUTION INTRAMUSCULAR; INTRAVENOUS DAILY
Status: DISCONTINUED | OUTPATIENT
Start: 2022-03-02 | End: 2022-03-04

## 2022-03-01 RX ORDER — CEFEPIME HYDROCHLORIDE 2 G/50ML
2 INJECTION, SOLUTION INTRAVENOUS EVERY 8 HOURS
Status: DISCONTINUED | OUTPATIENT
Start: 2022-03-01 | End: 2022-03-01

## 2022-03-01 RX ORDER — SODIUM CHLORIDE FOR INHALATION 7 %
4 VIAL, NEBULIZER (ML) INHALATION ONCE
Status: DISCONTINUED | OUTPATIENT
Start: 2022-03-01 | End: 2022-03-01

## 2022-03-01 RX ADMIN — IPRATROPIUM BROMIDE AND ALBUTEROL SULFATE 3 ML: .5; 2.5 SOLUTION RESPIRATORY (INHALATION) at 01:06

## 2022-03-01 RX ADMIN — CEFEPIME HYDROCHLORIDE 2 G: 2 INJECTION, SOLUTION INTRAVENOUS at 02:00

## 2022-03-01 RX ADMIN — DOCUSATE SODIUM 100 MG: 50 LIQUID ORAL at 20:15

## 2022-03-01 RX ADMIN — HYDROCODONE BITARTRATE AND HOMATROPINE METHYLBROMIDE 5 ML: 5; 1.5 SOLUTION ORAL at 20:16

## 2022-03-01 RX ADMIN — APIXABAN 10 MG: 2.5 TABLET, FILM COATED ORAL at 08:07

## 2022-03-01 RX ADMIN — OLANZAPINE 5 MG: 5 TABLET, FILM COATED ORAL at 20:16

## 2022-03-01 RX ADMIN — HYDROCODONE BITARTRATE AND HOMATROPINE METHYLBROMIDE 5 ML: 5; 1.5 SOLUTION ORAL at 10:04

## 2022-03-01 RX ADMIN — CEFEPIME 2 G: 2 INJECTION, POWDER, FOR SOLUTION INTRAVENOUS at 10:55

## 2022-03-01 RX ADMIN — POLYETHYLENE GLYCOL 3350 17 G: 17 POWDER, FOR SOLUTION ORAL at 08:07

## 2022-03-01 RX ADMIN — IPRATROPIUM BROMIDE AND ALBUTEROL SULFATE 3 ML: .5; 2.5 SOLUTION RESPIRATORY (INHALATION) at 19:21

## 2022-03-01 RX ADMIN — Medication 10 ML: at 08:08

## 2022-03-01 RX ADMIN — METHYLPREDNISOLONE SODIUM SUCCINATE 20 MG: 40 INJECTION, POWDER, FOR SOLUTION INTRAMUSCULAR; INTRAVENOUS at 08:07

## 2022-03-01 RX ADMIN — HYDROCODONE BITARTRATE AND HOMATROPINE METHYLBROMIDE 5 ML: 5; 1.5 SOLUTION ORAL at 15:18

## 2022-03-01 RX ADMIN — FERROUS SULFATE TAB EC 324 MG (65 MG FE EQUIVALENT) 324 MG: 324 (65 FE) TABLET DELAYED RESPONSE at 08:07

## 2022-03-01 RX ADMIN — Medication 4 ML: at 19:21

## 2022-03-01 RX ADMIN — Medication 10 ML: at 19:03

## 2022-03-01 RX ADMIN — IPRATROPIUM BROMIDE AND ALBUTEROL SULFATE 3 ML: .5; 2.5 SOLUTION RESPIRATORY (INHALATION) at 07:19

## 2022-03-01 RX ADMIN — IPRATROPIUM BROMIDE AND ALBUTEROL SULFATE 3 ML: .5; 2.5 SOLUTION RESPIRATORY (INHALATION) at 14:00

## 2022-03-01 RX ADMIN — DOCUSATE SODIUM 100 MG: 50 LIQUID ORAL at 08:07

## 2022-03-01 RX ADMIN — INSULIN ASPART 4 UNITS: 100 INJECTION, SOLUTION INTRAVENOUS; SUBCUTANEOUS at 00:11

## 2022-03-01 RX ADMIN — ATORVASTATIN CALCIUM 40 MG: 40 TABLET, FILM COATED ORAL at 20:15

## 2022-03-01 RX ADMIN — CEFEPIME 2 G: 2 INJECTION, POWDER, FOR SOLUTION INTRAVENOUS at 18:54

## 2022-03-01 RX ADMIN — INSULIN ASPART 12 UNITS: 100 INJECTION, SOLUTION INTRAVENOUS; SUBCUTANEOUS at 11:54

## 2022-03-01 RX ADMIN — APIXABAN 10 MG: 2.5 TABLET, FILM COATED ORAL at 20:15

## 2022-03-01 NOTE — NURSING NOTE
"Called and spoke to patients wife Araseli she is aware that patient now has a sitter, and that last pm he removed his Dobhoff and PICC line. Spoke with her about code status, CPR, and no intubation if something was to happen, she agreed that was the plan. Wife is aware that patient continues to be confused, and o2 saturations 70-90% at times, and continue to drop all day. Zyprexa given.Heated high flow cont 50L 100%, and non-rebreather worn. Bed alarm cont and told wife she was welcome to come in, or call anytime to check on him. She voiced \" All I can do for now is pray\".  "

## 2022-03-01 NOTE — PROGRESS NOTES
[facility]      Mar/chart reviewed  Follow up AHRF, pneumonia, post COVID fibrosis  Less confused today, +cough  Sitting in chair eating     Vital Sign Min/Max for last 24 hours  Temp  Min: 97.8 °F (36.6 °C)  Max: 98.6 °F (37 °C)   BP  Min: 92/61  Max: 123/78   Pulse  Min: 76  Max: 120   Resp  Min: 20  Max: 40   SpO2  Min: 77 %  Max: 100 %   Flow (L/min)  Min: 45  Max: 60   Weight  Min: 70.1 kg (154 lb 8 oz)  Max: 70.1 kg (154 lb 8 oz)   190/1875  Appears ill, alert,  agitated  perrl, eomi, normal sclera  mmm, no jvd, trachea midline, neck supple,  chest decreased bilaterally,+ crackles on right clear on left, no wheezes,   tachy  soft, nt, nd +bs,  no c/c/trace edema  Skin warm, dry no rashes    Labs: 3/1: reviewed:  Glucose 118  Bun 22  Cr 0.44  Bicarb 32  crp 7.2  Wbc 15  hgb 11.8  plts 516      A/P:  1. AHRF -- oxygen requirements went up and now have come down slightly, continue to titrate as needed. Try and keep him on dry side  2. Pneumonia -- agree with treating for secondary bacterial infection --was problem prior to cefepime  3. 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 Encephalopathy -- still confused,   5. Anemia  6. Daily alcohol use    Not much improvement in oxygen requirement despite some fluctuations, prognosis remains poor, no further inteventions to offer beyond what is being done at this point.     Repeat cxr

## 2022-03-01 NOTE — PROGRESS NOTES
"SERVICE: [facility] HOSPITALIST    CONSULTANTS: pulmonology    CHIEF COMPLAINT: Follow-up confusion, acute hypoxic respiratory failure secondary to bilateral bacterial pneumonia/aspiration pneumonia    SUBJECTIVE: Patient seen and examined at bedside.  Patient is eating breakfast this morning.  He reports his coughing is better today.  He denies any other acute complaints.  patient denies headache, fever or chills, nausea, vomiting, diarrhea, abdominal pain, chest pain or palpitations, shortness of breath, wheezing or coughing, leg pain or weakness.     OBJECTIVE:    Physical exam is largely unchanged from previous exam, except where documented below, examination is accurate as of 3/1/2022    Physical Exam:  General: Patient is awake and alert. Well developed and well nourished. No acute distress noted.   HENT: Head is atraumatic, normocephalic. Hearing is grossly intact. Nose is without obvious congestion and appears patent. Neck is supple and trachea is midline.   Eyes: Vision is grossly intact. Pupils appear equal and round.   Cardiovascular: Heart has regular rate and rhythm with no murmurs, rubs or gallops noted.   Respiratory: Poor air exchange but otherwise lungs clear without wheezing rhonchi or rales.  Occasional coughing noted.   Abdominal/GI: Soft, nontender, bowel sounds present. No rebound or guarding present.   Extremities: No peripheral edema noted.   Musculoskeletal: Spontaneous movement of bilateral upper and lower extremities against gravity noted. No signs of injury or deformity noted.   Skin: Warm and dry.   Psych: Mood and affect are appropriate. Cooperative with exam.   Neuro: No facial asymmetry noted. No focal deficits noted, hearing and vision are grossly intact.     /77 (BP Location: Left arm, Patient Position: Lying)   Pulse 77   Temp 97.8 °F (36.6 °C) (Axillary)   Resp 21   Ht 172 cm (67.72\")   Wt 70.1 kg (154 lb 8 oz)   SpO2 96%   BMI 23.69 kg/m² "     MEDS/LABS REVIEWED AND ORDERED    atorvastatin, 40 mg, Oral, Nightly  Cefepime-Dextrose, 2 g, Intravenous, Q8H  docusate sodium, 100 mg, Oral, BID  enoxaparin, 1 mg/kg, Subcutaneous, Q12H  ferrous sulfate, 324 mg, Oral, Daily With Breakfast  insulin aspart, 0-24 Units, Subcutaneous, Q6H  ipratropium-albuterol, 3 mL, Nebulization, Q6H - RT  methylPREDNISolone sodium succinate, 20 mg, Intravenous, Q12H  OLANZapine, 5 mg, Oral, Nightly  polyethylene glycol, 17 g, Oral, Daily  sodium chloride, 10 mL, Intravenous, Q12H  sodium chloride, 10 mL, Intravenous, Q12H  sodium chloride, 10 mL, Intravenous, Q12H  sodium chloride, 10 mL, Intravenous, Q12H          LAB/DIAGNOSTICS:    Lab Results (last 24 hours)     Procedure Component Value Units Date/Time    POC Glucose Once [[id number]]  (Normal) Collected: 03/01/22 0603    Specimen: Blood Updated: 03/01/22 0609     Glucose 127 mg/dL      Comment: Meter: [id number] : [id number] Jr GOLD RN       Comprehensive Metabolic Panel [[id number]]  (Abnormal) Collected: 03/01/22 0456    Specimen: Blood Updated: 03/01/22 0534     Glucose 118 mg/dL      BUN 22 mg/dL      Creatinine 0.44 mg/dL      Sodium 141 mmol/L      Potassium 4.6 mmol/L      Chloride 103 mmol/L      CO2 32.0 mmol/L      Calcium 9.5 mg/dL      Total Protein 6.5 g/dL      Albumin 2.80 g/dL      ALT (SGPT) 41 U/L      AST (SGOT) 24 U/L      Alkaline Phosphatase 48 U/L      Total Bilirubin 0.4 mg/dL      Globulin 3.7 gm/dL      A/G Ratio 0.8 g/dL      BUN/Creatinine Ratio 50.0     Anion Gap 6.0 mmol/L      eGFR 113.3 mL/min/1.73      Comment: National Kidney Foundation and American Society of Nephrology (ASN) Task Force recommended calculation based on the Chronic Kidney Disease Epidemiology Collaboration (CKD-EPI) equation refit without adjustment for race.       Narrative:      GFR Normal >60  Chronic Kidney Disease <60  Kidney Failure <15      CBC & Differential [[id number]]  (Abnormal) Collected: 03/01/22 0456     Specimen: Blood Updated: 03/01/22 0506    Narrative:      The following orders were created for panel order CBC & Differential.  Procedure                               Abnormality         Status                     ---------                               -----------         ------                     CBC Auto Differential[[id number]]        Abnormal            Final result                 Please view results for these tests on the individual orders.    CBC Auto Differential [[id number]]  (Abnormal) Collected: 03/01/22 0456    Specimen: Blood Updated: 03/01/22 0506     WBC 15.22 10*3/mm3      RBC 4.00 10*6/mm3      Hemoglobin 11.8 g/dL      Hematocrit 37.7 %      MCV 94.3 fL      MCH 29.5 pg      MCHC 31.3 g/dL      RDW 14.0 %      RDW-SD 47.8 fl      MPV 9.6 fL      Platelets 516 10*3/mm3      Neutrophil % 80.7 %      Lymphocyte % 7.6 %      Monocyte % 6.0 %      Eosinophil % 0.6 %      Basophil % 0.2 %      Immature Grans % 4.9 %      Neutrophils, Absolute 12.29 10*3/mm3      Lymphocytes, Absolute 1.15 10*3/mm3      Monocytes, Absolute 0.91 10*3/mm3      Eosinophils, Absolute 0.09 10*3/mm3      Basophils, Absolute 0.03 10*3/mm3      Immature Grans, Absolute 0.75 10*3/mm3      nRBC 0.0 /100 WBC     C-reactive Protein [[id number]] Collected: 03/01/22 0456    Specimen: Blood Updated: 03/01/22 0503    POC Glucose Once [[id number]]  (Abnormal) Collected: 03/01/22 0004    Specimen: Blood Updated: 03/01/22 0011     Glucose 192 mg/dL      Comment: Meter: [id number] : [id number] Jr GOLD RN       POC Glucose Once [[id number]]  (Normal) Collected: 02/28/22 1720    Specimen: Blood Updated: 02/28/22 1726     Glucose 128 mg/dL      Comment: Meter: [id number] : [id number] Pa Heard RN Float-Pool (Validator)       POC Glucose Once [[id number]]  (Abnormal) Collected: 02/28/22 1150    Specimen: Blood Updated: 02/28/22 1156     Glucose 149 mg/dL      Comment: Meter: [id number] : [id number] Elionr Benavides RN        C-reactive Protein [[id number]]  (Abnormal) Collected: 02/28/22 0437    Specimen: Blood Updated: 02/28/22 1053     C-Reactive Protein 3.39 mg/dL         ECG 12 Lead   Final Result   HEART RATE= 52  bpm   RR Interval= 1148  ms   NM Interval= 150  ms   P Horizontal Axis= -4  deg   P Front Axis= 47  deg   QRSD Interval= 101  ms   QT Interval= 468  ms   QRS Axis= -28  deg   T Wave Axis= 4  deg   - OTHERWISE NORMAL ECG -   Sinus rhythm - rate has slowed   Borderline left axis deviation   Electronically Signed By: Chapito Nielson ([facility]) 23-Feb-2022 08:47:10   Date and Time of Study: 2022-02-22 14:53:00      ECG 12 Lead   Final Result   HEART RATE= 120  bpm   RR Interval= 500  ms   NM Interval= 129  ms   P Horizontal Axis= 21  deg   P Front Axis= 31  deg   QRSD Interval= 105  ms   QT Interval= 334  ms   QRS Axis= -31  deg   T Wave Axis= -3  deg   - ABNORMAL ECG -   Sinus tachycardia   Inferior infarct, old   NO SIGNIFICANT CHANGE FROM PREVIOUS ECG   Electronically Signed By: Adelaida Huddleston ([facility]) 21-Feb-2022 19:02:23   Date and Time of Study: 2022-02-21 18:27:17        Results for orders placed during the hospital encounter of 02/21/22    Adult Transthoracic Echo Complete w/ Color, Spectral and Contrast if Necessary Per Protocol    Interpretation Summary  · Estimated left ventricular EF = 60% Left ventricular systolic function is normal.  · Left ventricular diastolic function was normal.  · Mild aortic valve stenosis is present.  · Peak velocity of the flow distal to the aortic valve is 288.1 cm/s. Aortic valve maximum pressure gradient is 33.2 mmHg. Aortic valve mean pressure gradient is 18.4 mmHg. Aortic valve dimensionless index is 0.4 .  · Estimated right ventricular systolic pressure from tricuspid regurgitation is mildly elevated (35-45 mmHg). Calculated right ventricular systolic pressure from tricuspid regurgitation is 37 mmHg.    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  Per my previous notes I discussed this with family and they are considering.  Patient is agreeable.   -Patient able to tolerate swallowing per speech and they have placed modified diet ordered for him.   -He continues on cefepime and Solu-Medrol. Solu-medrol causing leukocytosis.     Acute metabolic encephalopathy secondary to infection and suspected acute alcohol withdrawal  -No acute changes in presentation  -Neurology has signed off with improvement slowly of his mentation  -CT head previously negative  -He continues on home Zyprexa with as needed Ativan  -Pleasantly confused at times.     Acute left lower extremity DVT-patient remains on therapeutic dose Lovenox  -We will transition to Eliquis as he can now tolerate swallowing    Heart murmur/bradycardia-patient continues to have episodes of bradycardia, but appears asymptomatic during these episodes.  Echo showed EF 60% mild AS mildly elevated RSVP.  -Continue to monitor  -No acute issues    Chronic normocytic/iron deficiency anemia-he continues on iron supplementation, no active blood loss noted.     Hyperlipidemia-resumed home statin.     PLAN FOR DISPOSITION: TBD. Prognosis poor as above.     Osvaldo Gibson DO  Hospitalist, [facility]  03/01/22  07:16 EST    As of April 2021, as required by the Federal 21st Century Cures Act, medical records (including provider notes and laboratory/imaging results) are to be made  "available to patients and/or their designees as soon as the documents are signed/resulted. While the intention is to ensure transparency and to engage patients in their healthcare, this immediate access may create unintended consequences because this document uses language intended for communication between medical providers for interpretation with the entirety of the patient's clinical picture in mind. It is recommended that patients and/or their designees review all available information with their primary or specialist providers for explanation and to avoid misinterpretation of this information.    \"Dictated utilizing Dragon dictation\"    "

## 2022-03-01 NOTE — PLAN OF CARE
Problem: Adult Inpatient Plan of Care  Goal: Patient-Specific Goal (Individualized)  Outcome: Ongoing, Not Progressing  Goal: Absence of Hospital-Acquired Illness or Injury  Outcome: Ongoing, Not Progressing  Intervention: Identify and Manage Fall Risk  Recent Flowsheet Documentation  Taken 2/28/2022 2300 by Monica Norris RN  Safety Promotion/Fall Prevention: (RN staying in room with pt)   activity supervised   assistive device/personal items within reach   clutter free environment maintained   fall prevention program maintained   lighting adjusted   nonskid shoes/slippers when out of bed   room organization consistent   safety round/check completed   toileting scheduled  Taken 2/28/2022 2200 by Monica Norris RN  Safety Promotion/Fall Prevention: (RN staying in room with pt)   activity supervised   assistive device/personal items within reach   clutter free environment maintained   fall prevention program maintained   lighting adjusted   nonskid shoes/slippers when out of bed   room organization consistent   safety round/check completed   toileting scheduled  Taken 2/28/2022 2100 by Monica Norris RN  Safety Promotion/Fall Prevention: (RN staying in room with pt)   activity supervised   assistive device/personal items within reach   clutter free environment maintained   fall prevention program maintained   lighting adjusted   nonskid shoes/slippers when out of bed   room organization consistent   safety round/check completed   toileting scheduled   other (see comments)  Taken 2/28/2022 2000 by Monica Norris RN  Safety Promotion/Fall Prevention: (RN staying in room with pt)   activity supervised   assistive device/personal items within reach   clutter free environment maintained   fall prevention program maintained   lighting adjusted   nonskid shoes/slippers when out of bed   room organization consistent   safety round/check completed   toileting scheduled   other (see comments)  Taken 2/28/2022 1907 by Jr  SHARONA Anderson  Safety Promotion/Fall Prevention: (RN staying with pt in room)   activity supervised   assistive device/personal items within reach   clutter free environment maintained   fall prevention program maintained   lighting adjusted   nonskid shoes/slippers when out of bed   room organization consistent   safety round/check completed   toileting scheduled   other (see comments)  Intervention: Prevent Skin Injury  Recent Flowsheet Documentation  Taken 2/28/2022 2300 by Monica Norris RN  Body Position:   turned   tilted, right   legs elevated  Skin Protection:   adhesive use limited   incontinence pads utilized   protective footwear used   transparent dressing maintained   tubing/devices free from skin contact  Taken 2/28/2022 2200 by Monica Norris RN  Skin Protection:   adhesive use limited   incontinence pads utilized   protective footwear used   transparent dressing maintained   tubing/devices free from skin contact  Taken 2/28/2022 2100 by Monica Norris RN  Skin Protection:   adhesive use limited   incontinence pads utilized   protective footwear used   transparent dressing maintained   tubing/devices free from skin contact  Taken 2/28/2022 2000 by Monica Norris RN  Skin Protection:   adhesive use limited   incontinence pads utilized   transparent dressing maintained   tubing/devices free from skin contact   protective footwear used  Taken 2/28/2022 1907 by Monica Norris RN  Body Position:   turned   legs elevated   tilted, left  Skin Protection:   adhesive use limited   incontinence pads utilized   protective footwear used   transparent dressing maintained   tubing/devices free from skin contact  Intervention: Prevent and Manage VTE (venous thromboembolism) Risk  Recent Flowsheet Documentation  Taken 2/28/2022 2300 by Monica Norris RN  VTE Prevention/Management: (lovenox) --  Intervention: Prevent Infection  Recent Flowsheet Documentation  Taken 2/28/2022 2300 by Monica Norris RN  Infection Prevention:    visitors restricted/screened   single patient room provided   rest/sleep promoted   hand hygiene promoted   environmental surveillance performed  Taken 2/28/2022 1907 by Monica Norris RN  Infection Prevention:   visitors restricted/screened   single patient room provided   rest/sleep promoted   hand hygiene promoted   environmental surveillance performed  Goal: Optimal Comfort and Wellbeing  Outcome: Ongoing, Not Progressing  Intervention: Provide Person-Centered Care  Recent Flowsheet Documentation  Taken 2/28/2022 2300 by Monica Norris RN  Trust Relationship/Rapport:   care explained   choices provided   emotional support provided   empathic listening provided   questions answered   questions encouraged   reassurance provided   thoughts/feelings acknowledged  Taken 2/28/2022 1907 by Monica Norris RN  Trust Relationship/Rapport:   care explained   choices provided   emotional support provided   empathic listening provided   questions answered   questions encouraged   reassurance provided   thoughts/feelings acknowledged  Goal: Readiness for Transition of Care  Outcome: Ongoing, Not Progressing  Goal: Plan of Care Review  Outcome: Ongoing, Not Progressing     Problem: Communication Impairment (Mechanical Ventilation, Invasive)  Goal: Effective Communication  Outcome: Ongoing, Not Progressing  Intervention: Ensure Effective Communication  Recent Flowsheet Documentation  Taken 2/28/2022 2300 by Monica Norris RN  Communication Enhancement Strategies:   extra time allowed for response   family involved in communication plan   nonverbal strategies used  Taken 2/28/2022 1907 by Monica Norris RN  Communication Enhancement Strategies:   extra time allowed for response   family involved in communication plan   nonverbal strategies used   verbal and visual cues paired   verbal communication attempts encouraged     Problem: Adjustment to Illness (Sepsis/Septic Shock)  Goal: Optimal Coping  Outcome: Ongoing, Not  Progressing  Intervention: Optimize Psychosocial Adjustment to Illness  Recent Flowsheet Documentation  Taken 2/28/2022 2300 by Monica Norris RN  Supportive Measures:   active listening utilized   counseling provided   goal setting facilitated   decision-making supported   positive reinforcement provided   relaxation techniques promoted   self-responsibility promoted   verbalization of feelings encouraged  Family/Support System Care: (Pt spoke with wife on phone)   support provided   other (see comments)  Taken 2/28/2022 1907 by Monica Norris RN  Supportive Measures:   active listening utilized   decision-making supported   goal setting facilitated   positive reinforcement provided   relaxation techniques promoted   self-responsibility promoted   verbalization of feelings encouraged  Family/Support System Care: support provided     Problem: Respiratory Compromise (Sepsis/Septic Shock)  Goal: Effective Oxygenation and Ventilation  Outcome: Ongoing, Not Progressing  Intervention: Promote Airway Secretion Clearance  Recent Flowsheet Documentation  Taken 2/28/2022 2300 by Monica Norris RN  Activity Management:   activity adjusted per tolerance   bedrest  Breathing Techniques/Airway Clearance: deep/controlled cough encouraged  Cough And Deep Breathing: done independently per patient  Taken 2/28/2022 2200 by Monica Norris RN  Activity Management: (RN staying in room with pt)   activity adjusted per tolerance   bedrest  Taken 2/28/2022 2100 by Monica Norris RN  Activity Management: (RN staying in room with pt)   activity adjusted per tolerance   bedrest  Taken 2/28/2022 2000 by Monica Norris RN  Activity Management: (RN staying in room with pt)   activity adjusted per tolerance   bedrest  Taken 2/28/2022 1907 by Monica Norris RN  Activity Management: (Rn inroom with pt)   activity adjusted per tolerance   bedrest   other (see comments)  Breathing Techniques/Airway Clearance: deep/controlled cough encouraged  Cough And Deep  Breathing: done independently per patient  Intervention: Optimize Oxygenation and Ventilation  Recent Flowsheet Documentation  Taken 2/28/2022 2300 by Monica Norris RN  Head of Bed ([facility]): [facility] at 30-45 degrees  Airway/Ventilation Management:   calming measures promoted   pulmonary hygiene promoted  Taken 2/28/2022 1907 by Monica Norris RN  Head of Bed ([facility]): [facility] at 30-45 degrees  Airway/Ventilation Management:   calming measures promoted   pulmonary hygiene promoted     Problem: Respiratory Compromise (Pneumonia)  Goal: Effective Oxygenation and Ventilation  Outcome: Ongoing, Not Progressing  Intervention: Promote Airway Secretion Clearance  Recent Flowsheet Documentation  Taken 2/28/2022 2300 by Monica Norris RN  Breathing Techniques/Airway Clearance: deep/controlled cough encouraged  Cough And Deep Breathing: done independently per patient  Taken 2/28/2022 1907 by Monica Norris RN  Breathing Techniques/Airway Clearance: deep/controlled cough encouraged  Cough And Deep Breathing: done independently per patient  Intervention: Optimize Oxygenation and Ventilation  Recent Flowsheet Documentation  Taken 2/28/2022 2300 by Monica Norris RN  Head of Bed ([facility]): [facility] at 30-45 degrees  Airway/Ventilation Management:   calming measures promoted   pulmonary hygiene promoted  Taken 2/28/2022 1907 by Monica Norris RN  Head of Bed ([facility]): [facility] at 30-45 degrees  Airway/Ventilation Management:   calming measures promoted   pulmonary hygiene promoted     Problem: Fall Injury Risk  Goal: Absence of Fall and Fall-Related Injury  Outcome: Ongoing, Not Progressing  Intervention: Identify and Manage Contributors to Fall Injury Risk  Recent Flowsheet Documentation  Taken 2/28/2022 2300 by Monica Norris RN  Medication Review/Management: medications reviewed  Taken 2/28/2022 1907 by Monica Norris RN  Medication Review/Management: medications reviewed  Intervention: Promote Injury-Free Environment  Recent Flowsheet Documentation  Taken 2/28/2022  2300 by Monica Norrsi RN  Safety Promotion/Fall Prevention: (RN staying in room with pt)   activity supervised   assistive device/personal items within reach   clutter free environment maintained   fall prevention program maintained   lighting adjusted   nonskid shoes/slippers when out of bed   room organization consistent   safety round/check completed   toileting scheduled  Taken 2/28/2022 2200 by Monica Norris RN  Safety Promotion/Fall Prevention: (RN staying in room with pt)   activity supervised   assistive device/personal items within reach   clutter free environment maintained   fall prevention program maintained   lighting adjusted   nonskid shoes/slippers when out of bed   room organization consistent   safety round/check completed   toileting scheduled  Taken 2/28/2022 2100 by Monica Norris RN  Safety Promotion/Fall Prevention: (RN staying in room with pt)   activity supervised   assistive device/personal items within reach   clutter free environment maintained   fall prevention program maintained   lighting adjusted   nonskid shoes/slippers when out of bed   room organization consistent   safety round/check completed   toileting scheduled   other (see comments)  Taken 2/28/2022 2000 by Monica Norris RN  Safety Promotion/Fall Prevention: (RN staying in room with pt)   activity supervised   assistive device/personal items within reach   clutter free environment maintained   fall prevention program maintained   lighting adjusted   nonskid shoes/slippers when out of bed   room organization consistent   safety round/check completed   toileting scheduled   other (see comments)  Taken 2/28/2022 1907 by Monica Norris RN  Safety Promotion/Fall Prevention: (RN staying with pt in room)   activity supervised   assistive device/personal items within reach   clutter free environment maintained   fall prevention program maintained   lighting adjusted   nonskid shoes/slippers when out of bed   room organization  consistent   safety round/check completed   toileting scheduled   other (see comments)     Problem: Hypertension Comorbidity  Goal: Blood Pressure in Desired Range  Outcome: Ongoing, Progressing  Intervention: Maintain Hypertension-Management Strategies  Recent Flowsheet Documentation  Taken 2/28/2022 2300 by Monica Norris RN  Medication Review/Management: medications reviewed  Taken 2/28/2022 1907 by Monica Norris RN  Medication Review/Management: medications reviewed     Problem: Nutrition Impairment (Mechanical Ventilation, Invasive)  Goal: Optimal Nutrition Delivery  Outcome: Ongoing, Progressing     Problem: Glycemic Control Impaired (Sepsis/Septic Shock)  Goal: Blood Glucose Level Within Desired Range  Outcome: Ongoing, Progressing  Intervention: Optimize Glycemic Control  Recent Flowsheet Documentation  Taken 2/28/2022 2300 by Monica Nroris RN  Glycemic Management:   blood glucose monitoring   insulin dose matched to carbohydrate intake  Taken 2/28/2022 1907 by Monica Norris RN  Glycemic Management:   blood glucose monitoring   insulin dose matched to carbohydrate intake     Problem: Nutrition Impaired (Sepsis/Septic Shock)  Goal: Optimal Nutrition Intake  Outcome: Ongoing, Progressing     Problem: Fluid Imbalance (Pneumonia)  Goal: Fluid Balance  Outcome: Ongoing, Progressing  Intervention: Monitor and Manage Fluid Balance  Recent Flowsheet Documentation  Taken 2/28/2022 2300 by Monica Norris RN  Fluid/Electrolyte Management: fluids provided  Taken 2/28/2022 1907 by Monica Norris RN  Fluid/Electrolyte Management: fluids provided     Problem: Skin Injury Risk Increased  Goal: Skin Health and Integrity  Outcome: Ongoing, Progressing  Intervention: Optimize Skin Protection  Recent Flowsheet Documentation  Taken 2/28/2022 2300 by Monica Norris RN  Pressure Reduction Techniques:   frequent weight shift encouraged   heels elevated off bed   pressure points protected   weight shift assistance provided  Head of Bed  (HOB): HOB at 30-45 degrees  Pressure Reduction Devices:   specialty bed utilized   pressure-redistributing mattress utilized  Skin Protection:   adhesive use limited   incontinence pads utilized   protective footwear used   transparent dressing maintained   tubing/devices free from skin contact  Taken 2/28/2022 2200 by Monica Norris RN  Pressure Reduction Techniques:   frequent weight shift encouraged   heels elevated off bed   pressure points protected   weight shift assistance provided  Pressure Reduction Devices:   specialty bed utilized   pressure-redistributing mattress utilized  Skin Protection:   adhesive use limited   incontinence pads utilized   protective footwear used   transparent dressing maintained   tubing/devices free from skin contact  Taken 2/28/2022 2100 by Monica Norris RN  Pressure Reduction Techniques:   frequent weight shift encouraged   heels elevated off bed   pressure points protected   weight shift assistance provided  Pressure Reduction Devices:   specialty bed utilized   pressure-redistributing mattress utilized  Skin Protection:   adhesive use limited   incontinence pads utilized   protective footwear used   transparent dressing maintained   tubing/devices free from skin contact  Taken 2/28/2022 2000 by Monica Norris RN  Pressure Reduction Techniques:   frequent weight shift encouraged   heels elevated off bed   pressure points protected   weight shift assistance provided  Pressure Reduction Devices:   specialty bed utilized   pressure-redistributing mattress utilized  Skin Protection:   adhesive use limited   incontinence pads utilized   transparent dressing maintained   tubing/devices free from skin contact   protective footwear used  Taken 2/28/2022 1907 by Monica Norris RN  Pressure Reduction Techniques:   weight shift assistance provided   pressure points protected   heels elevated off bed   frequent weight shift encouraged  Head of Bed (HOB): HOB at 30-45 degrees  Pressure  Reduction Devices:   specialty bed utilized   pressure-redistributing mattress utilized  Skin Protection:   adhesive use limited   incontinence pads utilized   protective footwear used   transparent dressing maintained   tubing/devices free from skin contact     Problem: Device-Related Complication Risk (Mechanical Ventilation, Invasive)  Goal: Optimal Device Function  Outcome: Met  Intervention: Optimize Device Care and Function  Recent Flowsheet Documentation  Taken 2/28/2022 2300 by Monica Norris RN  Aspiration Precautions:   awake/alert before oral intake   distractions minimized during oral intake   oral hygiene care promoted   upright posture maintained  Airway Safety Measures:   suction at bedside   manual resuscitator/mask/valve in room  Taken 2/28/2022 2200 by Monica Norris RN  Airway Safety Measures:   suction at bedside   manual resuscitator/mask/valve in room  Taken 2/28/2022 2100 by Monica Norris RN  Airway Safety Measures:   suction at bedside   manual resuscitator/mask/valve in room  Taken 2/28/2022 2000 by Monica Norris RN  Airway Safety Measures: manual resuscitator/mask/valve in room  Taken 2/28/2022 1907 by Monica Norris RN  Aspiration Precautions:   awake/alert before oral intake   distractions minimized during oral intake   upright posture maintained   oral hygiene care promoted  Airway Safety Measures:   suction at bedside   manual resuscitator/mask/valve in room     Problem: Inability to Wean (Mechanical Ventilation, Invasive)  Goal: Mechanical Ventilation Liberation  Outcome: Met  Intervention: Promote Extubation and Mechanical Ventilation Liberation  Recent Flowsheet Documentation  Taken 2/28/2022 2300 by Monica Norris RN  Environmental Support:   calm environment promoted   distractions minimized   rest periods encouraged   environmental consistency promoted   personal routine supported  Sleep/Rest Enhancement:   awakenings minimized   consistent schedule promoted   natural light exposure  provided   noise level reduced   regular sleep/rest pattern promoted   room darkened  Medication Review/Management: medications reviewed  Taken 2/28/2022 1907 by Monica Norris RN  Environmental Support:   calm environment promoted   distractions minimized  Sleep/Rest Enhancement:   awakenings minimized   consistent schedule promoted   noise level reduced   regular sleep/rest pattern promoted   room darkened  Medication Review/Management: medications reviewed     Problem: Ventilator-Induced Lung Injury (Mechanical Ventilation, Invasive)  Goal: Absence of Ventilator-Induced Lung Injury  Outcome: Met  Intervention: Prevent Ventilator-Associated Pneumonia  Recent Flowsheet Documentation  Taken 2/28/2022 2300 by Monica Norris, RN  Head of Bed (HOB): HOB at 30-45 degrees  VAP Prevention Bundle:   HOB elevation maintained   oral care regularly provided   VTE prophylaxis provided  VAP Prevention Contraindications: (lovenox) --  Taken 2/28/2022 1907 by Monica Norris, RN  Head of Bed (HOB): HOB at 30-45 degrees  VAP Prevention Bundle:   HOB elevation maintained   oral care regularly provided   VTE prophylaxis provided  VAP Prevention Contraindications: (lovenox) other (see comments)  Oral Care:   lip lubricant applied   swabbed with antiseptic solution   Goal Outcome Evaluation:  Plan of Care Reviewed With: patient        Progress: no change  Outcome Summary: intubated and sedated upon arrival to ICU

## 2022-03-01 NOTE — PROGRESS NOTES
Had a lengthy discussion with patient wife and the patient himself at bedside regarding his overall prognosis which remains very poor.  We discussed the fact that he is requiring high levels of oxygen which are not compatible with transfer to rehab facility nursing facility or discharged home, even with tracheostomy placement if that were possible to sustain him which at this time it is not.  I told them I have discussed the case with pulmonology and they feel prognosis is grim as well.  I have encouraged them to consider hospice care to assist in end-of-life care.  Both patient and patient's wife expressed understanding of his condition, and informed me that they would talk about their goals of care, and let me know if they were agreeable to hospice.

## 2022-03-01 NOTE — CASE MANAGEMENT/SOCIAL WORK
Continued Stay Note  DILAN Ward     Patient Name: Dontae Bonds Jr.  MRN: [id number]  Today's Date: 3/1/2022    Admit Date: 2/21/2022     Discharge Plan     Row Name 03/01/22 1330       Plan    Plan Home with HH vs STR    Plan Comments Call received from patient's primary nurse Estefany who states patient wanted to talk with CM regarding his oxygen needs. CM followed up with patient who is currently sitting up in the chair and remains on 50L HHF of oxygen. Patient states he has several portable tanks and a concentrator at home and is asking if there was a concentrator that went up to the amount of oxygen he is on here in the hospital and CM informed him that there was not. CM also informed him that with the amount of oxygen he is on here at the hospital that it would be impossible to safely return home at this time and he verbalized understanding. He was also concerned about his insurance continuing to pay for his oxygen and CM informed him that as long as he had a medical necessity for the oxygen they will continue to pay. Patient had no other questions or concerns regarding discharge plans. CM will continue to follow for needs.               Discharge Codes    No documentation.                     Toma Marc RN

## 2022-03-01 NOTE — PROGRESS NOTES
Adult Nutrition  Assessment/PES    Patient Name:  Dontae Bonds Jr.  YOB: 1950  MRN: [id number]  Admit Date:  2/21/2022    Assessment Date:  3/1/2022    Comments:  Pt tolerating diet with good po intake.  Recommend:  Boost Plus supplement at meals 3 times daily.     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 accuracy as wt 2.28.22 noted as 165.2 lb,            Admit Weight    Admit Weight Method --  169.3 lb                Labs/Tests/Procedures/Meds     Row Name 03/01/22 1624          Labs/Procedures/Meds    Lab Results Reviewed reviewed            Medications    Pertinent Medications Reviewed reviewed                    Nutrition Prescription Ordered     Row Name 03/01/22 1624          Nutrition Prescription PO    Current PO Diet Soft Texture     Texture Chopped     Other Modifiers Small Feedings            Nutrition Prescription EN    Enteral Route --  tube feeding has been discontinued                Evaluation of Received Nutrient/Fluid Intake     Row Name 03/01/22 1625          PO Evaluation    Number of Meals 2     % PO Intake 100%, 75%                     Problem/Interventions:   Problem 1     Row Name 03/01/22 1626          Nutrition Diagnoses Problem 1    Problem 1 Inadequate Nutrient Intake     Etiology (related to) Factors Affecting Nutrition     Signs/Symptoms (evidenced by) Other (comment)     Resolved? Yes  pt no longer NPO/tolerating po diet                Problem 2     Row Name 03/01/22 1626          Nutrition Diagnoses Problem 2    Problem 2 Swallowing Difficulty      Etiology (related to) Medical Diagnosis     Signs/Symptoms (evidenced by) SLP/Swallow eval     Swallow eval status Done                    Intervention Goal     Row Name 03/01/22 1627          Intervention Goal    PO Establish PO; PO intake (%)     PO Intake % 50 %  or greater of meals/any supplements ordered                Nutrition Intervention     Row Name 03/01/22 1627          Nutrition Intervention    RD/Tech Action Follow Tx progress                  Education/Evaluation     Row Name 03/01/22 1627          Education    Education Other (comment)  education on diet by SLP            Monitor/Evaluation    Monitor I&O; PO intake; Pertinent labs; Weight; Skin status                 Electronically signed by:  Araseli Wick RD  03/01/22 16:28 EST

## 2022-03-01 NOTE — CASE MANAGEMENT/SOCIAL WORK
"Continued Stay Note  DILAN Ward     Patient Name: Dontae Bonds Jr.  MRN: [id number]  Today's Date: 3/1/2022    Admit Date: 2/21/2022     Discharge Plan     Row Name 03/01/22 1016       Plan    Plan Home with HH vs STR    Plan Comments Call received from Araseli Bonds patient's wife and she states that she would like Dr. Gibson know that since her  stated he wanted a trach she has decided to have one placed. CM informed her that I would let Dr. Gibson know of her decision and she states \"can you have him call me, I have a list of questions I want to ask him\". CM let Dr. Gibson of wife's decision regarding the Trach and that she would like to speak with him and he verbalized understanding. Per morning huddle patient's primary nurse states she had to increase his oxygen and that he now has a sitter. CM will continue to follow for needs.               Discharge Codes    No documentation.                     Toma Marc RN    "

## 2022-03-01 NOTE — THERAPY TREATMENT NOTE
Acute Care - Speech Language Pathology   Swallow Treatment Note DILAN Ward     Patient Name: Dontae Bonds Jr.  : 1950  MRN: [id number]  Today's Date: 3/1/2022               Admit Date: 2022    Visit Dx:     ICD-10-CM ICD-9-CM   1. Pneumonia of both lungs due to infectious organism, unspecified part of lung  J18.9 483.8   2. Acute respiratory failure without hypercapnia (HCC)  J96.00 518.81     Patient Active Problem List   Diagnosis   • Personal history of colonic polyps   • Family history of colon cancer   • Pneumonia due to COVID-19 virus   • Pneumonia of both lungs due to infectious organism   • Hypertension   • Hypokalemia     Past Medical History:   Diagnosis Date   • Hyperlipidemia    • Hypertension      History reviewed. No pertinent surgical history.    SLP Recommendation and Plan     SLP Diet Recommendation: soft textures, chopped, thin liquids, nutritional supplements needed, other (see comments) (smal meals w/snacks/supplements; rec half size portions to start off) (22 1300)  Recommended Precautions and Strategies: upright posture during/after eating, small bites of food and sips of liquid, no straw, general aspiration precautions, reflux precautions, fatigue precautions (22 1300)  SLP Rec. for Method of Medication Administration: meds whole, with pudding or applesauce, as tolerated (22 1300)     Monitor for Signs of Aspiration: yes, elevated WBC count, gurgly voice, upper respiratory (22 1300)        Anticipated Discharge Disposition (SLP): skilled nursing facility (22 1300)     Therapy Frequency (Swallow): daily, at least, 5 days per week (22 1300)  Predicted Duration Therapy Intervention (Days): 1 week, until discharge (22 1300)     Daily Summary of Progress (SLP): progress toward functional goals is good (22 1300)         Patient/Family Concerns, Anticipated Discharge Disposition (SLP): Pt does not report any concerns at this time. (22  1300)     Treatment Assessment (SLP): Pt reports tolerance of his diet without any concerns. RN, reports he has eaten all of his food at each meal today. Pt able to demonstrate oral prepping and small drinks with consistent verbal prompts from SLP. Education regarding MBS reviewed with both pt and RN. (03/01/22 1300)  Plan for Continued Treatment (SLP): continue treatment per plan of care (03/01/22 1300)         SWALLOW EVALUATION (last 72 hours)     SLP Adult Swallow Evaluation     Row Name 03/01/22 1300 02/28/22 1300                Rehab Evaluation    Document Type therapy note (daily note)  -AD evaluation  MBS  -AD       Subjective Information no complaints  -AD no complaints  -AD       Patient Observations alert; cooperative; agree to therapy  -AD alert; cooperative  -AD       Patient/Family/Caregiver Comments/Observations -- Pt seen upright in video imaging chair for MBS. RN and RT present during the session due to need for continuous high flow oxygen during the study.  -AD       Patient Effort good  -AD good  -AD       Symptoms Noted During/After Treatment other (see comments)  -AD significant change in vital signs  -AD       Symptoms Noted, Comment Pt with cough after attempting to stand for CXR board placement. Pt with decrease in O2 sats at that time to 82-85. RN, Estefany, present and aware. Session was ended at that time.  -AD Drop in O2 after initial trial to 85 but quickly rebounds to 90 w/deep breaths and use of non-rebreather. Non rebreather applied in between trials initially and then no longer needed midway through the study.  -AD                General Information    Patient Profile Reviewed -- yes  -AD       Pertinent History Of Current Problem -- No changes in hx. Improved alertness and pt pulled out Dobhoff tube last night. MBS ordered due to improved alertness and need for nutrition. Initial history as follows: Pt is a 72 y/o male admitted for acute hypoxemic respiratory failure with secondary  bacterial PNA, s/p covid PNA/fibrosis, hx of daily alcohol use and currently on withdrawal protocol, post covid syndrome, anemia and metabolic encephalopathy. Pt intubated on 2/21/22 and extubated on 2/24/22. Per report, passed RN Post Extubation Screen and started on full liquid diet. Concerns for aspiration and pulmonology noting he is 'marginal; this morning and ordered a follow up clinical swallow evaluation and MBS/VFSS if needed. Pt also known to me from prior admission. No difficulty swallowing reported at that time. Cognition with moderate deficits/decline at that time. 24 hour care was recommended at that time.  -AD       Current Method of Nutrition -- NPO; no current diet order  -AD       Precautions/Limitations, Vision -- WFL; for purposes of eval  -AD       Precautions/Limitations, Hearing -- WFL; for purposes of eval  -AD       Prior Level of Function-Communication -- cognitive-linguistic impairment  -AD       Prior Level of Function-Swallowing -- no diet consistency restrictions  -AD       Plans/Goals Discussed with -- patient; agreed upon  -AD       Barriers to Rehab -- cognitive status; medically complex  -AD       Patient's Goals for Discharge -- patient did not state  -AD                Pain    Additional Documentation --  no pain reported  -AD --  no current pain reported  -AD                Oral Musculature and Cranial Nerve Assessment    Oral Motor General Assessment -- generalized oral motor weakness  -AD       Lingual Impairment, Detail. Cranial Nerves IX, XII (Glossopharyngeal and Hypoglossal) -- reduced strength; bilaterally  -AD       Vocal Impairment, Detail. Cranial Nerve X (Vagus) -- impaired throat clear/cough (see comments)  -AD       Oral Motor, Comment -- Pt with generalized weakness but no focal deficits. Impaired cough, nonproductive.  -AD                General Eating/Swallowing Observations    Respiratory Support Currently in Use -- high-flow nasal cannula  RT present and  titrating/monitoring  -AD       Eating/Swallowing Skills -- fed by SLP  -AD       Positioning During Eating -- upright 90 degree; upright in chair  -AD       Pre SpO2 (%) -- 90  -AD       Post SpO2 (%) -- 88  drops to 85 at one time but quickly rebounds  -AD                Respiratory    Respiratory Status -- increase in respiratory rate; reduction in SpO2; during swallowing/eating  rate slows and O2 rebounds after about 45 seconds to 1 minute  -AD       Respiratory Pattern -- inhale-exhale pattern  -AD       Date of Intubation -- 1/21/2022  extubated 1/24/2022  -AD                MBS/VFSS    Utensils Used -- spoon; cup; straw  -AD       Consistencies Trialed -- regular textures; pureed; thin liquids; nectar/syrup-thick liquids; honey-thick liquids  -AD                MBS/VFSS Interpretation    Oral Prep Phase -- WFL  -AD       Oral Transit Phase -- impaired  -AD       Oral Residue -- WFL  -AD                Oral Transit Phase    Impaired Oral Transit Phase -- tongue pumping; piecemeal oral transit; other (see comments)  spill of all consistencies over the back of the tongue  -AD       Tongue Pumping -- pudding/puree; secondary to reduced lingual control  -AD       Piecemeal Oral Transit -- nectar-thick liquids; secondary to reduced lingual control; discoordination of lingual movement  -AD       Oral Transit Phase, Comment -- Mild deficits with decreased bolus control resulting in tongue pumping on puree, piecemeal transit of nectar thick liqiuds and spill of all consistencies over the back of the tongue. All due to decreased lingual control and coordination. Fatigue may also be a factor.  -AD                Initiation of Pharyngeal Swallow    Initiation of Pharyngeal Swallow -- bolus in valleculae; bolus in pyriform sinuses  -AD       Pharyngeal Phase -- impaired pharyngeal phase of swallowing  -AD       Rosenbek's Scale -- thin:; nectar:; honey:; pudding/puree:; regular textures:; all consistencies:; 1--->level 1   -AD       Pharyngeal Residue -- pudding/puree; regular textures; valleculae; pyriform sinuses; nectar-thick liquids; base of tongue; secondary to reduced base of tongue retraction; other (see comments)  -AD       Response to Residue -- cleared residue with cued swallow; cleared residue with liquid wash  -AD       Attempted Compensatory Maneuvers -- bolus size  -AD       Response to Attempted Compensatory Maneuvers -- other (see comments)  did not improve timing  or control of thin bolus. Pt unable to demonstrate small cup drink.  -AD       Pharyngeal Phase, Comment -- Pt with mild pharyngeal dysphagia with delay of swallow noted with bolus in the valleculae on all consistencies trialed and thins to the level of the pyriforms. Delay of swallow ranges from 2-3 seconds on all liquids, 2 seconds on puree and upt ot 5 seconds on solids with material on the base of tongue. Mild to moderate residue on puree and solids in the valleculae and pyriforms. Mild base of tongue residue on nectar thick liquids. Able to clear all with cued swallows. Thin wash also partially effective in clearing solid residue. Residue felt to be related to mildly decreased base of tongue retraction/decreased pharyngeal squeeze. Fatigue also appears to be a factor. No penetration or aspiration was seen on any trial or consistency.  -AD                SLP Evaluation Clinical Impression    SLP Swallowing Diagnosis -- mild; oral dysphagia; pharyngeal dysphagia  -AD       Functional Impact -- risk of aspiration/pneumonia; risk of malnutrition  -AD       Rehab Potential/Prognosis, Swallowing -- adequate, monitor progress closely  -AD       Swallow Criteria for Skilled Therapeutic Interventions Met -- demonstrates skilled criteria  -AD                SLP Treatment Clinical Impressions    Treatment Assessment (SLP) Pt reports tolerance of his diet without any concerns. RN, reports he has eaten all of his food at each meal today. Pt able to demonstrate oral  prepping and small drinks with consistent verbal prompts from SLP. Education regarding MBS reviewed with both pt and RN.  -AD --       Daily Summary of Progress (SLP) progress toward functional goals is good  -AD progress toward functional goals as expected  -AD       Barriers to Overall Progress (SLP) Medically complex  -AD Cognitive status; Medically complex  -AD       Plan for Continued Treatment (SLP) continue treatment per plan of care  -AD continue treatment per plan of care  -AD       Care Plan Review evaluation/treatment results reviewed; care plan/treatment goals reviewed; risks/benefits reviewed; current/potential barriers reviewed; patient/other agree to care plan  -AD evaluation/treatment results reviewed; care plan/treatment goals reviewed; risks/benefits reviewed; current/potential barriers reviewed; patient/other agree to care plan  -AD       Care Plan Review, Other Participant(s) other (see comments)  Estefany TABOR  -VICTOR HUGO other (see comments)  Emir TABOR MD, Dr. Gibson  -AD                Recommendations    Therapy Frequency (Swallow) daily; at least; 5 days per week  -AD daily; at least; 5 days per week  -AD       Predicted Duration Therapy Intervention (Days) 1 week; until discharge  -AD 1 week; until discharge  -AD       SLP Diet Recommendation soft textures; chopped; thin liquids; nutritional supplements needed; other (see comments)  smal meals w/snacks/supplements; rec half size portions to start off  -AD soft textures; chopped; thin liquids; nutritional supplements needed; other (see comments)  smal meals w/snacks/supplements; rec half size portions to start off  -AD       Recommended Precautions and Strategies upright posture during/after eating; small bites of food and sips of liquid; no straw; general aspiration precautions; reflux precautions; fatigue precautions  -AD upright posture during/after eating; small bites of food and sips of liquid; no straw; general aspiration precautions; reflux  precautions; fatigue precautions  -AD       Oral Care Recommendations Oral Care before breakfast, after meals and PRN; Toothbrush  -AD Oral Care before breakfast, after meals and PRN; Toothbrush  -AD       SLP Rec. for Method of Medication Administration meds whole; with pudding or applesauce; as tolerated  -AD meds whole; with pudding or applesauce; as tolerated  -AD       Monitor for Signs of Aspiration yes; elevated WBC count; gurgly voice; upper respiratory  -AD yes; elevated WBC count; gurgly voice; upper respiratory  -AD       Anticipated Discharge Disposition (SLP) skilled nursing facility  -AD skilled nursing facility  -AD       Patient/Family Concerns, Anticipated Discharge Disposition (SLP) Pt does not report any concerns at this time.  -AD Pt does not report any at this time  -AD                Swallow Goals (SLP)    Oral Nutrition/Hydration Goal Selection (SLP) -- oral nutrition/hydration, SLP goal (free text)  -AD       Swallow Management Recall Goal Selection (SLP) -- swallow management recall, SLP goal 1  -AD       Swallow Compensatory Strategies Goal Selection (SLP) -- swallow compensatory strategies, SLP goal 1  -AD       Additional Documentation -- swallow management recall goal selection (SLP); swallow compensatory strategies goal selection (SLP)  -AD                Oral Nutrition/Hydration Goal 1 (SLP)    Oral Nutrition/Hydration Goal 1, SLP -- Pt will be able to demonstrate improved arousal/alertness to participate in a reassessment of his swallowing via clinical swallow eval.  -AD       Time Frame (Oral Nutrition/Hydration Goal 1, SLP) -- short term goal (STG); 2 days  -AD       Barriers (Oral Nutrition/Hydration Goal 1, SLP) -- medically complex  -AD       Progress/Outcomes (Oral Nutrition/Hydration Goal 1, SLP) -- goal met  -AD                Oral Nutrition/Hydration Goal 2 (SLP)    Oral Nutrition/Hydration Goal 2, SLP -- Pt will be able to participate in a MBS to further assess pharyngeal  phase of swallowing in order to make least restrictive, safe diet recommendations.  -AD       Time Frame (Oral Nutrition/Hydration Goal 2, SLP) -- short term goal (STG); 1 week  -AD       Barriers (Oral Nutrition/Hydration Goal 2, SLP) -- medically complex; decreased arousal/alertness level.  -AD       Progress/Outcomes (Oral Nutrition/Hydration Goal 2, SLP) -- goal met  -AD                Oral Nutrition/Hydration Goal (SLP)    Oral Nutrition/Hydration Goal, SLP Pt will demonstrate tolerance of the least restrictive diet w/o s/s of aspiration or complications such as aspiration pneumonia.  -AD Pt will demonstrate tolerance of the least restrictive diet w/o s/s of aspiration or complications such as aspiration pneumonia.  -AD       Time Frame (Oral Nutrition/Hydration Goal, SLP) short term goal (STG); 1 week; by discharge  -AD short term goal (STG); 1 week; by discharge  -AD       Barriers (Oral Nutrition/Hydration Goal, SLP) medically complex; cognitive status  -AD medically complex; cognitive status  -AD       Progress/Outcomes (Oral Nutrition/Hydration Goal, SLP) good progress toward goal; goal ongoing  Good po intake of both foods and liquids. No overt s/s noted or reported. RN reports coughing in morning after drinking his coffee. Cough similar to other coughing episodes.  -AD other (see comments)  new  -AD                Swallow Management Recall Goal 1 (SLP)    Activity (Swallow Management Recall Goal 1, SLP) recall of; safe diet level/texture; compensatory swallow strategies/techniques  -AD recall of; safe diet level/texture; compensatory swallow strategies/techniques  -AD       [place]/Accuracy (Swallow Management Recall Goal 1, SLP) with moderate cues (50-74% accuracy)  -AD with moderate cues (50-74% accuracy)  -AD       Time Frame (Swallow Management Recall Goal 1, SLP) short term goal (STG); 1 week; by discharge  -AD short term goal (STG); 1 week; by discharge  -AD       Barriers (Swallow Management  Recall Goal 1, SLP) medically complex; cognitive status  -AD medically complex; cognitive status  -AD       Progress/Outcomes (Swallow Management Recall Goal 1, SLP) good progress toward goal; goal ongoing  able to verbalize after model diet level and use of small drinks. Reinforcement needed.  -AD other (see comments)  new  -AD                Swallow Compensatory Strategies Goal 1 (SLP)    Activity (Swallow Compensatory Strategies/Techniques Goal 1, SLP) compensatory strategies; during meal intake; during p.o. trials; small cup sips; extra swallow per bolus; other (see comments)  prepping prior to swallowing  -AD compensatory strategies; during meal intake; during p.o. trials; small cup sips; extra swallow per bolus; other (see comments)  prepping priorto swallowing  -AD       [place]/Accuracy (Swallow Compensatory Strategies/Techniques Goal 1, SLP) with moderate cues (50-74% accuracy)  -AD with moderate cues (50-74% accuracy)  -AD       Time Frame (Swallow Compensatory Strategies/Techniques Goal 1, SLP) short term goal (STG); 1 week; by discharge  -AD short term goal (STG); 1 week; by discharge  -AD       Barriers (Swallow Compensatory Strategies/Techniques Goal 1, SLP) medically complex; cognitive status  -AD medically complex; cognitive status  -AD       Progress/Outcomes (Swallow Compensatory Strategies/Techniques Goal 1, SLP) good progress toward goal; goal ongoing  able to demonstrate prepping after instruction on 3/4 trials w/inconsistent cues. Had to discontinued due to xray and coughing after physical exertion.  -AD other (see comments)  new  -AD             User Key  (r) = Recorded By, (t) = Taken By, (c) = Cosigned By    Initials Name Effective Dates    Daniela Burgess MS CCC-SLP 06/16/21 -                 EDUCATION  The patient has been educated in the following areas:   Dysphagia (Swallowing Impairment) Modified Diet Instruction MBS results, compensatory strategies. Pt and RNEstefany,  verbalize understanding. Reinforcement needed by patient for consistent use of slow rate, small bite/drink size and prepping.         SLP GOALS     Row Name 03/01/22 1300 02/28/22 1300          Oral Nutrition/Hydration Goal 1 (SLP)    Oral Nutrition/Hydration Goal 1, SLP -- Pt will be able to demonstrate improved arousal/alertness to participate in a reassessment of his swallowing via clinical swallow eval.  -AD     Time Frame (Oral Nutrition/Hydration Goal 1, SLP) -- short term goal (STG); 2 days  -AD     Barriers (Oral Nutrition/Hydration Goal 1, SLP) -- medically complex  -AD     Progress/Outcomes (Oral Nutrition/Hydration Goal 1, SLP) -- goal met  -AD            Oral Nutrition/Hydration Goal 2 (SLP)    Oral Nutrition/Hydration Goal 2, SLP -- Pt will be able to participate in a MBS to further assess pharyngeal phase of swallowing in order to make least restrictive, safe diet recommendations.  -AD     Time Frame (Oral Nutrition/Hydration Goal 2, SLP) -- short term goal (STG); 1 week  -AD     Barriers (Oral Nutrition/Hydration Goal 2, SLP) -- medically complex; decreased arousal/alertness level.  -AD     Progress/Outcomes (Oral Nutrition/Hydration Goal 2, SLP) -- goal met  -AD            Oral Nutrition/Hydration Goal (SLP)    Oral Nutrition/Hydration Goal, SLP Pt will demonstrate tolerance of the least restrictive diet w/o s/s of aspiration or complications such as aspiration pneumonia.  -AD Pt will demonstrate tolerance of the least restrictive diet w/o s/s of aspiration or complications such as aspiration pneumonia.  -AD     Time Frame (Oral Nutrition/Hydration Goal, SLP) short term goal (STG); 1 week; by discharge  -AD short term goal (STG); 1 week; by discharge  -AD     Barriers (Oral Nutrition/Hydration Goal, SLP) medically complex; cognitive status  -AD medically complex; cognitive status  -AD     Progress/Outcomes (Oral Nutrition/Hydration Goal, SLP) good progress toward goal; goal ongoing  Good po intake of  both foods and liquids. No overt s/s noted or reported. RN reports coughing in morning after drinking his coffee. Cough similar to other coughing episodes.  -AD other (see comments)  new  -AD            Swallow Management Recall Goal 1 (SLP)    Activity (Swallow Management Recall Goal 1, SLP) recall of; safe diet level/texture; compensatory swallow strategies/techniques  -AD recall of; safe diet level/texture; compensatory swallow strategies/techniques  -AD     [place]/Accuracy (Swallow Management Recall Goal 1, SLP) with moderate cues (50-74% accuracy)  -AD with moderate cues (50-74% accuracy)  -AD     Time Frame (Swallow Management Recall Goal 1, SLP) short term goal (STG); 1 week; by discharge  -AD short term goal (STG); 1 week; by discharge  -AD     Barriers (Swallow Management Recall Goal 1, SLP) medically complex; cognitive status  -AD medically complex; cognitive status  -AD     Progress/Outcomes (Swallow Management Recall Goal 1, SLP) good progress toward goal; goal ongoing  able to verbalize after model diet level and use of small drinks. Reinforcement needed.  -AD other (see comments)  new  -AD            Swallow Compensatory Strategies Goal 1 (SLP)    Activity (Swallow Compensatory Strategies/Techniques Goal 1, SLP) compensatory strategies; during meal intake; during p.o. trials; small cup sips; extra swallow per bolus; other (see comments)  prepping prior to swallowing  -AD compensatory strategies; during meal intake; during p.o. trials; small cup sips; extra swallow per bolus; other (see comments)  prepping priorto swallowing  -AD     [place]/Accuracy (Swallow Compensatory Strategies/Techniques Goal 1, SLP) with moderate cues (50-74% accuracy)  -AD with moderate cues (50-74% accuracy)  -AD     Time Frame (Swallow Compensatory Strategies/Techniques Goal 1, SLP) short term goal (STG); 1 week; by discharge  -AD short term goal (STG); 1 week; by discharge  -AD     Barriers (Swallow Compensatory  Strategies/Techniques Goal 1, SLP) medically complex; cognitive status  -AD medically complex; cognitive status  -AD     Progress/Outcomes (Swallow Compensatory Strategies/Techniques Goal 1, SLP) good progress toward goal; goal ongoing  able to demonstrate prepping after instruction on 3/4 trials w/inconsistent cues. Had to discontinued due to xray and coughing after physical exertion.  -AD other (see comments)  new  -AD           User Key  (r) = Recorded By, (t) = Taken By, (c) = Cosigned By    Initials Name Provider Type    Daniela Burgess MS CCC-SLP Speech and Language Pathologist                   Time Calculation:    Time Calculation- SLP     Row Name 03/01/22 1549             Time Calculation- SLP    SLP Start Time 1240  -AD      SLP Stop Time 1300  -AD      SLP Time Calculation (min) 20 min  -AD      Total Timed Code Minutes- SLP 0 minute(s)  -AD      SLP Non-Billable Time (min) 5 min  portable xray being completed.  -AD      SLP Received On 03/01/22  -AD              Untimed Charges    [id number] Treatment Swallow Minutes 15  -AD              Total Minutes    Untimed Charges Total Minutes 15  -AD       Total Minutes 15  -AD            User Key  (r) = Recorded By, (t) = Taken By, (c) = Cosigned By    Initials Name Provider Type    Daniela Burgess MS CCC-SLP Speech and Language Pathologist                Therapy Charges for Today     Code Description Service Date Service Provider Modifiers Qty    02189294649 HC ST MOTION FLUORO EVAL SWALLOW 7 2/28/2022 Daniela Robertson MS CCC-SLP GN 1    [id number] HC ST TREATMENT SWALLOW 1 3/1/2022 Daniela Robertson MS CCC-SLP GN 1               Daniela Robertson MS CCC-SLP  3/1/2022

## 2022-03-02 LAB
ALBUMIN SERPL-MCNC: 3.1 G/DL (ref 3.5–5.2)
ALBUMIN/GLOB SERPL: 1 G/DL
ALP SERPL-CCNC: 46 U/L (ref 39–117)
ALT SERPL W P-5'-P-CCNC: 78 U/L (ref 1–41)
ANION GAP SERPL CALCULATED.3IONS-SCNC: 7.8 MMOL/L (ref 5–15)
AST SERPL-CCNC: 54 U/L (ref 1–40)
BASOPHILS # BLD AUTO: 0.07 10*3/MM3 (ref 0–0.2)
BASOPHILS NFR BLD AUTO: 0.4 % (ref 0–1.5)
BILIRUB SERPL-MCNC: 0.3 MG/DL (ref 0–1.2)
BUN SERPL-MCNC: 24 MG/DL (ref 8–23)
BUN/CREAT SERPL: 48 (ref 7–25)
CALCIUM SPEC-SCNC: 9.3 MG/DL (ref 8.6–10.5)
CHLORIDE SERPL-SCNC: 100 MMOL/L (ref 98–107)
CO2 SERPL-SCNC: 30.2 MMOL/L (ref 22–29)
CREAT SERPL-MCNC: 0.5 MG/DL (ref 0.76–1.27)
CRP SERPL-MCNC: 4.58 MG/DL (ref 0–0.5)
DEPRECATED RDW RBC AUTO: 48.9 FL (ref 37–54)
EGFRCR SERPLBLD CKD-EPI 2021: 109 ML/MIN/1.73
EOSINOPHIL # BLD AUTO: 0.36 10*3/MM3 (ref 0–0.4)
EOSINOPHIL NFR BLD AUTO: 1.9 % (ref 0.3–6.2)
ERYTHROCYTE [DISTWIDTH] IN BLOOD BY AUTOMATED COUNT: 14 % (ref 12.3–15.4)
GLOBULIN UR ELPH-MCNC: 3.1 GM/DL
GLUCOSE BLDC GLUCOMTR-MCNC: 112 MG/DL (ref 70–130)
GLUCOSE BLDC GLUCOMTR-MCNC: 144 MG/DL (ref 70–130)
GLUCOSE BLDC GLUCOMTR-MCNC: 144 MG/DL (ref 70–130)
GLUCOSE BLDC GLUCOMTR-MCNC: 159 MG/DL (ref 70–130)
GLUCOSE SERPL-MCNC: 106 MG/DL (ref 65–99)
HCT VFR BLD AUTO: 37 % (ref 37.5–51)
HGB BLD-MCNC: 11.5 G/DL (ref 13–17.7)
IMM GRANULOCYTES # BLD AUTO: 0.92 10*3/MM3 (ref 0–0.05)
IMM GRANULOCYTES NFR BLD AUTO: 5 % (ref 0–0.5)
LYMPHOCYTES # BLD AUTO: 2.08 10*3/MM3 (ref 0.7–3.1)
LYMPHOCYTES NFR BLD AUTO: 11.3 % (ref 19.6–45.3)
MCH RBC QN AUTO: 29.7 PG (ref 26.6–33)
MCHC RBC AUTO-ENTMCNC: 31.1 G/DL (ref 31.5–35.7)
MCV RBC AUTO: 95.6 FL (ref 79–97)
MONOCYTES # BLD AUTO: 1.56 10*3/MM3 (ref 0.1–0.9)
MONOCYTES NFR BLD AUTO: 8.4 % (ref 5–12)
NEUTROPHILS NFR BLD AUTO: 13.49 10*3/MM3 (ref 1.7–7)
NEUTROPHILS NFR BLD AUTO: 73 % (ref 42.7–76)
NRBC BLD AUTO-RTO: 0 /100 WBC (ref 0–0.2)
PLATELET # BLD AUTO: 477 10*3/MM3 (ref 140–450)
PMV BLD AUTO: 9.7 FL (ref 6–12)
POTASSIUM SERPL-SCNC: 4.3 MMOL/L (ref 3.5–5.2)
PROCALCITONIN SERPL-MCNC: 0.05 NG/ML (ref 0–0.25)
PROT SERPL-MCNC: 6.2 G/DL (ref 6–8.5)
RBC # BLD AUTO: 3.87 10*6/MM3 (ref 4.14–5.8)
SODIUM SERPL-SCNC: 138 MMOL/L (ref 136–145)
WBC NRBC COR # BLD: 18.48 10*3/MM3 (ref 3.4–10.8)

## 2022-03-02 PROCEDURE — 82962 GLUCOSE BLOOD TEST: CPT

## 2022-03-02 PROCEDURE — 94669 MECHANICAL CHEST WALL OSCILL: CPT

## 2022-03-02 PROCEDURE — 92526 ORAL FUNCTION THERAPY: CPT

## 2022-03-02 PROCEDURE — 94799 UNLISTED PULMONARY SVC/PX: CPT

## 2022-03-02 PROCEDURE — 25010000002 CEFEPIME PER 500 MG

## 2022-03-02 PROCEDURE — 86140 C-REACTIVE PROTEIN: CPT | Performed by: NURSE PRACTITIONER

## 2022-03-02 PROCEDURE — 25010000002 METHYLPREDNISOLONE PER 40 MG: Performed by: INTERNAL MEDICINE

## 2022-03-02 PROCEDURE — 25010000002 CEFEPIME PER 500 MG: Performed by: INTERNAL MEDICINE

## 2022-03-02 PROCEDURE — 84145 PROCALCITONIN (PCT): CPT | Performed by: INTERNAL MEDICINE

## 2022-03-02 PROCEDURE — 85025 COMPLETE CBC W/AUTO DIFF WBC: CPT | Performed by: NURSE PRACTITIONER

## 2022-03-02 PROCEDURE — 99232 SBSQ HOSP IP/OBS MODERATE 35: CPT | Performed by: HOSPITALIST

## 2022-03-02 PROCEDURE — 94761 N-INVAS EAR/PLS OXIMETRY MLT: CPT

## 2022-03-02 PROCEDURE — 80053 COMPREHEN METABOLIC PANEL: CPT | Performed by: NURSE PRACTITIONER

## 2022-03-02 PROCEDURE — 63710000001 INSULIN ASPART PER 5 UNITS: Performed by: INTERNAL MEDICINE

## 2022-03-02 RX ORDER — SODIUM CHLORIDE 9 MG/ML
INJECTION, SOLUTION INTRAVENOUS
Status: COMPLETED
Start: 2022-03-02 | End: 2022-03-02

## 2022-03-02 RX ORDER — CEFEPIME HYDROCHLORIDE 2 G/1
INJECTION, POWDER, FOR SOLUTION INTRAVENOUS
Status: COMPLETED
Start: 2022-03-02 | End: 2022-03-02

## 2022-03-02 RX ADMIN — SODIUM CHLORIDE 100 ML: 900 INJECTION INTRAVENOUS at 02:28

## 2022-03-02 RX ADMIN — APIXABAN 10 MG: 2.5 TABLET, FILM COATED ORAL at 08:01

## 2022-03-02 RX ADMIN — POLYETHYLENE GLYCOL 3350 17 G: 17 POWDER, FOR SOLUTION ORAL at 08:01

## 2022-03-02 RX ADMIN — FERROUS SULFATE TAB EC 324 MG (65 MG FE EQUIVALENT) 324 MG: 324 (65 FE) TABLET DELAYED RESPONSE at 08:01

## 2022-03-02 RX ADMIN — Medication 10 ML: at 08:01

## 2022-03-02 RX ADMIN — HYDROCODONE BITARTRATE AND HOMATROPINE METHYLBROMIDE 5 ML: 5; 1.5 SOLUTION ORAL at 20:04

## 2022-03-02 RX ADMIN — DOCUSATE SODIUM 100 MG: 50 LIQUID ORAL at 20:04

## 2022-03-02 RX ADMIN — INSULIN ASPART 4 UNITS: 100 INJECTION, SOLUTION INTRAVENOUS; SUBCUTANEOUS at 17:22

## 2022-03-02 RX ADMIN — IPRATROPIUM BROMIDE AND ALBUTEROL SULFATE 3 ML: .5; 2.5 SOLUTION RESPIRATORY (INHALATION) at 20:29

## 2022-03-02 RX ADMIN — DOCUSATE SODIUM 100 MG: 50 LIQUID ORAL at 08:03

## 2022-03-02 RX ADMIN — CEFEPIME 2 G: 2 INJECTION, POWDER, FOR SOLUTION INTRAVENOUS at 02:30

## 2022-03-02 RX ADMIN — IPRATROPIUM BROMIDE AND ALBUTEROL SULFATE 3 ML: .5; 2.5 SOLUTION RESPIRATORY (INHALATION) at 07:23

## 2022-03-02 RX ADMIN — APIXABAN 10 MG: 2.5 TABLET, FILM COATED ORAL at 20:03

## 2022-03-02 RX ADMIN — CEFEPIME 2000 MG: 2 INJECTION, POWDER, FOR SOLUTION INTRAVENOUS at 02:30

## 2022-03-02 RX ADMIN — HYDROCODONE BITARTRATE AND HOMATROPINE METHYLBROMIDE 5 ML: 5; 1.5 SOLUTION ORAL at 04:44

## 2022-03-02 RX ADMIN — OLANZAPINE 5 MG: 5 TABLET, FILM COATED ORAL at 20:03

## 2022-03-02 RX ADMIN — IPRATROPIUM BROMIDE AND ALBUTEROL SULFATE 3 ML: .5; 2.5 SOLUTION RESPIRATORY (INHALATION) at 13:24

## 2022-03-02 RX ADMIN — ATORVASTATIN CALCIUM 40 MG: 40 TABLET, FILM COATED ORAL at 20:04

## 2022-03-02 RX ADMIN — Medication 10 ML: at 20:04

## 2022-03-02 RX ADMIN — METHYLPREDNISOLONE SODIUM SUCCINATE 20 MG: 40 INJECTION, POWDER, FOR SOLUTION INTRAMUSCULAR; INTRAVENOUS at 08:01

## 2022-03-02 RX ADMIN — SODIUM CHLORIDE 100 ML: 9 INJECTION, SOLUTION INTRAVENOUS at 02:28

## 2022-03-02 NOTE — PLAN OF CARE
Problem: Adult Inpatient Plan of Care  Goal: Patient-Specific Goal (Individualized)  Outcome: Ongoing, Not Progressing  Goal: Absence of Hospital-Acquired Illness or Injury  Outcome: Ongoing, Not Progressing  Intervention: Identify and Manage Fall Risk  Recent Flowsheet Documentation  Taken 3/1/2022 2100 by Monica Norris RN  Safety Promotion/Fall Prevention: (RN at bedside)   activity supervised   assistive device/personal items within reach   clutter free environment maintained   fall prevention program maintained   lighting adjusted   nonskid shoes/slippers when out of bed   room organization consistent   safety round/check completed   toileting scheduled   other (see comments)  Taken 3/1/2022 2000 by Monica Norris RN  Safety Promotion/Fall Prevention: (RN at bedside)   activity supervised   assistive device/personal items within reach   clutter free environment maintained   fall prevention program maintained   lighting adjusted   nonskid shoes/slippers when out of bed   room organization consistent   safety round/check completed   toileting scheduled   other (see comments)  Taken 3/1/2022 1931 by Monica Norris RN  Safety Promotion/Fall Prevention: (RN staying at bedside with patient)   activity supervised   assistive device/personal items within reach   clutter free environment maintained   fall prevention program maintained   lighting adjusted   nonskid shoes/slippers when out of bed   safety round/check completed   room organization consistent   toileting scheduled   other (see comments)  Intervention: Prevent Skin Injury  Recent Flowsheet Documentation  Taken 3/1/2022 2100 by Monica Norris RN  Body Position:   position changed independently   side-lying, right   legs elevated  Taken 3/1/2022 2000 by Monica Norris RN  Body Position:   turned   tilted, right   legs elevated  Skin Protection:   adhesive use limited   incontinence pads utilized   tubing/devices free from skin contact   transparent dressing  maintained  Taken 3/1/2022 1931 by Monica Norris RN  Body Position:   position changed independently   supine, legs elevated  Skin Protection:   adhesive use limited   incontinence pads utilized   transparent dressing maintained   tubing/devices free from skin contact  Goal: Optimal Comfort and Wellbeing  Outcome: Ongoing, Not Progressing  Intervention: Provide Person-Centered Care  Recent Flowsheet Documentation  Taken 3/1/2022 1931 by Monica Norris RN  Trust Relationship/Rapport:   care explained   choices provided   emotional support provided   empathic listening provided   questions answered   questions encouraged   reassurance provided   thoughts/feelings acknowledged  Goal: Readiness for Transition of Care  Outcome: Ongoing, Not Progressing  Goal: Plan of Care Review  Outcome: Ongoing, Not Progressing  Flowsheets (Taken 3/1/2022 2202)  Progress: declining  Plan of Care Reviewed With: patient     Problem: Adjustment to Illness (Sepsis/Septic Shock)  Goal: Optimal Coping  Outcome: Ongoing, Not Progressing  Intervention: Optimize Psychosocial Adjustment to Illness  Recent Flowsheet Documentation  Taken 3/1/2022 1931 by Monica Norris RN  Supportive Measures:   active listening utilized   counseling provided   decision-making supported   goal setting facilitated   positive reinforcement provided   problem-solving facilitated   self-reflection promoted   verbalization of feelings encouraged  Family/Support System Care: support provided     Problem: Infection (Sepsis/Septic Shock)  Goal: Absence of Infection Signs and Symptoms  Outcome: Ongoing, Not Progressing     Problem: Respiratory Compromise (Sepsis/Septic Shock)  Goal: Effective Oxygenation and Ventilation  Outcome: Ongoing, Not Progressing  Intervention: Promote Airway Secretion Clearance  Recent Flowsheet Documentation  Taken 3/1/2022 2100 by Monica Norris RN  Activity Management: activity adjusted per tolerance  Taken 3/1/2022 2000 by Monica Norris  RN  Activity Management: activity adjusted per tolerance  Taken 3/1/2022 1931 by Monica Norris RN  Activity Management: activity adjusted per tolerance  Breathing Techniques/Airway Clearance: deep/controlled cough encouraged  Cough And Deep Breathing: done independently per patient  Intervention: Optimize Oxygenation and Ventilation  Recent Flowsheet Documentation  Taken 3/1/2022 2100 by Monica Norris RN  Head of Bed ([facility]): HOB at 20-30 degrees  Taken 3/1/2022 2000 by Monica Norris RN  Head of Bed ([facility]): HOB at 30-45 degrees  Taken 3/1/2022 1931 by Monica Norris RN  Head of Bed ([facility]): HOB at 30-45 degrees  Airway/Ventilation Management:   pulmonary hygiene promoted   calming measures promoted   airway patency maintained     Problem: Fluid Imbalance (Pneumonia)  Goal: Fluid Balance  Outcome: Ongoing, Not Progressing  Intervention: Monitor and Manage Fluid Balance  Recent Flowsheet Documentation  Taken 3/1/2022 1931 by Monica Norris RN  Fluid/Electrolyte Management: fluids provided     Problem: Infection (Pneumonia)  Goal: Resolution of Infection Signs and Symptoms  Outcome: Ongoing, Not Progressing     Problem: Respiratory Compromise (Pneumonia)  Goal: Effective Oxygenation and Ventilation  Outcome: Ongoing, Not Progressing  Intervention: Promote Airway Secretion Clearance  Recent Flowsheet Documentation  Taken 3/1/2022 1931 by Monica Norris RN  Breathing Techniques/Airway Clearance: deep/controlled cough encouraged  Cough And Deep Breathing: done independently per patient  Intervention: Optimize Oxygenation and Ventilation  Recent Flowsheet Documentation  Taken 3/1/2022 2100 by Monica Norris RN  Head of Bed ([facility]): HOB at 20-30 degrees  Taken 3/1/2022 2000 by Monica Norris RN  Head of Bed ([facility]): HOB at 30-45 degrees  Taken 3/1/2022 1931 by Monica Norris RN  Head of Bed ([facility]): HOB at 30-45 degrees  Airway/Ventilation Management:   pulmonary hygiene promoted   calming measures promoted   airway patency maintained      Problem: Nutrition Impairment (Mechanical Ventilation, Invasive)  Goal: Optimal Nutrition Delivery  Outcome: Ongoing, Progressing     Problem: Nutrition Impaired (Sepsis/Septic Shock)  Goal: Optimal Nutrition Intake  Outcome: Ongoing, Progressing     Problem: Fall Injury Risk  Goal: Absence of Fall and Fall-Related Injury  Outcome: Ongoing, Progressing  Intervention: Identify and Manage Contributors to Fall Injury Risk  Recent Flowsheet Documentation  Taken 3/1/2022 1931 by Monica Norris RN  Medication Review/Management: medications reviewed  Intervention: Promote Injury-Free Environment  Recent Flowsheet Documentation  Taken 3/1/2022 2100 by Monica Norris RN  Safety Promotion/Fall Prevention: (RN at bedside)   activity supervised   assistive device/personal items within reach   clutter free environment maintained   fall prevention program maintained   lighting adjusted   nonskid shoes/slippers when out of bed   room organization consistent   safety round/check completed   toileting scheduled   other (see comments)  Taken 3/1/2022 2000 by Monica Norris RN  Safety Promotion/Fall Prevention: (RN at bedside)   activity supervised   assistive device/personal items within reach   clutter free environment maintained   fall prevention program maintained   lighting adjusted   nonskid shoes/slippers when out of bed   room organization consistent   safety round/check completed   toileting scheduled   other (see comments)  Taken 3/1/2022 1931 by Monica Norris RN  Safety Promotion/Fall Prevention: (RN staying at bedside with patient)   activity supervised   assistive device/personal items within reach   clutter free environment maintained   fall prevention program maintained   lighting adjusted   nonskid shoes/slippers when out of bed   safety round/check completed   room organization consistent   toileting scheduled   other (see comments)     Problem: Communication Impairment (Mechanical Ventilation, Invasive)  Goal:  Effective Communication  Outcome: Met  Intervention: Ensure Effective Communication  Recent Flowsheet Documentation  Taken 3/1/2022 1931 by Monica Norris RN  Communication Enhancement Strategies: call light answered in person     Problem: Hypertension Comorbidity  Goal: Blood Pressure in Desired Range  Outcome: Adequate for Care Transition  Intervention: Maintain Hypertension-Management Strategies  Recent Flowsheet Documentation  Taken 3/1/2022 1931 by Monica Norris RN  Medication Review/Management: medications reviewed     Problem: Bleeding (Sepsis/Septic Shock)  Goal: Absence of Bleeding  Outcome: Adequate for Care Transition  Intervention: Minimize Bleeding Risk  Recent Flowsheet Documentation  Taken 3/1/2022 1931 by Monica Norris RN  Bleeding Precautions: monitored for signs of bleeding     Problem: Glycemic Control Impaired (Sepsis/Septic Shock)  Goal: Blood Glucose Level Within Desired Range  Outcome: Adequate for Care Transition  Intervention: Optimize Glycemic Control  Recent Flowsheet Documentation  Taken 3/1/2022 1931 by Monica Norris RN  Glycemic Management:   blood glucose monitoring   insulin dose matched to carbohydrate intake     Problem: Hemodynamic Instability (Sepsis/Septic Shock)  Goal: Effective Tissue Perfusion  Outcome: Adequate for Care Transition  Intervention: Optimize Blood Flow  Recent Flowsheet Documentation  Taken 3/1/2022 2100 by Monica Norris RN  Stabilization Measures: legs elevated  Taken 3/1/2022 2000 by Monica Norris RN  Stabilization Measures: legs elevated  Taken 3/1/2022 1931 by Monica Norris RN  Stabilization Measures: legs elevated  Fluid/Electrolyte Management: fluids provided     Problem: Skin Injury Risk Increased  Goal: Skin Health and Integrity  Outcome: Adequate for Care Transition  Intervention: Optimize Skin Protection  Recent Flowsheet Documentation  Taken 3/1/2022 2100 by Monica Norris RN  Head of Bed (HOB): HOB at 20-30 degrees  Taken 3/1/2022 2000 by Monica Norris  RN  Pressure Reduction Techniques:   weight shift assistance provided   heels elevated off bed   frequent weight shift encouraged   pressure points protected  Head of Bed (HOB): HOB at 30-45 degrees  Pressure Reduction Devices:   specialty bed utilized   pressure-redistributing mattress utilized  Skin Protection:   adhesive use limited   incontinence pads utilized   tubing/devices free from skin contact   transparent dressing maintained  Taken 3/1/2022 1931 by Monica Norris RN  Pressure Reduction Techniques:   weight shift assistance provided   pressure points protected   heels elevated off bed   frequent weight shift encouraged  Head of Bed (HOB): HOB at 30-45 degrees  Pressure Reduction Devices:   specialty bed utilized   pressure-redistributing mattress utilized  Skin Protection:   adhesive use limited   incontinence pads utilized   transparent dressing maintained   tubing/devices free from skin contact     Problem: Skin Injury Risk Increased  Goal: Skin Health and Integrity  Intervention: Optimize Skin Protection  Recent Flowsheet Documentation  Taken 3/1/2022 2100 by Monica Norris RN  Head of Bed (HOB): HOB at 20-30 degrees  Taken 3/1/2022 2000 by Monica Norris RN  Pressure Reduction Techniques:   weight shift assistance provided   heels elevated off bed   frequent weight shift encouraged   pressure points protected  Head of Bed (HOB): HOB at 30-45 degrees  Pressure Reduction Devices:   specialty bed utilized   pressure-redistributing mattress utilized  Skin Protection:   adhesive use limited   incontinence pads utilized   tubing/devices free from skin contact   transparent dressing maintained  Taken 3/1/2022 1931 by Monica Norris RN  Pressure Reduction Techniques:   weight shift assistance provided   pressure points protected   heels elevated off bed   frequent weight shift encouraged  Head of Bed (HOB): HOB at 30-45 degrees  Pressure Reduction Devices:   specialty bed utilized   pressure-redistributing  mattress utilized  Skin Protection:   adhesive use limited   incontinence pads utilized   transparent dressing maintained   tubing/devices free from skin contact   Goal Outcome Evaluation:  Plan of Care Reviewed With: patient        Progress: declining  Outcome Summary: intubated and sedated upon arrival to ICU

## 2022-03-02 NOTE — PROGRESS NOTES
"Hospitalist Team      Patient Care Team:  Tiffany Burciaga MD as PCP - General (Internal Medicine)      Chief Complaint: Follow-up Acute Hypoxic Respiratory Failure    Subjective    No acute events overnight.  Mr. Bonds is quite dyspneic on exam, but he denies chest pain.  He reports he has a good appetite.  He was up in the chair quite a bit yesterday.    Objective    Vital Signs  Temp:  [97.5 °F (36.4 °C)-98 °F (36.7 °C)] 97.5 °F (36.4 °C)  Heart Rate:  [] 77  Resp:  [18-28] 24  BP: ()/(67-85) 105/67  Oxygen Therapy  SpO2: (!) 86 %  Pulse Oximetry Type: Continuous  Device (Oxygen Therapy): ventilator  Device (Oxygen Therapy): heated, high-flow nasal cannula  $ High Flow Nasal Cannula Set-Up: yes  Flow (L/min): 45  Oxygen Concentration (%): 70  ETCO2 (mmHg): 32 mmHg  Probe Placed On (Pulse Ox): Right:, forehead  Probe Removed From (Pulse Ox): Left:, forehead}    Flowsheet Rows      First Filed Value   Admission Height 172.7 cm (68\") Documented at 02/21/2022 1843   Admission Weight 78.9 kg (174 lb) Documented at 02/21/2022 1825        Physical Exam:    General: Appears stated age in distress.  Lungs: Breath sounds are diminished throughout all fields.  Some accessory use noted.  CV: Regular rate and rhythm.  Radial pulses are 2+ and symmetric.  Abdomen: Soft and non-tender.  Bowel sounds are diminished.  MSK: No C/C/E.  Skin: No evidence of embolic phenomena.  Neuro: CN II-XII grossly intact.  Psych: Pleasant affect.    Results Review:     I reviewed the patient's new clinical results.    Lab Results (last 24 hours)     Procedure Component Value Units Date/Time    POC Glucose Once [[id number]]  (Normal) Collected: 03/02/22 0608    Specimen: Blood Updated: 03/02/22 0615     Glucose 112 mg/dL      Comment: Meter: [id number] : [id number] Jr GOLD RN       Comprehensive Metabolic Panel [[id number]]  (Abnormal) Collected: 03/02/22 0430    Specimen: Blood Updated: 03/02/22 0514     Glucose 106 mg/dL " "     BUN 24 mg/dL      Creatinine 0.50 mg/dL      Sodium 138 mmol/L      Potassium 4.3 mmol/L      Chloride 100 mmol/L      CO2 30.2 mmol/L      Calcium 9.3 mg/dL      Total Protein 6.2 g/dL      Albumin 3.10 g/dL      ALT (SGPT) 78 U/L      AST (SGOT) 54 U/L      Alkaline Phosphatase 46 U/L      Total Bilirubin 0.3 mg/dL      Globulin 3.1 gm/dL      A/G Ratio 1.0 g/dL      BUN/Creatinine Ratio 48.0     Anion Gap 7.8 mmol/L      eGFR 109.0 mL/min/1.73      Comment: National Kidney Foundation and American Society of Nephrology (ASN) Task Force recommended calculation based on the Chronic Kidney Disease Epidemiology Collaboration (CKD-EPI) equation refit without adjustment for race.       Narrative:      GFR Normal >60  Chronic Kidney Disease <60  Kidney Failure <15      Procalcitonin [[id number]]  (Normal) Collected: 03/02/22 0430    Specimen: Blood Updated: 03/02/22 0503     Procalcitonin 0.05 ng/mL     Narrative:      As a Marker for Sepsis (Non-Neonates):     1. <0.5 ng/mL represents a low risk of severe sepsis and/or septic shock.  2. >2 ng/mL represents a high risk of severe sepsis and/or septic shock.    As a Marker for Lower Respiratory Tract Infections that require antibiotic therapy:  PCT on Admission     Antibiotic Therapy             6-12 Hrs later  >0.5                          Strongly Recommended            >0.25 - <0.5             Recommended  0.1 - 0.25                  Discouraged                       Remeasure/reassess PCT  <0.1                         Strongly Discouraged         Remeasure/reassess PCT      As 28 day mortality risk marker: \"Change in Procalcitonin Result\" (>80% or <=80%) if Day 0 (or Day 1) and Day 4 values are available. Refer to http://www.[employer]-pct-calculator.com/    Change in PCT <=80 %   A decrease of PCT levels below or equal to 80% defines a positive change in PCT test result representing a higher risk for 28-day all-cause mortality of patients diagnosed with severe sepsis " or septic shock.    Change in PCT >80 %   A decrease of PCT levels of more than 80% defines a negative change in PCT result representing a lower risk for 28-day all-cause mortality of patients diagnosed with severe sepsis or septic shock.                CBC & Differential [[id number]]  (Abnormal) Collected: 03/02/22 0430    Specimen: Blood Updated: 03/02/22 0439    Narrative:      The following orders were created for panel order CBC & Differential.  Procedure                               Abnormality         Status                     ---------                               -----------         ------                     CBC Auto Differential[[id number]]        Abnormal            Final result                 Please view results for these tests on the individual orders.    CBC Auto Differential [[id number]]  (Abnormal) Collected: 03/02/22 0430    Specimen: Blood Updated: 03/02/22 0439     WBC 18.48 10*3/mm3      RBC 3.87 10*6/mm3      Hemoglobin 11.5 g/dL      Hematocrit 37.0 %      MCV 95.6 fL      MCH 29.7 pg      MCHC 31.1 g/dL      RDW 14.0 %      RDW-SD 48.9 fl      MPV 9.7 fL      Platelets 477 10*3/mm3      Neutrophil % 73.0 %      Lymphocyte % 11.3 %      Monocyte % 8.4 %      Eosinophil % 1.9 %      Basophil % 0.4 %      Immature Grans % 5.0 %      Neutrophils, Absolute 13.49 10*3/mm3      Lymphocytes, Absolute 2.08 10*3/mm3      Monocytes, Absolute 1.56 10*3/mm3      Eosinophils, Absolute 0.36 10*3/mm3      Basophils, Absolute 0.07 10*3/mm3      Immature Grans, Absolute 0.92 10*3/mm3      nRBC 0.0 /100 WBC     C-reactive Protein [[id number]] Collected: 03/02/22 0430    Specimen: Blood Updated: 03/02/22 0433    POC Glucose Once [[id number]]  (Abnormal) Collected: 03/02/22 0033    Specimen: Blood Updated: 03/02/22 0045     Glucose 144 mg/dL      Comment: Meter: [id number] : [id number] Jr GOLD RN       POC Glucose Once [[id number]]  (Normal) Collected: 03/01/22 1712    Specimen: Blood Updated: 03/01/22  1729     Glucose 94 mg/dL      Comment: Meter: [id number] : [id number] Matthewsvidhya Myrickca RN       POC Glucose Once [[id number]]  (Abnormal) Collected: 03/01/22 1150    Specimen: Blood Updated: 03/01/22 1158     Glucose 277 mg/dL      Comment: Meter: [id number] : [id number] Elinor Benavides RN       C-reactive Protein [[id number]]  (Abnormal) Collected: 03/01/22 0456    Specimen: Blood Updated: 03/01/22 1146     C-Reactive Protein 7.22 mg/dL           Imaging Results (Last 24 Hours)     Procedure Component Value Units Date/Time    XR Chest 1 View [[id number]] Collected: 03/01/22 1328     Updated: 03/01/22 1332    Narrative:      XR CHEST 1 VW-: 3/1/2022 1:08 PM     INDICATION:   Increasing shortness of air today. Requiring more oxygen according to  the nurse.      COMPARISON:    02/25/2022.     FINDINGS:  Single Portable AP view(s) of the chest. An enteric tube is no longer  visible. Right-sided PICC line has been removed. Stable cardiac  silhouette. Shallow lung expansion with bronchovascular crowding.  Peripheral predominant airspace disease in both lungs remains present  but has partially regressed compared with the prior study. There is no  new consolidation. No pneumothorax or effusion. Thoracic spondylosis.  Oral contrast material present at the hepatic flexure and splenic  flexure.       Impression:         1. Bilateral pneumonia. Imaging findings favor atypical infection  including Covid 19. Partial regression compared to the prior study. No  pneumothorax.     This report was finalized on 3/1/2022 1:30 PM by Dr. Raghu Huang MD.             Xray reviewed personally by physician.    Medication Review:   I have reviewed the patient's current medication list    Current Facility-Administered Medications:   •  acetaminophen (TYLENOL) tablet 650 mg, 650 mg, Oral, Q4H PRN, 650 mg at 02/27/22 0311 **OR** acetaminophen (TYLENOL) suppository 650 mg, 650 mg, Rectal, Q4H PRN, Cameron Alvarado, DO  •  apixaban (ELIQUIS)  tablet 10 mg, 10 mg, Oral, Q12H, 10 mg at 03/02/22 0801 **FOLLOWED BY** [START ON 3/8/2022] apixaban (ELIQUIS) tablet 5 mg, 5 mg, Oral, Q12H, Osvaldo Gibson DO  •  atorvastatin (LIPITOR) tablet 40 mg, 40 mg, Oral, Nightly, Alvarado, Birrilla, DO, 40 mg at 03/01/22 2015  •  dextrose (D50W) (25 g/50 mL) IV injection 25 g, 25 g, Intravenous, Q15 Min PRN, Cash Arguello Jr., MD  •  dextrose (GLUTOSE) oral gel 15 g, 15 g, Oral, Q15 Min PRN, Cash Arguello Jr., MD  •  docusate sodium (COLACE) liquid 100 mg, 100 mg, Oral, BID, Alvarado, Birrilla, DO, 100 mg at 03/02/22 0803  •  ferrous sulfate EC tablet 324 mg, 324 mg, Oral, Daily With Breakfast, Alvarado, Birrilla, DO, 324 mg at 03/02/22 0801  •  glucagon (GLUCAGEN) injection 1 mg, 1 mg, Intramuscular, Q15 Min PRN, Cash Arguello Jr., MD  •  HYDROcodone-homatropine (HYCODAN) 5-1.5 MG/5ML syrup 5 mL, 5 mL, Oral, Q4H PRN, Osvaldo Gibson DO, 5 mL at 03/02/22 0444  •  insulin aspart (novoLOG) injection 0-24 Units, 0-24 Units, Subcutaneous, Q6H, Alvarado Birrilla, DO, 12 Units at 03/01/22 1154  •  ipratropium-albuterol (DUO-NEB) nebulizer solution 3 mL, 3 mL, Nebulization, Q6H - RT, Alvarado, Birrilla, DO, 3 mL at 03/02/22 0723  •  ipratropium-albuterol (DUO-NEB) nebulizer solution 3 mL, 3 mL, Nebulization, Q6H PRN, Alvarado Birrilla, DO  •  Magnesium Sulfate 2 gram Bolus, followed by 8 gram infusion (total Mg dose 10 grams)- Mg less than or equal to 1mg/dL, 2 g, Intravenous, PRN **OR** Magnesium Sulfate 2 gram / 50mL Infusion (GIVE X 3 BAGS TO EQUAL 6GM TOTAL DOSE) - Mg 1.1 - 1.5 mg/dl, 2 g, Intravenous, PRN **OR** Magnesium Sulfate 4 gram infusion- Mg 1.6-1.9 mg/dL, 4 g, Intravenous, PRN, Cameron Alvarado,   •  methylPREDNISolone sodium succinate (SOLU-Medrol) injection 20 mg, 20 mg, Intravenous, Daily, Garrett Yoon MD, 20 mg at 03/02/22 0801  •  OLANZapine (zyPREXA) injection 5 mg, 5 mg, Intramuscular, Q8H PRN, Garrett Yoon MD, 5 mg at 02/28/22  1709  •  OLANZapine (zyPREXA) tablet 5 mg, 5 mg, Oral, Nightly, Alvarado, Birrilla, DO, 5 mg at 03/01/22 2016  •  ondansetron (ZOFRAN) tablet 4 mg, 4 mg, Oral, Q6H PRN **OR** ondansetron (ZOFRAN) injection 4 mg, 4 mg, Intravenous, Q6H PRN, Alvarado, Birrilla, DO  •  polyethylene glycol (MIRALAX) packet 17 g, 17 g, Oral, Daily, Alvarado, Birrilla, DO, 17 g at 03/02/22 0801  •  potassium chloride (K-DUR,KLOR-CON) CR tablet 40 mEq, 40 mEq, Oral, PRN **OR** potassium chloride (KLOR-CON) packet 40 mEq, 40 mEq, Oral, PRN **OR** potassium chloride 10 mEq in 100 mL IVPB, 10 mEq, Intravenous, Q1H PRN, Alvarado, Birrilla, DO, Last Rate: 100 mL/hr at 02/22/22 0936, 10 mEq at 02/22/22 0936  •  [COMPLETED] Insert peripheral IV, , , Once **AND** sodium chloride 0.9 % flush 10 mL, 10 mL, Intravenous, PRN, Alvarado, Birrilla, DO, 10 mL at 02/24/22 1836  •  sodium chloride 0.9 % flush 10 mL, 10 mL, Intravenous, PRN, Alvarado, Birrilla, DO  •  sodium chloride 0.9 % flush 10 mL, 10 mL, Intravenous, Q12H, Alvarado, Birrilla, DO, 10 mL at 03/02/22 0801  •  sodium chloride 0.9 % infusion 40 mL, 40 mL, Intravenous, PRN, Alvarado, Birrilla, DO, 100 mL at 03/02/22 0228  •  sodium chloride nasal spray 2 spray, 2 spray, Each Nare, PRN, Osvaldo Gibson, DO, 2 spray at 02/28/22 1621      Assessment/Plan     1.  Acute Hypoxic Respiratory Failure due to bacterial/aspiration Pneumonia/Post-COVID Fibrosis: Note made of conversation yesterday afternoon.  Will discuss w/ pulmonary.  Procalcitonin remains low.  D/C antibiotics?  Continue IV solumedrol.  Last CRP was <10.    2.  Acute Metabolic Encephalopathy:  This appears to be resolving.  Pleasant on my exam, but dyspneic.  Continue to monitor.    3.  Acute LLE DVT: Trasitioned to Eliquis.    4.  Chronic Normocytic/Iron Deficiency Anemia: No acute issues.    5.  Dyslipidemia: Continue statin therapy.    6.  Steroid-induce Hyperglycemia: Continue SSI.  Trend is good.    Plan for disposition: Predicated on  hospital course.    Darrick Matt MD  03/02/22  09:36 EST

## 2022-03-02 NOTE — PROGRESS NOTES
[facility]      Mar/chart reviewed  Follow up AHRF, pneumonia, post COVID fibrosis  Less confused today, +cough    Vital Sign Min/Max for last 24 hours  Temp  Min: 97.5 °F (36.4 °C)  Max: 98 °F (36.7 °C)   BP  Min: 98/68  Max: 117/80   Pulse  Min: 65  Max: 108   Resp  Min: 18  Max: 28   SpO2  Min: 86 %  Max: 100 %   Flow (L/min)  Min: 45  Max: 70   Weight  Min: 70.8 kg (156 lb 1.6 oz)  Max: 70.8 kg (156 lb 1.6 oz)   1190/810    Appears ill, alert,  agitated  perrl, eomi, normal sclera  mmm, no jvd, trachea midline, neck supple,  chest decreased bilaterally,+ crackles on right clear on left, no wheezes,   tachy  soft, nt, nd +bs,  no c/c/trace edema  Skin warm, dry no rashes    Labs: 3/2: reviewed:  Glucose 106  Bun 25  Cr 0.5  Na 138  Bicarb 30  crp 4  Wbc 18  hgb 11.5  plts 477    3/1: CXR: reviewed: bilateral infiltrates    A/P:  1. AHRF -- oxygen requirements went up and now have come down slightly, continue to titrate as needed. Try and keep him on dry side  2. Pneumonia -- agree with treating for secondary bacterial infection --was problem prior to cefepime  3. 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 Encephalopathy -- still confused,   5. Anemia  6. Daily alcohol use    Wean oxygen as able, possibly try nasal cannula again

## 2022-03-02 NOTE — THERAPY TREATMENT NOTE
Acute Care - Speech Language Pathology   Swallow Treatment Note DILAN Ward     Patient Name: Dontae Bonds Jr.  : 1950  MRN: [id number]  Today's Date: 3/2/2022               Admit Date: 2022    Visit Dx:     ICD-10-CM ICD-9-CM   1. Pneumonia of both lungs due to infectious organism, unspecified part of lung  J18.9 483.8   2. Acute respiratory failure without hypercapnia (HCC)  J96.00 518.81     Patient Active Problem List   Diagnosis   • Personal history of colonic polyps   • Family history of colon cancer   • Pneumonia due to COVID-19 virus   • Pneumonia of both lungs due to infectious organism   • Hypertension   • Hypokalemia     Past Medical History:   Diagnosis Date   • Hyperlipidemia    • Hypertension      History reviewed. No pertinent surgical history.    SLP Recommendation and Plan     SLP Diet Recommendation: soft textures, chopped, thin liquids, nutritional supplements needed, other (see comments) (22 114)  Recommended Precautions and Strategies: upright posture during/after eating, small bites of food and sips of liquid, no straw, general aspiration precautions, reflux precautions, fatigue precautions (22 114)  SLP Rec. for Method of Medication Administration: meds whole, with pudding or applesauce, as tolerated (22 114)     Monitor for Signs of Aspiration: yes, elevated WBC count, gurgly voice, upper respiratory (22 114)        Anticipated Discharge Disposition (SLP): skilled nursing facility (22 1148)     Therapy Frequency (Swallow): daily, at least, 5 days per week (22 114)  Predicted Duration Therapy Intervention (Days): 1 week, until discharge (22 114)     Daily Summary of Progress (SLP): progress toward functional goals as expected (22 114)               Treatment Assessment (SLP): Continues to tolerate diet, but needs consistent reminders to slow rate down and take small bites/sips. Able to demonstrate prepping consistently after  cues as well as smaller bites/drinks (03/02/22 1148)  Plan for Continued Treatment (SLP): continue treatment per plan of care (03/02/22 1148)         Plan of Care Reviewed With: patient, other (see comments) (RNMakayla; MD, Dr. Gibson)  Outcome Summary: SLP: Completed clinical swallow eval. Pt with no overt s/s of aspiration on ice chips, small sips of thins, nectar and puree. Proceeded with MBS this afternoon. Pt with spill of thins to the level of the pyriforms, but no penetration or aspiration was viewed. Pt with mild pharyngeal residue after puree and solids. Able to partially clear with cued subsequent swallows and thin liquid wash. No significant residue at the end of the study. Recommend to start a diet, but consider smaller meals due to easy fatigue and drop in oxygen saturation with any activity. Also recommend soft, chopped with thins, no straws and meds whole in applesauce, aspiration precautions and SLP will continue to follow for diet tolerance and possible upgrade.      SWALLOW EVALUATION (last 72 hours)     SLP Adult Swallow Evaluation     Row Name 03/02/22 1148 03/01/22 1300 02/28/22 1300             Rehab Evaluation    Document Type therapy note (daily note)  -AD therapy note (daily note)  -AD evaluation  MBS  -AD      Subjective Information no complaints  -AD no complaints  -AD no complaints  -AD      Patient Observations alert; cooperative; agree to therapy  -AD alert; cooperative; agree to therapy  -AD alert; cooperative  -AD      Patient/Family/Caregiver Comments/Observations Pt upright in bed eating breakfast  -AD -- Pt seen upright in video imaging chair for MBS. RN and RT present during the session due to need for continuous high flow oxygen during the study.  -AD      Patient Effort good  -AD good  -AD good  -AD      Symptoms Noted During/After Treatment none  -AD other (see comments)  -AD significant change in vital signs  -AD      Symptoms Noted, Comment Occasional drop in O2 sat but  rebounds quickly.  -AD Pt with cough after attempting to stand for CXR board placement. Pt with decrease in O2 sats at that time to 82-85. RNEstefany, present and aware. Session was ended at that time.  -AD Drop in O2 after initial trial to 85 but quickly rebounds to 90 w/deep breaths and use of non-rebreather. Non rebreather applied in between trials initially and then no longer needed midway through the study.  -AD              General Information    Patient Profile Reviewed -- -- yes  -AD      Pertinent History Of Current Problem -- -- No changes in hx. Improved alertness and pt pulled out Dobhoff tube last night. MBS ordered due to improved alertness and need for nutrition. Initial history as follows: Pt is a 70 y/o male admitted for acute hypoxemic respiratory failure with secondary bacterial PNA, s/p covid PNA/fibrosis, hx of daily alcohol use and currently on withdrawal protocol, post covid syndrome, anemia and metabolic encephalopathy. Pt intubated on 2/21/22 and extubated on 2/24/22. Per report, passed RN Post Extubation Screen and started on full liquid diet. Concerns for aspiration and pulmonology noting he is 'marginal; this morning and ordered a follow up clinical swallow evaluation and MBS/VFSS if needed. Pt also known to me from prior admission. No difficulty swallowing reported at that time. Cognition with moderate deficits/decline at that time. 24 hour care was recommended at that time.  -AD      Current Method of Nutrition -- -- NPO; no current diet order  -AD      Precautions/Limitations, Vision -- -- WFL; for purposes of eval  -AD      Precautions/Limitations, Hearing -- -- WFL; for purposes of eval  -AD      Prior Level of Function-Communication -- -- cognitive-linguistic impairment  -AD      Prior Level of Function-Swallowing -- -- no diet consistency restrictions  -AD      Plans/Goals Discussed with -- -- patient; agreed upon  -AD      Barriers to Rehab -- -- cognitive status; medically complex   -AD      Patient's Goals for Discharge -- -- patient did not state  -AD              Pain    Additional Documentation --  pt reports no pain  -AD --  no pain reported  -AD --  no current pain reported  -AD              Oral Musculature and Cranial Nerve Assessment    Oral Motor General Assessment -- -- generalized oral motor weakness  -AD      Lingual Impairment, Detail. Cranial Nerves IX, XII (Glossopharyngeal and Hypoglossal) -- -- reduced strength; bilaterally  -AD      Vocal Impairment, Detail. Cranial Nerve X (Vagus) -- -- impaired throat clear/cough (see comments)  -AD      Oral Motor, Comment -- -- Pt with generalized weakness but no focal deficits. Impaired cough, nonproductive.  -AD              General Eating/Swallowing Observations    Respiratory Support Currently in Use high-flow nasal cannula  -AD -- high-flow nasal cannula  RT present and titrating/monitoring  -AD      Eating/Swallowing Skills -- -- fed by SLP  -AD      Positioning During Eating upright 90 degree; upright in bed  -AD -- upright 90 degree; upright in chair  -AD      Pre SpO2 (%) -- -- 90  -AD      Post SpO2 (%) -- -- 88  drops to 85 at one time but quickly rebounds  -AD              Respiratory    Respiratory Status -- -- increase in respiratory rate; reduction in SpO2; during swallowing/eating  rate slows and O2 rebounds after about 45 seconds to 1 minute  -AD      Respiratory Pattern -- -- inhale-exhale pattern  -AD      Date of Intubation -- -- 1/21/2022  extubated 1/24/2022  -AD              MBS/VFSS    Utensils Used -- -- spoon; cup; straw  -AD      Consistencies Trialed -- -- regular textures; pureed; thin liquids; nectar/syrup-thick liquids; honey-thick liquids  -AD              MBS/VFSS Interpretation    Oral Prep Phase -- -- WFL  -AD      Oral Transit Phase -- -- impaired  -AD      Oral Residue -- -- WFL  -AD              Oral Transit Phase    Impaired Oral Transit Phase -- -- tongue pumping; piecemeal oral transit; other (see  comments)  spill of all consistencies over the back of the tongue  -AD      Tongue Pumping -- -- pudding/puree; secondary to reduced lingual control  -AD      Piecemeal Oral Transit -- -- nectar-thick liquids; secondary to reduced lingual control; discoordination of lingual movement  -AD      Oral Transit Phase, Comment -- -- Mild deficits with decreased bolus control resulting in tongue pumping on puree, piecemeal transit of nectar thick liqiuds and spill of all consistencies over the back of the tongue. All due to decreased lingual control and coordination. Fatigue may also be a factor.  -AD              Initiation of Pharyngeal Swallow    Initiation of Pharyngeal Swallow -- -- bolus in valleculae; bolus in pyriform sinuses  -AD      Pharyngeal Phase -- -- impaired pharyngeal phase of swallowing  -AD      Rosenbek's Scale -- -- thin:; nectar:; honey:; pudding/puree:; regular textures:; all consistencies:; 1--->level 1  -AD      Pharyngeal Residue -- -- pudding/puree; regular textures; valleculae; pyriform sinuses; nectar-thick liquids; base of tongue; secondary to reduced base of tongue retraction; other (see comments)  -AD      Response to Residue -- -- cleared residue with cued swallow; cleared residue with liquid wash  -AD      Attempted Compensatory Maneuvers -- -- bolus size  -AD      Response to Attempted Compensatory Maneuvers -- -- other (see comments)  did not improve timing  or control of thin bolus. Pt unable to demonstrate small cup drink.  -AD      Pharyngeal Phase, Comment -- -- Pt with mild pharyngeal dysphagia with delay of swallow noted with bolus in the valleculae on all consistencies trialed and thins to the level of the pyriforms. Delay of swallow ranges from 2-3 seconds on all liquids, 2 seconds on puree and upt ot 5 seconds on solids with material on the base of tongue. Mild to moderate residue on puree and solids in the valleculae and pyriforms. Mild base of tongue residue on nectar thick  liquids. Able to clear all with cued swallows. Thin wash also partially effective in clearing solid residue. Residue felt to be related to mildly decreased base of tongue retraction/decreased pharyngeal squeeze. Fatigue also appears to be a factor. No penetration or aspiration was seen on any trial or consistency.  -AD              SLP Evaluation Clinical Impression    SLP Swallowing Diagnosis -- -- mild; oral dysphagia; pharyngeal dysphagia  -AD      Functional Impact -- -- risk of aspiration/pneumonia; risk of malnutrition  -AD      Rehab Potential/Prognosis, Swallowing -- -- adequate, monitor progress closely  -AD      Swallow Criteria for Skilled Therapeutic Interventions Met -- -- demonstrates skilled criteria  -AD              SLP Treatment Clinical Impressions    Treatment Assessment (SLP) Continues to tolerate diet, but needs consistent reminders to slow rate down and take small bites/sips. Able to demonstrate prepping consistently after cues as well as smaller bites/drinks  -AD Pt reports tolerance of his diet without any concerns. RN, reports he has eaten all of his food at each meal today. Pt able to demonstrate oral prepping and small drinks with consistent verbal prompts from SLP. Education regarding MBS reviewed with both pt and RN.  -AD --      Daily Summary of Progress (SLP) progress toward functional goals as expected  -AD progress toward functional goals is good  -AD progress toward functional goals as expected  -AD      Barriers to Overall Progress (SLP) -- Medically complex  -AD Cognitive status; Medically complex  -AD      Plan for Continued Treatment (SLP) continue treatment per plan of care  -AD continue treatment per plan of care  -AD continue treatment per plan of care  -AD      Care Plan Review -- evaluation/treatment results reviewed; care plan/treatment goals reviewed; risks/benefits reviewed; current/potential barriers reviewed; patient/other agree to care plan  -AD evaluation/treatment  results reviewed; care plan/treatment goals reviewed; risks/benefits reviewed; current/potential barriers reviewed; patient/other agree to care plan  -AD      Care Plan Review, Other Participant(s) -- other (see comments)  Estefany TABOR  -VICTOR HUGO other (see comments)  Emir TABOR MD, Dr. Gibson  -AD              Recommendations    Therapy Frequency (Swallow) daily; at least; 5 days per week  -AD daily; at least; 5 days per week  -AD daily; at least; 5 days per week  -AD      Predicted Duration Therapy Intervention (Days) 1 week; until discharge  -AD 1 week; until discharge  -AD 1 week; until discharge  -AD      SLP Diet Recommendation soft textures; chopped; thin liquids; nutritional supplements needed; other (see comments)  -AD soft textures; chopped; thin liquids; nutritional supplements needed; other (see comments)  smal meals w/snacks/supplements; rec half size portions to start off  -AD soft textures; chopped; thin liquids; nutritional supplements needed; other (see comments)  smal meals w/snacks/supplements; rec half size portions to start off  -AD      Recommended Precautions and Strategies upright posture during/after eating; small bites of food and sips of liquid; no straw; general aspiration precautions; reflux precautions; fatigue precautions  -AD upright posture during/after eating; small bites of food and sips of liquid; no straw; general aspiration precautions; reflux precautions; fatigue precautions  -AD upright posture during/after eating; small bites of food and sips of liquid; no straw; general aspiration precautions; reflux precautions; fatigue precautions  -AD      Oral Care Recommendations Oral Care before breakfast, after meals and PRN; Toothbrush  -AD Oral Care before breakfast, after meals and PRN; Toothbrush  -AD Oral Care before breakfast, after meals and PRN; Toothbrush  -AD      SLP Rec. for Method of Medication Administration meds whole; with pudding or applesauce; as tolerated  -AD meds whole;  with pudding or applesauce; as tolerated  -AD meds whole; with pudding or applesauce; as tolerated  -AD      Monitor for Signs of Aspiration yes; elevated WBC count; gurgly voice; upper respiratory  -AD yes; elevated WBC count; gurgly voice; upper respiratory  -AD yes; elevated WBC count; gurgly voice; upper respiratory  -AD      Anticipated Discharge Disposition (SLP) skilled nursing facility  -AD skilled nursing facility  -AD skilled nursing facility  -AD      Patient/Family Concerns, Anticipated Discharge Disposition (SLP) -- Pt does not report any concerns at this time.  -AD Pt does not report any at this time  -AD              Swallow Goals (SLP)    Oral Nutrition/Hydration Goal Selection (SLP) -- -- oral nutrition/hydration, SLP goal (free text)  -AD      Swallow Management Recall Goal Selection (SLP) -- -- swallow management recall, SLP goal 1  -AD      Swallow Compensatory Strategies Goal Selection (SLP) -- -- swallow compensatory strategies, SLP goal 1  -AD      Additional Documentation -- -- swallow management recall goal selection (SLP); swallow compensatory strategies goal selection (SLP)  -AD              Oral Nutrition/Hydration Goal 1 (SLP)    Oral Nutrition/Hydration Goal 1, SLP -- -- Pt will be able to demonstrate improved arousal/alertness to participate in a reassessment of his swallowing via clinical swallow eval.  -AD      Time Frame (Oral Nutrition/Hydration Goal 1, SLP) -- -- short term goal (STG); 2 days  -AD      Barriers (Oral Nutrition/Hydration Goal 1, SLP) -- -- medically complex  -AD      Progress/Outcomes (Oral Nutrition/Hydration Goal 1, SLP) -- -- goal met  -AD              Oral Nutrition/Hydration Goal 2 (SLP)    Oral Nutrition/Hydration Goal 2, SLP -- -- Pt will be able to participate in a MBS to further assess pharyngeal phase of swallowing in order to make least restrictive, safe diet recommendations.  -AD      Time Frame (Oral Nutrition/Hydration Goal 2, SLP) -- -- short term  goal (STG); 1 week  -AD      Barriers (Oral Nutrition/Hydration Goal 2, SLP) -- -- medically complex; decreased arousal/alertness level.  -AD      Progress/Outcomes (Oral Nutrition/Hydration Goal 2, SLP) -- -- goal met  -AD              Oral Nutrition/Hydration Goal (SLP)    Oral Nutrition/Hydration Goal, SLP Pt will demonstrate tolerance of the least restrictive diet w/o s/s of aspiration or complications such as aspiration pneumonia.  -AD Pt will demonstrate tolerance of the least restrictive diet w/o s/s of aspiration or complications such as aspiration pneumonia.  -AD Pt will demonstrate tolerance of the least restrictive diet w/o s/s of aspiration or complications such as aspiration pneumonia.  -AD      Time Frame (Oral Nutrition/Hydration Goal, SLP) short term goal (STG); 1 week; by discharge  -AD short term goal (STG); 1 week; by discharge  -AD short term goal (STG); 1 week; by discharge  -AD      Barriers (Oral Nutrition/Hydration Goal, SLP) medically complex; cognitive status  -AD medically complex; cognitive status  -AD medically complex; cognitive status  -AD      Progress/Outcomes (Oral Nutrition/Hydration Goal, SLP) good progress toward goal; goal ongoing  -AD good progress toward goal; goal ongoing  Good po intake of both foods and liquids. No overt s/s noted or reported. RN reports coughing in morning after drinking his coffee. Cough similar to other coughing episodes.  -AD other (see comments)  new  -AD              Swallow Management Recall Goal 1 (SLP)    Activity (Swallow Management Recall Goal 1, SLP) recall of; safe diet level/texture; compensatory swallow strategies/techniques  -AD recall of; safe diet level/texture; compensatory swallow strategies/techniques  -AD recall of; safe diet level/texture; compensatory swallow strategies/techniques  -AD      [place]/Accuracy (Swallow Management Recall Goal 1, SLP) with moderate cues (50-74% accuracy)  -AD with moderate cues (50-74% accuracy)  -AD  with moderate cues (50-74% accuracy)  -AD      Time Frame (Swallow Management Recall Goal 1, SLP) short term goal (STG); 1 week; by discharge  -AD short term goal (STG); 1 week; by discharge  -AD short term goal (STG); 1 week; by discharge  -AD      Barriers (Swallow Management Recall Goal 1, SLP) medically complex; cognitive status  -AD medically complex; cognitive status  -AD medically complex; cognitive status  -AD      Progress/Outcomes (Swallow Management Recall Goal 1, SLP) good progress toward goal; goal ongoing  -AD good progress toward goal; goal ongoing  able to verbalize after model diet level and use of small drinks. Reinforcement needed.  -AD other (see comments)  new  -AD              Swallow Compensatory Strategies Goal 1 (SLP)    Activity (Swallow Compensatory Strategies/Techniques Goal 1, SLP) compensatory strategies; during meal intake; during p.o. trials; small cup sips; extra swallow per bolus; other (see comments)  -AD compensatory strategies; during meal intake; during p.o. trials; small cup sips; extra swallow per bolus; other (see comments)  prepping prior to swallowing  -AD compensatory strategies; during meal intake; during p.o. trials; small cup sips; extra swallow per bolus; other (see comments)  prepping priorto swallowing  -AD      [place]/Accuracy (Swallow Compensatory Strategies/Techniques Goal 1, SLP) with moderate cues (50-74% accuracy)  -AD with moderate cues (50-74% accuracy)  -AD with moderate cues (50-74% accuracy)  -AD      Time Frame (Swallow Compensatory Strategies/Techniques Goal 1, SLP) short term goal (STG); 1 week; by discharge  -AD short term goal (STG); 1 week; by discharge  -AD short term goal (STG); 1 week; by discharge  -AD      Barriers (Swallow Compensatory Strategies/Techniques Goal 1, SLP) medically complex; cognitive status  -AD medically complex; cognitive status  -AD medically complex; cognitive status  -AD      Progress/Outcomes (Swallow Compensatory  Strategies/Techniques Goal 1, SLP) good progress toward goal; goal ongoing  -AD good progress toward goal; goal ongoing  able to demonstrate prepping after instruction on 3/4 trials w/inconsistent cues. Had to discontinued due to xray and coughing after physical exertion.  -AD other (see comments)  new  -AD            User Key  (r) = Recorded By, (t) = Taken By, (c) = Cosigned By    Initials Name Effective Dates    AD Daniela Robertson, MS CCC-SLP 06/16/21 -                 EDUCATION  The patient has been educated in the following areas:   Dysphagia (Swallowing Impairment) Modified Diet Instruction  Compensatory strategies.        SLP GOALS     Row Name 03/02/22 1148 03/01/22 1300 02/28/22 1300       Oral Nutrition/Hydration Goal 1 (SLP)    Oral Nutrition/Hydration Goal 1, SLP -- -- Pt will be able to demonstrate improved arousal/alertness to participate in a reassessment of his swallowing via clinical swallow eval.  -AD    Time Frame (Oral Nutrition/Hydration Goal 1, SLP) -- -- short term goal (STG); 2 days  -AD    Barriers (Oral Nutrition/Hydration Goal 1, SLP) -- -- medically complex  -AD    Progress/Outcomes (Oral Nutrition/Hydration Goal 1, SLP) -- -- goal met  -AD       Oral Nutrition/Hydration Goal 2 (SLP)    Oral Nutrition/Hydration Goal 2, SLP -- -- Pt will be able to participate in a MBS to further assess pharyngeal phase of swallowing in order to make least restrictive, safe diet recommendations.  -AD    Time Frame (Oral Nutrition/Hydration Goal 2, SLP) -- -- short term goal (STG); 1 week  -AD    Barriers (Oral Nutrition/Hydration Goal 2, SLP) -- -- medically complex; decreased arousal/alertness level.  -AD    Progress/Outcomes (Oral Nutrition/Hydration Goal 2, SLP) -- -- goal met  -AD       Oral Nutrition/Hydration Goal (SLP)    Oral Nutrition/Hydration Goal, SLP Pt will demonstrate tolerance of the least restrictive diet w/o s/s of aspiration or complications such as aspiration pneumonia.  -AD Pt will  demonstrate tolerance of the least restrictive diet w/o s/s of aspiration or complications such as aspiration pneumonia.  -AD Pt will demonstrate tolerance of the least restrictive diet w/o s/s of aspiration or complications such as aspiration pneumonia.  -AD    Time Frame (Oral Nutrition/Hydration Goal, SLP) short term goal (STG); 1 week; by discharge  -AD short term goal (STG); 1 week; by discharge  -AD short term goal (STG); 1 week; by discharge  -AD    Barriers (Oral Nutrition/Hydration Goal, SLP) medically complex; cognitive status  -AD medically complex; cognitive status  -AD medically complex; cognitive status  -AD    Progress/Outcomes (Oral Nutrition/Hydration Goal, SLP) good progress toward goal; goal ongoing  -AD good progress toward goal; goal ongoing  Good po intake of both foods and liquids. No overt s/s noted or reported. RN reports coughing in morning after drinking his coffee. Cough similar to other coughing episodes.  -AD other (see comments)  new  -AD       Swallow Management Recall Goal 1 (SLP)    Activity (Swallow Management Recall Goal 1, SLP) recall of; safe diet level/texture; compensatory swallow strategies/techniques  -AD recall of; safe diet level/texture; compensatory swallow strategies/techniques  -AD recall of; safe diet level/texture; compensatory swallow strategies/techniques  -AD    [place]/Accuracy (Swallow Management Recall Goal 1, SLP) with moderate cues (50-74% accuracy)  -AD with moderate cues (50-74% accuracy)  -AD with moderate cues (50-74% accuracy)  -AD    Time Frame (Swallow Management Recall Goal 1, SLP) short term goal (STG); 1 week; by discharge  -AD short term goal (STG); 1 week; by discharge  -AD short term goal (STG); 1 week; by discharge  -AD    Barriers (Swallow Management Recall Goal 1, SLP) medically complex; cognitive status  -AD medically complex; cognitive status  -AD medically complex; cognitive status  -AD    Progress/Outcomes (Swallow Management Recall  Goal 1, SLP) good progress toward goal; goal ongoing  -AD good progress toward goal; goal ongoing  able to verbalize after model diet level and use of small drinks. Reinforcement needed.  -AD other (see comments)  new  -AD       Swallow Compensatory Strategies Goal 1 (SLP)    Activity (Swallow Compensatory Strategies/Techniques Goal 1, SLP) compensatory strategies; during meal intake; during p.o. trials; small cup sips; extra swallow per bolus; other (see comments)  -AD compensatory strategies; during meal intake; during p.o. trials; small cup sips; extra swallow per bolus; other (see comments)  prepping prior to swallowing  -AD compensatory strategies; during meal intake; during p.o. trials; small cup sips; extra swallow per bolus; other (see comments)  prepping priorto swallowing  -AD    [place]/Accuracy (Swallow Compensatory Strategies/Techniques Goal 1, SLP) with moderate cues (50-74% accuracy)  -AD with moderate cues (50-74% accuracy)  -AD with moderate cues (50-74% accuracy)  -AD    Time Frame (Swallow Compensatory Strategies/Techniques Goal 1, SLP) short term goal (STG); 1 week; by discharge  -AD short term goal (STG); 1 week; by discharge  -AD short term goal (STG); 1 week; by discharge  -AD    Barriers (Swallow Compensatory Strategies/Techniques Goal 1, SLP) medically complex; cognitive status  -AD medically complex; cognitive status  -AD medically complex; cognitive status  -AD    Progress/Outcomes (Swallow Compensatory Strategies/Techniques Goal 1, SLP) good progress toward goal; goal ongoing  -AD good progress toward goal; goal ongoing  able to demonstrate prepping after instruction on 3/4 trials w/inconsistent cues. Had to discontinued due to xray and coughing after physical exertion.  -AD other (see comments)  new  -AD          User Key  (r) = Recorded By, (t) = Taken By, (c) = Cosigned By    Initials Name Provider Type    Daniela Burgess MS CCC-SLP Speech and Language Pathologist                    Time Calculation:    Time Calculation- SLP     Row Name 03/02/22 1722             Time Calculation- SLP    SLP Start Time 0816  -AD      SLP Stop Time 0848  -AD      SLP Time Calculation (min) 32 min  -AD      Total Timed Code Minutes- SLP 0 minute(s)  -AD      SLP Non-Billable Time (min) 0 min  -AD      SLP Received On 03/02/22  -AD              Untimed Charges    95630-DX Treatment Swallow Minutes 32  -AD              Total Minutes    Untimed Charges Total Minutes 32  -AD       Total Minutes 32  -AD            User Key  (r) = Recorded By, (t) = Taken By, (c) = Cosigned By    Initials Name Provider Type    Daniela Burgess, MS CCC-SLP Speech and Language Pathologist                Therapy Charges for Today     Code Description Service Date Service Provider Modifiers Qty    70842390056 HC ST TREATMENT SWALLOW 1 3/1/2022 Daniela Robertson MS CCC-SLP GN 1    50848473276 HC ST TREATMENT SWALLOW 2 3/2/2022 Daniela Robertson MS CCC-SLP GN 1               Daniela Robertson MS CCC-SLP  3/2/2022

## 2022-03-03 LAB
GLUCOSE BLDC GLUCOMTR-MCNC: 123 MG/DL (ref 70–130)
GLUCOSE BLDC GLUCOMTR-MCNC: 135 MG/DL (ref 70–130)
GLUCOSE BLDC GLUCOMTR-MCNC: 151 MG/DL (ref 70–130)
GLUCOSE BLDC GLUCOMTR-MCNC: 94 MG/DL (ref 70–130)

## 2022-03-03 PROCEDURE — 25010000002 FUROSEMIDE PER 20 MG: Performed by: HOSPITALIST

## 2022-03-03 PROCEDURE — 82962 GLUCOSE BLOOD TEST: CPT

## 2022-03-03 PROCEDURE — 25010000002 METHYLPREDNISOLONE PER 40 MG: Performed by: INTERNAL MEDICINE

## 2022-03-03 PROCEDURE — 94799 UNLISTED PULMONARY SVC/PX: CPT

## 2022-03-03 PROCEDURE — 94669 MECHANICAL CHEST WALL OSCILL: CPT

## 2022-03-03 PROCEDURE — 99232 SBSQ HOSP IP/OBS MODERATE 35: CPT | Performed by: HOSPITALIST

## 2022-03-03 PROCEDURE — 94761 N-INVAS EAR/PLS OXIMETRY MLT: CPT

## 2022-03-03 RX ORDER — FUROSEMIDE 10 MG/ML
20 INJECTION INTRAMUSCULAR; INTRAVENOUS ONCE
Status: COMPLETED | OUTPATIENT
Start: 2022-03-03 | End: 2022-03-03

## 2022-03-03 RX ADMIN — IPRATROPIUM BROMIDE AND ALBUTEROL SULFATE 3 ML: .5; 2.5 SOLUTION RESPIRATORY (INHALATION) at 07:34

## 2022-03-03 RX ADMIN — DOCUSATE SODIUM 100 MG: 50 LIQUID ORAL at 08:53

## 2022-03-03 RX ADMIN — METHYLPREDNISOLONE SODIUM SUCCINATE 20 MG: 40 INJECTION, POWDER, FOR SOLUTION INTRAMUSCULAR; INTRAVENOUS at 08:38

## 2022-03-03 RX ADMIN — ATORVASTATIN CALCIUM 40 MG: 40 TABLET, FILM COATED ORAL at 21:00

## 2022-03-03 RX ADMIN — APIXABAN 10 MG: 2.5 TABLET, FILM COATED ORAL at 08:38

## 2022-03-03 RX ADMIN — APIXABAN 10 MG: 2.5 TABLET, FILM COATED ORAL at 21:00

## 2022-03-03 RX ADMIN — POLYETHYLENE GLYCOL 3350 17 G: 17 POWDER, FOR SOLUTION ORAL at 08:39

## 2022-03-03 RX ADMIN — DOCUSATE SODIUM 100 MG: 50 LIQUID ORAL at 21:00

## 2022-03-03 RX ADMIN — Medication 10 ML: at 08:42

## 2022-03-03 RX ADMIN — HYDROCODONE BITARTRATE AND HOMATROPINE METHYLBROMIDE 5 ML: 5; 1.5 SOLUTION ORAL at 15:01

## 2022-03-03 RX ADMIN — HYDROCODONE BITARTRATE AND HOMATROPINE METHYLBROMIDE 5 ML: 5; 1.5 SOLUTION ORAL at 02:44

## 2022-03-03 RX ADMIN — IPRATROPIUM BROMIDE AND ALBUTEROL SULFATE 3 ML: .5; 2.5 SOLUTION RESPIRATORY (INHALATION) at 13:42

## 2022-03-03 RX ADMIN — HYDROCODONE BITARTRATE AND HOMATROPINE METHYLBROMIDE 5 ML: 5; 1.5 SOLUTION ORAL at 22:00

## 2022-03-03 RX ADMIN — IPRATROPIUM BROMIDE AND ALBUTEROL SULFATE 3 ML: .5; 2.5 SOLUTION RESPIRATORY (INHALATION) at 01:11

## 2022-03-03 RX ADMIN — FUROSEMIDE 20 MG: 10 INJECTION, SOLUTION INTRAMUSCULAR; INTRAVENOUS at 17:17

## 2022-03-03 RX ADMIN — IPRATROPIUM BROMIDE AND ALBUTEROL SULFATE 3 ML: .5; 2.5 SOLUTION RESPIRATORY (INHALATION) at 19:07

## 2022-03-03 RX ADMIN — FERROUS SULFATE TAB EC 324 MG (65 MG FE EQUIVALENT) 324 MG: 324 (65 FE) TABLET DELAYED RESPONSE at 08:39

## 2022-03-03 NOTE — PROGRESS NOTES
[facility]      Mar/chart reviewed  Follow up AHRF, pneumonia, post COVID fibrosis  Less confused today, +cough    Vital Sign Min/Max for last 24 hours  Temp  Min: 97.4 °F (36.3 °C)  Max: 97.7 °F (36.5 °C)   BP  Min: 109/73  Max: 158/80   Pulse  Min: 68  Max: 108   Resp  Min: 20  Max: 28   SpO2  Min: 89 %  Max: 98 %   Flow (L/min)  Min: 40  Max: 45   Weight  Min: 73.5 kg (162 lb 1.6 oz)  Max: 73.5 kg (162 lb 1.6 oz)   140/1600    Appears ill, alert,  fairly appropriate  perrl, eomi, normal sclera  mmm, no jvd, trachea midline, neck supple,  chest decreased bilaterally,+ crackles on right clear on left, no wheezes,   tachy  soft, nt, nd +bs,  no c/c/trace edema  Skin warm, dry no rashes    Labs: 3/3: reviewed:  Bicarb 30  Cr 0.5  Wbc 18  hgb 11.5  plts 477    A/P:  1. AHRF -- oxygen requirements went up and now have come down slightly, continue to titrate as needed. Try and keep him on dry side  2. Pneumonia -- agree with treating for secondary bacterial infection --was problem prior to cefepime  3. 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 Encephalopathy -- still confused,   5. Anemia  6. Daily alcohol use    Slow improvement it seems, continue current

## 2022-03-03 NOTE — PROGRESS NOTES
"Hospitalist Team      Patient Care Team:  Tiffany Burciaga MD as PCP - General (Internal Medicine)      Chief Complaint: Follow-up Acute Hypoxic Resp Failure    Subjective    Feels well this morning.  Breathing about the same.  He did get up and walk to the door and back yesterday and became quite dyspneic.  He denies chest pain.  Good appetite and PO intake.    Objective    Vital Signs  Temp:  [97.4 °F (36.3 °C)-97.7 °F (36.5 °C)] 97.4 °F (36.3 °C)  Heart Rate:  [] 75  Resp:  [20-28] 22  BP: (109-158)/() 110/69  Oxygen Therapy  SpO2: 91 %  Pulse Oximetry Type: Continuous  Device (Oxygen Therapy): ventilator  Device (Oxygen Therapy): heated, high-flow nasal cannula  $ High Flow Nasal Cannula Set-Up: yes  Flow (L/min): 40  Oxygen Concentration (%): 50  ETCO2 (mmHg): 32 mmHg  Probe Placed On (Pulse Ox): Left:, finger  Probe Removed From (Pulse Ox): Left:, forehead}    Flowsheet Rows      First Filed Value   Admission Height 172.7 cm (68\") Documented at 02/21/2022 1843   Admission Weight 78.9 kg (174 lb) Documented at 02/21/2022 1825          Physical Exam:    General: Appears stated age in no acute distress.  More relaxed than yesterday.  Lungs: Breath sounds are diminished throughout all fields.  Respirations are non-labored.  CV: Regular rate and rhythm.  No murmurs appreciated.  Radial and pedal pulses are 2+ and symmetric.  Abdomen: Soft and non-tender.  Bowel sounds are active.  MSK: No C/C/E.  Neuro: CN II-XII grossly intact.  Psych: Pleasant affect.  Ox3.    Results Review:     I reviewed the patient's new clinical results.    Lab Results (last 24 hours)     Procedure Component Value Units Date/Time    POC Glucose Once [[id number]]  (Normal) Collected: 03/03/22 0559    Specimen: Blood Updated: 03/03/22 0605     Glucose 94 mg/dL      Comment: Meter: [id number] : [id number] Ana SALINAS       POC Glucose Once [[id number]]  (Normal) Collected: 03/03/22 0005    Specimen: Blood Updated: 03/03/22 " 0011     Glucose 123 mg/dL      Comment: Meter: [id number] : [id number] Ana SALINAS       POC Glucose Once [[id number]]  (Abnormal) Collected: 03/02/22 1714    Specimen: Blood Updated: 03/02/22 1720     Glucose 159 mg/dL      Comment: Meter: [id number] : RWALS Byron Sears RN       POC Glucose Once [[id number]]  (Abnormal) Collected: 03/02/22 1205    Specimen: Blood Updated: 03/02/22 1212     Glucose 144 mg/dL      Comment: Meter: [id number] : [place] [id number] Byron Sears RN       C-reactive Protein [[id number]]  (Abnormal) Collected: 03/02/22 0430    Specimen: Blood Updated: 03/02/22 1121     C-Reactive Protein 4.58 mg/dL           Imaging Results (Last 24 Hours)     ** No results found for the last 24 hours. **          Medication Review:   I have reviewed the patient's current medication list    Current Facility-Administered Medications:   •  acetaminophen (TYLENOL) tablet 650 mg, 650 mg, Oral, Q4H PRN, 650 mg at 02/27/22 0311 **OR** acetaminophen (TYLENOL) suppository 650 mg, 650 mg, Rectal, Q4H PRN, Camreon Alvarado, DO  •  apixaban (ELIQUIS) tablet 10 mg, 10 mg, Oral, Q12H, 10 mg at 03/03/22 0838 **FOLLOWED BY** [START ON 3/8/2022] apixaban (ELIQUIS) tablet 5 mg, 5 mg, Oral, Q12H, Osvaldo Gibson,   •  atorvastatin (LIPITOR) tablet 40 mg, 40 mg, Oral, Nightly, Cameron Alvarado DO, 40 mg at 03/02/22 2004  •  dextrose (D50W) (25 g/50 mL) IV injection 25 g, 25 g, Intravenous, Q15 Min PRN, Cash Arguello Jr., MD  •  dextrose (GLUTOSE) oral gel 15 g, 15 g, Oral, Q15 Min PRN, Cash Arguello Jr., MD  •  docusate sodium (COLACE) liquid 100 mg, 100 mg, Oral, BID, Alvarado, Birrilla, DO, 100 mg at 03/03/22 0853  •  ferrous sulfate EC tablet 324 mg, 324 mg, Oral, Daily With Breakfast, Alvarado, Birrilla, DO, 324 mg at 03/03/22 0839  •  glucagon (GLUCAGEN) injection 1 mg, 1 mg, Intramuscular, Q15 Min PRN, Cash Arguello Jr., MD  •  HYDROcodone-homatropine (HYCODAN) 5-1.5 MG/5ML syrup 5  mL, 5 mL, Oral, Q4H PRN, Osvaldo Gibson DO, 5 mL at 03/03/22 0244  •  insulin aspart (novoLOG) injection 0-24 Units, 0-24 Units, Subcutaneous, Q6H, Alvarado, Birrilla, DO, 4 Units at 03/02/22 1722  •  ipratropium-albuterol (DUO-NEB) nebulizer solution 3 mL, 3 mL, Nebulization, Q6H - RT, Alvarado, Birrilla, DO, 3 mL at 03/03/22 0734  •  ipratropium-albuterol (DUO-NEB) nebulizer solution 3 mL, 3 mL, Nebulization, Q6H PRN, Alvarado, Birrilla, DO  •  Magnesium Sulfate 2 gram Bolus, followed by 8 gram infusion (total Mg dose 10 grams)- Mg less than or equal to 1mg/dL, 2 g, Intravenous, PRN **OR** Magnesium Sulfate 2 gram / 50mL Infusion (GIVE X 3 BAGS TO EQUAL 6GM TOTAL DOSE) - Mg 1.1 - 1.5 mg/dl, 2 g, Intravenous, PRN **OR** Magnesium Sulfate 4 gram infusion- Mg 1.6-1.9 mg/dL, 4 g, Intravenous, PRN, Alvarado, Birrilla, DO  •  methylPREDNISolone sodium succinate (SOLU-Medrol) injection 20 mg, 20 mg, Intravenous, Daily, Garrett Yoon MD, 20 mg at 03/03/22 0838  •  OLANZapine (zyPREXA) injection 5 mg, 5 mg, Intramuscular, Q8H PRN, Garrett Yoon MD, 5 mg at 02/28/22 1709  •  OLANZapine (zyPREXA) tablet 5 mg, 5 mg, Oral, Nightly, Alvarado, Birrilla, DO, 5 mg at 03/02/22 2003  •  ondansetron (ZOFRAN) tablet 4 mg, 4 mg, Oral, Q6H PRN **OR** ondansetron (ZOFRAN) injection 4 mg, 4 mg, Intravenous, Q6H PRN, Alvarado, Birrilla, DO  •  polyethylene glycol (MIRALAX) packet 17 g, 17 g, Oral, Daily, Alvarado, Birrilla, DO, 17 g at 03/03/22 0839  •  potassium chloride (K-DUR,KLOR-CON) CR tablet 40 mEq, 40 mEq, Oral, PRN **OR** potassium chloride (KLOR-CON) packet 40 mEq, 40 mEq, Oral, PRN **OR** potassium chloride 10 mEq in 100 mL IVPB, 10 mEq, Intravenous, Q1H PRN, Alvarado, Birrilla, DO, Last Rate: 100 mL/hr at 02/22/22 0936, 10 mEq at 02/22/22 0936  •  [COMPLETED] Insert peripheral IV, , , Once **AND** sodium chloride 0.9 % flush 10 mL, 10 mL, Intravenous, PRN, Alvarado, Birrilla, DO, 10 mL at 02/24/22 1836  •  sodium chloride 0.9 % flush  10 mL, 10 mL, Intravenous, PRN, Alvarado, Birrilla, DO  •  sodium chloride 0.9 % flush 10 mL, 10 mL, Intravenous, Q12H, Alvarado, Birrilla, DO, 10 mL at 03/03/22 0842  •  sodium chloride 0.9 % infusion 40 mL, 40 mL, Intravenous, PRN, Alvarado, Birrilla, DO, 100 mL at 03/02/22 0228  •  sodium chloride nasal spray 2 spray, 2 spray, Each Nare, PRN, Osvaldo Gibson, DO, 2 spray at 02/28/22 1621      Assessment/Plan     1.  Acute Hypoxic Respiratory Failure due to bacterial/aspiration Pneumonia/Post-COVID Fibrosis: Able to wean O2 down a little more.  Discussed w/ Dr. Yoon and will continue Cefepime for a course.  Weaning steroids.     2.  Acute Metabolic Encephalopathy:  This appears resolved.  Continue to monitor.     3.  Acute LLE DVT: Continue Eliquis.     4.  Chronic Normocytic/Iron Deficiency Anemia: Nothing acute.     5.  Dyslipidemia: No acute issues.  Continue statin therapy.     6.  Steroid-induce Hyperglycemia: Continue to monitor.  Bedsides at goal.    Plan for disposition: Predicated on hospital course.    Darrick Matt MD  03/03/22  10:27 EST

## 2022-03-03 NOTE — PROGRESS NOTES
Adult Nutrition  Assessment/PES    Patient Name:  Dontae Bonds Jr.  YOB: 1950  MRN: [id number]  Admit Date:  2/21/2022    Assessment Date:  3/3/2022    Comments:  Pt tolerating diet at present, deferred between meal snack, also defers oral drink supplement at this time.     Reason for Assessment     Row Name 03/03/22 1536          Reason for Assessment    Reason For Assessment follow-up protocol     Diagnosis --  Acute Hypoxic Respiratory Failure due to bacterial/aspiration Pneumonia/Post-COVID Fibrosis                Nutrition/Diet History     Row Name 03/03/22 1537          Nutrition/Diet History    Typical Food/Fluid Intake good appetite, likes the food, does not want between meal snacks or oral supplement drink at this time                Anthropometrics     Row Name 03/03/22 1538          Anthropometrics    Current Weight Method --  162.1 lb 3.3.22            Admit Weight    Admit Weight --  169.3 lb                Labs/Tests/Procedures/Meds     Row Name 03/03/22 1539          Labs/Procedures/Meds    Lab Results Reviewed reviewed            Medications    Pertinent Medications Reviewed reviewed                    Nutrition Prescription Ordered     Row Name 03/03/22 1540          Nutrition Prescription PO    Current PO Diet Soft Texture     Texture Chopped     Other Modifiers Small Feedings                Evaluation of Received Nutrient/Fluid Intake     Row Name 03/03/22 1541          PO Evaluation    Number of Meals 5     % PO Intake 100%, 100%, 100%, 75%, 100%                     Problem/Interventions:   Problem 1     Row Name 03/03/22 1542          Nutrition Diagnoses Problem 1    Problem 1 Inadequate Nutrient Intake     Etiology (related to) Factors Affecting Nutrition     Signs/Symptoms (evidenced by) NPO     Resolved? Yes                Problem 2     Row Name 03/03/22 1542          Nutrition Diagnoses Problem 2    Problem 2 Swallowing Difficulty     Etiology (related to) Medical Diagnosis;  Factors Affecting Nutrition     Signs/Symptoms (evidenced by) SLP/Swallow eval                    Intervention Goal     Row Name 03/03/22 1543          Intervention Goal    PO Maintain intake     PO Intake % 75 %  or greater of meals     Weight No significant weight loss                Nutrition Intervention     Row Name 03/03/22 1543          Nutrition Intervention    RD/Tech Action Encourage intake; Follow Tx progress; Interview for preference                  Education/Evaluation     Row Name 03/03/22 1543          Monitor/Evaluation    Monitor I&O; PO intake; Pertinent labs; Weight; Skin status                 Electronically signed by:  Araseli Wick RD  03/03/22 15:44 EST

## 2022-03-03 NOTE — SIGNIFICANT NOTE
Pt had coughing spell, spo2 dropped to 85-86 had to increase O2 back to 60%. Nurse in room as well.

## 2022-03-03 NOTE — PLAN OF CARE
Goal Outcome Evaluation:  Plan of Care Reviewed With: patient        Progress: no change  Outcome Summary: Pt has been able to get up to the chair and back to bed during this shift; pt does get SOA on exertion and requires more O2 to recover; remains on HHF NC; no BM and good UOP; pt has eaten well but does not want the snacks beyween meals; pt is motivated to be well enough to go home.

## 2022-03-03 NOTE — PLAN OF CARE
Goal Outcome Evaluation:  Plan of Care Reviewed With: patient        Progress: no change  Outcome Summary: resyted fairly well overnight, wean/titrate oxygen as tolerated-currently on HHFNC at 45L and 55%. SR on the monitor. afebrile, b/p stable.

## 2022-03-03 NOTE — CASE MANAGEMENT/SOCIAL WORK
Continued Stay Note  DILAN Ward     Patient Name: Dontae Bonds Jr.  MRN: [id number]  Today's Date: 3/3/2022    Admit Date: 2/21/2022     Discharge Plan     Row Name 03/03/22 1400       Plan    Plan Home with HH vs STR    Plan Comments Per chart review patient oxygen demands have decreased to 40L HHF 60% oxygen and sats 89%. Per report patient ambulated in room to the door. Staff continues to wean oxygen as tolerated. CM will continue to follow for needs.               Discharge Codes    No documentation.                     Toma Marc RN

## 2022-03-04 LAB
ALBUMIN SERPL-MCNC: 3 G/DL (ref 3.5–5.2)
ALBUMIN/GLOB SERPL: 0.9 G/DL
ALP SERPL-CCNC: 48 U/L (ref 39–117)
ALT SERPL W P-5'-P-CCNC: 91 U/L (ref 1–41)
ANION GAP SERPL CALCULATED.3IONS-SCNC: 6 MMOL/L (ref 5–15)
AST SERPL-CCNC: 31 U/L (ref 1–40)
BILIRUB SERPL-MCNC: 0.3 MG/DL (ref 0–1.2)
BUN SERPL-MCNC: 21 MG/DL (ref 8–23)
BUN/CREAT SERPL: 42 (ref 7–25)
CALCIUM SPEC-SCNC: 9.4 MG/DL (ref 8.6–10.5)
CHLORIDE SERPL-SCNC: 99 MMOL/L (ref 98–107)
CO2 SERPL-SCNC: 32 MMOL/L (ref 22–29)
CREAT SERPL-MCNC: 0.5 MG/DL (ref 0.76–1.27)
CRP SERPL-MCNC: 2.23 MG/DL (ref 0–0.5)
EGFRCR SERPLBLD CKD-EPI 2021: 109 ML/MIN/1.73
GLOBULIN UR ELPH-MCNC: 3.4 GM/DL
GLUCOSE BLDC GLUCOMTR-MCNC: 107 MG/DL (ref 70–130)
GLUCOSE BLDC GLUCOMTR-MCNC: 155 MG/DL (ref 70–130)
GLUCOSE SERPL-MCNC: 120 MG/DL (ref 65–99)
MAGNESIUM SERPL-MCNC: 1.9 MG/DL (ref 1.6–2.4)
POTASSIUM SERPL-SCNC: 4.4 MMOL/L (ref 3.5–5.2)
PROCALCITONIN SERPL-MCNC: 0.06 NG/ML (ref 0–0.25)
PROT SERPL-MCNC: 6.4 G/DL (ref 6–8.5)
SODIUM SERPL-SCNC: 137 MMOL/L (ref 136–145)

## 2022-03-04 PROCEDURE — 94799 UNLISTED PULMONARY SVC/PX: CPT

## 2022-03-04 PROCEDURE — 25010000002 METHYLPREDNISOLONE PER 125 MG: Performed by: INTERNAL MEDICINE

## 2022-03-04 PROCEDURE — 84145 PROCALCITONIN (PCT): CPT | Performed by: HOSPITALIST

## 2022-03-04 PROCEDURE — 94669 MECHANICAL CHEST WALL OSCILL: CPT

## 2022-03-04 PROCEDURE — 25010000002 METHYLPREDNISOLONE PER 40 MG: Performed by: INTERNAL MEDICINE

## 2022-03-04 PROCEDURE — 80053 COMPREHEN METABOLIC PANEL: CPT | Performed by: HOSPITALIST

## 2022-03-04 PROCEDURE — 83735 ASSAY OF MAGNESIUM: CPT | Performed by: HOSPITALIST

## 2022-03-04 PROCEDURE — 82962 GLUCOSE BLOOD TEST: CPT

## 2022-03-04 PROCEDURE — 86140 C-REACTIVE PROTEIN: CPT | Performed by: HOSPITALIST

## 2022-03-04 PROCEDURE — 94761 N-INVAS EAR/PLS OXIMETRY MLT: CPT

## 2022-03-04 PROCEDURE — 92526 ORAL FUNCTION THERAPY: CPT

## 2022-03-04 PROCEDURE — 99232 SBSQ HOSP IP/OBS MODERATE 35: CPT | Performed by: HOSPITALIST

## 2022-03-04 RX ORDER — METHYLPREDNISOLONE SODIUM SUCCINATE 125 MG/2ML
80 INJECTION, POWDER, LYOPHILIZED, FOR SOLUTION INTRAMUSCULAR; INTRAVENOUS EVERY 8 HOURS
Status: DISCONTINUED | OUTPATIENT
Start: 2022-03-04 | End: 2022-03-08

## 2022-03-04 RX ADMIN — IPRATROPIUM BROMIDE AND ALBUTEROL SULFATE 3 ML: .5; 2.5 SOLUTION RESPIRATORY (INHALATION) at 07:03

## 2022-03-04 RX ADMIN — METHYLPREDNISOLONE SODIUM SUCCINATE 80 MG: 125 INJECTION, POWDER, FOR SOLUTION INTRAMUSCULAR; INTRAVENOUS at 16:46

## 2022-03-04 RX ADMIN — METHYLPREDNISOLONE SODIUM SUCCINATE 80 MG: 125 INJECTION, POWDER, FOR SOLUTION INTRAMUSCULAR; INTRAVENOUS at 23:55

## 2022-03-04 RX ADMIN — IPRATROPIUM BROMIDE AND ALBUTEROL SULFATE 3 ML: .5; 2.5 SOLUTION RESPIRATORY (INHALATION) at 01:09

## 2022-03-04 RX ADMIN — FERROUS SULFATE TAB EC 324 MG (65 MG FE EQUIVALENT) 324 MG: 324 (65 FE) TABLET DELAYED RESPONSE at 08:01

## 2022-03-04 RX ADMIN — ATORVASTATIN CALCIUM 40 MG: 40 TABLET, FILM COATED ORAL at 20:29

## 2022-03-04 RX ADMIN — POLYETHYLENE GLYCOL 3350 17 G: 17 POWDER, FOR SOLUTION ORAL at 08:01

## 2022-03-04 RX ADMIN — DOCUSATE SODIUM 100 MG: 50 LIQUID ORAL at 20:29

## 2022-03-04 RX ADMIN — IPRATROPIUM BROMIDE AND ALBUTEROL SULFATE 3 ML: .5; 2.5 SOLUTION RESPIRATORY (INHALATION) at 19:15

## 2022-03-04 RX ADMIN — HYDROCODONE BITARTRATE AND HOMATROPINE METHYLBROMIDE 5 ML: 5; 1.5 SOLUTION ORAL at 02:35

## 2022-03-04 RX ADMIN — APIXABAN 10 MG: 2.5 TABLET, FILM COATED ORAL at 20:29

## 2022-03-04 RX ADMIN — APIXABAN 10 MG: 2.5 TABLET, FILM COATED ORAL at 08:00

## 2022-03-04 RX ADMIN — IPRATROPIUM BROMIDE AND ALBUTEROL SULFATE 3 ML: .5; 2.5 SOLUTION RESPIRATORY (INHALATION) at 13:13

## 2022-03-04 RX ADMIN — METHYLPREDNISOLONE SODIUM SUCCINATE 20 MG: 40 INJECTION, POWDER, FOR SOLUTION INTRAMUSCULAR; INTRAVENOUS at 08:01

## 2022-03-04 NOTE — PROGRESS NOTES
"Hospitalist Team      Patient Care Team:  Tiffany Burciaga MD as PCP - General (Internal Medicine)      Chief Complaint: Follow-up Acute Hypoxic Respiratory Failure    Subjective    A little winded this morning after getting up from bed to the chair.  O2 delivery also went up overnight.  Mr. Bonds reports no chest pain and has a good appetite and PO intake.    Objective    Vital Signs  Temp:  [97.3 °F (36.3 °C)-98.1 °F (36.7 °C)] 98.1 °F (36.7 °C)  Heart Rate:  [] 101  Resp:  [20-34] 20  BP: ()/(61-88) 119/88  Oxygen Therapy  SpO2: 94 %  Pulse Oximetry Type: Continuous  Device (Oxygen Therapy): ventilator  Device (Oxygen Therapy): heated, high-flow nasal cannula  $ High Flow Nasal Cannula Set-Up: yes  Flow (L/min): 50  Oxygen Concentration (%): 70  ETCO2 (mmHg): 32 mmHg  Probe Placed On (Pulse Ox): forehead, Left:  Probe Removed From (Pulse Ox): Right:, finger (index)}    Flowsheet Rows      First Filed Value   Admission Height 172.7 cm (68\") Documented at 02/21/2022 1843   Admission Weight 78.9 kg (174 lb) Documented at 02/21/2022 1825          Physical Exam:    General: Appears in no acute distress.  Lungs: Breath sounds diminished throughout all fields.  Respirations are non-labored.  CV: Regular rate and rhythm.  No murmurs appreciated.  Radial pulses are 2+ and symmetric.  Abdomen: Soft and non-tender w/ active bowel sounds.  MSK: No C/C/E.  Neuro: CN II-XII grossly intact.  Psych: Pleasant affect.  Ox3.    Results Review:     I reviewed the patient's new clinical results.    Lab Results (last 24 hours)     Procedure Component Value Units Date/Time    Comprehensive Metabolic Panel [[id number]]  (Abnormal) Collected: 03/04/22 0530    Specimen: Blood Updated: 03/04/22 0606     Glucose 120 mg/dL      BUN 21 mg/dL      Creatinine 0.50 mg/dL      Sodium 137 mmol/L      Potassium 4.4 mmol/L      Chloride 99 mmol/L      CO2 32.0 mmol/L      Calcium 9.4 mg/dL      Total Protein 6.4 g/dL      Albumin 3.00 " "g/dL      ALT (SGPT) 91 U/L      AST (SGOT) 31 U/L      Alkaline Phosphatase 48 U/L      Total Bilirubin 0.3 mg/dL      Globulin 3.4 gm/dL      A/G Ratio 0.9 g/dL      BUN/Creatinine Ratio 42.0     Anion Gap 6.0 mmol/L      eGFR 109.0 mL/min/1.73      Comment: National Kidney Foundation and American Society of Nephrology (ASN) Task Force recommended calculation based on the Chronic Kidney Disease Epidemiology Collaboration (CKD-EPI) equation refit without adjustment for race.       Narrative:      GFR Normal >60  Chronic Kidney Disease <60  Kidney Failure <15      Magnesium [[id number]]  (Normal) Collected: 03/04/22 0530    Specimen: Blood Updated: 03/04/22 0606     Magnesium 1.9 mg/dL     Procalcitonin [[id number]]  (Normal) Collected: 03/04/22 0530    Specimen: Blood Updated: 03/04/22 0605     Procalcitonin 0.06 ng/mL     Narrative:      As a Marker for Sepsis (Non-Neonates):    1. <0.5 ng/mL represents a low risk of severe sepsis and/or septic shock.  2. >2 ng/mL represents a high risk of severe sepsis and/or septic shock.    As a Marker for Lower Respiratory Tract Infections that require antibiotic therapy:    PCT on Admission    Antibiotic Therapy       6-12 Hrs later    >0.5                Strongly Recommended  >0.25 - <0.5        Recommended   0.1 - 0.25          Discouraged              Remeasure/reassess PCT  <0.1                Strongly Discouraged     Remeasure/reassess PCT    As 28 day mortality risk marker: \"Change in Procalcitonin Result\" (>80% or <=80%) if Day 0 (or Day 1) and Day 4 values are available. Refer to http://www.[employer]-pct-calculator.com    Change in PCT <=80%  A decrease of PCT levels below or equal to 80% defines a positive change in PCT test result representing a higher risk for 28-day all-cause mortality of patients diagnosed with severe sepsis for septic shock.    Change in PCT >80%  A decrease of PCT levels of more than 80% defines a negative change in PCT result representing a lower " risk for 28-day all-cause mortality of patients diagnosed with severe sepsis or septic shock.       C-reactive Protein [[id number]] Collected: 03/04/22 0530    Specimen: Blood Updated: 03/04/22 0537    POC Glucose Once [[id number]]  (Abnormal) Collected: 03/03/22 1700    Specimen: Blood Updated: 03/03/22 1706     Glucose 135 mg/dL      Comment: Meter: [id number] : [id number] Abbe Victoria RN       POC Glucose Once [[id number]]  (Abnormal) Collected: 03/03/22 1130    Specimen: Blood Updated: 03/03/22 1138     Glucose 151 mg/dL      Comment: Meter: [id number] : [id number] Abbe Victoria RN             Imaging Results (Last 24 Hours)     ** No results found for the last 24 hours. **          Medication Review:   I have reviewed the patient's current medication list    Current Facility-Administered Medications:   •  acetaminophen (TYLENOL) tablet 650 mg, 650 mg, Oral, Q4H PRN, 650 mg at 02/27/22 0311 **OR** acetaminophen (TYLENOL) suppository 650 mg, 650 mg, Rectal, Q4H PRN, Alvarado Birrilla, DO  •  apixaban (ELIQUIS) tablet 10 mg, 10 mg, Oral, Q12H, 10 mg at 03/04/22 0800 **FOLLOWED BY** [START ON 3/8/2022] apixaban (ELIQUIS) tablet 5 mg, 5 mg, Oral, Q12H, Osvaldo Gibson, DO  •  atorvastatin (LIPITOR) tablet 40 mg, 40 mg, Oral, Nightly, Alvarado, Birrilla, DO, 40 mg at 03/03/22 2100  •  dextrose (D50W) (25 g/50 mL) IV injection 25 g, 25 g, Intravenous, Q15 Min PRN, Cash Arguello Jr., MD  •  dextrose (GLUTOSE) oral gel 15 g, 15 g, Oral, Q15 Min PRN, Cash Arguello Jr., MD  •  docusate sodium (COLACE) liquid 100 mg, 100 mg, Oral, BID, Alvarado, Birrilla, DO, 100 mg at 03/03/22 2100  •  ferrous sulfate EC tablet 324 mg, 324 mg, Oral, Daily With Breakfast, Cameron Alvarado DO, 324 mg at 03/04/22 0801  •  glucagon (GLUCAGEN) injection 1 mg, 1 mg, Intramuscular, Q15 Min PRN, Cash Arguello Jr., MD  •  HYDROcodone-homatropine (HYCODAN) 5-1.5 MG/5ML syrup 5 mL, 5 mL, Oral, Q4H PRN, Arthur  Osvaldo BARROW DO, 5 mL at 03/04/22 0235  •  insulin aspart (novoLOG) injection 0-24 Units, 0-24 Units, Subcutaneous, Q6H, Alvarado, Birrilla, DO, 4 Units at 03/02/22 1722  •  ipratropium-albuterol (DUO-NEB) nebulizer solution 3 mL, 3 mL, Nebulization, Q6H - RT, Alvarado, Birrilla, DO, 3 mL at 03/04/22 0703  •  ipratropium-albuterol (DUO-NEB) nebulizer solution 3 mL, 3 mL, Nebulization, Q6H PRN, Alvarado, Birrilla, DO  •  Magnesium Sulfate 2 gram Bolus, followed by 8 gram infusion (total Mg dose 10 grams)- Mg less than or equal to 1mg/dL, 2 g, Intravenous, PRN **OR** Magnesium Sulfate 2 gram / 50mL Infusion (GIVE X 3 BAGS TO EQUAL 6GM TOTAL DOSE) - Mg 1.1 - 1.5 mg/dl, 2 g, Intravenous, PRN **OR** Magnesium Sulfate 4 gram infusion- Mg 1.6-1.9 mg/dL, 4 g, Intravenous, PRN, Alvarado, Birrilla, DO  •  methylPREDNISolone sodium succinate (SOLU-Medrol) injection 20 mg, 20 mg, Intravenous, Daily, Garrett Yoon MD, 20 mg at 03/04/22 0801  •  OLANZapine (zyPREXA) injection 5 mg, 5 mg, Intramuscular, Q8H PRN, Garrett Yoon MD, 5 mg at 02/28/22 1709  •  ondansetron (ZOFRAN) tablet 4 mg, 4 mg, Oral, Q6H PRN **OR** ondansetron (ZOFRAN) injection 4 mg, 4 mg, Intravenous, Q6H PRN, Alvarado, Birrilla, DO  •  polyethylene glycol (MIRALAX) packet 17 g, 17 g, Oral, Daily, Alvarado, Birrilla, DO, 17 g at 03/04/22 0801  •  potassium chloride (K-DUR,KLOR-CON) CR tablet 40 mEq, 40 mEq, Oral, PRN **OR** potassium chloride (KLOR-CON) packet 40 mEq, 40 mEq, Oral, PRN **OR** potassium chloride 10 mEq in 100 mL IVPB, 10 mEq, Intravenous, Q1H PRN, Alvarado, Birrilla, DO, Last Rate: 100 mL/hr at 02/22/22 0936, 10 mEq at 02/22/22 0936  •  [COMPLETED] Insert peripheral IV, , , Once **AND** sodium chloride 0.9 % flush 10 mL, 10 mL, Intravenous, PRN, Alvarado, Birrilla, DO, 10 mL at 02/24/22 1836  •  sodium chloride 0.9 % infusion 40 mL, 40 mL, Intravenous, PRN, Alvarado, Birrilla, DO, 100 mL at 03/02/22 0228  •  sodium chloride nasal spray 2 spray, 2 spray, Each  ELIZABETH Santiago, Osvaldo Gibson, DO, 2 spray at 02/28/22 4991      Assessment/Plan     1.  Acute Hypoxic Respiratory Failure due to bacterial/aspiration Pneumonia/Post-COVID Fibrosis: Disappointed that O2 had to be increased back up.  Discussed w/ Dr. Yoon and will increase steroids, but no clear trend of SaO2 so will continue to treat inflammatory changes.  Procalcitonin is negative.      2.  Acute LLE DVT: Continues on Eliquis     3.  Steroid-induced Hyperglycemia: Watch trend as steroid dose increased.     4.  Dyslipidemia: No acute issues.     Plan for disposition: Predicated on hospital course.    Darrick Mtat MD  03/04/22  09:26 EST

## 2022-03-04 NOTE — PROGRESS NOTES
[facility]      Mar/chart reviewed  Follow up AHRF, pneumonia, post COVID fibrosis  Less confused today, +cough    Vital Sign Min/Max for last 24 hours  Temp  Min: 97.3 °F (36.3 °C)  Max: 98.1 °F (36.7 °C)   BP  Min: 82/61  Max: 129/85   Pulse  Min: 83  Max: 111   Resp  Min: 20  Max: 34   SpO2  Min: 83 %  Max: 99 %   Flow (L/min)  Min: 40  Max: 50   No data recorded     Appears ill, alert,  fairly appropriate  perrl, eomi, normal sclera  mmm, no jvd, trachea midline, neck supple,  chest decreased bilaterally,+ crackles on right clear on left, no wheezes,   tachy  soft, nt, nd +bs,  no c/c/trace edema  Skin warm, dry no rashes    Labs: 3/4: reviewed:  Bicarb 32  Cr 0.5  procal 0.06    A/P:  1. AHRF -- oxygen requirements went up and now have come down slightly, continue to titrate as needed. Try and keep him on dry side  2. Pneumonia -- agree with treating for secondary bacterial infection --was problem prior to cefepime  3. 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   4. Encephalopathy -- still confused,   5. Anemia  6. Daily alcohol use    Will trial higher dose solumedrol for inflammatory or organizing pneumonia type process? Will start solumedrol 80 tid.

## 2022-03-04 NOTE — THERAPY TREATMENT NOTE
Acute Care - Speech Language Pathology   Swallow Treatment Note DILAN Ward     Patient Name: Dontae Bonds Jr.  : 1950  MRN: [id number]  Today's Date: 3/4/2022               Admit Date: 2022    Visit Dx:     ICD-10-CM ICD-9-CM   1. Pneumonia of both lungs due to infectious organism, unspecified part of lung  J18.9 483.8   2. Acute respiratory failure without hypercapnia (HCC)  J96.00 518.81     Patient Active Problem List   Diagnosis   • Personal history of colonic polyps   • Family history of colon cancer   • Pneumonia due to COVID-19 virus   • Pneumonia of both lungs due to infectious organism   • Hypertension   • Hypokalemia     Past Medical History:   Diagnosis Date   • Hyperlipidemia    • Hypertension      History reviewed. No pertinent surgical history.    SLP Recommendation and Plan     SLP Diet Recommendation: soft textures, chopped, thin liquids, nutritional supplements needed, other (see comments) (small portions with snacks/supplements in between meals.) (22)  Recommended Precautions and Strategies: upright posture during/after eating, small bites of food and sips of liquid, no straw, 3 second prep, general aspiration precautions, reflux precautions, fatigue precautions (22)  SLP Rec. for Method of Medication Administration: meds whole, with pudding or applesauce, as tolerated (22)     Monitor for Signs of Aspiration: yes, elevated WBC count, gurgly voice, upper respiratory (22)        Anticipated Discharge Disposition (SLP): unknown, other (see comments) (Pt considering returning to home with home health at discharge once oxygen requirements decrease to level that can be managed at home.) (22)     Therapy Frequency (Swallow): daily, 3 days per week, 5 days per week (22)  Predicted Duration Therapy Intervention (Days): 1 week, until discharge (22)     Daily Summary of Progress (SLP): progress toward functional goals as  expected (03/04/22 1215)         Patient/Family Concerns, Anticipated Discharge Disposition (SLP): Pt concerned about his weakness and would like to start walking around as much as possible. Will notify MD. Pt also reports he feels he needs to put a little weight back on. Will notify dietician of desire to take supplements now. (03/04/22 1215)     Treatment Assessment (SLP): Pt with continued tolerance of diet and overall improved timing of swallow. Able to demonstrate small bites and drinks from cup independently today. Noted improved affect overall and more relaxed respirations from prior visits. Recommend to start advancing diet/intake. Pt reports he likes the portions he is currently receiving but is interested in supplements at this time. No overt s/s of aspiration and good oral intake of meals provided. If respiratory status continues to improve, will start with exercises to improve lingual control/coordination. (03/04/22 1215)  Plan for Continued Treatment (SLP): continue treatment per plan of care (03/04/22 1215)         Plan of Care Reviewed With: patient  Outcome Summary: SLP: Pt tolerating soft, chopped meats diet with thins w/o overt s/s of aspiration. Mostly compliant with small bites/drinks and prepping before swallowing. Minimal cues needed. Pt does not want to advance diet to full size portions, but states he is interested in supplements at this time as he feels he has lost too much weight. Pt also voices interest in physical and occupational therapy in order to build back his strength. SLP to continue to folllow but will decrease frequency to 3-5 times per week as tolerated.      SWALLOW EVALUATION (last 72 hours)     SLP Adult Swallow Evaluation     Row Name 03/04/22 1215 03/02/22 1148                Rehab Evaluation    Document Type therapy note (daily note)  -AD therapy note (daily note)  -AD       Subjective Information no complaints  -AD no complaints  -AD       Patient Observations alert;  cooperative; agree to therapy  -AD alert; cooperative; agree to therapy  -AD       Patient/Family/Caregiver Comments/Observations Pt seen upright in bed during lunch. States he was 'naughty' earlier and tried to walk in his room on his own. States he was really weak. States doing well with meals and doesn't want to change the amount, but is now interested in supplements. Feels he needs to put on some of the weight he has lost. Reports he drinks supplements at home.  -AD Pt upright in bed eating breakfast  -AD       Patient Effort good  -AD good  -AD       Symptoms Noted During/After Treatment none; other (see comments)  -AD none  -AD       Symptoms Noted, Comment Pt wtih O2 off when SLP entered the room. Sats running between 87-89 mostley with occasional drop to 85 and quick rebound. After coughing episode, sats dropped to 82-85 and O2 reapplied with immediate rebound to 90 and then improved to 96.  -AD Occasional drop in O2 sat but rebounds quickly.  -AD                Pain    Additional Documentation --  no pain reported  -AD --  pt reports no pain  -AD                General Eating/Swallowing Observations    Respiratory Support Currently in Use high-flow nasal cannula  heated; see noted under patient observations  -AD high-flow nasal cannula  -AD       Positioning During Eating upright 90 degree; upright in bed  -AD upright 90 degree; upright in bed  -AD       Pre SpO2 (%) 87  -AD --       Post SpO2 (%) 96  -AD --                Respiratory    Respiratory Status reduction in SpO2; other (see comments)  w/coughing  -AD --                SLP Treatment Clinical Impressions    Treatment Assessment (SLP) Pt with continued tolerance of diet and overall improved timing of swallow. Able to demonstrate small bites and drinks from cup independently today. Noted improved affect overall and more relaxed respirations from prior visits. Recommend to start advancing diet/intake. Pt reports he likes the portions he is currently  receiving but is interested in supplements at this time. No overt s/s of aspiration and good oral intake of meals provided. If respiratory status continues to improve, will start with exercises to improve lingual control/coordination.  -AD Continues to tolerate diet, but needs consistent reminders to slow rate down and take small bites/sips. Able to demonstrate prepping consistently after cues as well as smaller bites/drinks  -AD       Daily Summary of Progress (SLP) progress toward functional goals as expected  -AD progress toward functional goals as expected  -AD       Barriers to Overall Progress (SLP) Medically complex  -AD --       Plan for Continued Treatment (SLP) continue treatment per plan of care  -AD continue treatment per plan of care  -AD       Care Plan Review care plan/treatment goals reviewed; risks/benefits reviewed; current/potential barriers reviewed; patient/other agree to care plan  -AD --                Recommendations    Therapy Frequency (Swallow) daily; 3 days per week; 5 days per week  -AD daily; at least; 5 days per week  -AD       Predicted Duration Therapy Intervention (Days) 1 week; until discharge  -AD 1 week; until discharge  -AD       SLP Diet Recommendation soft textures; chopped; thin liquids; nutritional supplements needed; other (see comments)  small portions with snacks/supplements in between meals.  -AD soft textures; chopped; thin liquids; nutritional supplements needed; other (see comments)  -AD       Recommended Precautions and Strategies upright posture during/after eating; small bites of food and sips of liquid; no straw; 3 second prep; general aspiration precautions; reflux precautions; fatigue precautions  -AD upright posture during/after eating; small bites of food and sips of liquid; no straw; general aspiration precautions; reflux precautions; fatigue precautions  -AD       Oral Care Recommendations Oral Care before breakfast, after meals and PRN; Toothbrush  -AD Oral  Care before breakfast, after meals and PRN; Toothbrush  -AD       SLP Rec. for Method of Medication Administration meds whole; with pudding or applesauce; as tolerated  -AD meds whole; with pudding or applesauce; as tolerated  -AD       Monitor for Signs of Aspiration yes; elevated WBC count; gurgly voice; upper respiratory  -AD yes; elevated WBC count; gurgly voice; upper respiratory  -AD       Anticipated Discharge Disposition (SLP) unknown; other (see comments)  Pt considering returning to home with home health at discharge once oxygen requirements decrease to level that can be managed at home.  -AD skilled nursing facility  -AD       Patient/Family Concerns, Anticipated Discharge Disposition (SLP) Pt concerned about his weakness and would like to start walking around as much as possible. Will notify MD. Pt also reports he feels he needs to put a little weight back on. Will notify dietician of desire to take supplements now.  -AD --                Oral Nutrition/Hydration Goal (SLP)    Oral Nutrition/Hydration Goal, SLP Pt will demonstrate tolerance of the least restrictive diet w/o s/s of aspiration or complications such as aspiration pneumonia.  -AD Pt will demonstrate tolerance of the least restrictive diet w/o s/s of aspiration or complications such as aspiration pneumonia.  -AD       Time Frame (Oral Nutrition/Hydration Goal, SLP) short term goal (STG); 1 week; by discharge  -AD short term goal (STG); 1 week; by discharge  -AD       Barriers (Oral Nutrition/Hydration Goal, SLP) medically complex; cognitive status  -AD medically complex; cognitive status  -AD       Progress/Outcomes (Oral Nutrition/Hydration Goal, SLP) good progress toward goal; goal ongoing  Tolerating current diet, does not want to advance to full size meals, likes current portions. SLP also feels smaller size and chopped meats continues to be the safest and choice as pt eats most all on his tray and can be fast at times/occ large bites.   -AD good progress toward goal; goal ongoing  -AD                Swallow Management Recall Goal 1 (SLP)    Activity (Swallow Management Recall Goal 1, SLP) recall of; safe diet level/texture; compensatory swallow strategies/techniques  -AD recall of; safe diet level/texture; compensatory swallow strategies/techniques  -AD       [place]/Accuracy (Swallow Management Recall Goal 1, SLP) with moderate cues (50-74% accuracy)  -AD with moderate cues (50-74% accuracy)  -AD       Time Frame (Swallow Management Recall Goal 1, SLP) short term goal (STG); 1 week; by discharge  -AD short term goal (STG); 1 week; by discharge  -AD       Barriers (Swallow Management Recall Goal 1, SLP) medically complex; cognitive status  -AD medically complex; cognitive status  -AD       Progress/Outcomes (Swallow Management Recall Goal 1, SLP) good progress toward goal; goal ongoing  Pt able to verbalize diet level/texture w/o cues. Able to state compensatory techniques of prepping and small bites/drinks w/minimal verbal prompt initially.  -AD good progress toward goal; goal ongoing  -AD                Swallow Compensatory Strategies Goal 1 (SLP)    Activity (Swallow Compensatory Strategies/Techniques Goal 1, SLP) compensatory strategies; during meal intake; during p.o. trials; small cup sips; extra swallow per bolus; other (see comments)  -AD compensatory strategies; during meal intake; during p.o. trials; small cup sips; extra swallow per bolus; other (see comments)  -AD       [place]/Accuracy (Swallow Compensatory Strategies/Techniques Goal 1, SLP) with moderate cues (50-74% accuracy)  -AD with moderate cues (50-74% accuracy)  -AD       Time Frame (Swallow Compensatory Strategies/Techniques Goal 1, SLP) short term goal (STG); 1 week; by discharge  -AD short term goal (STG); 1 week; by discharge  -AD       Barriers (Swallow Compensatory Strategies/Techniques Goal 1, SLP) medically complex; cognitive status  -AD medically complex;  cognitive status  -AD       Progress/Outcomes (Swallow Compensatory Strategies/Techniques Goal 1, SLP) good progress toward goal; goal ongoing  Small bites on 85% of trials w/o cues; small drinks 100% of trials w/o cues; prepping w/liquids on 80% w/minimal inconsistent cues.  -AD good progress toward goal; goal ongoing  -AD             User Key  (r) = Recorded By, (t) = Taken By, (c) = Cosigned By    Initials Name Effective Dates    Daniela Burgess MS CCC-SLP 06/16/21 -                 EDUCATION  The patient has been educated in the following areas:   Modified Diet Instruction Compensatory Strategies. Pt verbalizes and demonstrates understanding.       SLP GOALS     Row Name 03/04/22 1215 03/02/22 1148          Oral Nutrition/Hydration Goal (SLP)    Oral Nutrition/Hydration Goal, SLP Pt will demonstrate tolerance of the least restrictive diet w/o s/s of aspiration or complications such as aspiration pneumonia.  -AD Pt will demonstrate tolerance of the least restrictive diet w/o s/s of aspiration or complications such as aspiration pneumonia.  -AD     Time Frame (Oral Nutrition/Hydration Goal, SLP) short term goal (STG); 1 week; by discharge  -AD short term goal (STG); 1 week; by discharge  -AD     Barriers (Oral Nutrition/Hydration Goal, SLP) medically complex; cognitive status  -AD medically complex; cognitive status  -AD     Progress/Outcomes (Oral Nutrition/Hydration Goal, SLP) good progress toward goal; goal ongoing  Tolerating current diet, does not want to advance to full size meals, likes current portions. SLP also feels smaller size and chopped meats continues to be the safest and choice as pt eats most all on his tray and can be fast at times/occ large bites.  -AD good progress toward goal; goal ongoing  -AD            Swallow Management Recall Goal 1 (SLP)    Activity (Swallow Management Recall Goal 1, SLP) recall of; safe diet level/texture; compensatory swallow strategies/techniques  -AD recall  of; safe diet level/texture; compensatory swallow strategies/techniques  -AD     [place]/Accuracy (Swallow Management Recall Goal 1, SLP) with moderate cues (50-74% accuracy)  -AD with moderate cues (50-74% accuracy)  -AD     Time Frame (Swallow Management Recall Goal 1, SLP) short term goal (STG); 1 week; by discharge  -AD short term goal (STG); 1 week; by discharge  -AD     Barriers (Swallow Management Recall Goal 1, SLP) medically complex; cognitive status  -AD medically complex; cognitive status  -AD     Progress/Outcomes (Swallow Management Recall Goal 1, SLP) good progress toward goal; goal ongoing  Pt able to verbalize diet level/texture w/o cues. Able to state compensatory techniques of prepping and small bites/drinks w/minimal verbal prompt initially.  -AD good progress toward goal; goal ongoing  -AD            Swallow Compensatory Strategies Goal 1 (SLP)    Activity (Swallow Compensatory Strategies/Techniques Goal 1, SLP) compensatory strategies; during meal intake; during p.o. trials; small cup sips; extra swallow per bolus; other (see comments)  -AD compensatory strategies; during meal intake; during p.o. trials; small cup sips; extra swallow per bolus; other (see comments)  -AD     [place]/Accuracy (Swallow Compensatory Strategies/Techniques Goal 1, SLP) with moderate cues (50-74% accuracy)  -AD with moderate cues (50-74% accuracy)  -AD     Time Frame (Swallow Compensatory Strategies/Techniques Goal 1, SLP) short term goal (STG); 1 week; by discharge  -AD short term goal (STG); 1 week; by discharge  -AD     Barriers (Swallow Compensatory Strategies/Techniques Goal 1, SLP) medically complex; cognitive status  -AD medically complex; cognitive status  -AD     Progress/Outcomes (Swallow Compensatory Strategies/Techniques Goal 1, SLP) good progress toward goal; goal ongoing  Small bites on 85% of trials w/o cues; small drinks 100% of trials w/o cues; prepping w/liquids on 80% w/minimal inconsistent  cues.  -AD good progress toward goal; goal ongoing  -AD           User Key  (r) = Recorded By, (t) = Taken By, (c) = Cosigned By    Initials Name Provider Type    Daniela Burgess MS CCC-SLP Speech and Language Pathologist                   Time Calculation:    Time Calculation- SLP     Row Name 03/04/22 1459             Time Calculation- SLP    SLP Start Time 1135  -AD      SLP Stop Time 1215  -AD      SLP Time Calculation (min) 40 min  -AD      Total Timed Code Minutes- SLP 0 minute(s)  -AD      SLP Non-Billable Time (min) 0 min  -AD      SLP Received On 03/04/22  -AD              Untimed Charges    21921-AN Treatment Swallow Minutes 40  -AD              Total Minutes    Untimed Charges Total Minutes 40  -AD       Total Minutes 40  -AD            User Key  (r) = Recorded By, (t) = Taken By, (c) = Cosigned By    Initials Name Provider Type    Daniela Burgess MS CCC-SLP Speech and Language Pathologist                Therapy Charges for Today     Code Description Service Date Service Provider Modifiers Qty    [id number]  ST TREATMENT SWALLOW 3 3/4/2022 Daniela Robertson MS CCC-SLP GN 1               MS JAMEY Willingham  3/4/2022

## 2022-03-04 NOTE — PLAN OF CARE
Goal Outcome Evaluation:  Plan of Care Reviewed With: patient           Outcome Summary: SLP: Pt tolerating soft, chopped meats diet with thins w/o overt s/s of aspiration. Mostly compliant with small bites/drinks and prepping before swallowing. Minimal cues needed. Pt does not want to advance diet to full size portions, but states he is interested in supplements at this time as he feels he has lost too much weight. Pt also voices interest in physical and occupational therapy in order to build back his strength. SLP to continue to folllow but will decrease frequency to 3-5 times per week as tolerated.

## 2022-03-04 NOTE — PLAN OF CARE
Goal Outcome Evaluation:           Progress: no change  Outcome Summary: Awake most of shift. Sat up in chair. Used NRB with activity. Had large BMper BSC assist of two. Gets mildy confused at night. Redirects easily. HHF currently at 50L and 85%. Weaning per RT. VSS. UOP good.

## 2022-03-04 NOTE — PLAN OF CARE
Goal Outcome Evaluation:  Plan of Care Reviewed With: patient        Progress: improving  Outcome Summary: PT continues on HHF NC currently 45L 50% with a goal of 30L and 30% by tomorrow; pt has eaten well and had good UOP and no BM for shift; All VSS; pt is motivated to get off of HHF and on a regular HF NC so that he can walk around the room; pt has been A&O today but does have an increase in confusion in the evening.

## 2022-03-04 NOTE — PROGRESS NOTES
Adult Nutrition  Assessment/PES    Patient Name:  Dontae Bonds Jr.  YOB: 1950  MRN: [id number]  Admit Date:  2/21/2022    Assessment Date:  3/4/2022    Comments:  Pt tolerating diet with good po intake.     Reason for Assessment     Row Name 03/04/22 1756          Reason for Assessment    Reason For Assessment follow-up protocol     Diagnosis --  Acute Hypoxic Respiratory Failure due to bacterial/aspiration Pneumonia/Post-COVID Fibrosis                Nutrition/Diet History     Row Name 03/04/22 1756          Nutrition/Diet History    Typical Food/Fluid Intake pt reports continues with good appetite, wanting supplements now as does not want to lose weight                Anthropometrics     Row Name 03/04/22 1757          Anthropometrics    Current Weight Method --  no new weight                      Nutrition Prescription Ordered     Row Name 03/04/22 1801          Nutrition Prescription PO    Current PO Diet Soft Texture     Texture Chopped     Supplement Boost Plus (Ensure Enlive, Ensure Plus)     Supplement Frequency 3 times a day     Other Modifiers Small Feedings                Evaluation of Received Nutrient/Fluid Intake     Row Name 03/04/22 1808          PO Evaluation    Number of Meals 5     % PO Intake 95%                     Problem/Interventions:   Problem 1     Row Name 03/04/22 1809          Nutrition Diagnoses Problem 1    Problem 1 Inadequate Nutrient Intake     Etiology (related to) Factors Affecting Nutrition     Signs/Symptoms (evidenced by) Report/Observation     Resolved? Yes                Problem 2     Row Name 03/04/22 1809          Nutrition Diagnoses Problem 2    Problem 2 Swallowing Difficulty     Etiology (related to) Medical Diagnosis     Signs/Symptoms (evidenced by) SLP/Swallow eval                    Intervention Goal     Row Name 03/04/22 1809          Intervention Goal    PO Maintain intake     PO Intake % 75 %  or greater of meals     Weight No significant weight loss                 Nutrition Intervention     Row Name 03/04/22 1810          Nutrition Intervention    RD/Tech Action Encourage intake; Follow Tx progress; Interview for preference                  Education/Evaluation     Row Name 03/04/22 1810          Monitor/Evaluation    Monitor I&O; PO intake; Supplement intake; Pertinent labs; Weight; Skin status                 Electronically signed by:  Araseli Wick RD  03/04/22 18:11 EST

## 2022-03-05 ENCOUNTER — APPOINTMENT (OUTPATIENT)
Dept: GENERAL RADIOLOGY | Facility: HOSPITAL | Age: 72
End: 2022-03-05

## 2022-03-05 LAB
GLUCOSE BLDC GLUCOMTR-MCNC: 145 MG/DL (ref 70–130)
GLUCOSE BLDC GLUCOMTR-MCNC: 183 MG/DL (ref 70–130)
GLUCOSE BLDC GLUCOMTR-MCNC: 210 MG/DL (ref 70–130)
GLUCOSE BLDC GLUCOMTR-MCNC: 276 MG/DL (ref 70–130)

## 2022-03-05 PROCEDURE — 82962 GLUCOSE BLOOD TEST: CPT

## 2022-03-05 PROCEDURE — 94761 N-INVAS EAR/PLS OXIMETRY MLT: CPT

## 2022-03-05 PROCEDURE — 99232 SBSQ HOSP IP/OBS MODERATE 35: CPT | Performed by: HOSPITALIST

## 2022-03-05 PROCEDURE — 71045 X-RAY EXAM CHEST 1 VIEW: CPT

## 2022-03-05 PROCEDURE — 94799 UNLISTED PULMONARY SVC/PX: CPT

## 2022-03-05 PROCEDURE — 94669 MECHANICAL CHEST WALL OSCILL: CPT

## 2022-03-05 PROCEDURE — 25010000002 METHYLPREDNISOLONE PER 125 MG: Performed by: INTERNAL MEDICINE

## 2022-03-05 PROCEDURE — 63710000001 INSULIN ASPART PER 5 UNITS: Performed by: HOSPITALIST

## 2022-03-05 RX ADMIN — IPRATROPIUM BROMIDE AND ALBUTEROL SULFATE 3 ML: .5; 2.5 SOLUTION RESPIRATORY (INHALATION) at 19:16

## 2022-03-05 RX ADMIN — IPRATROPIUM BROMIDE AND ALBUTEROL SULFATE 3 ML: .5; 2.5 SOLUTION RESPIRATORY (INHALATION) at 12:03

## 2022-03-05 RX ADMIN — FERROUS SULFATE TAB EC 324 MG (65 MG FE EQUIVALENT) 324 MG: 324 (65 FE) TABLET DELAYED RESPONSE at 08:39

## 2022-03-05 RX ADMIN — APIXABAN 10 MG: 2.5 TABLET, FILM COATED ORAL at 20:47

## 2022-03-05 RX ADMIN — METHYLPREDNISOLONE SODIUM SUCCINATE 80 MG: 125 INJECTION, POWDER, FOR SOLUTION INTRAMUSCULAR; INTRAVENOUS at 23:50

## 2022-03-05 RX ADMIN — METHYLPREDNISOLONE SODIUM SUCCINATE 80 MG: 125 INJECTION, POWDER, FOR SOLUTION INTRAMUSCULAR; INTRAVENOUS at 08:38

## 2022-03-05 RX ADMIN — INSULIN ASPART 12 UNITS: 100 INJECTION, SOLUTION INTRAVENOUS; SUBCUTANEOUS at 12:09

## 2022-03-05 RX ADMIN — IPRATROPIUM BROMIDE AND ALBUTEROL SULFATE 3 ML: .5; 2.5 SOLUTION RESPIRATORY (INHALATION) at 00:17

## 2022-03-05 RX ADMIN — ATORVASTATIN CALCIUM 40 MG: 40 TABLET, FILM COATED ORAL at 20:47

## 2022-03-05 RX ADMIN — INSULIN ASPART 4 UNITS: 100 INJECTION, SOLUTION INTRAVENOUS; SUBCUTANEOUS at 08:38

## 2022-03-05 RX ADMIN — APIXABAN 10 MG: 2.5 TABLET, FILM COATED ORAL at 08:38

## 2022-03-05 RX ADMIN — METHYLPREDNISOLONE SODIUM SUCCINATE 80 MG: 125 INJECTION, POWDER, FOR SOLUTION INTRAMUSCULAR; INTRAVENOUS at 17:37

## 2022-03-05 RX ADMIN — IPRATROPIUM BROMIDE AND ALBUTEROL SULFATE 3 ML: .5; 2.5 SOLUTION RESPIRATORY (INHALATION) at 07:30

## 2022-03-05 NOTE — PLAN OF CARE
Goal Outcome Evaluation:  Plan of Care Reviewed With: patient        Progress: no change  Outcome Evaluation: Pt remains on HHF NC 40L 60% and maintaining O2 SATS in the 90's; mayorga removed per order and been able to void on his own, 1 BM this shift; pt eating a drinking well; pts goal is to be able to decrease his O2 needs to the point where he can go home; pt has been compliant with safety requests and has been using his call light and not getting up on his own.

## 2022-03-05 NOTE — PROGRESS NOTES
"Hospitalist Team      Patient Care Team:  Tiffany Burciaga MD as PCP - General (Internal Medicine)      Chief Complaint: Follow-up Acute Hypoxic Respiratory Failure    Subjective    No acute events overnight, but O2 had to be increased.  He does not feel dyspneic, although he knows he is.  He still has a good appetite and PO intake.    Objective    Vital Signs  Temp:  [98 °F (36.7 °C)-98.3 °F (36.8 °C)] 98.1 °F (36.7 °C)  Heart Rate:  [] 101  Resp:  [20-30] 22  BP: (104-130)/(65-85) 130/85  Oxygen Therapy  SpO2: 91 %  Pulse Oximetry Type: Continuous  Device (Oxygen Therapy): ventilator  Device (Oxygen Therapy): heated, high-flow nasal cannula  $ High Flow Nasal Cannula Set-Up: yes  Flow (L/min): 45  Oxygen Concentration (%): 80  ETCO2 (mmHg): 32 mmHg  Probe Placed On (Pulse Ox): forehead, Left:  Probe Removed From (Pulse Ox): Right:, finger (index)}  Flowsheet Rows    Flowsheet Row First Filed Value   Admission Height 172.7 cm (68\") Documented at 02/21/2022 1843   Admission Weight 78.9 kg (174 lb) Documented at 02/21/2022 1825          Physical Exam:    General: Appears stated age in no acute distress.  Lungs: Bases are diminished but upper airways are clear.  Respirations are shallow, and a little labored.  Dyspneic just speaking.  CV: Regular rate and rhythm.  No murmurs appreciated.  Radial and pedal pulses are 2+ and symmetric.  Abdomen: Soft and non-tender w/ active bowel sounds.  MSK: No C/C/E.  Neuro: CN II-XII grossly intact  Psych: Pleasant affect.  Ox3.    Results Review:     I reviewed the patient's new clinical results.    Lab Results (last 24 hours)     Procedure Component Value Units Date/Time    POC Glucose Once [[id number]]  (Abnormal) Collected: 03/05/22 0759    Specimen: Blood Updated: 03/05/22 0806     Glucose 183 mg/dL      Comment: Meter: [id number] : [id number] Abbe Victoria RN       POC Glucose Once [[id number]]  (Abnormal) Collected: 03/05/22 0031    Specimen: Blood Updated: " 03/05/22 0037     Glucose 210 mg/dL      Comment: Meter: [id number] : [id number] Fabian Fox RN       POC Glucose Once [[id number]]  (Normal) Collected: 03/04/22 1642    Specimen: Blood Updated: 03/04/22 1649     Glucose 107 mg/dL      Comment: Meter: [id number] : [id number] Abbe Victoria RN       C-reactive Protein [[id number]]  (Abnormal) Collected: 03/04/22 0530    Specimen: Blood Updated: 03/04/22 1325     C-Reactive Protein 2.23 mg/dL     POC Glucose Once [[id number]]  (Abnormal) Collected: 03/04/22 1152    Specimen: Blood Updated: 03/04/22 1158     Glucose 155 mg/dL      Comment: Meter: [id number] : [id number] Abbe Victoria RN             Imaging Results (Last 24 Hours)     Reviewed and appears unchanged.  Await official read.          Medication Review:   I have reviewed the patient's current medication list    Current Facility-Administered Medications:   •  acetaminophen (TYLENOL) tablet 650 mg, 650 mg, Oral, Q4H PRN, 650 mg at 02/27/22 0311 **OR** acetaminophen (TYLENOL) suppository 650 mg, 650 mg, Rectal, Q4H PRN, Cameron Alvarado, DO  •  apixaban (ELIQUIS) tablet 10 mg, 10 mg, Oral, Q12H, 10 mg at 03/05/22 0838 **FOLLOWED BY** [START ON 3/8/2022] apixaban (ELIQUIS) tablet 5 mg, 5 mg, Oral, Q12H, Osvaldo Gibson,   •  atorvastatin (LIPITOR) tablet 40 mg, 40 mg, Oral, Nightly, Cameron Alvarado, DO, 40 mg at 03/04/22 2029  •  dextrose (D50W) (25 g/50 mL) IV injection 25 g, 25 g, Intravenous, Q15 Min PRN, Cash Arguello Jr., MD  •  dextrose (GLUTOSE) oral gel 15 g, 15 g, Oral, Q15 Min PRN, Cash Arguello Jr., MD  •  docusate sodium (COLACE) liquid 100 mg, 100 mg, Oral, BID, AlvaradoOlerilla, DO, 100 mg at 03/04/22 2029  •  ferrous sulfate EC tablet 324 mg, 324 mg, Oral, Daily With Breakfast, Alvarado, Birrilla, DO, 324 mg at 03/05/22 0839  •  glucagon (GLUCAGEN) injection 1 mg, 1 mg, Intramuscular, Q15 Min PRN, Cash Arguello Jr., MD  •  HYDROcodone-homatropine (HYCODAN)  5-1.5 MG/5ML syrup 5 mL, 5 mL, Oral, Q4H PRN, Osvaldo Gibson DO, 5 mL at 03/04/22 0235  •  insulin aspart (novoLOG) injection 0-24 Units, 0-24 Units, Subcutaneous, TID With Meals, Darrick Matt MD, 4 Units at 03/05/22 0838  •  ipratropium-albuterol (DUO-NEB) nebulizer solution 3 mL, 3 mL, Nebulization, Q6H - RT, AlvaradoOlerijaya, DO, 3 mL at 03/05/22 0730  •  ipratropium-albuterol (DUO-NEB) nebulizer solution 3 mL, 3 mL, Nebulization, Q6H PRN, Alvarado Birrilla, DO  •  Magnesium Sulfate 2 gram Bolus, followed by 8 gram infusion (total Mg dose 10 grams)- Mg less than or equal to 1mg/dL, 2 g, Intravenous, PRN **OR** Magnesium Sulfate 2 gram / 50mL Infusion (GIVE X 3 BAGS TO EQUAL 6GM TOTAL DOSE) - Mg 1.1 - 1.5 mg/dl, 2 g, Intravenous, PRN **OR** Magnesium Sulfate 4 gram infusion- Mg 1.6-1.9 mg/dL, 4 g, Intravenous, PRN, Alvarado Birrilla, DO  •  methylPREDNISolone sodium succinate (SOLU-Medrol) injection 80 mg, 80 mg, Intravenous, Q8H, Garrett Yoon MD, 80 mg at 03/05/22 0838  •  OLANZapine (zyPREXA) injection 5 mg, 5 mg, Intramuscular, Q8H PRN, Garrett Yoon MD, 5 mg at 02/28/22 1709  •  ondansetron (ZOFRAN) tablet 4 mg, 4 mg, Oral, Q6H PRN **OR** ondansetron (ZOFRAN) injection 4 mg, 4 mg, Intravenous, Q6H PRN, Alvarado Birrilla, DO  •  polyethylene glycol (MIRALAX) packet 17 g, 17 g, Oral, Daily, Alvarado, Olerilla, DO, 17 g at 03/04/22 0801  •  potassium chloride (K-DUR,KLOR-CON) CR tablet 40 mEq, 40 mEq, Oral, PRN **OR** potassium chloride (KLOR-CON) packet 40 mEq, 40 mEq, Oral, PRN **OR** potassium chloride 10 mEq in 100 mL IVPB, 10 mEq, Intravenous, Q1H PRN, Alvarado, Birrilla, DO, Last Rate: 100 mL/hr at 02/22/22 0936, 10 mEq at 02/22/22 0936  •  [COMPLETED] Insert peripheral IV, , , Once **AND** sodium chloride 0.9 % flush 10 mL, 10 mL, Intravenous, PRN, Alvarado, Birrilla, DO, 10 mL at 02/24/22 1836  •  sodium chloride 0.9 % infusion 40 mL, 40 mL, Intravenous, PRN, Alvarado, Birrilla, DO, 100 mL at  03/02/22 0228  •  sodium chloride nasal spray 2 spray, 2 spray, Each Nare, PRN, Osvaldo Gibson R, DO, 2 spray at 02/28/22 1621      Assessment/Plan     1.  Acute Hypoxic Respiratory Failure due to bacterial/aspiration Pneumonia/Post-COVID Fibrosis: Trending in the wrong direction.  Concerned that CRP is low so inflammation may not be a major player.  However, it is not entirely normal so will continue increased steroid dose as prescribed.  CXR from this morning appears relatively unchanged.  Appreciate Pulmonary's continued assistance.      2.  Acute LLE DVT: Continues on Eliquis.     3.  Steroid-induced Hyperglycemia: Trend is up as expected.  Continue SSI coverage.     4.  Dyslipidemia: Nothing acute.    Plan for disposition: Predicated on hospital course.    Darrick Matt MD  03/05/22  09:09 EST

## 2022-03-05 NOTE — PLAN OF CARE
Goal Outcome Evaluation:  Plan of Care Reviewed With: patient        Progress: no change  Outcome Evaluation: HHF remains ar 45L 50%. Unable to wean further at this time. Requires increase in flow and O2 when assited to BSC with NRB to maintain acceptable saturations.  Although needs frequent encouragment to take slower deeper breaths during increased activity. Rested fair. UOP good. Afebrile. HR B/P stable.  Problem: Adult Inpatient Plan of Care  Goal: Plan of Care Review  Recent Flowsheet Documentation  Taken 3/5/2022 0439 by Dominique Peralta RN  Progress: no change  Plan of Care Reviewed With: patient  Outcome Evaluation: HHF remains ar 45L 50%. Unable to wean further at this time. Requires increase in flow and O2 when assited to BSC with NRB to maintain acceptable saturations.  Although needs frequent encouragment to take slower deeper breaths during increased activity. Rested fair. UOP good. Afebrile. HR B/P stable.  3/4/2022 1923 by Dominique Peralta RN  Outcome: Unable to Meet, Plan Revised  Goal: Patient-Specific Goal (Individualized)  Outcome: Unable to Meet, Plan Revised  Goal: Absence of Hospital-Acquired Illness or Injury  Outcome: Unable to Meet, Plan Revised  Goal: Optimal Comfort and Wellbeing  Outcome: Unable to Meet, Plan Revised  Goal: Readiness for Transition of Care  Outcome: Unable to Meet, Plan Revised  Goal: Plan of Care Review  Recent Flowsheet Documentation  Taken 3/5/2022 0439 by Dominique Peralta RN  Progress: no change  Plan of Care Reviewed With: patient  Outcome Evaluation: HHF remains ar 45L 50%. Unable to wean further at this time. Requires increase in flow and O2 when assited to BSC with NRB to maintain acceptable saturations.  Although needs frequent encouragment to take slower deeper breaths during increased activity. Rested fair. UOP good. Afebrile. HR B/P stable.  3/4/2022 1923 by Dominique Peralta RN  Outcome: Unable to Meet, Plan Revised     Problem: Nutrition Impairment (Mechanical  Ventilation, Invasive)  Goal: Optimal Nutrition Delivery  Outcome: Unable to Meet, Plan Revised     Problem: Adjustment to Illness (Sepsis/Septic Shock)  Goal: Optimal Coping  Outcome: Unable to Meet, Plan Revised     Problem: Infection (Sepsis/Septic Shock)  Goal: Absence of Infection Signs and Symptoms  Outcome: Unable to Meet, Plan Revised     Problem: Nutrition Impaired (Sepsis/Septic Shock)  Goal: Optimal Nutrition Intake  Outcome: Unable to Meet, Plan Revised     Problem: Respiratory Compromise (Sepsis/Septic Shock)  Goal: Effective Oxygenation and Ventilation  Outcome: Unable to Meet, Plan Revised     Problem: Fluid Imbalance (Pneumonia)  Goal: Fluid Balance  Outcome: Unable to Meet, Plan Revised     Problem: Infection (Pneumonia)  Goal: Resolution of Infection Signs and Symptoms  Outcome: Unable to Meet, Plan Revised     Problem: Respiratory Compromise (Pneumonia)  Goal: Effective Oxygenation and Ventilation  Outcome: Unable to Meet, Plan Revised     Problem: Fall Injury Risk  Goal: Absence of Fall and Fall-Related Injury  Outcome: Unable to Meet, Plan Revised     Problem: Diabetes Comorbidity  Goal: Blood Glucose Level Within Desired Range  Outcome: Unable to Meet, Plan Revised     Problem: Skin Injury Risk Increased  Goal: Skin Health and Integrity  Outcome: Unable to Meet, Plan Revised

## 2022-03-05 NOTE — PLAN OF CARE
Goal Outcome Evaluation:           Progress: no change  Outcome Summary: Awake most of shift. Sat up in chair. Used NRB with activity. Had large BMper BSC assist of two. Gets mildy confused at night. Redirects easily. HHF currently at 50L and 85%. Weaning per RT. VSS. UOP good.  Problem: Adult Inpatient Plan of Care  Goal: Plan of Care Review  Outcome: Unable to Meet, Plan Revised  Goal: Patient-Specific Goal (Individualized)  Outcome: Unable to Meet, Plan Revised  Goal: Absence of Hospital-Acquired Illness or Injury  Outcome: Unable to Meet, Plan Revised  Goal: Optimal Comfort and Wellbeing  Outcome: Unable to Meet, Plan Revised  Goal: Readiness for Transition of Care  Outcome: Unable to Meet, Plan Revised  Goal: Plan of Care Review  Outcome: Unable to Meet, Plan Revised     Problem: Nutrition Impairment (Mechanical Ventilation, Invasive)  Goal: Optimal Nutrition Delivery  Outcome: Unable to Meet, Plan Revised     Problem: Adjustment to Illness (Sepsis/Septic Shock)  Goal: Optimal Coping  Outcome: Unable to Meet, Plan Revised     Problem: Infection (Sepsis/Septic Shock)  Goal: Absence of Infection Signs and Symptoms  Outcome: Unable to Meet, Plan Revised     Problem: Nutrition Impaired (Sepsis/Septic Shock)  Goal: Optimal Nutrition Intake  Outcome: Unable to Meet, Plan Revised     Problem: Respiratory Compromise (Sepsis/Septic Shock)  Goal: Effective Oxygenation and Ventilation  Outcome: Unable to Meet, Plan Revised     Problem: Fluid Imbalance (Pneumonia)  Goal: Fluid Balance  Outcome: Unable to Meet, Plan Revised     Problem: Infection (Pneumonia)  Goal: Resolution of Infection Signs and Symptoms  Outcome: Unable to Meet, Plan Revised     Problem: Respiratory Compromise (Pneumonia)  Goal: Effective Oxygenation and Ventilation  Outcome: Unable to Meet, Plan Revised     Problem: Fall Injury Risk  Goal: Absence of Fall and Fall-Related Injury  Outcome: Unable to Meet, Plan Revised     Problem: Diabetes  Comorbidity  Goal: Blood Glucose Level Within Desired Range  Outcome: Unable to Meet, Plan Revised     Problem: Skin Injury Risk Increased  Goal: Skin Health and Integrity  Outcome: Unable to Meet, Plan Revised

## 2022-03-05 NOTE — PROGRESS NOTES
[facility] INPATIENT PROGRESS NOTE         [facility] ICU    3/5/2022      PATIENT IDENTIFICATION:  Name: Dontae Bonds Jr. ADMIT: 2022   : 1950  PCP: Tiffany Burciaga MD    MRN: [id number] LOS: 12 days   AGE/SEX: 71 y.o. male  ROOM: ICU1                     LOS 12    Reason for visit: Respiratory failure      SUBJECTIVE:      On OptiFlow.  Saturations 87% at time of visit.  He drops pretty rapidly just talking.  Denies feeling more short of breath, productive cough or chest pain.  Discussed with nursing staff at bedside.  Crackles noted distal bases bilaterally.  No wheezing.  Nonlabored.  I am seeing the patient for the first time today.  All patient problems are new to me.      Objective   OBJECTIVE:    Vital Sign Min/Max for last 24 hours  Temp  Min: 98 °F (36.7 °C)  Max: 98.3 °F (36.8 °C)   BP  Min: 104/65  Max: 130/85   Pulse  Min: 77  Max: 116   Resp  Min: 20  Max: 30   SpO2  Min: 85 %  Max: 100 %   No data recorded   No data recorded                   FiO2 (%): 41 %     Body mass index is 24.85 kg/m².    Intake/Output Summary (Last 24 hours) at 3/5/2022 0951  Last data filed at 3/5/2022 0842  Gross per 24 hour   Intake 840 ml   Output --   Net 840 ml         Exam:  GEN:  No distress, appears stated age  EYES:   PERRL, anicteric sclerae  ENT:    External ears/nose normal, OP clear  NECK:  No adenopathy, midline trachea  LUNGS: Normal chest on inspection, palpation and crackles at bases on auscultation  CV:  Normal S1S2, without murmur  ABD:  Nontender, nondistended, no hepatosplenomegaly, +BS  EXT:  No edema.  No cyanosis or clubbing.  No mottling and normal cap refill.    Assessment     Scheduled meds:  apixaban, 10 mg, Oral, Q12H   Followed by  [START ON 3/8/2022] apixaban, 5 mg, Oral, Q12H  atorvastatin, 40 mg, Oral, Nightly  docusate sodium, 100 mg, Oral, BID  ferrous sulfate, 324 mg, Oral, Daily With Breakfast  insulin aspart, 0-24 Units, Subcutaneous, TID With  Meals  ipratropium-albuterol, 3 mL, Nebulization, Q6H - RT  methylPREDNISolone sodium succinate, 80 mg, Intravenous, Q8H  polyethylene glycol, 17 g, Oral, Daily      IV meds:                         Data Review:  Results from last 7 days   Lab Units 03/04/22  0530 03/02/22 0430 03/01/22 0456 02/28/22 0437 02/27/22  0431   SODIUM mmol/L 137 138 141 139 140   POTASSIUM mmol/L 4.4 4.3 4.6 4.6 4.5   CHLORIDE mmol/L 99 100 103 100 104   CO2 mmol/L 32.0* 30.2* 32.0* 33.5* 31.1*   BUN mg/dL 21 24* 22 13 12   CREATININE mg/dL 0.50* 0.50* 0.44* 0.42* 0.36*   GLUCOSE mg/dL 120* 106* 118* 125* 154*   CALCIUM mg/dL 9.4 9.3 9.5 9.7 8.7         Estimated Creatinine Clearance: 88 mL/min (A) (by C-G formula based on SCr of 0.5 mg/dL (L)).  Results from last 7 days   Lab Units 03/02/22 0430 03/01/22 0456 02/28/22 0437 02/27/22 0456   WBC 10*3/mm3 18.48* 15.22* 17.48* 13.65*   HEMOGLOBIN g/dL 11.5* 11.8* 11.2* 10.1*   PLATELETS 10*3/mm3 477* 516* 518* 378         Results from last 7 days   Lab Units 03/04/22 0530 03/02/22  0430 03/01/22 0456 02/28/22  0437 02/27/22  0431   ALT (SGPT) U/L 91* 78* 41 42* 35   AST (SGOT) U/L 31 54* 24 28 26     Results from last 7 days   Lab Units 02/27/22 2222   PH, ARTERIAL pH units 7.435   PO2 ART mm Hg 65.5*   PCO2, ARTERIAL mm Hg 51.5*   HCO3 ART mmol/L 34.6*     Results from last 7 days   Lab Units 03/04/22 0530 03/02/22 0430   PROCALCITONIN ng/mL 0.06 0.05         Glucose   Date/Time Value Ref Range Status   03/05/2022 0759 183 (H) 70 - 130 mg/dL Final     Comment:     Meter: [id number] : [id number] Abbe Victoria RN   03/05/2022 0031 210 (H) 70 - 130 mg/dL Final     Comment:     Meter: [id number] : [id number] Faiban Fox RN   03/04/2022 1642 107 70 - 130 mg/dL Final     Comment:     Meter: [id number] : [id number] Abbe Victoria RN   03/04/2022 1152 155 (H) 70 - 130 mg/dL Final     Comment:     Meter: [id number] : [id number] Abbe Victoria RN   03/03/2022  1700 135 (H) 70 - 130 mg/dL Final     Comment:     Meter: [id number] : [id number] Abbe Victoria RN   03/03/2022 1130 151 (H) 70 - 130 mg/dL Final     Comment:     Meter: [id number] : [id number] Abbe Naldo Victoria RN   03/03/2022 0559 94 70 - 130 mg/dL Final     Comment:     Meter: [id number] : [id number] Ana SALINAS     Most recent chest x-ray 3/1 reviewed shows bilateral airspace disease consistent with pneumonia          Microbiology reviewed              Active Hospital Problems    Diagnosis  POA   • **Pneumonia of both lungs due to infectious organism [J18.9]  Yes   • Hypertension [I10]  Yes   • Hypokalemia [E87.6]  Yes      Resolved Hospital Problems   No resolved problems to display.         ASSESSMENT:    Acute hypoxemic respiratory failure  Recent COVID-19 pneumonia  Bacterial superimposed pneumonia  Encephalopathy: Improving  Anemia  Alcohol abuse        PLAN:    Suspect that he is in a fibrotic phase of his recent Covid pneumonia and possible bacterial pneumonia on top of that.  We will repeat chest x-ray for follow-up.  Status post antibiotic course.  Weaning oxygen as able.  Not really making much progress.  Dr. Yoon increased his steroids yesterday for hopes that there is a significant inflammatory component that could be reduced with steroid.  On full anticoagulation.  Discussed with nursing staff.      CCT: 32 min    Fabiano Dunbar MD  Pulmonary and Critical Care Medicine  [facility], [facility]  3/5/2022    09:51 EST

## 2022-03-06 LAB
ALBUMIN SERPL-MCNC: 3 G/DL (ref 3.5–5.2)
ALBUMIN/GLOB SERPL: 0.9 G/DL
ALP SERPL-CCNC: 47 U/L (ref 39–117)
ALT SERPL W P-5'-P-CCNC: 84 U/L (ref 1–41)
ANION GAP SERPL CALCULATED.3IONS-SCNC: 8.6 MMOL/L (ref 5–15)
AST SERPL-CCNC: 29 U/L (ref 1–40)
BILIRUB SERPL-MCNC: 0.3 MG/DL (ref 0–1.2)
BUN SERPL-MCNC: 28 MG/DL (ref 8–23)
BUN/CREAT SERPL: 60.9 (ref 7–25)
CALCIUM SPEC-SCNC: 9.5 MG/DL (ref 8.6–10.5)
CHLORIDE SERPL-SCNC: 104 MMOL/L (ref 98–107)
CO2 SERPL-SCNC: 27.4 MMOL/L (ref 22–29)
CREAT SERPL-MCNC: 0.46 MG/DL (ref 0.76–1.27)
EGFRCR SERPLBLD CKD-EPI 2021: 111.8 ML/MIN/1.73
GLOBULIN UR ELPH-MCNC: 3.3 GM/DL
GLUCOSE BLDC GLUCOMTR-MCNC: 165 MG/DL (ref 70–130)
GLUCOSE BLDC GLUCOMTR-MCNC: 199 MG/DL (ref 70–130)
GLUCOSE BLDC GLUCOMTR-MCNC: 250 MG/DL (ref 70–130)
GLUCOSE SERPL-MCNC: 172 MG/DL (ref 65–99)
POTASSIUM SERPL-SCNC: 4.2 MMOL/L (ref 3.5–5.2)
PROT SERPL-MCNC: 6.3 G/DL (ref 6–8.5)
SODIUM SERPL-SCNC: 140 MMOL/L (ref 136–145)

## 2022-03-06 PROCEDURE — 99232 SBSQ HOSP IP/OBS MODERATE 35: CPT | Performed by: HOSPITALIST

## 2022-03-06 PROCEDURE — 94761 N-INVAS EAR/PLS OXIMETRY MLT: CPT

## 2022-03-06 PROCEDURE — 63710000001 INSULIN ASPART PER 5 UNITS: Performed by: HOSPITALIST

## 2022-03-06 PROCEDURE — 94669 MECHANICAL CHEST WALL OSCILL: CPT

## 2022-03-06 PROCEDURE — 82962 GLUCOSE BLOOD TEST: CPT

## 2022-03-06 PROCEDURE — 25010000002 METHYLPREDNISOLONE PER 125 MG: Performed by: INTERNAL MEDICINE

## 2022-03-06 PROCEDURE — 80053 COMPREHEN METABOLIC PANEL: CPT | Performed by: HOSPITALIST

## 2022-03-06 PROCEDURE — 94799 UNLISTED PULMONARY SVC/PX: CPT

## 2022-03-06 RX ORDER — IPRATROPIUM BROMIDE AND ALBUTEROL SULFATE 2.5; .5 MG/3ML; MG/3ML
3 SOLUTION RESPIRATORY (INHALATION)
Status: DISCONTINUED | OUTPATIENT
Start: 2022-03-06 | End: 2022-03-13

## 2022-03-06 RX ADMIN — Medication 10 ML: at 08:15

## 2022-03-06 RX ADMIN — DOCUSATE SODIUM 100 MG: 50 LIQUID ORAL at 08:15

## 2022-03-06 RX ADMIN — IPRATROPIUM BROMIDE AND ALBUTEROL SULFATE 3 ML: .5; 2.5 SOLUTION RESPIRATORY (INHALATION) at 12:58

## 2022-03-06 RX ADMIN — IPRATROPIUM BROMIDE AND ALBUTEROL SULFATE 3 ML: .5; 2.5 SOLUTION RESPIRATORY (INHALATION) at 07:36

## 2022-03-06 RX ADMIN — POLYETHYLENE GLYCOL 3350 17 G: 17 POWDER, FOR SOLUTION ORAL at 08:14

## 2022-03-06 RX ADMIN — FERROUS SULFATE TAB EC 324 MG (65 MG FE EQUIVALENT) 324 MG: 324 (65 FE) TABLET DELAYED RESPONSE at 08:15

## 2022-03-06 RX ADMIN — IPRATROPIUM BROMIDE AND ALBUTEROL SULFATE 3 ML: .5; 2.5 SOLUTION RESPIRATORY (INHALATION) at 19:20

## 2022-03-06 RX ADMIN — APIXABAN 10 MG: 2.5 TABLET, FILM COATED ORAL at 08:14

## 2022-03-06 RX ADMIN — IPRATROPIUM BROMIDE AND ALBUTEROL SULFATE 3 ML: .5; 2.5 SOLUTION RESPIRATORY (INHALATION) at 01:00

## 2022-03-06 RX ADMIN — SALINE NASAL SPRAY 2 SPRAY: 1.5 SOLUTION NASAL at 08:18

## 2022-03-06 RX ADMIN — METHYLPREDNISOLONE SODIUM SUCCINATE 80 MG: 125 INJECTION, POWDER, FOR SOLUTION INTRAMUSCULAR; INTRAVENOUS at 17:59

## 2022-03-06 RX ADMIN — Medication 10 ML: at 17:59

## 2022-03-06 RX ADMIN — ATORVASTATIN CALCIUM 40 MG: 40 TABLET, FILM COATED ORAL at 20:11

## 2022-03-06 RX ADMIN — INSULIN ASPART 4 UNITS: 100 INJECTION, SOLUTION INTRAVENOUS; SUBCUTANEOUS at 08:14

## 2022-03-06 RX ADMIN — APIXABAN 10 MG: 2.5 TABLET, FILM COATED ORAL at 20:11

## 2022-03-06 RX ADMIN — METHYLPREDNISOLONE SODIUM SUCCINATE 80 MG: 125 INJECTION, POWDER, FOR SOLUTION INTRAMUSCULAR; INTRAVENOUS at 08:14

## 2022-03-06 RX ADMIN — INSULIN ASPART 12 UNITS: 100 INJECTION, SOLUTION INTRAVENOUS; SUBCUTANEOUS at 19:09

## 2022-03-06 NOTE — PROGRESS NOTES
[facility] INPATIENT PROGRESS NOTE         [facility] ICU    3/6/2022      PATIENT IDENTIFICATION:  Name: Dontae Bonds Jr. ADMIT: 2022   : 1950  PCP: Tiffany Burciaga MD    MRN: [id number] LOS: 13 days   AGE/SEX: 71 y.o. male  ROOM: ICU1                     LOS 13    Reason for visit: Respiratory failure      SUBJECTIVE:      At rest his oxygen requirements on OptiFlow are at 40 L and 55%.  With any activity or even just talking he desaturates quickly.  Does not seem to be making any real improvement.  Denies nausea, vomiting, chest pain or productive cough.      Objective   OBJECTIVE:    Vital Sign Min/Max for last 24 hours  Temp  Min: 97.6 °F (36.4 °C)  Max: 98.1 °F (36.7 °C)   BP  Min: 106/70  Max: 136/73   Pulse  Min: 70  Max: 109   Resp  Min: 20  Max: 24   SpO2  Min: 85 %  Max: 98 %   No data recorded   No data recorded                   FiO2 (%): 41 %     Body mass index is 24.85 kg/m².    Intake/Output Summary (Last 24 hours) at 3/6/2022 0751  Last data filed at 3/6/2022 0500  Gross per 24 hour   Intake 1200 ml   Output 950 ml   Net 250 ml         Exam:  GEN:  No distress, appears stated age  EYES:   PERRL, anicteric sclerae  ENT:    External ears/nose normal, OP clear  NECK:  No adenopathy, midline trachea  LUNGS: Normal chest on inspection, palpation and crackles at bases on auscultation  CV:  Normal S1S2, without murmur  ABD:  Nontender, nondistended, no hepatosplenomegaly, +BS  EXT:  No edema.  No cyanosis or clubbing.  No mottling and normal cap refill.    Assessment     Scheduled meds:  apixaban, 10 mg, Oral, Q12H   Followed by  [START ON 3/8/2022] apixaban, 5 mg, Oral, Q12H  atorvastatin, 40 mg, Oral, Nightly  docusate sodium, 100 mg, Oral, BID  ferrous sulfate, 324 mg, Oral, Daily With Breakfast  insulin aspart, 0-24 Units, Subcutaneous, TID With Meals  ipratropium-albuterol, 3 mL, Nebulization, Q6H - RT  methylPREDNISolone sodium succinate, 80 mg, Intravenous,  Q8H  polyethylene glycol, 17 g, Oral, Daily      IV meds:                         Data Review:  Results from last 7 days   Lab Units 03/06/22  0500 03/04/22  0530 03/02/22  0430 03/01/22  0456 02/28/22  0437   SODIUM mmol/L 140 137 138 141 139   POTASSIUM mmol/L 4.2 4.4 4.3 4.6 4.6   CHLORIDE mmol/L 104 99 100 103 100   CO2 mmol/L 27.4 32.0* 30.2* 32.0* 33.5*   BUN mg/dL 28* 21 24* 22 13   CREATININE mg/dL 0.46* 0.50* 0.50* 0.44* 0.42*   GLUCOSE mg/dL 172* 120* 106* 118* 125*   CALCIUM mg/dL 9.5 9.4 9.3 9.5 9.7         Estimated Creatinine Clearance: 88 mL/min (A) (by C-G formula based on SCr of 0.46 mg/dL (L)).  Results from last 7 days   Lab Units 03/02/22  0430 03/01/22 0456 02/28/22  0437   WBC 10*3/mm3 18.48* 15.22* 17.48*   HEMOGLOBIN g/dL 11.5* 11.8* 11.2*   PLATELETS 10*3/mm3 477* 516* 518*         Results from last 7 days   Lab Units 03/06/22  0500 03/04/22  0530 03/02/22  0430 03/01/22  0456 02/28/22  0437   ALT (SGPT) U/L 84* 91* 78* 41 42*   AST (SGOT) U/L 29 31 54* 24 28     Results from last 7 days   Lab Units 02/27/22  2222   PH, ARTERIAL pH units 7.435   PO2 ART mm Hg 65.5*   PCO2, ARTERIAL mm Hg 51.5*   HCO3 ART mmol/L 34.6*     Results from last 7 days   Lab Units 03/04/22  0530 03/02/22  0430   PROCALCITONIN ng/mL 0.06 0.05         Glucose   Date/Time Value Ref Range Status   03/05/2022 1654 145 (H) 70 - 130 mg/dL Final     Comment:     Meter: [id number] : [id number] Jing Angulo RN   03/05/2022 1135 276 (H) 70 - 130 mg/dL Final     Comment:     Meter: [id number] : [id number] Abbe Victoria RN   03/05/2022 0759 183 (H) 70 - 130 mg/dL Final     Comment:     Meter: [id number] : [id number] Abbe Victoria RN   03/05/2022 0031 210 (H) 70 - 130 mg/dL Final     Comment:     Meter: [id number] : [id number] Fabian Fox RN   03/04/2022 1642 107 70 - 130 mg/dL Final     Comment:     Meter: [id number] : [id number] Abbe Victoria RN   03/04/2022 1152 155 (H) 70 - 130  mg/dL Final     Comment:     Meter: [id number] : [id number] Abbe Victoria RN   03/03/2022 1700 135 (H) 70 - 130 mg/dL Final     Comment:     Meter: [id number] : [id number] Abbe Victoria RN     Most recent chest x-ray 3/5 reviewed shows bilateral airspace disease and coarse interstitial changes.  This does not look any better compared to previous 3/1.          Microbiology reviewed              Active Hospital Problems    Diagnosis  POA   • **Pneumonia of both lungs due to infectious organism [J18.9]  Yes   • Hypertension [I10]  Yes   • Hypokalemia [E87.6]  Yes      Resolved Hospital Problems   No resolved problems to display.         ASSESSMENT:    Acute hypoxemic respiratory failure  Recent COVID-19 pneumonia  Bacterial superimposed pneumonia  Encephalopathy: Improving  Anemia  Alcohol abuse        PLAN:    He is in a fibrotic phase of his recent Covid pneumonia and possible bacterial pneumonia on top of that.  Status post antibiotic course.  Not really making a lot of progress and regards to oxygen requirements.  Continue steroids.  On full anticoagulation.  Discussed with nursing staff.          Fabiano Dunbar MD  Pulmonary and Critical Care Medicine  [facility], [facility]  3/6/2022    07:51 EST

## 2022-03-06 NOTE — PROGRESS NOTES
"Hospitalist Team      Patient Care Team:  Tiffany Burciaga MD as PCP - General (Internal Medicine)      Chief Complaint: Follow-up Acute Hypoxic Respiratory Failure    Subjective    Doing quite well today!  He has been weaned down to high flow O2.  He is still dyspneic w/ activity and speaking, but he is encouraged because he is doing better.  He continues to have a good appetite and PO intake.    Objective    Vital Signs  Temp:  [96.4 °F (35.8 °C)-98 °F (36.7 °C)] 96.4 °F (35.8 °C)  Heart Rate:  [] 104  Resp:  [16-36] 36  BP: (122-136)/(65-90) 135/82  Oxygen Therapy  SpO2: 90 %  Pulse Oximetry Type: Continuous  Device (Oxygen Therapy): ventilator  Device (Oxygen Therapy): high-flow nasal cannula  $ High Flow Nasal Cannula Set-Up: yes  Flow (L/min): 11  Oxygen Concentration (%): 55  ETCO2 (mmHg): 32 mmHg  Probe Placed On (Pulse Ox): forehead, Left:  Probe Removed From (Pulse Ox): Right:, finger (index)}    Flowsheet Rows    Flowsheet Row First Filed Value   Admission Height 172.7 cm (68\") Documented at 02/21/2022 1843   Admission Weight 78.9 kg (174 lb) Documented at 02/21/2022 1825          Physical Exam:    General: Appears in no acute distress.  Lungs: Dry crackles at the right base w/ diminished sounds at the left base.  Otherwise clear.  Dyspneic just speaking.  CV: Regular rate and rhythm.  No murmur appreciated.  Radial pulses are 2+ and symmetric.  Abdomen: Soft and non-tender w/ active bowel sounds.  MSK: No C/C/E.  Neuro: CN II-XII grossly intact.  Psych:  Pleasant affect.  Ox3.    Results Review:     I reviewed the patient's new clinical results.    Lab Results (last 24 hours)     Procedure Component Value Units Date/Time    POC Glucose Once [[id number]]  (Abnormal) Collected: 03/06/22 1758    Specimen: Blood Updated: 03/06/22 1804     Glucose 250 mg/dL      Comment: Meter: [id number] : [id number] Aroldo Medina RN (Validator)       POC Glucose Once [[id number]]  (Abnormal) Collected: " 03/06/22 1203    Specimen: Blood Updated: 03/06/22 1209     Glucose 165 mg/dL      Comment: Meter: [id number] : [id number] Aroldo Medina RN (Validator)       POC Glucose Once [[id number]]  (Abnormal) Collected: 03/06/22 0803    Specimen: Blood Updated: 03/06/22 0808     Glucose 199 mg/dL      Comment: Meter: [id number] : [id number] Aroldo Medina RN (Validator)       Comprehensive Metabolic Panel [[id number]]  (Abnormal) Collected: 03/06/22 0500    Specimen: Blood from Arm, Left Updated: 03/06/22 0533     Glucose 172 mg/dL      BUN 28 mg/dL      Creatinine 0.46 mg/dL      Sodium 140 mmol/L      Potassium 4.2 mmol/L      Chloride 104 mmol/L      CO2 27.4 mmol/L      Calcium 9.5 mg/dL      Total Protein 6.3 g/dL      Albumin 3.00 g/dL      ALT (SGPT) 84 U/L      AST (SGOT) 29 U/L      Alkaline Phosphatase 47 U/L      Total Bilirubin 0.3 mg/dL      Globulin 3.3 gm/dL      A/G Ratio 0.9 g/dL      BUN/Creatinine Ratio 60.9     Anion Gap 8.6 mmol/L      eGFR 111.8 mL/min/1.73      Comment: National Kidney Foundation and American Society of Nephrology (ASN) Task Force recommended calculation based on the Chronic Kidney Disease Epidemiology Collaboration (CKD-EPI) equation refit without adjustment for race.       Narrative:      GFR Normal >60  Chronic Kidney Disease <60  Kidney Failure <15            Imaging Results (Last 24 Hours)     ** No results found for the last 24 hours. **          Medication Review:   I have reviewed the patient's current medication list    Current Facility-Administered Medications:   •  acetaminophen (TYLENOL) tablet 650 mg, 650 mg, Oral, Q4H PRN, 650 mg at 02/27/22 0311 **OR** acetaminophen (TYLENOL) suppository 650 mg, 650 mg, Rectal, Q4H PRN, Christiano, Cameron, DO  •  apixaban (ELIQUIS) tablet 10 mg, 10 mg, Oral, Q12H, 10 mg at 03/06/22 0814 **FOLLOWED BY** [START ON 3/8/2022] apixaban (ELIQUIS) tablet 5 mg, 5 mg, Oral, Q12H, Osvaldo Gibson, DO  •  atorvastatin (LIPITOR) tablet 40  mg, 40 mg, Oral, Nightly, Alvarado, Birrilla, DO, 40 mg at 03/05/22 2047  •  dextrose (D50W) (25 g/50 mL) IV injection 25 g, 25 g, Intravenous, Q15 Min PRN, Cash Arguello Jr., MD  •  dextrose (GLUTOSE) oral gel 15 g, 15 g, Oral, Q15 Min PRN, Cash Arguello Jr., MD  •  docusate sodium (COLACE) liquid 100 mg, 100 mg, Oral, BID, Alvarado, Birrilla, DO, 100 mg at 03/06/22 0815  •  ferrous sulfate EC tablet 324 mg, 324 mg, Oral, Daily With Breakfast, Alavrado, Birrilla, DO, 324 mg at 03/06/22 0815  •  glucagon (GLUCAGEN) injection 1 mg, 1 mg, Intramuscular, Q15 Min PRN, Cash Arguello Jr., MD  •  HYDROcodone-homatropine (HYCODAN) 5-1.5 MG/5ML syrup 5 mL, 5 mL, Oral, Q4H PRN, Osvaldo Gibson DO, 5 mL at 03/04/22 0235  •  insulin aspart (novoLOG) injection 0-24 Units, 0-24 Units, Subcutaneous, TID With Meals, Darrick Matt MD, 4 Units at 03/06/22 0814  •  ipratropium-albuterol (DUO-NEB) nebulizer solution 3 mL, 3 mL, Nebulization, Q6H - RT, Alvarado, Birrilla, DO, 3 mL at 03/06/22 1258  •  ipratropium-albuterol (DUO-NEB) nebulizer solution 3 mL, 3 mL, Nebulization, Q6H PRN, Alvarado, Birrilla, DO  •  Magnesium Sulfate 2 gram Bolus, followed by 8 gram infusion (total Mg dose 10 grams)- Mg less than or equal to 1mg/dL, 2 g, Intravenous, PRN **OR** Magnesium Sulfate 2 gram / 50mL Infusion (GIVE X 3 BAGS TO EQUAL 6GM TOTAL DOSE) - Mg 1.1 - 1.5 mg/dl, 2 g, Intravenous, PRN **OR** Magnesium Sulfate 4 gram infusion- Mg 1.6-1.9 mg/dL, 4 g, Intravenous, PRN, Cameron Alvarado DO  •  methylPREDNISolone sodium succinate (SOLU-Medrol) injection 80 mg, 80 mg, Intravenous, Q8H, Garrett Yoon MD, 80 mg at 03/06/22 1759  •  OLANZapine (zyPREXA) injection 5 mg, 5 mg, Intramuscular, Q8H PRN, Garrett Yoon MD, 5 mg at 02/28/22 1709  •  ondansetron (ZOFRAN) tablet 4 mg, 4 mg, Oral, Q6H PRN **OR** ondansetron (ZOFRAN) injection 4 mg, 4 mg, Intravenous, Q6H PRN, Cameron Alvarado DO  •  polyethylene glycol (MIRALAX) packet 17  g, 17 g, Oral, Daily, Alvarado, Birrilla, DO, 17 g at 03/06/22 0814  •  potassium chloride (K-DUR,KLOR-CON) CR tablet 40 mEq, 40 mEq, Oral, PRN **OR** potassium chloride (KLOR-CON) packet 40 mEq, 40 mEq, Oral, PRN **OR** potassium chloride 10 mEq in 100 mL IVPB, 10 mEq, Intravenous, Q1H PRN, Alvarado, Birrilla, DO, Last Rate: 100 mL/hr at 02/22/22 0936, 10 mEq at 02/22/22 0936  •  [COMPLETED] Insert peripheral IV, , , Once **AND** sodium chloride 0.9 % flush 10 mL, 10 mL, Intravenous, PRN, Alvarado, Birrilla, DO, 10 mL at 03/06/22 1759  •  sodium chloride 0.9 % infusion 40 mL, 40 mL, Intravenous, PRN, Alvarado, Birrilla, DO, 100 mL at 03/02/22 0228  •  sodium chloride nasal spray 2 spray, 2 spray, Each Nare, PRN, Osvaldo Gibson R, DO, 2 spray at 03/06/22 0818      Assessment/Plan     1.  Acute Hypoxic Respiratory Failure due to bacterial/aspiration Pneumonia/Post-COVID Fibrosis: Weaned down to high flow O2 today!  I'm wondering if he would benefit from BiPAP at night to allow him to rest.  Will defer to Dr. Dunbar.  Seems the steroids are taking affect as his exam has improved although he does like to talk which can wear him.  Continue to monitor in ICU.  Will also see if we can start PT, but this may be ambitious.      2.  Acute LLE DVT: On Eliquis.     3.  Steroid-induced Hyperglycemia: Bedsides variable, but trend is upward as expected.  I'm going to start a small dose of basal insulin to help.     4.  Dyslipidemia: No acute issues.  Continue Lipitor.    Plan for disposition: Predicated on hospital course.    Darrick Matt MD  03/06/22  18:10 EST

## 2022-03-06 NOTE — CASE MANAGEMENT/SOCIAL WORK
Continued Stay Note  [facility]     Patient Name: Dontae Bonds Jr.  MRN: [id number]  Today's Date: 3/6/2022    Admit Date: 2/21/2022     Discharge Plan     Row Name 03/06/22 1349       Plan    Plan home w HH vs STR vs [facility]    Plan Comments Chart reviewed, patient currently on HF 02@ 14 L. CM will continue to follow for dc needs.               Discharge Codes    No documentation.                     Garret Mcwilliams RN

## 2022-03-06 NOTE — PLAN OF CARE
Goal Outcome Evaluation:  Plan of Care Reviewed With: patient        Progress: no change  Outcome Evaluation: HHF at 55% and 40L. Sats continue to fall with activity/coughing, recovery time improving. Less confusion this night compared to previous nights. Voiding per urinal W/O difficulty. UOP fair. No new issues.

## 2022-03-07 LAB
ALBUMIN SERPL-MCNC: 3.1 G/DL (ref 3.5–5.2)
ALBUMIN/GLOB SERPL: 1 G/DL
ALP SERPL-CCNC: 49 U/L (ref 39–117)
ALT SERPL W P-5'-P-CCNC: 92 U/L (ref 1–41)
ANION GAP SERPL CALCULATED.3IONS-SCNC: 4.6 MMOL/L (ref 5–15)
AST SERPL-CCNC: 27 U/L (ref 1–40)
BASOPHILS # BLD AUTO: 0.05 10*3/MM3 (ref 0–0.2)
BASOPHILS NFR BLD AUTO: 0.1 % (ref 0–1.5)
BILIRUB SERPL-MCNC: 0.3 MG/DL (ref 0–1.2)
BUN SERPL-MCNC: 23 MG/DL (ref 8–23)
BUN/CREAT SERPL: 44.2 (ref 7–25)
CALCIUM SPEC-SCNC: 9.7 MG/DL (ref 8.6–10.5)
CHLORIDE SERPL-SCNC: 104 MMOL/L (ref 98–107)
CO2 SERPL-SCNC: 30.4 MMOL/L (ref 22–29)
CREAT SERPL-MCNC: 0.52 MG/DL (ref 0.76–1.27)
DEPRECATED RDW RBC AUTO: 49.2 FL (ref 37–54)
EGFRCR SERPLBLD CKD-EPI 2021: 107.8 ML/MIN/1.73
EOSINOPHIL # BLD AUTO: 0 10*3/MM3 (ref 0–0.4)
EOSINOPHIL NFR BLD AUTO: 0 % (ref 0.3–6.2)
ERYTHROCYTE [DISTWIDTH] IN BLOOD BY AUTOMATED COUNT: 14.6 % (ref 12.3–15.4)
GLOBULIN UR ELPH-MCNC: 3 GM/DL
GLUCOSE BLDC GLUCOMTR-MCNC: 174 MG/DL (ref 70–130)
GLUCOSE BLDC GLUCOMTR-MCNC: 285 MG/DL (ref 70–130)
GLUCOSE SERPL-MCNC: 165 MG/DL (ref 65–99)
HCT VFR BLD AUTO: 36.6 % (ref 37.5–51)
HGB BLD-MCNC: 11.6 G/DL (ref 13–17.7)
IMM GRANULOCYTES # BLD AUTO: 0.79 10*3/MM3 (ref 0–0.05)
IMM GRANULOCYTES NFR BLD AUTO: 2.3 % (ref 0–0.5)
LYMPHOCYTES # BLD AUTO: 1.08 10*3/MM3 (ref 0.7–3.1)
LYMPHOCYTES # BLD MANUAL: 1.36 10*3/MM3 (ref 0.7–3.1)
LYMPHOCYTES NFR BLD AUTO: 3.2 % (ref 19.6–45.3)
LYMPHOCYTES NFR BLD MANUAL: 1 % (ref 5–12)
MCH RBC QN AUTO: 29.8 PG (ref 26.6–33)
MCHC RBC AUTO-ENTMCNC: 31.7 G/DL (ref 31.5–35.7)
MCV RBC AUTO: 94.1 FL (ref 79–97)
MONOCYTES # BLD AUTO: 0.61 10*3/MM3 (ref 0.1–0.9)
MONOCYTES # BLD: 0.34 10*3/MM3 (ref 0.1–0.9)
MONOCYTES NFR BLD AUTO: 1.8 % (ref 5–12)
NEUTROPHILS # BLD AUTO: 32.25 10*3/MM3 (ref 1.7–7)
NEUTROPHILS NFR BLD AUTO: 31.42 10*3/MM3 (ref 1.7–7)
NEUTROPHILS NFR BLD AUTO: 92.6 % (ref 42.7–76)
NEUTROPHILS NFR BLD MANUAL: 95 % (ref 42.7–76)
NRBC BLD AUTO-RTO: 0 /100 WBC (ref 0–0.2)
PLAT MORPH BLD: NORMAL
PLATELET # BLD AUTO: 433 10*3/MM3 (ref 140–450)
PMV BLD AUTO: 10.1 FL (ref 6–12)
POTASSIUM SERPL-SCNC: 4.6 MMOL/L (ref 3.5–5.2)
PROCALCITONIN SERPL-MCNC: 0.06 NG/ML (ref 0–0.25)
PROT SERPL-MCNC: 6.1 G/DL (ref 6–8.5)
RBC # BLD AUTO: 3.89 10*6/MM3 (ref 4.14–5.8)
RBC MORPH BLD: NORMAL
SCAN SLIDE: NORMAL
SODIUM SERPL-SCNC: 139 MMOL/L (ref 136–145)
VARIANT LYMPHS NFR BLD MANUAL: 4 % (ref 19.6–45.3)
WBC MORPH BLD: NORMAL
WBC NRBC COR # BLD: 33.95 10*3/MM3 (ref 3.4–10.8)

## 2022-03-07 PROCEDURE — 99233 SBSQ HOSP IP/OBS HIGH 50: CPT | Performed by: INTERNAL MEDICINE

## 2022-03-07 PROCEDURE — 63710000001 INSULIN DETEMIR PER 5 UNITS: Performed by: HOSPITALIST

## 2022-03-07 PROCEDURE — 94669 MECHANICAL CHEST WALL OSCILL: CPT

## 2022-03-07 PROCEDURE — 25010000002 METHYLPREDNISOLONE PER 125 MG: Performed by: INTERNAL MEDICINE

## 2022-03-07 PROCEDURE — 94761 N-INVAS EAR/PLS OXIMETRY MLT: CPT

## 2022-03-07 PROCEDURE — 80053 COMPREHEN METABOLIC PANEL: CPT | Performed by: HOSPITALIST

## 2022-03-07 PROCEDURE — 85025 COMPLETE CBC W/AUTO DIFF WBC: CPT | Performed by: HOSPITALIST

## 2022-03-07 PROCEDURE — 82962 GLUCOSE BLOOD TEST: CPT

## 2022-03-07 PROCEDURE — 84145 PROCALCITONIN (PCT): CPT | Performed by: HOSPITALIST

## 2022-03-07 PROCEDURE — 94799 UNLISTED PULMONARY SVC/PX: CPT

## 2022-03-07 PROCEDURE — 63710000001 INSULIN ASPART PER 5 UNITS: Performed by: HOSPITALIST

## 2022-03-07 PROCEDURE — 97161 PT EVAL LOW COMPLEX 20 MIN: CPT

## 2022-03-07 PROCEDURE — 85007 BL SMEAR W/DIFF WBC COUNT: CPT | Performed by: HOSPITALIST

## 2022-03-07 RX ADMIN — Medication 10 ML: at 08:16

## 2022-03-07 RX ADMIN — FERROUS SULFATE TAB EC 324 MG (65 MG FE EQUIVALENT) 324 MG: 324 (65 FE) TABLET DELAYED RESPONSE at 08:16

## 2022-03-07 RX ADMIN — INSULIN DETEMIR 7 UNITS: 100 INJECTION, SOLUTION SUBCUTANEOUS at 08:12

## 2022-03-07 RX ADMIN — METHYLPREDNISOLONE SODIUM SUCCINATE 80 MG: 125 INJECTION, POWDER, FOR SOLUTION INTRAMUSCULAR; INTRAVENOUS at 08:14

## 2022-03-07 RX ADMIN — IPRATROPIUM BROMIDE AND ALBUTEROL SULFATE 3 ML: .5; 2.5 SOLUTION RESPIRATORY (INHALATION) at 15:46

## 2022-03-07 RX ADMIN — INSULIN ASPART 4 UNITS: 100 INJECTION, SOLUTION INTRAVENOUS; SUBCUTANEOUS at 08:11

## 2022-03-07 RX ADMIN — APIXABAN 10 MG: 2.5 TABLET, FILM COATED ORAL at 08:16

## 2022-03-07 RX ADMIN — IPRATROPIUM BROMIDE AND ALBUTEROL SULFATE 3 ML: .5; 2.5 SOLUTION RESPIRATORY (INHALATION) at 07:08

## 2022-03-07 RX ADMIN — IPRATROPIUM BROMIDE AND ALBUTEROL SULFATE 3 ML: .5; 2.5 SOLUTION RESPIRATORY (INHALATION) at 20:14

## 2022-03-07 RX ADMIN — INSULIN ASPART 12 UNITS: 100 INJECTION, SOLUTION INTRAVENOUS; SUBCUTANEOUS at 12:28

## 2022-03-07 RX ADMIN — APIXABAN 10 MG: 2.5 TABLET, FILM COATED ORAL at 21:13

## 2022-03-07 RX ADMIN — METHYLPREDNISOLONE SODIUM SUCCINATE 80 MG: 125 INJECTION, POWDER, FOR SOLUTION INTRAMUSCULAR; INTRAVENOUS at 16:16

## 2022-03-07 RX ADMIN — ATORVASTATIN CALCIUM 40 MG: 40 TABLET, FILM COATED ORAL at 21:13

## 2022-03-07 RX ADMIN — INSULIN ASPART 4 UNITS: 100 INJECTION, SOLUTION INTRAVENOUS; SUBCUTANEOUS at 18:21

## 2022-03-07 RX ADMIN — Medication 10 ML: at 16:16

## 2022-03-07 RX ADMIN — IPRATROPIUM BROMIDE AND ALBUTEROL SULFATE 3 ML: .5; 2.5 SOLUTION RESPIRATORY (INHALATION) at 11:23

## 2022-03-07 RX ADMIN — METHYLPREDNISOLONE SODIUM SUCCINATE 80 MG: 125 INJECTION, POWDER, FOR SOLUTION INTRAMUSCULAR; INTRAVENOUS at 00:06

## 2022-03-07 RX ADMIN — HYDROCODONE BITARTRATE AND HOMATROPINE METHYLBROMIDE 5 ML: 5; 1.5 SOLUTION ORAL at 00:54

## 2022-03-07 RX ADMIN — HYDROCODONE BITARTRATE AND HOMATROPINE METHYLBROMIDE 5 ML: 5; 1.5 SOLUTION ORAL at 21:14

## 2022-03-07 NOTE — PLAN OF CARE
Goal Outcome Evaluation:              Outcome Evaluation: Physical therapy evaluation complete.  Patient oriented x3, follows 1 step commands.  Patient performs sit to stand with CGA and use of rolling walker.  patient completes trials of standing marching x10-20 seconds with CGA and seated rest breaks as needed.  Patient's O2 decreased to 82-83% with activity on heated high flow, requires approximately 60 seconds to recover to 89-90%.  Further mobility not attempted due to heated high flow lines and O2 sats with minimal activity.  Will follow for therapy needs, however further improvement in gait/mobility may be limited until heated high flow oxygen discontinued.  Will continue to assess discharge recommendations as further mobility can be assessed.

## 2022-03-07 NOTE — PROGRESS NOTES
Pt weaned to 10 LPM high flow n/c yesterday.  During noc shift, SpO2 was consistently 87-88%. RR at 32-36.  O2 increased to 11 LPM maintaining SpO2 of 92%.  SpO2 then dropped to 85% where it stayed.  RR also increased to 44. Pt had no c/o soa but was placed back on heated high flow n/c with Flow at 40 lpm and FiO2 of 65%.  Fio2 was weaned down to 60% where SpO2 has maintained at 96%.  RR remains around 38-40.

## 2022-03-07 NOTE — THERAPY EVALUATION
"Acute Care - Physical Therapy Initial Evaluation   [facility]     Patient Name: Dontae Bonds Jr.  : 1950  MRN: [id number]  Today's Date: 3/7/2022   Onset of Illness/Injury or Date of Surgery: 22  Visit Dx:     ICD-10-CM ICD-9-CM   1. Pneumonia of both lungs due to infectious organism, unspecified part of lung  J18.9 483.8   2. Acute respiratory failure without hypercapnia (HCC)  J96.00 518.81     Patient Active Problem List   Diagnosis   • Personal history of colonic polyps   • Family history of colon cancer   • Pneumonia due to COVID-19 virus   • Pneumonia of both lungs due to infectious organism   • Hypertension   • Hypokalemia     Past Medical History:   Diagnosis Date   • Hyperlipidemia    • Hypertension      History reviewed. No pertinent surgical history.  PT Assessment (last 12 hours)     PT Evaluation and Treatment     Row Name 22 0848          Physical Therapy Time and Intention    Subjective Information no complaints  -     Document Type evaluation  -     Mode of Treatment physical therapy  -     Patient Effort adequate  -     Symptoms Noted During/After Treatment shortness of breath  -     Comment pt mobility limited by heated high flow O2 lines and O2 sats with minimal activity  -     Row Name 22 0848          General Information    Patient Profile Reviewed yes  -     Onset of Illness/Injury or Date of Surgery 22  -     Referring Physician Dr Matt  -     Patient Observations alert;cooperative;agree to therapy  -     Patient/Family/Caregiver Comments/Observations pt sitting in recliner, agrees to therapy evaluation  -     Prior Level of Function independent:;all household mobility;community mobility  pt uses rolling walker \"sometimes\"  -     Equipment Currently Used at Home walker, rolling;cane, straight;grab bar;shower chair  -     Pertinent History of Current Functional Problem Patient admitted to hospital with worsening SOA since recent discharge " "from hospital from COVID diagnosis  -     Existing Precautions/Restrictions fall  -JW     Risks Reviewed patient:;increased discomfort  -     Benefits Reviewed patient:;improve function;increase independence;increase strength;increase balance  -     Barriers to Rehab medically complex  -     Comment, General Information pt with decreased O2 sats with minimal activity  -     Row Name 03/07/22 0848          Living Environment    Current Living Arrangements home  single story house  -     Home Accessibility stairs to enter home  -     Row Name 03/07/22 0848          Home Main Entrance    Number of Stairs, Main Entrance two  -     Stair Railings, Main Entrance other (see comments)  pt reports he can hold onto \"door frame\"  -     Row Name 03/07/22 0848          Pain    Pre/Posttreatment Pain Comment no c/o pain  -[facility] Name 03/07/22 0848          Cognition    Orientation Status (Cognition) oriented x 3  -     Follows Commands (Cognition) follows one-step commands  -     Personal Safety Interventions gait belt;nonskid shoes/slippers when out of bed  -[facility] Name 03/07/22 0848          Range of Motion Comprehensive    Comment, General Range of Motion LE ROM WFL bilaterally  -[facility] Name 03/07/22 0848          Strength Comprehensive (MMT)    Comment, General Manual Muscle Testing (MMT) Assessment LE strength functional within activity  -     Row Name 03/07/22 0848          Sensory Assessment (Somatosensory)    Sensory Assessment (Somatosensory) --  pt reports no numbness/tingling  -[facility] Name 03/07/22 0848          Bed Mobility    Comment, (Bed Mobility) deferred up in chair  -[facility] Name 03/07/22 0848          Transfers    Transfers sit-stand transfer;stand-sit transfer  -     Comment, (Transfers) pt able to complete 10-20 seconds of standing marching with seated rest breaks  -     Sit-Stand [place] (Transfers) contact guard;verbal cues  -     Stand-Sit [place] " (Transfers) contact guard;verbal cues  -     Row Name 03/07/22 0848          Sit-Stand Transfer    Assistive Device (Sit-Stand Transfers) walker, front-wheeled  -     Row Name 03/07/22 0848          Stand-Sit Transfer    Assistive Device (Stand-Sit Transfers) walker, front-wheeled  -     Row Name 03/07/22 0848          Gait/Stairs (Locomotion)    Comment, (Gait/Stairs) unable to safely attempt gait due to heated high flow lines  -     Row Name 03/07/22 0848          Motor Skills    Therapeutic Exercise --  performs standing marching x10-20 seconds per trial with rest breaks as needed  -     Row Name 03/07/22 0848          Plan of Care Review    Outcome Evaluation Physical therapy evaluation complete.  Patient oriented x3, follows 1 step commands.  Patient performs sit to stand with CGA and use of rolling walker.  patient completes trials of standing marching x10-20 seconds with CGA and seated rest breaks as needed.  Patient's O2 decreased to 82-83% with activity on heated high flow, requires approximately 60 seconds to recover to 89-90%.  Further mobility not attempted due to heated high flow lines and O2 sats with minimal activity.  Will follow for therapy needs, however further improvement in gait/mobility may be limited until heated high flow oxygen discontinued.  Will continue to assess discharge recommendations as further mobility can be assessed.  -     Row Name 03/07/22 0848          Positioning and Restraints    Pre-Treatment Position sitting in chair/recliner  -     Post Treatment Position chair  -JW     In Chair reclined;call light within reach;encouraged to call for assist  -     Row Name 03/07/22 0848          Therapy Assessment/Plan (PT)    Patient/Family Therapy Goals Statement (PT) go home soon  -     Rehab Potential (PT) good, to achieve stated therapy goals  -     Criteria for Skilled Interventions Met (PT) yes;meets criteria  -     Predicted Duration of Therapy Intervention  (PT) 3 days  -     Row Name 03/07/22 0848          PT Evaluation Complexity    History, PT Evaluation Complexity 1-2 personal factors and/or comorbidities  -JW     Examination of Body Systems (PT Eval Complexity) 1-2 elements  -JW     Clinical Presentation (PT Evaluation Complexity) evolving  -JW     Clinical Decision Making (PT Evaluation Complexity) low complexity  -JW     Overall Complexity (PT Evaluation Complexity) low complexity  -     Row Name 03/07/22 0848          Therapy Plan Review/Discharge Plan (PT)    Therapy Plan Review (PT) evaluation/treatment results reviewed;care plan/treatment goals reviewed  -     Row Name 03/07/22 0848          Physical Therapy Goals    Bed Mobility Goal Selection (PT) bed mobility, PT goal 1  -     Transfer Goal Selection (PT) transfer, PT goal 1  -     Gait Training Goal Selection (PT) gait training, PT goal 1  -     Row Name 03/07/22 0848          Bed Mobility Goal 1 (PT)    Activity/Assistive Device (Bed Mobility Goal 1, PT) bed mobility activities, all  -JW     Newborn Level/Cues Needed (Bed Mobility Goal 1, PT) supervision required  -JW     Time Frame (Bed Mobility Goal 1, PT) 3 days  -JW     Progress/Outcomes (Bed Mobility Goal 1, PT) goal ongoing  -     Row Name 03/07/22 0848          Transfer Goal 1 (PT)    Activity/Assistive Device (Transfer Goal 1, PT) transfers, all  -JW     Newborn Level/Cues Needed (Transfer Goal 1, PT) supervision required  -JW     Time Frame (Transfer Goal 1, PT) 3 days  -JW     Progress/Outcome (Transfer Goal 1, PT) goal ongoing  -     Row Name 03/07/22 0848          Gait Training Goal 1 (PT)    Activity/Assistive Device (Gait Training Goal 1, PT) gait (walking locomotion);assistive device use  -JW     Newborn Level (Gait Training Goal 1, PT) supervision required  -JW     Distance (Gait Training Goal 1, PT) 25  -JW     Time Frame (Gait Training Goal 1, PT) 3 days  -JW     Progress/Outcome (Gait Training Goal 1,  PT) goal ongoing  -           User Key  (r) = Recorded By, (t) = Taken By, (c) = Cosigned By    Initials Name Provider Type    Tami Kitchen PT Physical Therapist                Physical Therapy Education                 Title: PT OT SLP Therapies (In Progress)     Topic: Physical Therapy (In Progress)     Point: Mobility training (Done)     Learning Progress Summary           Patient Acceptance, E,TB, VU by BRIANA at 3/7/2022 0919                   Point: Home exercise program (Not Started)     Learner Progress:  Not documented in this visit.                      User Key     Initials Effective Dates Name Provider Type Discipline     06/16/21 -  Tami Hodgson, MARY Physical Therapist PT              PT Recommendation and Plan  Anticipated Discharge Disposition (PT): other (see comments) (will continue to assess)  Planned Therapy Interventions (PT): balance training, bed mobility training, gait training, home exercise program, strengthening, transfer training, patient/family education  Therapy Frequency (PT): daily  Outcome Evaluation: Physical therapy evaluation complete.  Patient oriented x3, follows 1 step commands.  Patient performs sit to stand with CGA and use of rolling walker.  patient completes trials of standing marching x10-20 seconds with CGA and seated rest breaks as needed.  Patient's O2 decreased to 82-83% with activity on heated high flow, requires approximately 60 seconds to recover to 89-90%.  Further mobility not attempted due to heated high flow lines and O2 sats with minimal activity.  Will follow for therapy needs, however further improvement in gait/mobility may be limited until heated high flow oxygen discontinued.  Will continue to assess discharge recommendations as further mobility can be assessed.   Outcome Measures     Row Name 03/07/22 0848             How much help from another person do you currently need...    Turning from your back to your side while in flat bed without using  bedrails? 3  -JW      Moving from lying on back to sitting on the side of a flat bed without bedrails? 3  -JW      Moving to and from a bed to a chair (including a wheelchair)? 3  -JW      Standing up from a chair using your arms (e.g., wheelchair, bedside chair)? 3  -JW      Climbing 3-5 steps with a railing? 1  -JW      To walk in hospital room? 1  -JW      AM-PAC 6 Clicks Score (PT) 14  -JW              Functional Assessment    Outcome Measure Options AM-PAC 6 Clicks Basic Mobility (PT)  -            User Key  (r) = Recorded By, (t) = Taken By, (c) = Cosigned By    Initials Name Provider Type    Tami Kitchen PT Physical Therapist                 Time Calculation:    PT Charges     Row Name 03/07/22 0848             Time Calculation    Start Time 0848  -      Stop Time 0905  -      Time Calculation (min) 17 min  -      PT Received On 03/07/22  -BRIANA      PT - Next Appointment 03/08/22  -            User Key  (r) = Recorded By, (t) = Taken By, (c) = Cosigned By    Initials Name Provider Type    Tami Kitchen, MARY Physical Therapist              Therapy Charges for Today     Code Description Service Date Service Provider Modifiers Qty    32349599479 HC PT EVAL LOW COMPLEXITY 1 3/7/2022 Tami Hodgson, PT GP 1          PT G-Codes  Outcome Measure Options: AM-PAC 6 Clicks Basic Mobility (PT)  AM-PAC 6 Clicks Score (PT): 14    Tami Hodgson, MARY  3/7/2022

## 2022-03-07 NOTE — PROGRESS NOTES
[facility] INPATIENT PROGRESS NOTE         [facility] ICU    3/7/2022      PATIENT IDENTIFICATION:  Name: Dontae Bonds Jr. ADMIT: 2022   : 1950  PCP: Tiffany Burciaga MD    MRN: [id number] LOS: 14 days   AGE/SEX: 71 y.o. male  ROOM: ICU1                     LOS 14    Reason for visit: Respiratory failure      SUBJECTIVE:      Discussed with nursing staff.  On 40 L and 65% FiO2 via OptiFlow.  Saturations dropped to the low 80s just by talking.  Discussed with nursing staff and respiratory therapy.  Noted that his white blood cell count is significantly increased.  Procalcitonin has not significantly increased.  Denies nausea, vomiting or diarrhea.  No chest pain or productive cough.      Objective   OBJECTIVE:    Vital Sign Min/Max for last 24 hours  Temp  Min: 96.4 °F (35.8 °C)  Max: 98.1 °F (36.7 °C)   BP  Min: 102/68  Max: 142/80   Pulse  Min: 60  Max: 108   Resp  Min: 16  Max: 44   SpO2  Min: 83 %  Max: 100 %   No data recorded   Weight  Min: 69.6 kg (153 lb 6.4 oz)  Max: 69.6 kg (153 lb 6.4 oz)                   FiO2 (%): 41 %     Body mass index is 23.52 kg/m².    Intake/Output Summary (Last 24 hours) at 3/7/2022 1004  Last data filed at 3/7/2022 0801  Gross per 24 hour   Intake 820 ml   Output 1445 ml   Net -625 ml         Exam:  GEN:  No distress, appears stated age  EYES:   PERRL, anicteric sclerae  ENT:    External ears/nose normal, OP clear  NECK:  No adenopathy, midline trachea  LUNGS: Normal chest on inspection, palpation and crackles at bases on auscultation  CV:  Normal S1S2, without murmur  ABD:  Nontender, nondistended, no hepatosplenomegaly, +BS  EXT:  No edema.  No cyanosis or clubbing.  No mottling and normal cap refill.    Assessment     Scheduled meds:  apixaban, 10 mg, Oral, Q12H   Followed by  [START ON 3/8/2022] apixaban, 5 mg, Oral, Q12H  atorvastatin, 40 mg, Oral, Nightly  docusate sodium, 100 mg, Oral, BID  ferrous sulfate, 324 mg, Oral, Daily With  Breakfast  insulin aspart, 0-24 Units, Subcutaneous, TID With Meals  insulin detemir, 7 Units, Subcutaneous, Daily  ipratropium-albuterol, 3 mL, Nebulization, 4x Daily - RT  methylPREDNISolone sodium succinate, 80 mg, Intravenous, Q8H  polyethylene glycol, 17 g, Oral, Daily      IV meds:                         Data Review:  Results from last 7 days   Lab Units 03/07/22 0515 03/06/22  0500 03/04/22  0530 03/02/22  0430 03/01/22  0456   SODIUM mmol/L 139 140 137 138 141   POTASSIUM mmol/L 4.6 4.2 4.4 4.3 4.6   CHLORIDE mmol/L 104 104 99 100 103   CO2 mmol/L 30.4* 27.4 32.0* 30.2* 32.0*   BUN mg/dL 23 28* 21 24* 22   CREATININE mg/dL 0.52* 0.46* 0.50* 0.50* 0.44*   GLUCOSE mg/dL 165* 172* 120* 106* 118*   CALCIUM mg/dL 9.7 9.5 9.4 9.3 9.5         Estimated Creatinine Clearance: 83.4 mL/min (A) (by C-G formula based on SCr of 0.52 mg/dL (L)).  Results from last 7 days   Lab Units 03/07/22 0515 03/02/22  0430 03/01/22  0456   WBC 10*3/mm3 33.95* 18.48* 15.22*   HEMOGLOBIN g/dL 11.6* 11.5* 11.8*   PLATELETS 10*3/mm3 433 477* 516*         Results from last 7 days   Lab Units 03/07/22 0515 03/06/22  0500 03/04/22  0530 03/02/22  0430 03/01/22  0456   ALT (SGPT) U/L 92* 84* 91* 78* 41   AST (SGOT) U/L 27 29 31 54* 24         Results from last 7 days   Lab Units 03/07/22 0515 03/04/22  0530 03/02/22  0430   PROCALCITONIN ng/mL 0.06 0.06 0.05         Glucose   Date/Time Value Ref Range Status   03/06/2022 1758 250 (H) 70 - 130 mg/dL Final     Comment:     Meter: [id number] : [id number] Aroldo Medina RN (Validator)   03/06/2022 1203 165 (H) 70 - 130 mg/dL Final     Comment:     Meter: [id number] : [id number] Aroldo Medina RN (Validator)   03/06/2022 0803 199 (H) 70 - 130 mg/dL Final     Comment:     Meter: [id number] : [id number] Aroldo Medina RN (Validator)   03/05/2022 1654 145 (H) 70 - 130 mg/dL Final     Comment:     Meter: [id number] : [id number] Jing Angulo RN   03/05/2022 1135 276 (H)  70 - 130 mg/dL Final     Comment:     Meter: [id number] : [id number] Abbe Victoria RN   03/05/2022 0759 183 (H) 70 - 130 mg/dL Final     Comment:     Meter: [id number] : [id number] Abbe Naldo Victoria RN   03/05/2022 0031 210 (H) 70 - 130 mg/dL Final     Comment:     Meter: [id number] : [id number] Fabian Fox RN     Most recent chest x-ray 3/5 reviewed shows bilateral airspace disease and coarse interstitial changes.  This does not look any better compared to previous 3/1.          Microbiology reviewed              Active Hospital Problems    Diagnosis  POA   • **Pneumonia of both lungs due to infectious organism [J18.9]  Yes   • Hypertension [I10]  Yes   • Hypokalemia [E87.6]  Yes      Resolved Hospital Problems   No resolved problems to display.         ASSESSMENT:    Acute hypoxemic respiratory failure  Recent COVID-19 pneumonia  Bacterial superimposed pneumonia  Encephalopathy: Improving  Anemia  Alcohol abuse  Worsening leukocytosis      PLAN:    Significant worsening leukocytosis but no clinical signs of new infection.  Would consider steroid-induced leukocytosis but this is a significant jump from yesterday's white count.  Often times leukocytosis this high is C. difficile related but he has not had any signs of diarrhea and nursing notes formed stool yesterday.  He is in a fibrotic phase of his recent Covid pneumonia and possible bacterial pneumonia on top of that.  Status post antibiotic course.  Not really making a lot of progress and regards to oxygen requirements.  Continue steroids.  On full anticoagulation.  Discussed with nursing staff.  Guarded prognosis.        Fabiano Dunbar MD  Pulmonary and Critical Care Medicine  [facility], [facility]  3/7/2022    10:04 EST

## 2022-03-07 NOTE — PROGRESS NOTES
Adult Nutrition  Assessment/PES    Patient Name:  Dontae Bonds Jr.  YOB: 1950  MRN: [id number]  Admit Date:  2/21/2022    Assessment Date:  3/7/2022    Comments:  Agree with diet per SLP. Pt taking 100% of meals and 100% of supplements reported/recorded.  Will cont to follow.      Reason for Assessment     Row Name 03/07/22 1447          Reason for Assessment    Reason For Assessment follow-up protocol     Diagnosis pulmonary disease  AHRF post covid fibrosis, DVT LLE                Nutrition/Diet History     Row Name 03/07/22 1448          Nutrition/Diet History    Typical Intake (Food/Fluid/EN/PN) spoke w pt at bedside, reports he eats everything on his plate and drinks his boost each time, like pieces not finely chopped but a bit bigger                  Labs/Tests/Procedures/Meds     Row Name 03/07/22 1448          Labs/Procedures/Meds    Lab Results Reviewed reviewed     Lab Results Comments ALT 92 H, GLu 162, 250            Diagnostic Tests/Procedures    Diagnostic Test/Procedure Reviewed reviewed            Medications    Pertinent Medications Reviewed reviewed     Pertinent Medications Comments solumedrol, iron, novolog, levemir, miralax                    Nutrition Prescription Ordered     Row Name 03/07/22 1449          Nutrition Prescription PO    Current PO Diet Soft Texture     Texture Chopped  smaller portions, no straws     Supplement Boost Plus (Ensure Enlive, Ensure Plus)     Supplement Frequency 3 times a day                Evaluation of Received Nutrient/Fluid Intake     Row Name 03/07/22 1450          Fluid Intake Evaluation    Oral Fluid (mL) 973  ave x 3, 50%            PO Evaluation    Number of Meals 8     % PO Intake 100 ( meals), 100% x 3 supplements recorded                     Problem/Interventions:   Problem 1     Row Name 03/07/22 1450          Nutrition Diagnoses Problem 1    Problem 1 --     Etiology (related to) --     Signs/Symptoms (evidenced by) --                 Problem 2     Row Name 03/07/22 1451          Nutrition Diagnoses Problem 2    Problem 2 Swallowing Difficulty     Etiology (related to) Factors Affecting Nutrition     Signs/Symptoms (evidenced by) Report/Observation                    Intervention Goal     Row Name 03/07/22 1451          Intervention Goal    General Meet nutritional needs for age/condition     PO Maintain intake;PO intake (%)     PO Intake % 80 %  or greater of meals and supplements     Weight Maintain weight                Nutrition Intervention     Row Name 03/07/22 1451          Nutrition Intervention    RD/Tech Action Follow Tx progress                  Education/Evaluation     Row Name 03/07/22 1451          Education    Education No discharge needs identified at this time            Monitor/Evaluation    Monitor Per protocol;I&O;PO intake;Pertinent labs;Weight;Symptoms                 Electronically signed by:  Silke Mcconnell RD  03/07/22 14:52 EST

## 2022-03-07 NOTE — PLAN OF CARE
Problem: Adult Inpatient Plan of Care  Goal: Plan of Care Review  Outcome: Ongoing, Progressing  Flowsheets (Taken 3/6/2022 2133)  Progress: improving  Plan of Care Reviewed With: patient  Goal: Patient-Specific Goal (Individualized)  Outcome: Ongoing, Progressing  Goal: Absence of Hospital-Acquired Illness or Injury  Outcome: Ongoing, Progressing  Intervention: Identify and Manage Fall Risk  Recent Flowsheet Documentation  Taken 3/6/2022 2000 by Monica Norris RN  Safety Promotion/Fall Prevention:   activity supervised   clutter free environment maintained   fall prevention program maintained   lighting adjusted   nonskid shoes/slippers when out of bed   room organization consistent   safety round/check completed   toileting scheduled  Intervention: Prevent Skin Injury  Recent Flowsheet Documentation  Taken 3/6/2022 2000 by Monica Norris RN  Body Position:   supine, legs elevated   position changed independently  Taken 3/6/2022 1931 by Monica Norris RN  Skin Protection:   adhesive use limited   incontinence pads utilized   protective footwear used   transparent dressing maintained   tubing/devices free from skin contact  Intervention: Prevent and Manage VTE (Venous Thromboembolism) Risk  Recent Flowsheet Documentation  Taken 3/6/2022 2000 by Monica Norris RN  Activity Management: (gets SOB when speaking) activity adjusted per tolerance  Taken 3/6/2022 1931 by Monica Norris RN  VTE Prevention/Management: (Eliquis) sequential compression devices off  Intervention: Prevent Infection  Recent Flowsheet Documentation  Taken 3/6/2022 2000 by Monica Norris RN  Infection Prevention:   visitors restricted/screened   single patient room provided   rest/sleep promoted   hand hygiene promoted  Goal: Optimal Comfort and Wellbeing  Outcome: Ongoing, Progressing  Intervention: Provide Person-Centered Care  Recent Flowsheet Documentation  Taken 3/6/2022 1931 by Monica Norris RN  Trust Relationship/Rapport:   care explained    choices provided   emotional support provided   empathic listening provided   questions answered   questions encouraged   reassurance provided   thoughts/feelings acknowledged  Goal: Readiness for Transition of Care  Outcome: Ongoing, Progressing     Problem: Fluid Imbalance (Pneumonia)  Goal: Fluid Balance  Outcome: Ongoing, Progressing  Intervention: Monitor and Manage Fluid Balance  Recent Flowsheet Documentation  Taken 3/6/2022 1931 by Monica Norris RN  Fluid/Electrolyte Management: fluids provided     Problem: Respiratory Compromise (Pneumonia)  Goal: Effective Oxygenation and Ventilation  Outcome: Ongoing, Progressing  Intervention: Promote Airway Secretion Clearance  Recent Flowsheet Documentation  Taken 3/6/2022 1931 by Monica Norris RN  Breathing Techniques/Airway Clearance: deep/controlled cough encouraged  Cough And Deep Breathing: done independently per patient  Intervention: Optimize Oxygenation and Ventilation  Recent Flowsheet Documentation  Taken 3/6/2022 2000 by Monica Norris RN  Head of Bed (HOB) Positioning: HOB at 30-45 degrees  Taken 3/6/2022 1931 by Monica Norris RN  Airway/Ventilation Management:   pulmonary hygiene promoted   humidification applied   airway patency maintained     Problem: Fall Injury Risk  Goal: Absence of Fall and Fall-Related Injury  Outcome: Ongoing, Progressing  Intervention: Identify and Manage Contributors  Recent Flowsheet Documentation  Taken 3/6/2022 2000 by Monica Norris RN  Medication Review/Management: medications reviewed  Self-Care Promotion: independence encouraged  Intervention: Promote Injury-Free Environment  Recent Flowsheet Documentation  Taken 3/6/2022 2000 by Monica Norris RN  Safety Promotion/Fall Prevention:   activity supervised   clutter free environment maintained   fall prevention program maintained   lighting adjusted   nonskid shoes/slippers when out of bed   room organization consistent   safety round/check completed   toileting scheduled      Problem: Skin Injury Risk Increased  Goal: Skin Health and Integrity  Outcome: Adequate for Care Transition  Intervention: Promote and Optimize Oral Intake  Recent Flowsheet Documentation  Taken 3/6/2022 1931 by Monica Norris RN  Oral Nutrition Promotion: rest periods promoted  Intervention: Optimize Skin Protection  Recent Flowsheet Documentation  Taken 3/6/2022 2000 by Monica Norris RN  Head of Bed (HOB) Positioning: HOB at 30-45 degrees  Taken 3/6/2022 1931 by Monica Norris RN  Pressure Reduction Techniques:   frequent weight shift encouraged   heels elevated off bed   pressure points protected  Pressure Reduction Devices:   specialty bed utilized   pressure-redistributing mattress utilized  Skin Protection:   adhesive use limited   incontinence pads utilized   protective footwear used   transparent dressing maintained   tubing/devices free from skin contact     Problem: Adult Inpatient Plan of Care  Goal: Plan of Care Review  Recent Flowsheet Documentation  Taken 3/6/2022 2133 by Monica Norris RN  Progress: improving  Plan of Care Reviewed With: patient  Goal: Absence of Hospital-Acquired Illness or Injury  Intervention: Identify and Manage Fall Risk  Recent Flowsheet Documentation  Taken 3/6/2022 2000 by Monica Norris RN  Safety Promotion/Fall Prevention:   activity supervised   clutter free environment maintained   fall prevention program maintained   lighting adjusted   nonskid shoes/slippers when out of bed   room organization consistent   safety round/check completed   toileting scheduled  Intervention: Prevent Skin Injury  Recent Flowsheet Documentation  Taken 3/6/2022 2000 by Monica Norris RN  Body Position:   supine, legs elevated   position changed independently  Taken 3/6/2022 1931 by Monica Norris RN  Skin Protection:   adhesive use limited   incontinence pads utilized   protective footwear used   transparent dressing maintained   tubing/devices free from skin contact  Intervention: Prevent and  Manage VTE (venous thromboembolism) Risk  Recent Flowsheet Documentation  Taken 3/6/2022 1931 by Monica Norris RN  VTE Prevention/Management: (Eliquis) sequential compression devices off  Intervention: Prevent Infection  Recent Flowsheet Documentation  Taken 3/6/2022 2000 by Monica Norris RN  Infection Prevention:   visitors restricted/screened   single patient room provided   rest/sleep promoted   hand hygiene promoted  Goal: Optimal Comfort and Wellbeing  Intervention: Provide Person-Centered Care  Recent Flowsheet Documentation  Taken 3/6/2022 1931 by Monica Norris RN  Trust Relationship/Rapport:   care explained   choices provided   emotional support provided   empathic listening provided   questions answered   questions encouraged   reassurance provided   thoughts/feelings acknowledged  Goal: Plan of Care Review  Recent Flowsheet Documentation  Taken 3/6/2022 2133 by Monica Norris RN  Progress: improving  Plan of Care Reviewed With: patient     Problem: Hypertension Comorbidity  Goal: Blood Pressure in Desired Range  Intervention: Maintain Hypertension-Management Strategies  Recent Flowsheet Documentation  Taken 3/6/2022 2000 by Monica Norris RN  Medication Review/Management: medications reviewed     Problem: Communication Impairment (Mechanical Ventilation, Invasive)  Goal: Effective Communication  Intervention: Ensure Effective Communication  Recent Flowsheet Documentation  Taken 3/6/2022 1931 by Monica Norris RN  Communication Enhancement Strategies:   call light answered in person   extra time allowed for response     Problem: Device-Related Complication Risk (Mechanical Ventilation, Invasive)  Goal: Optimal Device Function  Intervention: Optimize Device Care and Function  Recent Flowsheet Documentation  Taken 3/6/2022 2000 by Monica Norris RN  Aspiration Precautions:   awake/alert before oral intake   distractions minimized during oral intake   upright posture maintained   oral hygiene care  promoted  Airway Safety Measures: suction at bedside     Problem: Inability to Wean (Mechanical Ventilation, Invasive)  Goal: Mechanical Ventilation Liberation  Intervention: Promote Extubation and Mechanical Ventilation Liberation  Recent Flowsheet Documentation  Taken 3/6/2022 2000 by Monica Norris RN  Medication Review/Management: medications reviewed  Taken 3/6/2022 1931 by Monica Norris RN  Sleep/Rest Enhancement:   awakenings minimized   consistent schedule promoted   noise level reduced   natural light exposure provided   regular sleep/rest pattern promoted   room darkened     Problem: Skin and Tissue Injury (Mechanical Ventilation, Invasive)  Goal: Absence of Device-Related Skin and Tissue Injury  Intervention: Maintain Skin and Tissue Health  Recent Flowsheet Documentation  Taken 3/6/2022 1931 by Monica Norris RN  Device Skin Pressure Protection:   absorbent pad utilized/changed   adhesive use limited   pressure points protected     Problem: Ventilator-Induced Lung Injury (Mechanical Ventilation, Invasive)  Goal: Absence of Ventilator-Induced Lung Injury  Intervention: Prevent Ventilator-Associated Pneumonia  Recent Flowsheet Documentation  Taken 3/6/2022 2000 by Monica Norris RN  Head of Bed (HOB) Positioning: HOB at 30-45 degrees     Problem: Adjustment to Illness (Sepsis/Septic Shock)  Goal: Optimal Coping  Intervention: Optimize Psychosocial Adjustment to Illness  Recent Flowsheet Documentation  Taken 3/6/2022 1931 by Monica Norris RN  Family/Support System Care: support provided     Problem: Bleeding (Sepsis/Septic Shock)  Goal: Absence of Bleeding  Intervention: Minimize Bleeding Risk  Recent Flowsheet Documentation  Taken 3/6/2022 2000 by Monica Norris RN  Bleeding Precautions:   blood pressure closely monitored   gentle oral care promoted   monitored for signs of bleeding  Bleeding Management: dressing monitored     Problem: Glycemic Control Impaired (Sepsis/Septic Shock)  Goal: Blood Glucose Level  Within Desired Range  Intervention: Optimize Glycemic Control  Recent Flowsheet Documentation  Taken 3/6/2022 1931 by Monica Norris RN  Glycemic Management:   blood glucose monitored   insulin dose matched to carbohydrate intake     Problem: Hemodynamic Instability (Sepsis/Septic Shock)  Goal: Effective Tissue Perfusion  Intervention: Optimize Blood Flow  Recent Flowsheet Documentation  Taken 3/6/2022 2000 by Monica Norris RN  Stabilization Measures: legs elevated  Taken 3/6/2022 1931 by Monica Norris RN  Fluid/Electrolyte Management: fluids provided     Problem: Infection (Sepsis/Septic Shock)  Goal: Absence of Infection Signs and Symptoms  Intervention: Prevent or Manage Infection Progression  Recent Flowsheet Documentation  Taken 3/6/2022 2000 by Monica Norris RN  Infection Management: aseptic technique maintained  Infection Prevention:   visitors restricted/screened   single patient room provided   rest/sleep promoted   hand hygiene promoted  Isolation Precautions: precautions maintained     Problem: Respiratory Compromise (Sepsis/Septic Shock)  Goal: Effective Oxygenation and Ventilation  Intervention: Promote Airway Secretion Clearance  Recent Flowsheet Documentation  Taken 3/6/2022 2000 by Monica Norris RN  Activity Management: (gets SOB when speaking) activity adjusted per tolerance  Taken 3/6/2022 1931 by Monica Norris RN  Breathing Techniques/Airway Clearance: deep/controlled cough encouraged  Cough And Deep Breathing: done independently per patient  Intervention: Optimize Oxygenation and Ventilation  Recent Flowsheet Documentation  Taken 3/6/2022 2000 by Monica Norris RN  Head of Bed (HOB) Positioning: HOB at 30-45 degrees  Taken 3/6/2022 1931 by Monica Norris RN  Airway/Ventilation Management:   pulmonary hygiene promoted   humidification applied   airway patency maintained     Problem: Fluid Imbalance (Pneumonia)  Goal: Fluid Balance  Intervention: Monitor and Manage Fluid Balance  Recent Flowsheet  Documentation  Taken 3/6/2022 1931 by Monica Norris RN  Fluid/Electrolyte Management: fluids provided     Problem: Infection (Pneumonia)  Goal: Resolution of Infection Signs and Symptoms  Intervention: Prevent Infection Progression  Recent Flowsheet Documentation  Taken 3/6/2022 2000 by Monica Norris RN  Infection Management: aseptic technique maintained  Isolation Precautions: precautions maintained     Problem: Respiratory Compromise (Pneumonia)  Goal: Effective Oxygenation and Ventilation  Intervention: Promote Airway Secretion Clearance  Recent Flowsheet Documentation  Taken 3/6/2022 1931 by Monica Norris RN  Breathing Techniques/Airway Clearance: deep/controlled cough encouraged  Cough And Deep Breathing: done independently per patient  Intervention: Optimize Oxygenation and Ventilation  Recent Flowsheet Documentation  Taken 3/6/2022 2000 by Monica Norris RN  Head of Bed (HOB) Positioning: HOB at 30-45 degrees  Taken 3/6/2022 1931 by Monica Norris RN  Airway/Ventilation Management:   pulmonary hygiene promoted   humidification applied   airway patency maintained     Problem: Skin Injury Risk Increased  Goal: Skin Health and Integrity  Intervention: Optimize Skin Protection  Recent Flowsheet Documentation  Taken 3/6/2022 2000 by Monica Norris RN  Head of Bed (HOB) Positioning: HOB at 30-45 degrees  Taken 3/6/2022 1931 by Monica Norris RN  Pressure Reduction Techniques:   frequent weight shift encouraged   heels elevated off bed   pressure points protected  Pressure Reduction Devices:   specialty bed utilized   pressure-redistributing mattress utilized  Skin Protection:   adhesive use limited   incontinence pads utilized   protective footwear used   transparent dressing maintained   tubing/devices free from skin contact  Intervention: Promote and Optimize Oral Intake  Recent Flowsheet Documentation  Taken 3/6/2022 1931 by Monica Norris RN  Oral Nutrition Promotion: rest periods promoted     Problem: Fall Injury  Risk  Goal: Absence of Fall and Fall-Related Injury  Intervention: Identify and Manage Contributors to Fall Injury Risk  Recent Flowsheet Documentation  Taken 3/6/2022 2000 by Monica Norris RN  Medication Review/Management: medications reviewed  Self-Care Promotion: independence encouraged  Intervention: Promote Injury-Free Environment  Recent Flowsheet Documentation  Taken 3/6/2022 2000 by Monica Norris RN  Safety Promotion/Fall Prevention:   activity supervised   clutter free environment maintained   fall prevention program maintained   lighting adjusted   nonskid shoes/slippers when out of bed   room organization consistent   safety round/check completed   toileting scheduled     Problem: Skin Injury Risk Increased  Goal: Skin Health and Integrity  Intervention: Optimize Skin Protection  Recent Flowsheet Documentation  Taken 3/6/2022 2000 by Monica Norris RN  Head of Bed (HOB) Positioning: HOB at 30-45 degrees  Taken 3/6/2022 1931 by Monica Norris RN  Pressure Reduction Techniques:   frequent weight shift encouraged   heels elevated off bed   pressure points protected  Pressure Reduction Devices:   specialty bed utilized   pressure-redistributing mattress utilized  Skin Protection:   adhesive use limited   incontinence pads utilized   protective footwear used   transparent dressing maintained   tubing/devices free from skin contact  Intervention: Promote and Optimize Oral Intake  Recent Flowsheet Documentation  Taken 3/6/2022 1931 by Monica Norris RN  Oral Nutrition Promotion: rest periods promoted     Problem: Diabetes Comorbidity  Goal: Blood Glucose Level Within Desired Range  Intervention: Maintain Glycemic Control  Recent Flowsheet Documentation  Taken 3/6/2022 1931 by Monica Norris RN  Glycemic Management:   blood glucose monitored   insulin dose matched to carbohydrate intake     Problem: Skin Injury Risk Increased  Goal: Skin Health and Integrity  Intervention: Optimize Skin Protection  Recent Flowsheet  Documentation  Taken 3/6/2022 2000 by Monica Norris RN  Head of Bed (HOB) Positioning: HOB at 30-45 degrees  Taken 3/6/2022 1931 by Monica Norris RN  Pressure Reduction Techniques:   frequent weight shift encouraged   heels elevated off bed   pressure points protected  Pressure Reduction Devices:   specialty bed utilized   pressure-redistributing mattress utilized  Skin Protection:   adhesive use limited   incontinence pads utilized   protective footwear used   transparent dressing maintained   tubing/devices free from skin contact  Intervention: Promote and Optimize Oral Intake  Recent Flowsheet Documentation  Taken 3/6/2022 1931 by Monica Norris RN  Oral Nutrition Promotion: rest periods promoted     Problem: Infection (Pneumonia)  Goal: Resolution of Infection Signs and Symptoms  Intervention: Prevent Infection Progression  Recent Flowsheet Documentation  Taken 3/6/2022 2000 by Monica Norris RN  Infection Management: aseptic technique maintained  Isolation Precautions: precautions maintained   Goal Outcome Evaluation:  Plan of Care Reviewed With: patient        Progress: improving  Outcome Evaluation: intubated and sedated upon arrival to ICU

## 2022-03-07 NOTE — PLAN OF CARE
Goal Outcome Evaluation:  Plan of Care Reviewed With: patient           Outcome Evaluation: patient has been up ambulating in room and to chair throughout the day. tolerates activity well on HHF. weaning slow this shift now on   40 L and 50 % . Sats upper 90's-100% while at rest in chair. No soa or distress noted at this time. Patient is with a very positive outlook and is determined to do whatever it takes to get better. He does understand prognosis is very guarded due to inability to decrease o2 requirements for long periods of time. Appetite has been very good.     He does appear to have some sun downers as he becomes somewhat confused in evening hours sometimes trying to get out of bed as well.

## 2022-03-07 NOTE — PROGRESS NOTES
"Hospitalist Team      Patient Care Team:  Tiffany Burciaga MD as PCP - General (Internal Medicine)      Chief Complaint: Follow-up Acute Hypoxic Respiratory Failure    Subjective    Sitting in chair reports feeling well with some mild shortness of breath. however FiO2 had to be uptitrated again last night.  Overall still feels improved    Objective    Vital Signs  Temp:  [96.4 °F (35.8 °C)-98.1 °F (36.7 °C)] 97.9 °F (36.6 °C)  Heart Rate:  [] 107  Resp:  [16-44] 38  BP: (102-142)/(62-85) 134/80  Oxygen Therapy  SpO2: 91 %  Pulse Oximetry Type: Continuous  Device (Oxygen Therapy): ventilator  Device (Oxygen Therapy): heated, high-flow nasal cannula  $ High Flow Nasal Cannula Set-Up: yes  Flow (L/min): 40  Oxygen Concentration (%): 55  ETCO2 (mmHg): 32 mmHg  Probe Placed On (Pulse Ox): forehead, Left:  Probe Removed From (Pulse Ox): Right:, finger (index)}    Flowsheet Rows    Flowsheet Row First Filed Value   Admission Height 172.7 cm (68\") Documented at 02/21/2022 1843   Admission Weight 78.9 kg (174 lb) Documented at 02/21/2022 1825          Physical Exam:    General: Appears in no acute distress.  Lungs: Dry crackles at the right base w/ diminished sounds at the left base.  Otherwise clear.  Dyspneic just speaking.  CV: Regular rate and rhythm.  No murmur appreciated.  Radial pulses are 2+ and symmetric.  Abdomen: Soft and non-tender w/ active bowel sounds.  MSK: No C/C/E.  Neuro: CN II-XII grossly intact.  Psych:  Pleasant affect.  Ox3.    Results Review:     I reviewed the patient's new clinical results.    Lab Results (last 24 hours)     Procedure Component Value Units Date/Time    Procalcitonin [[id number]]  (Normal) Collected: 03/07/22 0515    Specimen: Blood from Arm, Right Updated: 03/07/22 0623     Procalcitonin 0.06 ng/mL     Narrative:      As a Marker for Sepsis (Non-Neonates):    1. <0.5 ng/mL represents a low risk of severe sepsis and/or septic shock.  2. >2 ng/mL represents a high risk of " "severe sepsis and/or septic shock.    As a Marker for Lower Respiratory Tract Infections that require antibiotic therapy:    PCT on Admission    Antibiotic Therapy       6-12 Hrs later    >0.5                Strongly Recommended  >0.25 - <0.5        Recommended   0.1 - 0.25          Discouraged              Remeasure/reassess PCT  <0.1                Strongly Discouraged     Remeasure/reassess PCT    As 28 day mortality risk marker: \"Change in Procalcitonin Result\" (>80% or <=80%) if Day 0 (or Day 1) and Day 4 values are available. Refer to http://www.[facility]-pct-calculator.com    Change in PCT <=80%  A decrease of PCT levels below or equal to 80% defines a positive change in PCT test result representing a higher risk for 28-day all-cause mortality of patients diagnosed with severe sepsis for septic shock.    Change in PCT >80%  A decrease of PCT levels of more than 80% defines a negative change in PCT result representing a lower risk for 28-day all-cause mortality of patients diagnosed with severe sepsis or septic shock.       CBC & Differential [[id number]]  (Abnormal) Collected: 03/07/22 0515    Specimen: Blood from Arm, Right Updated: 03/07/22 0559    Narrative:      The following orders were created for panel order CBC & Differential.  Procedure                               Abnormality         Status                     ---------                               -----------         ------                     CBC Auto Differential[[id number]]        Abnormal            Final result               Scan Slide[[id number]]                                       Final result                 Please view results for these tests on the individual orders.    Scan Slide [[id number]] Collected: 03/07/22 0515    Specimen: Blood from Arm, Right Updated: 03/07/22 0559     Scan Slide --     Comment: See Manual Differential Results       Manual Differential [[id number]]  (Abnormal) Collected: 03/07/22 0515    Specimen: Blood from Arm, " Right Updated: 03/07/22 0559     Neutrophil % 95.0 %      Lymphocyte % 4.0 %      Monocyte % 1.0 %      Neutrophils Absolute 32.25 10*3/mm3      Lymphocytes Absolute 1.36 10*3/mm3      Monocytes Absolute 0.34 10*3/mm3      RBC Morphology Normal     WBC Morphology Normal     Platelet Morphology Normal    Comprehensive Metabolic Panel [[id number]]  (Abnormal) Collected: 03/07/22 0515    Specimen: Blood from Arm, Right Updated: 03/07/22 0558     Glucose 165 mg/dL      BUN 23 mg/dL      Creatinine 0.52 mg/dL      Sodium 139 mmol/L      Potassium 4.6 mmol/L      Chloride 104 mmol/L      CO2 30.4 mmol/L      Calcium 9.7 mg/dL      Total Protein 6.1 g/dL      Albumin 3.10 g/dL      ALT (SGPT) 92 U/L      AST (SGOT) 27 U/L      Alkaline Phosphatase 49 U/L      Total Bilirubin 0.3 mg/dL      Globulin 3.0 gm/dL      A/G Ratio 1.0 g/dL      BUN/Creatinine Ratio 44.2     Anion Gap 4.6 mmol/L      eGFR 107.8 mL/min/1.73      Comment: National Kidney Foundation and American Society of Nephrology (ASN) Task Force recommended calculation based on the Chronic Kidney Disease Epidemiology Collaboration (CKD-EPI) equation refit without adjustment for race.       Narrative:      GFR Normal >60  Chronic Kidney Disease <60  Kidney Failure <15      CBC Auto Differential [[id number]]  (Abnormal) Collected: 03/07/22 0515    Specimen: Blood from Arm, Right Updated: 03/07/22 0540     WBC 33.95 10*3/mm3      RBC 3.89 10*6/mm3      Hemoglobin 11.6 g/dL      Hematocrit 36.6 %      MCV 94.1 fL      MCH 29.8 pg      MCHC 31.7 g/dL      RDW 14.6 %      RDW-SD 49.2 fl      MPV 10.1 fL      Platelets 433 10*3/mm3      Neutrophil % 92.6 %      Lymphocyte % 3.2 %      Monocyte % 1.8 %      Eosinophil % 0.0 %      Basophil % 0.1 %      Immature Grans % 2.3 %      Neutrophils, Absolute 31.42 10*3/mm3      Lymphocytes, Absolute 1.08 10*3/mm3      Monocytes, Absolute 0.61 10*3/mm3      Eosinophils, Absolute 0.00 10*3/mm3      Basophils, Absolute 0.05 10*3/mm3       Immature Grans, Absolute 0.79 10*3/mm3      nRBC 0.0 /100 WBC     POC Glucose Once [[id number]]  (Abnormal) Collected: 03/06/22 1758    Specimen: Blood Updated: 03/06/22 1804     Glucose 250 mg/dL      Comment: Meter: [id number] : [id number] Aroldo Medina RN (Validator)       POC Glucose Once [[id number]]  (Abnormal) Collected: 03/06/22 1203    Specimen: Blood Updated: 03/06/22 1209     Glucose 165 mg/dL      Comment: Meter: [id number] : [id number] Aroldo Medina RN (Validator)             Imaging Results (Last 24 Hours)     ** No results found for the last 24 hours. **          Medication Review:   I have reviewed the patient's current medication list    Current Facility-Administered Medications:   •  acetaminophen (TYLENOL) tablet 650 mg, 650 mg, Oral, Q4H PRN, 650 mg at 02/27/22 0311 **OR** acetaminophen (TYLENOL) suppository 650 mg, 650 mg, Rectal, Q4H PRN, Alvarado, Birrilla, DO  •  apixaban (ELIQUIS) tablet 10 mg, 10 mg, Oral, Q12H, 10 mg at 03/07/22 0816 **FOLLOWED BY** [START ON 3/8/2022] apixaban (ELIQUIS) tablet 5 mg, 5 mg, Oral, Q12H, Osvaldo Gibson,   •  atorvastatin (LIPITOR) tablet 40 mg, 40 mg, Oral, Nightly, Alvarado, Birrilla, DO, 40 mg at 03/06/22 2011  •  dextrose (D50W) (25 g/50 mL) IV injection 25 g, 25 g, Intravenous, Q15 Min PRN, Cash Arguello Jr., MD  •  dextrose (GLUTOSE) oral gel 15 g, 15 g, Oral, Q15 Min PRN, Cash Arguello Jr., MD  •  docusate sodium (COLACE) liquid 100 mg, 100 mg, Oral, BID, Alvarado, Birrilla, DO, 100 mg at 03/06/22 0815  •  ferrous sulfate EC tablet 324 mg, 324 mg, Oral, Daily With Breakfast, Alvarado, Birrilla, DO, 324 mg at 03/07/22 0816  •  glucagon (GLUCAGEN) injection 1 mg, 1 mg, Intramuscular, Q15 Min PRN, Cash Arguello Jr., MD  •  HYDROcodone-homatropine (HYCODAN) 5-1.5 MG/5ML syrup 5 mL, 5 mL, Oral, Q4H PRN, Osvaldo Gibson DO, 5 mL at 03/07/22 0054  •  insulin aspart (novoLOG) injection 0-24 Units, 0-24 Units, Subcutaneous,  TID With Meals, Darrick Matt MD, 4 Units at 03/07/22 0811  •  insulin detemir (LEVEMIR) injection 7 Units, 7 Units, Subcutaneous, Daily, Darrick Matt MD, 7 Units at 03/07/22 0812  •  ipratropium-albuterol (DUO-NEB) nebulizer solution 3 mL, 3 mL, Nebulization, Q6H PRN, Alvarado, Birrilla, DO  •  ipratropium-albuterol (DUO-NEB) nebulizer solution 3 mL, 3 mL, Nebulization, 4x Daily - RT, Darrick Matt MD, 3 mL at 03/07/22 1123  •  Magnesium Sulfate 2 gram Bolus, followed by 8 gram infusion (total Mg dose 10 grams)- Mg less than or equal to 1mg/dL, 2 g, Intravenous, PRN **OR** Magnesium Sulfate 2 gram / 50mL Infusion (GIVE X 3 BAGS TO EQUAL 6GM TOTAL DOSE) - Mg 1.1 - 1.5 mg/dl, 2 g, Intravenous, PRN **OR** Magnesium Sulfate 4 gram infusion- Mg 1.6-1.9 mg/dL, 4 g, Intravenous, PRN, Alvarado, Birrilla, DO  •  methylPREDNISolone sodium succinate (SOLU-Medrol) injection 80 mg, 80 mg, Intravenous, Q8H, Garrett Yoon MD, 80 mg at 03/07/22 0814  •  OLANZapine (zyPREXA) injection 5 mg, 5 mg, Intramuscular, Q8H PRN, Garrett Yoon MD, 5 mg at 02/28/22 1709  •  ondansetron (ZOFRAN) tablet 4 mg, 4 mg, Oral, Q6H PRN **OR** ondansetron (ZOFRAN) injection 4 mg, 4 mg, Intravenous, Q6H PRN, Alvarado, Birrilla, DO  •  polyethylene glycol (MIRALAX) packet 17 g, 17 g, Oral, Daily, Alvarado, Birrilla, DO, 17 g at 03/06/22 0814  •  potassium chloride (K-DUR,KLOR-CON) CR tablet 40 mEq, 40 mEq, Oral, PRN **OR** potassium chloride (KLOR-CON) packet 40 mEq, 40 mEq, Oral, PRN **OR** potassium chloride 10 mEq in 100 mL IVPB, 10 mEq, Intravenous, Q1H PRN, Alvarado, Birrilla, DO, Last Rate: 100 mL/hr at 02/22/22 0936, 10 mEq at 02/22/22 0936  •  [COMPLETED] Insert peripheral IV, , , Once **AND** sodium chloride 0.9 % flush 10 mL, 10 mL, Intravenous, PRN, Alvarado, Birrilla, DO, 10 mL at 03/07/22 0816  •  sodium chloride 0.9 % infusion 40 mL, 40 mL, Intravenous, PRN, Alvarado, Birrilla, DO, 100 mL at 03/02/22 0228  •  sodium chloride nasal spray 2  spray, 2 spray, Each Nare, PRN, Osvaldo Gibson, DO, 2 spray at 03/06/22 0818      Assessment/Plan     Acute Hypoxic Respiratory Failure due to bacterial/aspiration Pneumonia/Post-COVID Fibrosis:   -Unfortunately back on FiO2 60% today  -aggressive pulmonary toilet  -Pulmonary following appreciate assistance     Leukocytosis    -possibly steroid induced   -WBC  markedly elevated status post antibiotic course,   -procalcitonin normal, will hold further antibiotics for now.   - If develops abdominal pain or diarrhea would consider checking stool studies for C. difficile.  However also remains afebrile.     Acute LLE DVT:   -On Eliquis.     Steroid-induced Hyperglycemia:   -Continue basal insulin and sliding scale     Dyslipidemia:  - No acute issues.  Continue Lipitor.    Prognosis guarded/high risk    Plan for disposition: Predicated on hospital course.    Trevon Chandler DO  03/07/22  11:51 EST

## 2022-03-08 ENCOUNTER — APPOINTMENT (OUTPATIENT)
Dept: SLEEP MEDICINE | Facility: HOSPITAL | Age: 72
End: 2022-03-08

## 2022-03-08 LAB
ANION GAP SERPL CALCULATED.3IONS-SCNC: 5.8 MMOL/L (ref 5–15)
BASOPHILS # BLD AUTO: 0.04 10*3/MM3 (ref 0–0.2)
BASOPHILS NFR BLD AUTO: 0.1 % (ref 0–1.5)
BUN SERPL-MCNC: 23 MG/DL (ref 8–23)
BUN/CREAT SERPL: 46.9 (ref 7–25)
CALCIUM SPEC-SCNC: 9.5 MG/DL (ref 8.6–10.5)
CHLORIDE SERPL-SCNC: 101 MMOL/L (ref 98–107)
CO2 SERPL-SCNC: 29.2 MMOL/L (ref 22–29)
CREAT SERPL-MCNC: 0.49 MG/DL (ref 0.76–1.27)
DEPRECATED RDW RBC AUTO: 48.7 FL (ref 37–54)
EGFRCR SERPLBLD CKD-EPI 2021: 109.7 ML/MIN/1.73
EOSINOPHIL # BLD AUTO: 0 10*3/MM3 (ref 0–0.4)
EOSINOPHIL NFR BLD AUTO: 0 % (ref 0.3–6.2)
ERYTHROCYTE [DISTWIDTH] IN BLOOD BY AUTOMATED COUNT: 14.4 % (ref 12.3–15.4)
GLUCOSE BLDC GLUCOMTR-MCNC: 171 MG/DL (ref 70–130)
GLUCOSE BLDC GLUCOMTR-MCNC: 183 MG/DL (ref 70–130)
GLUCOSE BLDC GLUCOMTR-MCNC: 249 MG/DL (ref 70–130)
GLUCOSE SERPL-MCNC: 168 MG/DL (ref 65–99)
HCT VFR BLD AUTO: 34.9 % (ref 37.5–51)
HGB BLD-MCNC: 11.2 G/DL (ref 13–17.7)
IMM GRANULOCYTES # BLD AUTO: 1 10*3/MM3 (ref 0–0.05)
IMM GRANULOCYTES NFR BLD AUTO: 3.4 % (ref 0–0.5)
LYMPHOCYTES # BLD AUTO: 0.74 10*3/MM3 (ref 0.7–3.1)
LYMPHOCYTES NFR BLD AUTO: 2.5 % (ref 19.6–45.3)
MCH RBC QN AUTO: 29.9 PG (ref 26.6–33)
MCHC RBC AUTO-ENTMCNC: 32.1 G/DL (ref 31.5–35.7)
MCV RBC AUTO: 93.3 FL (ref 79–97)
MONOCYTES # BLD AUTO: 0.88 10*3/MM3 (ref 0.1–0.9)
MONOCYTES NFR BLD AUTO: 3 % (ref 5–12)
NEUTROPHILS NFR BLD AUTO: 26.44 10*3/MM3 (ref 1.7–7)
NEUTROPHILS NFR BLD AUTO: 91 % (ref 42.7–76)
NRBC BLD AUTO-RTO: 0 /100 WBC (ref 0–0.2)
PLATELET # BLD AUTO: 410 10*3/MM3 (ref 140–450)
PMV BLD AUTO: 10.2 FL (ref 6–12)
POTASSIUM SERPL-SCNC: 4.8 MMOL/L (ref 3.5–5.2)
RBC # BLD AUTO: 3.74 10*6/MM3 (ref 4.14–5.8)
SODIUM SERPL-SCNC: 136 MMOL/L (ref 136–145)
WBC NRBC COR # BLD: 29.1 10*3/MM3 (ref 3.4–10.8)

## 2022-03-08 PROCEDURE — 85025 COMPLETE CBC W/AUTO DIFF WBC: CPT | Performed by: INTERNAL MEDICINE

## 2022-03-08 PROCEDURE — 94799 UNLISTED PULMONARY SVC/PX: CPT

## 2022-03-08 PROCEDURE — 63710000001 INSULIN ASPART PER 5 UNITS: Performed by: HOSPITALIST

## 2022-03-08 PROCEDURE — 63710000001 INSULIN DETEMIR PER 5 UNITS: Performed by: HOSPITALIST

## 2022-03-08 PROCEDURE — 94669 MECHANICAL CHEST WALL OSCILL: CPT

## 2022-03-08 PROCEDURE — 80048 BASIC METABOLIC PNL TOTAL CA: CPT | Performed by: INTERNAL MEDICINE

## 2022-03-08 PROCEDURE — 99233 SBSQ HOSP IP/OBS HIGH 50: CPT | Performed by: INTERNAL MEDICINE

## 2022-03-08 PROCEDURE — 25010000002 METHYLPREDNISOLONE PER 40 MG: Performed by: INTERNAL MEDICINE

## 2022-03-08 PROCEDURE — 25010000002 METHYLPREDNISOLONE PER 125 MG: Performed by: INTERNAL MEDICINE

## 2022-03-08 PROCEDURE — 97110 THERAPEUTIC EXERCISES: CPT

## 2022-03-08 PROCEDURE — 82962 GLUCOSE BLOOD TEST: CPT

## 2022-03-08 PROCEDURE — 94761 N-INVAS EAR/PLS OXIMETRY MLT: CPT

## 2022-03-08 RX ORDER — METHYLPREDNISOLONE SODIUM SUCCINATE 40 MG/ML
40 INJECTION, POWDER, LYOPHILIZED, FOR SOLUTION INTRAMUSCULAR; INTRAVENOUS EVERY 8 HOURS
Status: DISCONTINUED | OUTPATIENT
Start: 2022-03-08 | End: 2022-03-09

## 2022-03-08 RX ORDER — OLANZAPINE 5 MG/1
5 TABLET ORAL ONCE
Status: COMPLETED | OUTPATIENT
Start: 2022-03-08 | End: 2022-03-08

## 2022-03-08 RX ADMIN — IPRATROPIUM BROMIDE AND ALBUTEROL SULFATE 3 ML: .5; 2.5 SOLUTION RESPIRATORY (INHALATION) at 08:07

## 2022-03-08 RX ADMIN — ATORVASTATIN CALCIUM 40 MG: 40 TABLET, FILM COATED ORAL at 21:11

## 2022-03-08 RX ADMIN — METHYLPREDNISOLONE SODIUM SUCCINATE 40 MG: 40 INJECTION, POWDER, FOR SOLUTION INTRAMUSCULAR; INTRAVENOUS at 17:12

## 2022-03-08 RX ADMIN — METHYLPREDNISOLONE SODIUM SUCCINATE 40 MG: 40 INJECTION, POWDER, FOR SOLUTION INTRAMUSCULAR; INTRAVENOUS at 23:45

## 2022-03-08 RX ADMIN — APIXABAN 5 MG: 2.5 TABLET, FILM COATED ORAL at 07:53

## 2022-03-08 RX ADMIN — FERROUS SULFATE TAB EC 324 MG (65 MG FE EQUIVALENT) 324 MG: 324 (65 FE) TABLET DELAYED RESPONSE at 07:53

## 2022-03-08 RX ADMIN — HYDROCODONE BITARTRATE AND HOMATROPINE METHYLBROMIDE 5 ML: 5; 1.5 SOLUTION ORAL at 02:19

## 2022-03-08 RX ADMIN — METHYLPREDNISOLONE SODIUM SUCCINATE 80 MG: 125 INJECTION, POWDER, FOR SOLUTION INTRAMUSCULAR; INTRAVENOUS at 07:55

## 2022-03-08 RX ADMIN — OLANZAPINE 5 MG: 5 TABLET, FILM COATED ORAL at 11:51

## 2022-03-08 RX ADMIN — INSULIN DETEMIR 7 UNITS: 100 INJECTION, SOLUTION SUBCUTANEOUS at 07:55

## 2022-03-08 RX ADMIN — POLYETHYLENE GLYCOL 3350 17 G: 17 POWDER, FOR SOLUTION ORAL at 07:54

## 2022-03-08 RX ADMIN — APIXABAN 5 MG: 2.5 TABLET, FILM COATED ORAL at 21:10

## 2022-03-08 RX ADMIN — INSULIN ASPART 8 UNITS: 100 INJECTION, SOLUTION INTRAVENOUS; SUBCUTANEOUS at 11:51

## 2022-03-08 RX ADMIN — IPRATROPIUM BROMIDE AND ALBUTEROL SULFATE 3 ML: .5; 2.5 SOLUTION RESPIRATORY (INHALATION) at 15:47

## 2022-03-08 RX ADMIN — DOCUSATE SODIUM 100 MG: 50 LIQUID ORAL at 07:53

## 2022-03-08 RX ADMIN — INSULIN ASPART 4 UNITS: 100 INJECTION, SOLUTION INTRAVENOUS; SUBCUTANEOUS at 17:12

## 2022-03-08 RX ADMIN — IPRATROPIUM BROMIDE AND ALBUTEROL SULFATE 3 ML: .5; 2.5 SOLUTION RESPIRATORY (INHALATION) at 11:38

## 2022-03-08 RX ADMIN — IPRATROPIUM BROMIDE AND ALBUTEROL SULFATE 3 ML: .5; 2.5 SOLUTION RESPIRATORY (INHALATION) at 20:14

## 2022-03-08 RX ADMIN — INSULIN ASPART 4 UNITS: 100 INJECTION, SOLUTION INTRAVENOUS; SUBCUTANEOUS at 07:54

## 2022-03-08 RX ADMIN — METHYLPREDNISOLONE SODIUM SUCCINATE 80 MG: 125 INJECTION, POWDER, FOR SOLUTION INTRAMUSCULAR; INTRAVENOUS at 01:07

## 2022-03-08 NOTE — PROGRESS NOTES
[facility] INPATIENT PROGRESS NOTE         [facility] ICU    3/8/2022      PATIENT IDENTIFICATION:  Name: Dontae Bonds Jr. ADMIT: 2022   : 1950  PCP: Tiffany Burciaga MD    MRN: [id number] LOS: 15 days   AGE/SEX: 71 y.o. male  ROOM: ICU1                     LOS 15    Reason for visit: Respiratory failure      SUBJECTIVE:      Still on OptiFlow.  No new complaints.  Nursing notes that they have seen him desaturating to the low 70s with minimal activity.  Dropped to the mid 80s while talking to me.  Says that he does not feel any worse.  No nausea, vomiting, chest pain or purulent sputum.  Crackles bilaterally on auscultation.      Objective   OBJECTIVE:    Vital Sign Min/Max for last 24 hours  Temp  Min: 97 °F (36.1 °C)  Max: 98 °F (36.7 °C)   BP  Min: 116/74  Max: 147/93   Pulse  Min: 62  Max: 107   Resp  Min: 28  Max: 40   SpO2  Min: 83 %  Max: 100 %   No data recorded   Weight  Min: 73.5 kg (162 lb 1.6 oz)  Max: 73.5 kg (162 lb 1.6 oz)                   FiO2 (%): 41 %     Body mass index is 24.85 kg/m².    Intake/Output Summary (Last 24 hours) at 3/8/2022 0810  Last data filed at 3/8/2022 0721  Gross per 24 hour   Intake 1960 ml   Output 2150 ml   Net -190 ml         Exam:  GEN:  No distress, appears stated age  EYES:   PERRL, anicteric sclerae  ENT:    External ears/nose normal, OP clear  NECK:  No adenopathy, midline trachea  LUNGS: Normal chest on inspection, palpation and crackles at bases on auscultation  CV:  Normal S1S2, without murmur  ABD:  Nontender, nondistended, no hepatosplenomegaly, +BS  EXT:  No edema.  No cyanosis or clubbing.  No mottling and normal cap refill.    Assessment     Scheduled meds:  apixaban, 5 mg, Oral, Q12H  atorvastatin, 40 mg, Oral, Nightly  docusate sodium, 100 mg, Oral, BID  ferrous sulfate, 324 mg, Oral, Daily With Breakfast  insulin aspart, 0-24 Units, Subcutaneous, TID With Meals  insulin detemir, 7 Units, Subcutaneous, Daily  ipratropium-albuterol,  3 mL, Nebulization, 4x Daily - RT  methylPREDNISolone sodium succinate, 80 mg, Intravenous, Q8H  polyethylene glycol, 17 g, Oral, Daily      IV meds:                         Data Review:  Results from last 7 days   Lab Units 03/08/22  0517 03/07/22  0515 03/06/22  0500 03/04/22  0530 03/02/22  0430   SODIUM mmol/L 136 139 140 137 138   POTASSIUM mmol/L 4.8 4.6 4.2 4.4 4.3   CHLORIDE mmol/L 101 104 104 99 100   CO2 mmol/L 29.2* 30.4* 27.4 32.0* 30.2*   BUN mg/dL 23 23 28* 21 24*   CREATININE mg/dL 0.49* 0.52* 0.46* 0.50* 0.50*   GLUCOSE mg/dL 168* 165* 172* 120* 106*   CALCIUM mg/dL 9.5 9.7 9.5 9.4 9.3         Estimated Creatinine Clearance: 88 mL/min (A) (by C-G formula based on SCr of 0.49 mg/dL (L)).  Results from last 7 days   Lab Units 03/08/22  0517 03/07/22  0515 03/02/22  0430   WBC 10*3/mm3 29.10* 33.95* 18.48*   HEMOGLOBIN g/dL 11.2* 11.6* 11.5*   PLATELETS 10*3/mm3 410 433 477*         Results from last 7 days   Lab Units 03/07/22  0515 03/06/22  0500 03/04/22  0530 03/02/22  0430   ALT (SGPT) U/L 92* 84* 91* 78*   AST (SGOT) U/L 27 29 31 54*         Results from last 7 days   Lab Units 03/07/22  0515 03/04/22  0530 03/02/22  0430   PROCALCITONIN ng/mL 0.06 0.06 0.05         Glucose   Date/Time Value Ref Range Status   03/08/2022 0713 183 (H) 70 - 130 mg/dL Final     Comment:     Meter: [id number] : [id number] Byron Sears RN   03/07/2022 1808 174 (H) 70 - 130 mg/dL Final     Comment:     Meter: [id number] : [id number] Aroldo Medina RN (Validator)   03/07/2022 1201 285 (H) 70 - 130 mg/dL Final     Comment:     Meter: [id number] : [id number] Aroldo Medina RN (Validator)   03/06/2022 1758 250 (H) 70 - 130 mg/dL Final     Comment:     Meter: [id number] : [id number] Aroldo Medina RN (Validator)   03/06/2022 1203 165 (H) 70 - 130 mg/dL Final     Comment:     Meter: [id number] : [id number] Aroldo Medina RN (Validator)   03/06/2022 0803 199 (H) 70 - 130 mg/dL Final     Comment:      Meter: [id number] : [id number] Aroldo Medina RN (Validator)   03/05/2022 1654 145 (H) 70 - 130 mg/dL Final     Comment:     Meter: [id number] : [id number] Jing Angulo RN     Most recent chest x-ray 3/5 reviewed shows bilateral airspace disease and coarse interstitial changes.  This does not look any better compared to previous 3/1.          Microbiology reviewed              Active Hospital Problems    Diagnosis  POA   • **Pneumonia of both lungs due to infectious organism [J18.9]  Yes   • Hypertension [I10]  Yes   • Hypokalemia [E87.6]  Yes      Resolved Hospital Problems   No resolved problems to display.         ASSESSMENT:    Acute hypoxemic respiratory failure  Recent COVID-19 pneumonia  Bacterial superimposed pneumonia  Encephalopathy: Improving  Anemia  Alcohol abuse  Worsening leukocytosis      PLAN:    He is in a fibrotic phase of his recent Covid pneumonia and possible bacterial pneumonia on top of that.  Status post antibiotic course.  Not really making a lot of progress and regards to oxygen requirements; no better, no worse.  Continue steroids and begin to taper.  Repeat chest x-ray tomorrow for follow-up.  On full anticoagulation.  Discussed with nursing staff.  Guarded prognosis.        Fabiano Dunbar MD  Pulmonary and Critical Care Medicine  [facility], [facility]  3/8/2022    08:10 EST

## 2022-03-08 NOTE — PLAN OF CARE
Problem: Adult Inpatient Plan of Care  Goal: Plan of Care Review  Outcome: Ongoing, Not Progressing  Flowsheets  Taken 3/7/2022 2141 by Monica Norris RN  Progress: declining  Outcome Evaluation: Seems to do well during the day. At night becomes confused at times and takes oxygen off. Put back on heated high flow at 40L 50% from 11 L high flow on 3/6/22. No changes in oxygen status today. Montiored closely by RN.  Taken 3/7/2022 1656 by Carol Kendrick RN  Plan of Care Reviewed With: patient  Goal: Absence of Hospital-Acquired Illness or Injury  Outcome: Ongoing, Not Progressing  Intervention: Identify and Manage Fall Risk  Recent Flowsheet Documentation  Taken 3/7/2022 2100 by Monica Norris RN  Safety Promotion/Fall Prevention:   activity supervised   assistive device/personal items within reach   clutter free environment maintained   fall prevention program maintained   lighting adjusted   room organization consistent   safety round/check completed   toileting scheduled   muscle strengthening facilitated  Taken 3/7/2022 2001 by Monica Norris RN  Safety Promotion/Fall Prevention:   activity supervised   assistive device/personal items within reach   clutter free environment maintained   fall prevention program maintained   lighting adjusted   muscle strengthening facilitated   nonskid shoes/slippers when out of bed   room organization consistent   safety round/check completed   toileting scheduled  Taken 3/7/2022 1931 by Monica Norris, RN  Safety Promotion/Fall Prevention:   activity supervised   assistive device/personal items within reach   clutter free environment maintained   fall prevention program maintained   lighting adjusted   nonskid shoes/slippers when out of bed   muscle strengthening facilitated   room organization consistent   safety round/check completed   toileting scheduled  Intervention: Prevent Skin Injury  Recent Flowsheet Documentation  Taken 3/7/2022 2100 by Monica Norris RN  Body  Position:   supine, legs elevated   position changed independently  Skin Protection:   adhesive use limited   incontinence pads utilized   transparent dressing maintained   tubing/devices free from skin contact  Taken 3/7/2022 2001 by Monica Norris RN  Body Position:   right   tilted   legs elevated  Skin Protection:   adhesive use limited   incontinence pads utilized   protective footwear used   transparent dressing maintained   tubing/devices free from skin contact  Taken 3/7/2022 1931 by Monica Norris RN  Body Position:   tilted   right   legs elevated   position changed independently  Skin Protection:   adhesive use limited   incontinence pads utilized   protective footwear used   pulse oximeter probe site changed   transparent dressing maintained   tubing/devices free from skin contact  Intervention: Prevent and Manage VTE (Venous Thromboembolism) Risk  Recent Flowsheet Documentation  Taken 3/7/2022 2100 by Monica Norris RN  Activity Management: activity adjusted per tolerance  Taken 3/7/2022 2001 by Monica Norris RN  Activity Management: activity adjusted per tolerance  VTE Prevention/Management: (Eliquis)   sequential compression devices off   other (see comments)  Range of Motion:   active ROM (range of motion) encouraged   ROM (range of motion) performed  Taken 3/7/2022 1931 by Monica Norris RN  Activity Management: activity adjusted per tolerance  Intervention: Prevent Infection  Recent Flowsheet Documentation  Taken 3/7/2022 2001 by Monica Norris RN  Infection Prevention:   visitors restricted/screened   single patient room provided   rest/sleep promoted   hand hygiene promoted  Goal: Optimal Comfort and Wellbeing  Outcome: Ongoing, Not Progressing  Intervention: Provide Person-Centered Care  Recent Flowsheet Documentation  Taken 3/7/2022 2001 by Monica Norris RN  Trust Relationship/Rapport:   care explained   choices provided   emotional support provided   empathic listening provided   questions  answered   questions encouraged   reassurance provided   thoughts/feelings acknowledged  Goal: Readiness for Transition of Care  Outcome: Ongoing, Not Progressing     Problem: Respiratory Compromise (Pneumonia)  Goal: Effective Oxygenation and Ventilation  Outcome: Ongoing, Not Progressing  Intervention: Promote Airway Secretion Clearance  Recent Flowsheet Documentation  Taken 3/7/2022 2001 by Monica Norris RN  Breathing Techniques/Airway Clearance: deep/controlled cough encouraged  Cough And Deep Breathing: done independently per patient  Intervention: Optimize Oxygenation and Ventilation  Recent Flowsheet Documentation  Taken 3/7/2022 2100 by Monica Norris RN  Head of Bed ([facility]) Positioning: HOB at 30-45 degrees  Taken 3/7/2022 2001 by Monica Norris RN  Head of Bed ([facility]) Positioning: HOB at 30-45 degrees  Airway/Ventilation Management:   airway patency maintained   humidification applied   pulmonary hygiene promoted   calming measures promoted  Taken 3/7/2022 1931 by Monica Norris RN  Head of Bed ([facility]) Positioning: HOB at 30-45 degrees     Problem: Fall Injury Risk  Goal: Absence of Fall and Fall-Related Injury  Outcome: Ongoing, Not Progressing  Intervention: Identify and Manage Contributors  Recent Flowsheet Documentation  Taken 3/7/2022 2001 by Monica Norris RN  Medication Review/Management: medications reviewed  Self-Care Promotion: independence encouraged  Intervention: Promote Injury-Free Environment  Recent Flowsheet Documentation  Taken 3/7/2022 2100 by Monica Norris RN  Safety Promotion/Fall Prevention:   activity supervised   assistive device/personal items within reach   clutter free environment maintained   fall prevention program maintained   lighting adjusted   room organization consistent   safety round/check completed   toileting scheduled   muscle strengthening facilitated  Taken 3/7/2022 2001 by Monica Norris RN  Safety Promotion/Fall Prevention:   activity supervised   assistive device/personal  items within reach   clutter free environment maintained   fall prevention program maintained   lighting adjusted   muscle strengthening facilitated   nonskid shoes/slippers when out of bed   room organization consistent   safety round/check completed   toileting scheduled  Taken 3/7/2022 1931 by Monica Norris RN  Safety Promotion/Fall Prevention:   activity supervised   assistive device/personal items within reach   clutter free environment maintained   fall prevention program maintained   lighting adjusted   nonskid shoes/slippers when out of bed   muscle strengthening facilitated   room organization consistent   safety round/check completed   toileting scheduled     Problem: Fluid Imbalance (Pneumonia)  Goal: Fluid Balance  Outcome: Ongoing, Progressing  Intervention: Monitor and Manage Fluid Balance  Recent Flowsheet Documentation  Taken 3/7/2022 2001 by Monica Norris RN  Fluid/Electrolyte Management: fluids provided     Problem: Skin Injury Risk Increased  Goal: Skin Health and Integrity  Outcome: Ongoing, Progressing  Intervention: Promote and Optimize Oral Intake  Recent Flowsheet Documentation  Taken 3/7/2022 2001 by Monica Norris RN  Oral Nutrition Promotion: rest periods promoted  Intervention: Optimize Skin Protection  Recent Flowsheet Documentation  Taken 3/7/2022 2100 by Monica Norris RN  Pressure Reduction Techniques:   frequent weight shift encouraged   heels elevated off bed   pressure points protected  Head of Bed (HOB) Positioning: HOB at 30-45 degrees  Pressure Reduction Devices:   specialty bed utilized   pressure-redistributing mattress utilized  Skin Protection:   adhesive use limited   incontinence pads utilized   transparent dressing maintained   tubing/devices free from skin contact  Taken 3/7/2022 2001 by Monica Norris RN  Pressure Reduction Techniques:   frequent weight shift encouraged   heels elevated off bed   pressure points protected  Head of Bed (HOB) Positioning: HOB at 30-45  degrees  Pressure Reduction Devices:   specialty bed utilized   pressure-redistributing mattress utilized  Skin Protection:   adhesive use limited   incontinence pads utilized   protective footwear used   transparent dressing maintained   tubing/devices free from skin contact  Taken 3/7/2022 1931 by Monica Norris RN  Pressure Reduction Techniques:   frequent weight shift encouraged   heels elevated off bed   pressure points protected  Head of Bed (HOB) Positioning: HOB at 30-45 degrees  Pressure Reduction Devices: pressure-redistributing mattress utilized  Skin Protection:   adhesive use limited   incontinence pads utilized   protective footwear used   pulse oximeter probe site changed   transparent dressing maintained   tubing/devices free from skin contact     Problem: Adult Inpatient Plan of Care  Goal: Plan of Care Review  Recent Flowsheet Documentation  Taken 3/7/2022 2141 by Monica Norris RN  Progress: declining  Outcome Evaluation: Seems to do well during the day. At night becomes confused at times and takes oxygen off. Put back on heated high flow at 40L 50% from 11 L high flow on 3/6/22. No changes in oxygen status today. Montiored closely by RN.  Goal: Absence of Hospital-Acquired Illness or Injury  Intervention: Identify and Manage Fall Risk  Recent Flowsheet Documentation  Taken 3/7/2022 2100 by Monica Norris, RN  Safety Promotion/Fall Prevention:   activity supervised   assistive device/personal items within reach   clutter free environment maintained   fall prevention program maintained   lighting adjusted   room organization consistent   safety round/check completed   toileting scheduled   muscle strengthening facilitated  Taken 3/7/2022 2001 by Monica Norris RN  Safety Promotion/Fall Prevention:   activity supervised   assistive device/personal items within reach   clutter free environment maintained   fall prevention program maintained   lighting adjusted   muscle strengthening facilitated   nonskid  shoes/slippers when out of bed   room organization consistent   safety round/check completed   toileting scheduled  Taken 3/7/2022 1931 by Monica Norris RN  Safety Promotion/Fall Prevention:   activity supervised   assistive device/personal items within reach   clutter free environment maintained   fall prevention program maintained   lighting adjusted   nonskid shoes/slippers when out of bed   muscle strengthening facilitated   room organization consistent   safety round/check completed   toileting scheduled  Intervention: Prevent Skin Injury  Recent Flowsheet Documentation  Taken 3/7/2022 2100 by Monica Norris RN  Body Position:   supine, legs elevated   position changed independently  Skin Protection:   adhesive use limited   incontinence pads utilized   transparent dressing maintained   tubing/devices free from skin contact  Taken 3/7/2022 2001 by Monica Norris RN  Body Position:   right   tilted   legs elevated  Skin Protection:   adhesive use limited   incontinence pads utilized   protective footwear used   transparent dressing maintained   tubing/devices free from skin contact  Taken 3/7/2022 1931 by Monica Norris RN  Body Position:   tilted   right   legs elevated   position changed independently  Skin Protection:   adhesive use limited   incontinence pads utilized   protective footwear used   pulse oximeter probe site changed   transparent dressing maintained   tubing/devices free from skin contact  Intervention: Prevent and Manage VTE (venous thromboembolism) Risk  Recent Flowsheet Documentation  Taken 3/7/2022 2001 by Monica Norris RN  VTE Prevention/Management: (Eliquis)   sequential compression devices off   other (see comments)  Intervention: Prevent Infection  Recent Flowsheet Documentation  Taken 3/7/2022 2001 by Monica Norris RN  Infection Prevention:   visitors restricted/screened   single patient room provided   rest/sleep promoted   hand hygiene promoted  Goal: Optimal Comfort and  Wellbeing  Intervention: Provide Person-Centered Care  Recent Flowsheet Documentation  Taken 3/7/2022 2001 by Monica Norris RN  Trust Relationship/Rapport:   care explained   choices provided   emotional support provided   empathic listening provided   questions answered   questions encouraged   reassurance provided   thoughts/feelings acknowledged  Goal: Plan of Care Review  Recent Flowsheet Documentation  Taken 3/7/2022 2141 by Monica Norris RN  Progress: declining  Outcome Evaluation: Seems to do well during the day. At night becomes confused at times and takes oxygen off. Put back on heated high flow at 40L 50% from 11 L high flow on 3/6/22. No changes in oxygen status today. Montiored closely by RN.     Problem: Hypertension Comorbidity  Goal: Blood Pressure in Desired Range  Intervention: Maintain Hypertension-Management Strategies  Recent Flowsheet Documentation  Taken 3/7/2022 2001 by Monica Norris RN  Medication Review/Management: medications reviewed     Problem: Communication Impairment (Mechanical Ventilation, Invasive)  Goal: Effective Communication  Intervention: Ensure Effective Communication  Recent Flowsheet Documentation  Taken 3/7/2022 2001 by Monica Norris RN  Communication Enhancement Strategies:   call light answered in person   extra time allowed for response   nonverbal strategies used   verbal and visual cues paired     Problem: Device-Related Complication Risk (Mechanical Ventilation, Invasive)  Goal: Optimal Device Function  Intervention: Optimize Device Care and Function  Recent Flowsheet Documentation  Taken 3/7/2022 2100 by Monica Norris RN  Airway Safety Measures:   suction at bedside   oxygen flowmeter at bedside  Taken 3/7/2022 2001 by Monica Norris RN  Aspiration Precautions:   awake/alert before oral intake   oral hygiene care promoted   respiratory status monitored   upright posture maintained  Airway Safety Measures: suction at bedside  Taken 3/7/2022 1931 by Monica Norris  RN  Airway Safety Measures:   suction at bedside   oxygen flowmeter at bedside     Problem: Inability to Wean (Mechanical Ventilation, Invasive)  Goal: Mechanical Ventilation Liberation  Intervention: Promote Extubation and Mechanical Ventilation Liberation  Recent Flowsheet Documentation  Taken 3/7/2022 2001 by Monica Norris RN  Environmental Support:   calm environment promoted   distractions minimized   environmental consistency promoted  Sleep/Rest Enhancement:   awakenings minimized   consistent schedule promoted   natural light exposure provided   noise level reduced   regular sleep/rest pattern promoted   room darkened  Medication Review/Management: medications reviewed     Problem: Skin and Tissue Injury (Mechanical Ventilation, Invasive)  Goal: Absence of Device-Related Skin and Tissue Injury  Intervention: Maintain Skin and Tissue Health  Recent Flowsheet Documentation  Taken 3/7/2022 2001 by Monica Norris RN  Device Skin Pressure Protection:   absorbent pad utilized/changed   adhesive use limited   pressure points protected     Problem: Ventilator-Induced Lung Injury (Mechanical Ventilation, Invasive)  Goal: Absence of Ventilator-Induced Lung Injury  Intervention: Prevent Ventilator-Associated Pneumonia  Recent Flowsheet Documentation  Taken 3/7/2022 2100 by Monica Norris RN  Head of Bed (HOB) Positioning: HOB at 30-45 degrees  Taken 3/7/2022 2001 by Monica Norris RN  Head of Bed ([facility]) Positioning: HOB at 30-45 degrees  Taken 3/7/2022 1931 by Monica Norris RN  Head of Bed ([facility]) Positioning: HOB at 30-45 degrees     Problem: Adjustment to Illness (Sepsis/Septic Shock)  Goal: Optimal Coping  Intervention: Optimize Psychosocial Adjustment to Illness  Recent Flowsheet Documentation  Taken 3/7/2022 2001 by Monica Norris RN  Supportive Measures:   active listening utilized   positive reinforcement provided   self-care encouraged   verbalization of feelings encouraged  Family/Support System Care: support  provided     Problem: Bleeding (Sepsis/Septic Shock)  Goal: Absence of Bleeding  Intervention: Minimize Bleeding Risk  Recent Flowsheet Documentation  Taken 3/7/2022 2001 by Monica Norris RN  Bleeding Precautions: blood pressure closely monitored     Problem: Glycemic Control Impaired (Sepsis/Septic Shock)  Goal: Blood Glucose Level Within Desired Range  Intervention: Optimize Glycemic Control  Recent Flowsheet Documentation  Taken 3/7/2022 2001 by Monica Norris RN  Glycemic Management:   blood glucose monitored   insulin dose matched to carbohydrate intake     Problem: Hemodynamic Instability (Sepsis/Septic Shock)  Goal: Effective Tissue Perfusion  Intervention: Optimize Blood Flow  Recent Flowsheet Documentation  Taken 3/7/2022 2100 by Monica Norris RN  Stabilization Measures: legs elevated  Taken 3/7/2022 2001 by Monica Norris RN  Stabilization Measures: legs elevated  Fluid/Electrolyte Management: fluids provided  Taken 3/7/2022 1931 by Monica Norris RN  Stabilization Measures: legs elevated     Problem: Infection (Sepsis/Septic Shock)  Goal: Absence of Infection Signs and Symptoms  Intervention: Prevent or Manage Infection Progression  Recent Flowsheet Documentation  Taken 3/7/2022 2001 by Monica Norris RN  Infection Management: aseptic technique maintained  Infection Prevention:   visitors restricted/screened   single patient room provided   rest/sleep promoted   hand hygiene promoted     Problem: Respiratory Compromise (Sepsis/Septic Shock)  Goal: Effective Oxygenation and Ventilation  Intervention: Promote Airway Secretion Clearance  Recent Flowsheet Documentation  Taken 3/7/2022 2100 by Monica Norris RN  Activity Management: activity adjusted per tolerance  Taken 3/7/2022 2001 by Monica Norris RN  Activity Management: activity adjusted per tolerance  Breathing Techniques/Airway Clearance: deep/controlled cough encouraged  Cough And Deep Breathing: done independently per patient  Taken 3/7/2022 1931 by  Jr, Monica, RN  Activity Management: activity adjusted per tolerance  Intervention: Optimize Oxygenation and Ventilation  Recent Flowsheet Documentation  Taken 3/7/2022 2100 by Monica Norris RN  Head of Bed ([facility]) Positioning: [facility] at 30-45 degrees  Taken 3/7/2022 2001 by Monica Norris RN  Head of Bed ([facility]) Positioning: HOB at 30-45 degrees  Airway/Ventilation Management:   airway patency maintained   humidification applied   pulmonary hygiene promoted   calming measures promoted  Taken 3/7/2022 1931 by Monica Norris RN  Head of Bed ([facility]) Positioning: HOB at 30-45 degrees     Problem: Fluid Imbalance (Pneumonia)  Goal: Fluid Balance  Intervention: Monitor and Manage Fluid Balance  Recent Flowsheet Documentation  Taken 3/7/2022 2001 by Monica Norris RN  Fluid/Electrolyte Management: fluids provided     Problem: Infection (Pneumonia)  Goal: Resolution of Infection Signs and Symptoms  Intervention: Prevent Infection Progression  Recent Flowsheet Documentation  Taken 3/7/2022 2001 by Monica Norris RN  Infection Management: aseptic technique maintained     Problem: Respiratory Compromise (Pneumonia)  Goal: Effective Oxygenation and Ventilation  Intervention: Promote Airway Secretion Clearance  Recent Flowsheet Documentation  Taken 3/7/2022 2001 by Monica Norris RN  Breathing Techniques/Airway Clearance: deep/controlled cough encouraged  Cough And Deep Breathing: done independently per patient  Intervention: Optimize Oxygenation and Ventilation  Recent Flowsheet Documentation  Taken 3/7/2022 2100 by Monica Norris RN  Head of Bed ([facility]) Positioning: [facility] at 30-45 degrees  Taken 3/7/2022 2001 by Monica Norris RN  Head of Bed ([facility]) Positioning: HOB at 30-45 degrees  Airway/Ventilation Management:   airway patency maintained   humidification applied   pulmonary hygiene promoted   calming measures promoted  Taken 3/7/2022 1931 by Monica Norris RN  Head of Bed ([facility]) Positioning: HOB at 30-45 degrees     Problem: Skin Injury Risk  Increased  Goal: Skin Health and Integrity  Intervention: Optimize Skin Protection  Recent Flowsheet Documentation  Taken 3/7/2022 2100 by Monica Norris RN  Pressure Reduction Techniques:   frequent weight shift encouraged   heels elevated off bed   pressure points protected  Head of Bed ([facility]) Positioning: HOB at 30-45 degrees  Pressure Reduction Devices:   specialty bed utilized   pressure-redistributing mattress utilized  Skin Protection:   adhesive use limited   incontinence pads utilized   transparent dressing maintained   tubing/devices free from skin contact  Taken 3/7/2022 2001 by Monica Norris RN  Pressure Reduction Techniques:   frequent weight shift encouraged   heels elevated off bed   pressure points protected  Head of Bed ([facility]) Positioning: HOB at 30-45 degrees  Pressure Reduction Devices:   specialty bed utilized   pressure-redistributing mattress utilized  Skin Protection:   adhesive use limited   incontinence pads utilized   protective footwear used   transparent dressing maintained   tubing/devices free from skin contact  Taken 3/7/2022 1931 by Monica Norris RN  Pressure Reduction Techniques:   frequent weight shift encouraged   heels elevated off bed   pressure points protected  Head of Bed ([facility]) Positioning: HOB at 30-45 degrees  Pressure Reduction Devices: pressure-redistributing mattress utilized  Skin Protection:   adhesive use limited   incontinence pads utilized   protective footwear used   pulse oximeter probe site changed   transparent dressing maintained   tubing/devices free from skin contact  Intervention: Promote and Optimize Oral Intake  Recent Flowsheet Documentation  Taken 3/7/2022 2001 by Monica Norris RN  Oral Nutrition Promotion: rest periods promoted     Problem: Fall Injury Risk  Goal: Absence of Fall and Fall-Related Injury  Intervention: Identify and Manage Contributors to Fall Injury Risk  Recent Flowsheet Documentation  Taken 3/7/2022 2001 by Monica Norris RN  Medication  Review/Management: medications reviewed  Self-Care Promotion: independence encouraged  Intervention: Promote Injury-Free Environment  Recent Flowsheet Documentation  Taken 3/7/2022 2100 by Monica Norris RN  Safety Promotion/Fall Prevention:   activity supervised   assistive device/personal items within reach   clutter free environment maintained   fall prevention program maintained   lighting adjusted   room organization consistent   safety round/check completed   toileting scheduled   muscle strengthening facilitated  Taken 3/7/2022 2001 by Monica Norris RN  Safety Promotion/Fall Prevention:   activity supervised   assistive device/personal items within reach   clutter free environment maintained   fall prevention program maintained   lighting adjusted   muscle strengthening facilitated   nonskid shoes/slippers when out of bed   room organization consistent   safety round/check completed   toileting scheduled  Taken 3/7/2022 1931 by Monica Norris RN  Safety Promotion/Fall Prevention:   activity supervised   assistive device/personal items within reach   clutter free environment maintained   fall prevention program maintained   lighting adjusted   nonskid shoes/slippers when out of bed   muscle strengthening facilitated   room organization consistent   safety round/check completed   toileting scheduled     Problem: Skin Injury Risk Increased  Goal: Skin Health and Integrity  Intervention: Optimize Skin Protection  Recent Flowsheet Documentation  Taken 3/7/2022 2100 by Monica Norris RN  Pressure Reduction Techniques:   frequent weight shift encouraged   heels elevated off bed   pressure points protected  Head of Bed ([facility]) Positioning: HOB at 30-45 degrees  Pressure Reduction Devices:   specialty bed utilized   pressure-redistributing mattress utilized  Skin Protection:   adhesive use limited   incontinence pads utilized   transparent dressing maintained   tubing/devices free from skin contact  Taken 3/7/2022 2001 by  Jr, Monica, RN  Pressure Reduction Techniques:   frequent weight shift encouraged   heels elevated off bed   pressure points protected  Head of Bed ([facility]) Positioning: HOB at 30-45 degrees  Pressure Reduction Devices:   specialty bed utilized   pressure-redistributing mattress utilized  Skin Protection:   adhesive use limited   incontinence pads utilized   protective footwear used   transparent dressing maintained   tubing/devices free from skin contact  Taken 3/7/2022 1931 by Monica Norris RN  Pressure Reduction Techniques:   frequent weight shift encouraged   heels elevated off bed   pressure points protected  Head of Bed (HOB) Positioning: HOB at 30-45 degrees  Pressure Reduction Devices: pressure-redistributing mattress utilized  Skin Protection:   adhesive use limited   incontinence pads utilized   protective footwear used   pulse oximeter probe site changed   transparent dressing maintained   tubing/devices free from skin contact  Intervention: Promote and Optimize Oral Intake  Recent Flowsheet Documentation  Taken 3/7/2022 2001 by Monica Norris RN  Oral Nutrition Promotion: rest periods promoted     Problem: Diabetes Comorbidity  Goal: Blood Glucose Level Within Desired Range  Intervention: Maintain Glycemic Control  Recent Flowsheet Documentation  Taken 3/7/2022 2001 by Monica Norris RN  Glycemic Management:   blood glucose monitored   insulin dose matched to carbohydrate intake     Problem: Skin Injury Risk Increased  Goal: Skin Health and Integrity  Intervention: Optimize Skin Protection  Recent Flowsheet Documentation  Taken 3/7/2022 2100 by Monica Norris RN  Pressure Reduction Techniques:   frequent weight shift encouraged   heels elevated off bed   pressure points protected  Head of Bed (HOB) Positioning: HOB at 30-45 degrees  Pressure Reduction Devices:   specialty bed utilized   pressure-redistributing mattress utilized  Skin Protection:   adhesive use limited   incontinence pads utilized    transparent dressing maintained   tubing/devices free from skin contact  Taken 3/7/2022 2001 by Monica Norris RN  Pressure Reduction Techniques:   frequent weight shift encouraged   heels elevated off bed   pressure points protected  Head of Bed (HOB) Positioning: HOB at 30-45 degrees  Pressure Reduction Devices:   specialty bed utilized   pressure-redistributing mattress utilized  Skin Protection:   adhesive use limited   incontinence pads utilized   protective footwear used   transparent dressing maintained   tubing/devices free from skin contact  Taken 3/7/2022 1931 by Monica Norris RN  Pressure Reduction Techniques:   frequent weight shift encouraged   heels elevated off bed   pressure points protected  Head of Bed (HOB) Positioning: HOB at 30-45 degrees  Pressure Reduction Devices: pressure-redistributing mattress utilized  Skin Protection:   adhesive use limited   incontinence pads utilized   protective footwear used   pulse oximeter probe site changed   transparent dressing maintained   tubing/devices free from skin contact  Intervention: Promote and Optimize Oral Intake  Recent Flowsheet Documentation  Taken 3/7/2022 2001 by Monica Norris RN  Oral Nutrition Promotion: rest periods promoted     Problem: Infection (Pneumonia)  Goal: Resolution of Infection Signs and Symptoms  Intervention: Prevent Infection Progression  Recent Flowsheet Documentation  Taken 3/7/2022 2001 by Monica Norris RN  Infection Management: aseptic technique maintained     Problem: Skin Injury Risk Increased  Goal: Skin Health and Integrity  Intervention: Promote and Optimize Oral Intake  Recent Flowsheet Documentation  Taken 3/7/2022 2001 by Monica Norris RN  Oral Nutrition Promotion: rest periods promoted  Intervention: Optimize Skin Protection  Recent Flowsheet Documentation  Taken 3/7/2022 2100 by Monica Norris RN  Pressure Reduction Techniques:   frequent weight shift encouraged   heels elevated off bed   pressure points  protected  Head of Bed (HOB) Positioning: HOB at 30-45 degrees  Pressure Reduction Devices:   specialty bed utilized   pressure-redistributing mattress utilized  Skin Protection:   adhesive use limited   incontinence pads utilized   transparent dressing maintained   tubing/devices free from skin contact  Taken 3/7/2022 2001 by Monica Norris RN  Pressure Reduction Techniques:   frequent weight shift encouraged   heels elevated off bed   pressure points protected  Head of Bed (HOB) Positioning: HOB at 30-45 degrees  Pressure Reduction Devices:   specialty bed utilized   pressure-redistributing mattress utilized  Skin Protection:   adhesive use limited   incontinence pads utilized   protective footwear used   transparent dressing maintained   tubing/devices free from skin contact  Taken 3/7/2022 1931 by Monica Norris RN  Pressure Reduction Techniques:   frequent weight shift encouraged   heels elevated off bed   pressure points protected  Head of Bed (HOB) Positioning: HOB at 30-45 degrees  Pressure Reduction Devices: pressure-redistributing mattress utilized  Skin Protection:   adhesive use limited   incontinence pads utilized   protective footwear used   pulse oximeter probe site changed   transparent dressing maintained   tubing/devices free from skin contact   Goal Outcome Evaluation:  Plan of Care Reviewed With: patient        Progress: declining  Outcome Evaluation: Seems to do well during the day. At night becomes confused at times and takes oxygen off. Put back on heated high flow at 40L 50% from 11 L high flow on 3/6/22. No changes in oxygen status today. Montiored closely by RN.

## 2022-03-08 NOTE — PLAN OF CARE
Goal Outcome Evaluation:              Outcome Evaluation: PT: Patient performs sit to stand with SBA and verbal cues for hand placement and controlled descent.  Patient performs standing exercises x10-15 reps with rolling walker with seated rest breaks as needed.  Patient's O2 sats decreased to 82-83% with activity, requiring 60 seconds to recover to 87-88%.  Will continue to follow for therapy needs.

## 2022-03-08 NOTE — THERAPY TREATMENT NOTE
Acute Care - Physical Therapy Treatment Note  DILAN Ward     Patient Name: Dontae Bonds Jr.  : 1950  MRN: [id number]  Today's Date: 3/8/2022   Onset of Illness/Injury or Date of Surgery: 22  Visit Dx:     ICD-10-CM ICD-9-CM   1. Pneumonia of both lungs due to infectious organism, unspecified part of lung  J18.9 483.8   2. Acute respiratory failure without hypercapnia (HCC)  J96.00 518.81     Patient Active Problem List   Diagnosis   • Personal history of colonic polyps   • Family history of colon cancer   • Pneumonia due to COVID-19 virus   • Pneumonia of both lungs due to infectious organism   • Hypertension   • Hypokalemia     Past Medical History:   Diagnosis Date   • Hyperlipidemia    • Hypertension      History reviewed. No pertinent surgical history.  PT Assessment (last 12 hours)     PT Evaluation and Treatment     Row Name 22          Physical Therapy Time and Intention    Subjective Information complains of;fatigue  -     Document Type therapy note (daily note)  -     Mode of Treatment physical therapy  -     Patient Effort adequate  -     Symptoms Noted During/After Treatment shortness of breath  -     Comment pt mobility limited by high flow O2 lines  -     Row Name 22          General Information    Patient Observations alert;cooperative;agree to therapy  -     Patient/Family/Caregiver Comments/Observations pt sitting in recliner, agrees to therapy  -     Existing Precautions/Restrictions fall  -     Barriers to Rehab medically complex  -     Comment, General Information decreased O2 sats with minimal activity  -     Row Name 22          Pain    Pre/Posttreatment Pain Comment no c/o pain  -     Row Name 22          Cognition    Follows Commands (Cognition) follows one-step commands  -     Personal Safety Interventions gait belt;nonskid shoes/slippers when out of bed  -     Row Name 22          Bed Mobility     Comment, (Bed Mobility) deferred up in chair  -     Row Name 03/08/22 0930          Transfers    Transfers sit-stand transfer;stand-sit transfer  -     Comment, (Transfers) cues for hand placement and controlled descent into sitting  -     Sit-Stand [place] (Transfers) standby assist;verbal cues  -     Stand-Sit [place] (Transfers) standby assist;verbal cues  -     Row Name 03/08/22 0930          Sit-Stand Transfer    Assistive Device (Sit-Stand Transfers) walker, front-wheeled  -     Row Name 03/08/22 0930          Stand-Sit Transfer    Assistive Device (Stand-Sit Transfers) walker, front-wheeled  -     Row Name 03/08/22 0930          Gait/Stairs (Locomotion)    Comment, (Gait/Stairs) unable to attempt gait due to length of heated high flow lines  -     Row Name 03/08/22 0930          Motor Skills    Therapeutic Exercise --  pt tolerates standing exercises x10-15 reps with seated rest breaks as needed  -     Row Name 03/08/22 0930          Plan of Care Review    Outcome Evaluation PT: Patient performs sit to stand with SBA and verbal cues for hand placement and controlled descent.  Patient performs standing exercises x10-15 reps with rolling walker with seated rest breaks as needed.  Patient's O2 sats decreased to 82-83% with activity, requiring 60 seconds to recover to 87-88%.  Will continue to follow for therapy needs.  -     Row Name 03/08/22 0930          Positioning and Restraints    Pre-Treatment Position sitting in chair/recliner  -     Post Treatment Position chair  -JW     In Chair reclined;call light within reach;encouraged to call for assist;exit alarm on;notified nsg  -     Row Name 03/08/22 0930          Progress Summary (PT)    Progress Toward Functional Goals (PT) progress toward functional goals is good  -           User Key  (r) = Recorded By, (t) = Taken By, (c) = Cosigned By    Initials Name Provider Type    Tami Kitchen, PT Physical Therapist                 Physical Therapy Education                 Title: PT OT SLP Therapies (In Progress)     Topic: Physical Therapy (In Progress)     Point: Mobility training (Done)     Learning Progress Summary           Patient Acceptance, E,TB, VU by BRIANA at 3/8/2022 1003    Acceptance, E,TB, VU by BRIANA at 3/7/2022 0919                   Point: Home exercise program (Not Started)     Learner Progress:  Not documented in this visit.                      User Key     Initials Effective Dates Name Provider Type Discipline     06/16/21 -  Tami Hodgson, PT Physical Therapist PT              PT Recommendation and Plan  Anticipated Discharge Disposition (PT):  (will continue to assess as medical status improves and O2 requirements decreased)  Planned Therapy Interventions (PT): balance training, bed mobility training, gait training, home exercise program, strengthening, transfer training, patient/family education  Therapy Frequency (PT): daily  Progress Summary (PT)  Progress Toward Functional Goals (PT): progress toward functional goals is good  Outcome Evaluation: PT: Patient performs sit to stand with SBA and verbal cues for hand placement and controlled descent.  Patient performs standing exercises x10-15 reps with rolling walker with seated rest breaks as needed.  Patient's O2 sats decreased to 82-83% with activity, requiring 60 seconds to recover to 87-88%.  Will continue to follow for therapy needs.   Outcome Measures     Row Name 03/08/22 0930 03/07/22 0848          How much help from another person do you currently need...    Turning from your back to your side while in flat bed without using bedrails? 3  -JW 3  -JW     Moving from lying on back to sitting on the side of a flat bed without bedrails? 3  -JW 3  -JW     Moving to and from a bed to a chair (including a wheelchair)? 3  -JW 3  -JW     Standing up from a chair using your arms (e.g., wheelchair, bedside chair)? 3  -JW 3  -JW     Climbing 3-5 steps with a railing? 1   -JW 1  -JW     To walk in hospital room? 1  -JW 1  -JW     AM-PAC 6 Clicks Score (PT) 14  -JW 14  -JW            Functional Assessment    Outcome Measure Options AM-PAC 6 Clicks Basic Mobility (PT)  -JW AM-PAC 6 Clicks Basic Mobility (PT)  -JW           User Key  (r) = Recorded By, (t) = Taken By, (c) = Cosigned By    Initials Name Provider Type    Tami Kitchen, PT Physical Therapist                 Time Calculation:    PT Charges     Row Name 03/08/22 1004             Time Calculation    Start Time 0930  -      Stop Time 0949  -      Time Calculation (min) 19 min  -JW      PT Received On 03/08/22  -JW      PT - Next Appointment 03/09/22  -              Timed Charges    35577 - PT Therapeutic Exercise Minutes 19  -              Total Minutes    Timed Charges Total Minutes 19  -JW       Total Minutes 19  -JW            User Key  (r) = Recorded By, (t) = Taken By, (c) = Cosigned By    Initials Name Provider Type    Tami Kitchen, PT Physical Therapist              Therapy Charges for Today     Code Description Service Date Service Provider Modifiers Qty    29699569522 HC PT EVAL LOW COMPLEXITY 1 3/7/2022 Tami Hodgson, PT GP 1    28754559014 HC PT THER PROC EA 15 MIN 3/8/2022 Tami Hodgson, PT GP 1          PT G-Codes  Outcome Measure Options: AM-PAC 6 Clicks Basic Mobility (PT)  AM-PAC 6 Clicks Score (PT): 14    Tami Hodgson, MARY  3/8/2022

## 2022-03-08 NOTE — PROGRESS NOTES
"Hospitalist Team      Patient Care Team:  Tiffany Burciaga MD as PCP - General (Internal Medicine)      Chief Complaint: Follow-up Acute Hypoxic Respiratory Failure    Subjective    Sitting in chair reports overall feeling well breathing remains the same.  Later, nurse reported that he seemed somewhat mildly confused but cooperative.    Objective    Vital Signs  Temp:  [97 °F (36.1 °C)-98 °F (36.7 °C)] 98 °F (36.7 °C)  Heart Rate:  [62-97] 97  Resp:  [16-40] 16  BP: (116-147)/(74-99) 123/74  Oxygen Therapy  SpO2: 93 %  Pulse Oximetry Type: Continuous  Device (Oxygen Therapy): ventilator  Device (Oxygen Therapy): heated, high-flow nasal cannula  $ High Flow Nasal Cannula Set-Up: yes  Flow (L/min): 15  Oxygen Concentration (%): 40  ETCO2 (mmHg): 32 mmHg  Probe Placed On (Pulse Ox): Left:, forehead  Probe Removed From (Pulse Ox): forehead, Left:}    Flowsheet Rows    Flowsheet Row First Filed Value   Admission Height 172.7 cm (68\") Documented at 02/21/2022 1843   Admission Weight 78.9 kg (174 lb) Documented at 02/21/2022 1825          Physical Exam:    General: NAD  Lungs: Manage sounds at the bases, mild tachypnea noted  CV: Regular rate and rhythm.  No murmur appreciated.  Radial pulses are 2+ and symmetric.  Abdomen: Soft and non-tender w/ active bowel sounds.  MSK: No C/C/E.  Neuro: CN II-XII grossly intact.  Psych: AO x2 mood stable.    Results Review:     I reviewed the patient's new clinical results.    Lab Results (last 24 hours)     Procedure Component Value Units Date/Time    POC Glucose Once [[id number]]  (Abnormal) Collected: 03/08/22 1140    Specimen: Blood Updated: 03/08/22 1149     Glucose 249 mg/dL      Comment: Meter: [id number] : CHRIS Sears RN       POC Glucose Once [[id number]]  (Abnormal) Collected: 03/08/22 0713    Specimen: Blood Updated: 03/08/22 0720     Glucose 183 mg/dL      Comment: Meter: [id number] : CHRIS Sears RN       Basic Metabolic Panel " [[id number]]  (Abnormal) Collected: 03/08/22 0517    Specimen: Blood from Arm, Right Updated: 03/08/22 0603     Glucose 168 mg/dL      BUN 23 mg/dL      Creatinine 0.49 mg/dL      Sodium 136 mmol/L      Potassium 4.8 mmol/L      Comment: Slight hemolysis detected by analyzer. Results may be affected.        Chloride 101 mmol/L      CO2 29.2 mmol/L      Calcium 9.5 mg/dL      BUN/Creatinine Ratio 46.9     Anion Gap 5.8 mmol/L      eGFR 109.7 mL/min/1.73      Comment: National Kidney Foundation and American Society of Nephrology (ASN) Task Force recommended calculation based on the Chronic Kidney Disease Epidemiology Collaboration (CKD-EPI) equation refit without adjustment for race.       Narrative:      GFR Normal >60  Chronic Kidney Disease <60  Kidney Failure <15      CBC & Differential [[id number]]  (Abnormal) Collected: 03/08/22 0517    Specimen: Blood from Arm, Right Updated: 03/08/22 0537    Narrative:      The following orders were created for panel order CBC & Differential.  Procedure                               Abnormality         Status                     ---------                               -----------         ------                     CBC Auto Differential[[id number]]        Abnormal            Final result                 Please view results for these tests on the individual orders.    CBC Auto Differential [[id number]]  (Abnormal) Collected: 03/08/22 0517    Specimen: Blood from Arm, Right Updated: 03/08/22 0537     WBC 29.10 10*3/mm3      RBC 3.74 10*6/mm3      Hemoglobin 11.2 g/dL      Hematocrit 34.9 %      MCV 93.3 fL      MCH 29.9 pg      MCHC 32.1 g/dL      RDW 14.4 %      RDW-SD 48.7 fl      MPV 10.2 fL      Platelets 410 10*3/mm3      Neutrophil % 91.0 %      Lymphocyte % 2.5 %      Monocyte % 3.0 %      Eosinophil % 0.0 %      Basophil % 0.1 %      Immature Grans % 3.4 %      Neutrophils, Absolute 26.44 10*3/mm3      Lymphocytes, Absolute 0.74 10*3/mm3      Monocytes, Absolute 0.88 10*3/mm3       Eosinophils, Absolute 0.00 10*3/mm3      Basophils, Absolute 0.04 10*3/mm3      Immature Grans, Absolute 1.00 10*3/mm3      nRBC 0.0 /100 WBC     POC Glucose Once [[id number]]  (Abnormal) Collected: 03/07/22 1808    Specimen: Blood Updated: 03/07/22 1816     Glucose 174 mg/dL      Comment: Meter: [id number] : [id number] Aroldo Medina RN (Validator)             Imaging Results (Last 24 Hours)     ** No results found for the last 24 hours. **          Medication Review:   I have reviewed the patient's current medication list    Current Facility-Administered Medications:   •  acetaminophen (TYLENOL) tablet 650 mg, 650 mg, Oral, Q4H PRN, 650 mg at 02/27/22 0311 **OR** acetaminophen (TYLENOL) suppository 650 mg, 650 mg, Rectal, Q4H PRN, Alvarado, Birrilla, DO  •  [COMPLETED] apixaban (ELIQUIS) tablet 10 mg, 10 mg, Oral, Q12H, 10 mg at 03/07/22 2113 **FOLLOWED BY** apixaban (ELIQUIS) tablet 5 mg, 5 mg, Oral, Q12H, Osvaldo Gibson, DO, 5 mg at 03/08/22 0753  •  atorvastatin (LIPITOR) tablet 40 mg, 40 mg, Oral, Nightly, Alvarado, Birrilla, DO, 40 mg at 03/07/22 2113  •  dextrose (D50W) (25 g/50 mL) IV injection 25 g, 25 g, Intravenous, Q15 Min PRN, Cash Arguello Jr., MD  •  dextrose (GLUTOSE) oral gel 15 g, 15 g, Oral, Q15 Min PRN, Cash Arguello Jr., MD  •  docusate sodium (COLACE) liquid 100 mg, 100 mg, Oral, BID, Alvarado, Birrilla, DO, 100 mg at 03/08/22 0753  •  ferrous sulfate EC tablet 324 mg, 324 mg, Oral, Daily With Breakfast, Alvarado, Birrilla, DO, 324 mg at 03/08/22 0753  •  glucagon (GLUCAGEN) injection 1 mg, 1 mg, Intramuscular, Q15 Min PRN, Cash Arguello Jr., MD  •  HYDROcodone-homatropine (HYCODAN) 5-1.5 MG/5ML syrup 5 mL, 5 mL, Oral, Q4H PRN, Osvaldo Gibson DO, 5 mL at 03/08/22 0219  •  insulin aspart (novoLOG) injection 0-24 Units, 0-24 Units, Subcutaneous, TID With Meals, Darrick Matt MD, 8 Units at 03/08/22 1151  •  insulin detemir (LEVEMIR) injection 7 Units, 7 Units,  Subcutaneous, Daily, Darrick Matt MD, 7 Units at 03/08/22 0755  •  ipratropium-albuterol (DUO-NEB) nebulizer solution 3 mL, 3 mL, Nebulization, Q6H PRN, Alvarado, Birrilla, DO  •  ipratropium-albuterol (DUO-NEB) nebulizer solution 3 mL, 3 mL, Nebulization, 4x Daily - RT, Darrick Matt MD, 3 mL at 03/08/22 1138  •  Magnesium Sulfate 2 gram Bolus, followed by 8 gram infusion (total Mg dose 10 grams)- Mg less than or equal to 1mg/dL, 2 g, Intravenous, PRN **OR** Magnesium Sulfate 2 gram / 50mL Infusion (GIVE X 3 BAGS TO EQUAL 6GM TOTAL DOSE) - Mg 1.1 - 1.5 mg/dl, 2 g, Intravenous, PRN **OR** Magnesium Sulfate 4 gram infusion- Mg 1.6-1.9 mg/dL, 4 g, Intravenous, PRN, Alvarado, Birrilla, DO  •  methylPREDNISolone sodium succinate (SOLU-Medrol) injection 40 mg, 40 mg, Intravenous, Q8H, Fabiano Dunbar MD  •  OLANZapine (zyPREXA) injection 5 mg, 5 mg, Intramuscular, Q8H PRN, Trevon Chandler, DO, 5 mg at 02/28/22 1709  •  ondansetron (ZOFRAN) tablet 4 mg, 4 mg, Oral, Q6H PRN **OR** ondansetron (ZOFRAN) injection 4 mg, 4 mg, Intravenous, Q6H PRN, Alvarado, Birrilla, DO  •  polyethylene glycol (MIRALAX) packet 17 g, 17 g, Oral, Daily, Alvarado, Birrilla, DO, 17 g at 03/08/22 0754  •  potassium chloride (K-DUR,KLOR-CON) CR tablet 40 mEq, 40 mEq, Oral, PRN **OR** potassium chloride (KLOR-CON) packet 40 mEq, 40 mEq, Oral, PRN **OR** potassium chloride 10 mEq in 100 mL IVPB, 10 mEq, Intravenous, Q1H PRN, Alvarado, Birrilla, DO, Last Rate: 100 mL/hr at 02/22/22 0936, 10 mEq at 02/22/22 0936  •  [COMPLETED] Insert peripheral IV, , , Once **AND** sodium chloride 0.9 % flush 10 mL, 10 mL, Intravenous, PRN, Alvarado, Birrilla, DO, 10 mL at 03/07/22 1616  •  sodium chloride 0.9 % infusion 40 mL, 40 mL, Intravenous, PRN, Alvarado, Birrilla, DO, 100 mL at 03/02/22 0228  •  sodium chloride nasal spray 2 spray, 2 spray, Each Nare, PRN, Osvaldo Gibson, DO, 2 spray at 03/06/22 0818      Assessment/Plan     Acute Hypoxic Respiratory Failure  due to bacterial/aspiration Pneumonia/Post-COVID Fibrosis:   -Improved now on FiO2 40% today  -aggressive pulmonary toilet  -Pulmonary following appreciate assistance     Leukocytosis    -Seems steroid induced, slight downtrend today  -status post antibiotic course,   -procalcitonin normal, will hold further antibiotics for now.   -Remains afebrile     Acute LLE DVT:   -On Eliquis.     Steroid-induced Hyperglycemia:   -Continue basal insulin and sliding scale     Dyslipidemia:  - No acute issues.  Continue Lipitor.    Acute metabolic encephalopathy secondary to infection and suspected acute alcohol withdrawal  -Neurology has signed off   -Status improved but remains confused at times  -Continue as needed Zyprexa    Prognosis guarded/high risk    Plan for disposition: Depending on ability to wean oxygen to a sustainable level on nasal cannula    Trevon Chandler DO  03/08/22  12:38 EST

## 2022-03-08 NOTE — CASE MANAGEMENT/SOCIAL WORK
Continued Stay Note  DILAN Ward     Patient Name: Dontae Bonds Jr.  MRN: [id number]  Today's Date: 3/8/2022    Admit Date: 2/21/2022     Discharge Plan     Row Name 03/08/22 1502       Plan    Plan Home with [facility]     Plan Comments Attempted to see patient today to discuss discharge plans. Patient is currently sleeping in bed. Per chart review he remains on 15L HHF and desats to low 80's with activity. CM is in the process of checking on a motorized wheel chair for him at home. CM will continue to follow for needs.               Discharge Codes    No documentation.                     Toma Marc RN

## 2022-03-09 ENCOUNTER — APPOINTMENT (OUTPATIENT)
Dept: GENERAL RADIOLOGY | Facility: HOSPITAL | Age: 72
End: 2022-03-09

## 2022-03-09 LAB
ANION GAP SERPL CALCULATED.3IONS-SCNC: 9 MMOL/L (ref 5–15)
BASOPHILS # BLD AUTO: 0.04 10*3/MM3 (ref 0–0.2)
BASOPHILS NFR BLD AUTO: 0.2 % (ref 0–1.5)
BUN SERPL-MCNC: 27 MG/DL (ref 8–23)
BUN/CREAT SERPL: 48.2 (ref 7–25)
CALCIUM SPEC-SCNC: 9.4 MG/DL (ref 8.6–10.5)
CHLORIDE SERPL-SCNC: 99 MMOL/L (ref 98–107)
CO2 SERPL-SCNC: 27 MMOL/L (ref 22–29)
CREAT SERPL-MCNC: 0.56 MG/DL (ref 0.76–1.27)
DEPRECATED RDW RBC AUTO: 48.5 FL (ref 37–54)
EGFRCR SERPLBLD CKD-EPI 2021: 105.4 ML/MIN/1.73
EOSINOPHIL # BLD AUTO: 0 10*3/MM3 (ref 0–0.4)
EOSINOPHIL NFR BLD AUTO: 0 % (ref 0.3–6.2)
ERYTHROCYTE [DISTWIDTH] IN BLOOD BY AUTOMATED COUNT: 14.4 % (ref 12.3–15.4)
GLUCOSE BLDC GLUCOMTR-MCNC: 165 MG/DL (ref 70–130)
GLUCOSE BLDC GLUCOMTR-MCNC: 199 MG/DL (ref 70–130)
GLUCOSE BLDC GLUCOMTR-MCNC: 204 MG/DL (ref 70–130)
GLUCOSE SERPL-MCNC: 252 MG/DL (ref 65–99)
HCT VFR BLD AUTO: 35.9 % (ref 37.5–51)
HGB BLD-MCNC: 11.4 G/DL (ref 13–17.7)
IMM GRANULOCYTES # BLD AUTO: 1.23 10*3/MM3 (ref 0–0.05)
IMM GRANULOCYTES NFR BLD AUTO: 4.8 % (ref 0–0.5)
LYMPHOCYTES # BLD AUTO: 0.9 10*3/MM3 (ref 0.7–3.1)
LYMPHOCYTES NFR BLD AUTO: 3.5 % (ref 19.6–45.3)
MAGNESIUM SERPL-MCNC: 2.1 MG/DL (ref 1.6–2.4)
MCH RBC QN AUTO: 29.7 PG (ref 26.6–33)
MCHC RBC AUTO-ENTMCNC: 31.8 G/DL (ref 31.5–35.7)
MCV RBC AUTO: 93.5 FL (ref 79–97)
MONOCYTES # BLD AUTO: 1.08 10*3/MM3 (ref 0.1–0.9)
MONOCYTES NFR BLD AUTO: 4.3 % (ref 5–12)
NEUTROPHILS NFR BLD AUTO: 22.15 10*3/MM3 (ref 1.7–7)
NEUTROPHILS NFR BLD AUTO: 87.2 % (ref 42.7–76)
NRBC BLD AUTO-RTO: 0.1 /100 WBC (ref 0–0.2)
PLATELET # BLD AUTO: 378 10*3/MM3 (ref 140–450)
PMV BLD AUTO: 10.2 FL (ref 6–12)
POTASSIUM SERPL-SCNC: 4.8 MMOL/L (ref 3.5–5.2)
RBC # BLD AUTO: 3.84 10*6/MM3 (ref 4.14–5.8)
SODIUM SERPL-SCNC: 135 MMOL/L (ref 136–145)
WBC NRBC COR # BLD: 25.4 10*3/MM3 (ref 3.4–10.8)

## 2022-03-09 PROCEDURE — 93005 ELECTROCARDIOGRAM TRACING: CPT | Performed by: INTERNAL MEDICINE

## 2022-03-09 PROCEDURE — 85025 COMPLETE CBC W/AUTO DIFF WBC: CPT | Performed by: INTERNAL MEDICINE

## 2022-03-09 PROCEDURE — 82962 GLUCOSE BLOOD TEST: CPT

## 2022-03-09 PROCEDURE — 80048 BASIC METABOLIC PNL TOTAL CA: CPT | Performed by: INTERNAL MEDICINE

## 2022-03-09 PROCEDURE — 94669 MECHANICAL CHEST WALL OSCILL: CPT

## 2022-03-09 PROCEDURE — 63710000001 INSULIN DETEMIR PER 5 UNITS: Performed by: HOSPITALIST

## 2022-03-09 PROCEDURE — 71045 X-RAY EXAM CHEST 1 VIEW: CPT

## 2022-03-09 PROCEDURE — 94799 UNLISTED PULMONARY SVC/PX: CPT

## 2022-03-09 PROCEDURE — 83735 ASSAY OF MAGNESIUM: CPT | Performed by: INTERNAL MEDICINE

## 2022-03-09 PROCEDURE — 63710000001 INSULIN ASPART PER 5 UNITS: Performed by: HOSPITALIST

## 2022-03-09 PROCEDURE — 94761 N-INVAS EAR/PLS OXIMETRY MLT: CPT

## 2022-03-09 PROCEDURE — 92526 ORAL FUNCTION THERAPY: CPT

## 2022-03-09 PROCEDURE — 99233 SBSQ HOSP IP/OBS HIGH 50: CPT | Performed by: INTERNAL MEDICINE

## 2022-03-09 PROCEDURE — 93010 ELECTROCARDIOGRAM REPORT: CPT | Performed by: INTERNAL MEDICINE

## 2022-03-09 PROCEDURE — 97116 GAIT TRAINING THERAPY: CPT

## 2022-03-09 PROCEDURE — 63710000001 PREDNISONE PER 5 MG: Performed by: INTERNAL MEDICINE

## 2022-03-09 RX ORDER — PREDNISONE 10 MG/1
40 TABLET ORAL
Status: DISCONTINUED | OUTPATIENT
Start: 2022-03-09 | End: 2022-03-10

## 2022-03-09 RX ADMIN — IPRATROPIUM BROMIDE AND ALBUTEROL SULFATE 3 ML: .5; 2.5 SOLUTION RESPIRATORY (INHALATION) at 19:43

## 2022-03-09 RX ADMIN — INSULIN ASPART 4 UNITS: 100 INJECTION, SOLUTION INTRAVENOUS; SUBCUTANEOUS at 12:29

## 2022-03-09 RX ADMIN — APIXABAN 5 MG: 2.5 TABLET, FILM COATED ORAL at 08:39

## 2022-03-09 RX ADMIN — IPRATROPIUM BROMIDE AND ALBUTEROL SULFATE 3 ML: .5; 2.5 SOLUTION RESPIRATORY (INHALATION) at 07:10

## 2022-03-09 RX ADMIN — DOCUSATE SODIUM 100 MG: 50 LIQUID ORAL at 20:13

## 2022-03-09 RX ADMIN — APIXABAN 5 MG: 2.5 TABLET, FILM COATED ORAL at 20:13

## 2022-03-09 RX ADMIN — FERROUS SULFATE TAB EC 324 MG (65 MG FE EQUIVALENT) 324 MG: 324 (65 FE) TABLET DELAYED RESPONSE at 08:39

## 2022-03-09 RX ADMIN — INSULIN DETEMIR 7 UNITS: 100 INJECTION, SOLUTION SUBCUTANEOUS at 08:38

## 2022-03-09 RX ADMIN — ATORVASTATIN CALCIUM 40 MG: 40 TABLET, FILM COATED ORAL at 20:13

## 2022-03-09 RX ADMIN — IPRATROPIUM BROMIDE AND ALBUTEROL SULFATE 3 ML: .5; 2.5 SOLUTION RESPIRATORY (INHALATION) at 11:21

## 2022-03-09 RX ADMIN — HYDROCODONE BITARTRATE AND HOMATROPINE METHYLBROMIDE 5 ML: 5; 1.5 SOLUTION ORAL at 20:13

## 2022-03-09 RX ADMIN — INSULIN ASPART 4 UNITS: 100 INJECTION, SOLUTION INTRAVENOUS; SUBCUTANEOUS at 08:38

## 2022-03-09 RX ADMIN — INSULIN ASPART 8 UNITS: 100 INJECTION, SOLUTION INTRAVENOUS; SUBCUTANEOUS at 17:29

## 2022-03-09 RX ADMIN — PREDNISONE 40 MG: 10 TABLET ORAL at 08:41

## 2022-03-09 RX ADMIN — IPRATROPIUM BROMIDE AND ALBUTEROL SULFATE 3 ML: .5; 2.5 SOLUTION RESPIRATORY (INHALATION) at 15:35

## 2022-03-09 NOTE — NURSING NOTE
"Pt admitted to being \"restless\" and stated that the \"mornings are ok because they're busy with people in and out, but after lunch is the problem.\"  This nurse suggested she and the pt take a walk after dinner and the pt was agreeable.  The gait belt was placed and the pt walked with his walker to the door of his room and then back to his chair.  Pt's gait was very steady.  Pt's O2 sats maintained in the low-mid 80s @ 82-84% and immediately jumped back to 89-92% once pt sat back down.  Pt did experience tachypnea and SOA and nurse encouraged deep controlled breathing.  Pt is now resting comfortably.  Pt would like to walk again before getting into bed for the night.  Night shift nurse notified.    "

## 2022-03-09 NOTE — PLAN OF CARE
Problem: Adult Inpatient Plan of Care  Goal: Plan of Care Review  Outcome: Ongoing, Progressing  Flowsheets  Taken 3/9/2022 0011 by Monica Norris RN  Progress: improving  Taken 3/7/2022 1656 by Carol Kendrick RN  Plan of Care Reviewed With: patient  Goal: Absence of Hospital-Acquired Illness or Injury  Outcome: Ongoing, Progressing  Intervention: Identify and Manage Fall Risk  Recent Flowsheet Documentation  Taken 3/8/2022 2300 by Monica Norris RN  Safety Promotion/Fall Prevention:   activity supervised   assistive device/personal items within reach   clutter free environment maintained   fall prevention program maintained   lighting adjusted   muscle strengthening facilitated   nonskid shoes/slippers when out of bed   room organization consistent   safety round/check completed   toileting scheduled  Taken 3/8/2022 1900 by Monica Norris RN  Safety Promotion/Fall Prevention:   activity supervised   assistive device/personal items within reach   clutter free environment maintained   fall prevention program maintained   lighting adjusted   nonskid shoes/slippers when out of bed   room organization consistent   safety round/check completed   toileting scheduled   muscle strengthening facilitated  Intervention: Prevent Skin Injury  Recent Flowsheet Documentation  Taken 3/9/2022 0000 by Monica Norris RN  Body Position:   position changed independently   supine, legs elevated  Taken 3/8/2022 2300 by Monica Norris RN  Body Position:   position changed independently   supine, legs elevated  Skin Protection:   adhesive use limited   incontinence pads utilized   protective footwear used   transparent dressing maintained   tubing/devices free from skin contact  Taken 3/8/2022 2200 by Monica Norris RN  Body Position:   position changed independently   tilted   right   legs elevated  Taken 3/8/2022 2100 by Monica Norris RN  Body Position:   position changed independently   right   tilted   legs elevated  Taken 3/8/2022  2000 by Jr, Monica, RN  Body Position:   position changed independently   supine, legs elevated  Taken 3/8/2022 1900 by Monica Norris RN  Body Position:   right   tilted   legs elevated  Skin Protection:   adhesive use limited   incontinence pads utilized   protective footwear used   transparent dressing maintained   tubing/devices free from skin contact  Intervention: Prevent and Manage VTE (Venous Thromboembolism) Risk  Recent Flowsheet Documentation  Taken 3/9/2022 0000 by Monica Norris RN  Activity Management: activity adjusted per tolerance  Taken 3/8/2022 2300 by Monica Norris RN  Activity Management: activity adjusted per tolerance  VTE Prevention/Management: (Eliquis) --  Taken 3/8/2022 2200 by Monica Norris RN  Activity Management: activity adjusted per tolerance  Taken 3/8/2022 2100 by Monica Norris RN  Activity Management: activity adjusted per tolerance  Taken 3/8/2022 2000 by Monica Norris RN  Activity Management: activity adjusted per tolerance  Taken 3/8/2022 1900 by Monica Norris RN  Activity Management: activity adjusted per tolerance  VTE Prevention/Management: (Eliquis) sequential compression devices off  Range of Motion:   active ROM (range of motion) encouraged   ROM (range of motion) performed  Intervention: Prevent Infection  Recent Flowsheet Documentation  Taken 3/8/2022 2300 by Monica Norris RN  Infection Prevention:   visitors restricted/screened   single patient room provided   rest/sleep promoted   hand hygiene promoted  Taken 3/8/2022 1900 by Monica Norris RN  Infection Prevention:   visitors restricted/screened   single patient room provided   rest/sleep promoted   hand hygiene promoted  Goal: Optimal Comfort and Wellbeing  Outcome: Ongoing, Progressing  Intervention: Provide Person-Centered Care  Recent Flowsheet Documentation  Taken 3/8/2022 2300 by Monica Norris RN  Trust Relationship/Rapport:   care explained   choices provided   emotional support provided   empathic listening  provided   questions answered   questions encouraged   reassurance provided   thoughts/feelings acknowledged  Taken 3/8/2022 1900 by Monica Norris RN  Trust Relationship/Rapport:   care explained   choices provided   emotional support provided   empathic listening provided   questions answered   questions encouraged   reassurance provided   thoughts/feelings acknowledged  Goal: Readiness for Transition of Care  Outcome: Ongoing, Progressing     Problem: Infection (Pneumonia)  Goal: Resolution of Infection Signs and Symptoms  Outcome: Ongoing, Progressing  Intervention: Prevent Infection Progression  Recent Flowsheet Documentation  Taken 3/8/2022 2300 by Monica Norris RN  Infection Management: aseptic technique maintained  Taken 3/8/2022 1900 by Monica Norris RN  Infection Management: aseptic technique maintained     Problem: Respiratory Compromise (Pneumonia)  Goal: Effective Oxygenation and Ventilation  Outcome: Ongoing, Progressing  Intervention: Promote Airway Secretion Clearance  Recent Flowsheet Documentation  Taken 3/8/2022 1900 by Monica Norris RN  Breathing Techniques/Airway Clearance: deep/controlled cough encouraged  Cough And Deep Breathing: done independently per patient  Intervention: Optimize Oxygenation and Ventilation  Recent Flowsheet Documentation  Taken 3/9/2022 0000 by Monica Norris RN  Head of Bed (HOB) Positioning: HOB at 20 degrees  Taken 3/8/2022 2300 by Monica Norris RN  Head of Bed (HOB) Positioning: HOB at 20 degrees  Taken 3/8/2022 2200 by Monica Norris RN  Head of Bed (HOB) Positioning: HOB at 30-45 degrees  Taken 3/8/2022 2100 by Monica Norris RN  Head of Bed (HOB) Positioning: HOB at 30-45 degrees  Taken 3/8/2022 2000 by Monica Norris RN  Head of Bed (HOB) Positioning: HOB at 30-45 degrees  Taken 3/8/2022 1900 by Monica Norris RN  Head of Bed (HOB) Positioning: HOB at 30 degrees  Airway/Ventilation Management: pulmonary hygiene promoted     Problem: Fall Injury Risk  Goal: Absence of  Fall and Fall-Related Injury  Outcome: Ongoing, Progressing  Intervention: Identify and Manage Contributors  Recent Flowsheet Documentation  Taken 3/8/2022 2300 by Monica Norris RN  Medication Review/Management: medications reviewed  Taken 3/8/2022 1900 by Monica Norris RN  Medication Review/Management: medications reviewed  Self-Care Promotion: independence encouraged  Intervention: Promote Injury-Free Environment  Recent Flowsheet Documentation  Taken 3/8/2022 2300 by Monica Norris RN  Safety Promotion/Fall Prevention:   activity supervised   assistive device/personal items within reach   clutter free environment maintained   fall prevention program maintained   lighting adjusted   muscle strengthening facilitated   nonskid shoes/slippers when out of bed   room organization consistent   safety round/check completed   toileting scheduled  Taken 3/8/2022 1900 by Monica Norris RN  Safety Promotion/Fall Prevention:   activity supervised   assistive device/personal items within reach   clutter free environment maintained   fall prevention program maintained   lighting adjusted   nonskid shoes/slippers when out of bed   room organization consistent   safety round/check completed   toileting scheduled   muscle strengthening facilitated     Problem: Skin Injury Risk Increased  Goal: Skin Health and Integrity  Outcome: Adequate for Care Transition  Intervention: Promote and Optimize Oral Intake  Recent Flowsheet Documentation  Taken 3/8/2022 2300 by Monica Norris RN  Oral Nutrition Promotion: rest periods promoted  Taken 3/8/2022 1900 by Monica Norris RN  Oral Nutrition Promotion: rest periods promoted  Intervention: Optimize Skin Protection  Recent Flowsheet Documentation  Taken 3/9/2022 0000 by Monica Norris RN  Head of Bed (HOB) Positioning: HOB at 20 degrees  Taken 3/8/2022 2300 by Monica Norris RN  Pressure Reduction Techniques:   heels elevated off bed   frequent weight shift encouraged   pressure points  protected  Head of Bed (HOB) Positioning: HOB at 20 degrees  Pressure Reduction Devices:   specialty bed utilized   pressure-redistributing mattress utilized  Skin Protection:   adhesive use limited   incontinence pads utilized   protective footwear used   transparent dressing maintained   tubing/devices free from skin contact  Taken 3/8/2022 2200 by Monica Norris RN  Head of Bed (HOB) Positioning: HOB at 30-45 degrees  Taken 3/8/2022 2100 by Monica Norris RN  Head of Bed (HOB) Positioning: HOB at 30-45 degrees  Taken 3/8/2022 2000 by Monica Norris RN  Head of Bed (HOB) Positioning: HOB at 30-45 degrees  Taken 3/8/2022 1900 by Monica Norris RN  Pressure Reduction Techniques:   frequent weight shift encouraged   heels elevated off bed   pressure points protected  Head of Bed (HOB) Positioning: HOB at 30 degrees  Pressure Reduction Devices:   pressure-redistributing mattress utilized   specialty bed utilized  Skin Protection:   adhesive use limited   incontinence pads utilized   protective footwear used   transparent dressing maintained   tubing/devices free from skin contact     Problem: Adult Inpatient Plan of Care  Goal: Plan of Care Review  Recent Flowsheet Documentation  Taken 3/9/2022 0011 by Monica Norris RN  Progress: improving  Goal: Absence of Hospital-Acquired Illness or Injury  Intervention: Identify and Manage Fall Risk  Recent Flowsheet Documentation  Taken 3/8/2022 2300 by Monica Norris RN  Safety Promotion/Fall Prevention:   activity supervised   assistive device/personal items within reach   clutter free environment maintained   fall prevention program maintained   lighting adjusted   muscle strengthening facilitated   nonskid shoes/slippers when out of bed   room organization consistent   safety round/check completed   toileting scheduled  Taken 3/8/2022 1900 by Monica Norris RN  Safety Promotion/Fall Prevention:   activity supervised   assistive device/personal items within reach   clutter free  environment maintained   fall prevention program maintained   lighting adjusted   nonskid shoes/slippers when out of bed   room organization consistent   safety round/check completed   toileting scheduled   muscle strengthening facilitated  Intervention: Prevent Skin Injury  Recent Flowsheet Documentation  Taken 3/9/2022 0000 by Monica Norris RN  Body Position:   position changed independently   supine, legs elevated  Taken 3/8/2022 2300 by Monica Norris RN  Body Position:   position changed independently   supine, legs elevated  Skin Protection:   adhesive use limited   incontinence pads utilized   protective footwear used   transparent dressing maintained   tubing/devices free from skin contact  Taken 3/8/2022 2200 by Monica Norris RN  Body Position:   position changed independently   tilted   right   legs elevated  Taken 3/8/2022 2100 by Monica Norris RN  Body Position:   position changed independently   right   tilted   legs elevated  Taken 3/8/2022 2000 by Monica Norris RN  Body Position:   position changed independently   supine, legs elevated  Taken 3/8/2022 1900 by Monica Norris RN  Body Position:   right   tilted   legs elevated  Skin Protection:   adhesive use limited   incontinence pads utilized   protective footwear used   transparent dressing maintained   tubing/devices free from skin contact  Intervention: Prevent and Manage VTE (venous thromboembolism) Risk  Recent Flowsheet Documentation  Taken 3/8/2022 2300 by Monica Norris RN  VTE Prevention/Management: (Eliquis) --  Taken 3/8/2022 1900 by Monica Norris RN  VTE Prevention/Management: (Eliquis) sequential compression devices off  Intervention: Prevent Infection  Recent Flowsheet Documentation  Taken 3/8/2022 2300 by Monica Norris RN  Infection Prevention:   visitors restricted/screened   single patient room provided   rest/sleep promoted   hand hygiene promoted  Taken 3/8/2022 1900 by Monica Norris RN  Infection Prevention:   visitors  restricted/screened   single patient room provided   rest/sleep promoted   hand hygiene promoted  Goal: Optimal Comfort and Wellbeing  Intervention: Provide Person-Centered Care  Recent Flowsheet Documentation  Taken 3/8/2022 2300 by Monica Norris RN  Trust Relationship/Rapport:   care explained   choices provided   emotional support provided   empathic listening provided   questions answered   questions encouraged   reassurance provided   thoughts/feelings acknowledged  Taken 3/8/2022 1900 by Monica Norris RN  Trust Relationship/Rapport:   care explained   choices provided   emotional support provided   empathic listening provided   questions answered   questions encouraged   reassurance provided   thoughts/feelings acknowledged  Goal: Plan of Care Review  Recent Flowsheet Documentation  Taken 3/9/2022 0011 by Monica Norris RN  Progress: improving     Problem: Hypertension Comorbidity  Goal: Blood Pressure in Desired Range  Intervention: Maintain Hypertension-Management Strategies  Recent Flowsheet Documentation  Taken 3/8/2022 2300 by Monica Norris RN  Medication Review/Management: medications reviewed  Taken 3/8/2022 1900 by Monica Norris RN  Medication Review/Management: medications reviewed     Problem: Communication Impairment (Mechanical Ventilation, Invasive)  Goal: Effective Communication  Intervention: Ensure Effective Communication  Recent Flowsheet Documentation  Taken 3/8/2022 2300 by Monica Norris RN  Communication Enhancement Strategies:   call light answered in person   nonverbal strategies used   verbal and visual cues paired  Taken 3/8/2022 1900 by Monica Norris RN  Communication Enhancement Strategies:   call light answered in person   device use encouraged   extra time allowed for response   family involved in communication plan   nonverbal strategies used   verbal and visual cues paired     Problem: Device-Related Complication Risk (Mechanical Ventilation, Invasive)  Goal: Optimal Device  Function  Intervention: Optimize Device Care and Function  Recent Flowsheet Documentation  Taken 3/9/2022 0000 by Monica Norris RN  Airway Safety Measures: suction at bedside  Taken 3/8/2022 2300 by Monica Norris RN  Aspiration Precautions:   awake/alert before oral intake   distractions minimized during oral intake   oral hygiene care promoted   respiratory status monitored   upright posture maintained  Airway Safety Measures: suction at bedside  Taken 3/8/2022 2200 by Monica Norris RN  Airway Safety Measures: suction at bedside  Taken 3/8/2022 2100 by Monica Norris RN  Airway Safety Measures: suction at bedside  Taken 3/8/2022 2000 by Monica Norris RN  Airway Safety Measures: suction at bedside  Taken 3/8/2022 1900 by Monica Norris RN  Aspiration Precautions:   awake/alert before oral intake   distractions minimized during oral intake   oral hygiene care promoted   respiratory status monitored   upright posture maintained  Airway Safety Measures: suction at bedside     Problem: Inability to Wean (Mechanical Ventilation, Invasive)  Goal: Mechanical Ventilation Liberation  Intervention: Promote Extubation and Mechanical Ventilation Liberation  Recent Flowsheet Documentation  Taken 3/8/2022 2300 by oMnica Norris RN  Sleep/Rest Enhancement:   awakenings minimized   consistent schedule promoted   natural light exposure provided   noise level reduced   regular sleep/rest pattern promoted   room darkened  Medication Review/Management: medications reviewed  Taken 3/8/2022 1900 by Monica Norris RN  Environmental Support:   calm environment promoted   distractions minimized   environmental consistency promoted   personal routine supported  Sleep/Rest Enhancement:   awakenings minimized   consistent schedule promoted   natural light exposure provided   noise level reduced   regular sleep/rest pattern promoted   room darkened  Medication Review/Management: medications reviewed     Problem: Skin and Tissue Injury (Mechanical  Ventilation, Invasive)  Goal: Absence of Device-Related Skin and Tissue Injury  Intervention: Maintain Skin and Tissue Health  Recent Flowsheet Documentation  Taken 3/8/2022 2300 by Monica Norris RN  Device Skin Pressure Protection:   absorbent pad utilized/changed   adhesive use limited   pressure points protected  Taken 3/8/2022 1900 by Monica Norris RN  Device Skin Pressure Protection:   absorbent pad utilized/changed   adhesive use limited   pressure points protected     Problem: Ventilator-Induced Lung Injury (Mechanical Ventilation, Invasive)  Goal: Absence of Ventilator-Induced Lung Injury  Intervention: Prevent Ventilator-Associated Pneumonia  Recent Flowsheet Documentation  Taken 3/9/2022 0000 by Monica Norris RN  Head of Bed (HOB) Positioning: HOB at 20 degrees  Taken 3/8/2022 2300 by Monica Norris RN  Head of Bed (HOB) Positioning: HOB at 20 degrees  Taken 3/8/2022 2200 by Monica Norris RN  Head of Bed (HOB) Positioning: HOB at 30-45 degrees  Taken 3/8/2022 2100 by Monica Norris RN  Head of Bed (HOB) Positioning: HOB at 30-45 degrees  Taken 3/8/2022 2000 by Monica Norris RN  Head of Bed (HOB) Positioning: HOB at 30-45 degrees  Taken 3/8/2022 1900 by Monica Norris RN  Head of Bed (HOB) Positioning: HOB at 30 degrees     Problem: Adjustment to Illness (Sepsis/Septic Shock)  Goal: Optimal Coping  Intervention: Optimize Psychosocial Adjustment to Illness  Recent Flowsheet Documentation  Taken 3/8/2022 2300 by Monica Norris RN  Supportive Measures: (sleeping) --  Family/Support System Care: support provided  Taken 3/8/2022 1900 by Monica Norris RN  Supportive Measures:   positive reinforcement provided   self-care encouraged   verbalization of feelings encouraged  Family/Support System Care: support provided     Problem: Bleeding (Sepsis/Septic Shock)  Goal: Absence of Bleeding  Intervention: Minimize Bleeding Risk  Recent Flowsheet Documentation  Taken 3/8/2022 2300 by Monica Norris RN  Bleeding  Precautions:   blood pressure closely monitored   monitored for signs of bleeding  Bleeding Management: dressing monitored  Taken 3/8/2022 1900 by Monica Norris RN  Bleeding Precautions:   blood pressure closely monitored   monitored for signs of bleeding     Problem: Glycemic Control Impaired (Sepsis/Septic Shock)  Goal: Blood Glucose Level Within Desired Range  Intervention: Optimize Glycemic Control  Recent Flowsheet Documentation  Taken 3/8/2022 2300 by Monica Norris RN  Glycemic Management:   blood glucose monitored   insulin dose matched to carbohydrate intake  Taken 3/8/2022 1900 by Monica Norris RN  Glycemic Management:   blood glucose monitored   insulin dose matched to carbohydrate intake     Problem: Hemodynamic Instability (Sepsis/Septic Shock)  Goal: Effective Tissue Perfusion  Intervention: Optimize Blood Flow  Recent Flowsheet Documentation  Taken 3/9/2022 0000 by Monica Norris RN  Stabilization Measures: legs elevated  Taken 3/8/2022 2300 by Monica Norris RN  Stabilization Measures: legs elevated  Fluid/Electrolyte Management: fluids provided  Taken 3/8/2022 2200 by Monica Norris RN  Stabilization Measures: legs elevated  Taken 3/8/2022 2100 by Monica Norris RN  Stabilization Measures: legs elevated  Taken 3/8/2022 2000 by Monica Norris RN  Stabilization Measures: legs elevated  Taken 3/8/2022 1900 by Monica Norris RN  Stabilization Measures: legs elevated  Fluid/Electrolyte Management: fluids provided     Problem: Infection (Sepsis/Septic Shock)  Goal: Absence of Infection Signs and Symptoms  Intervention: Prevent or Manage Infection Progression  Recent Flowsheet Documentation  Taken 3/8/2022 2300 by Monica Norris RN  Infection Management: aseptic technique maintained  Infection Prevention:   visitors restricted/screened   single patient room provided   rest/sleep promoted   hand hygiene promoted  Taken 3/8/2022 1900 by Monica Norris RN  Infection Management: aseptic technique maintained  Infection  Prevention:   visitors restricted/screened   single patient room provided   rest/sleep promoted   hand hygiene promoted     Problem: Respiratory Compromise (Sepsis/Septic Shock)  Goal: Effective Oxygenation and Ventilation  Intervention: Promote Airway Secretion Clearance  Recent Flowsheet Documentation  Taken 3/9/2022 0000 by Monica Norris RN  Activity Management: activity adjusted per tolerance  Taken 3/8/2022 2300 by Monica Norris RN  Activity Management: activity adjusted per tolerance  Taken 3/8/2022 2200 by Monica Norris RN  Activity Management: activity adjusted per tolerance  Taken 3/8/2022 2100 by Monica Norris RN  Activity Management: activity adjusted per tolerance  Taken 3/8/2022 2000 by Monica Norris RN  Activity Management: activity adjusted per tolerance  Taken 3/8/2022 1900 by Monica Norris RN  Activity Management: activity adjusted per tolerance  Breathing Techniques/Airway Clearance: deep/controlled cough encouraged  Cough And Deep Breathing: done independently per patient  Intervention: Optimize Oxygenation and Ventilation  Recent Flowsheet Documentation  Taken 3/9/2022 0000 by Monica Norris RN  Head of Bed (HOB) Positioning: HOB at 20 degrees  Taken 3/8/2022 2300 by Monica Norris RN  Head of Bed (HOB) Positioning: HOB at 20 degrees  Taken 3/8/2022 2200 by Monica Norris RN  Head of Bed (HOB) Positioning: HOB at 30-45 degrees  Taken 3/8/2022 2100 by Monica Norris RN  Head of Bed (HOB) Positioning: HOB at 30-45 degrees  Taken 3/8/2022 2000 by Monica Norris RN  Head of Bed (HOB) Positioning: HOB at 30-45 degrees  Taken 3/8/2022 1900 by Monica Norris RN  Head of Bed (HOB) Positioning: HOB at 30 degrees  Airway/Ventilation Management: pulmonary hygiene promoted     Problem: Fluid Imbalance (Pneumonia)  Goal: Fluid Balance  Intervention: Monitor and Manage Fluid Balance  Recent Flowsheet Documentation  Taken 3/8/2022 2300 by Monica Norris RN  Fluid/Electrolyte Management: fluids provided  Taken 3/8/2022  1900 by Monica Norris RN  Fluid/Electrolyte Management: fluids provided     Problem: Infection (Pneumonia)  Goal: Resolution of Infection Signs and Symptoms  Intervention: Prevent Infection Progression  Recent Flowsheet Documentation  Taken 3/8/2022 2300 by Monica Norris RN  Infection Management: aseptic technique maintained  Taken 3/8/2022 1900 by Monica Norris RN  Infection Management: aseptic technique maintained     Problem: Respiratory Compromise (Pneumonia)  Goal: Effective Oxygenation and Ventilation  Intervention: Promote Airway Secretion Clearance  Recent Flowsheet Documentation  Taken 3/8/2022 1900 by Monica Norris RN  Breathing Techniques/Airway Clearance: deep/controlled cough encouraged  Cough And Deep Breathing: done independently per patient  Intervention: Optimize Oxygenation and Ventilation  Recent Flowsheet Documentation  Taken 3/9/2022 0000 by Monica Norris RN  Head of Bed (HOB) Positioning: HOB at 20 degrees  Taken 3/8/2022 2300 by Monica Norris RN  Head of Bed (HOB) Positioning: HOB at 20 degrees  Taken 3/8/2022 2200 by Monica Norris RN  Head of Bed (HOB) Positioning: HOB at 30-45 degrees  Taken 3/8/2022 2100 by Monica Norris RN  Head of Bed (HOB) Positioning: HOB at 30-45 degrees  Taken 3/8/2022 2000 by Monica Norris RN  Head of Bed (HOB) Positioning: HOB at 30-45 degrees  Taken 3/8/2022 1900 by Monica Norris RN  Head of Bed (HOB) Positioning: HOB at 30 degrees  Airway/Ventilation Management: pulmonary hygiene promoted     Problem: Skin Injury Risk Increased  Goal: Skin Health and Integrity  Intervention: Optimize Skin Protection  Recent Flowsheet Documentation  Taken 3/9/2022 0000 by Monica Norris RN  Head of Bed (HOB) Positioning: HOB at 20 degrees  Taken 3/8/2022 2300 by Monica Norris RN  Pressure Reduction Techniques:   heels elevated off bed   frequent weight shift encouraged   pressure points protected  Head of Bed (HOB) Positioning: HOB at 20 degrees  Pressure Reduction Devices:    specialty bed utilized   pressure-redistributing mattress utilized  Skin Protection:   adhesive use limited   incontinence pads utilized   protective footwear used   transparent dressing maintained   tubing/devices free from skin contact  Taken 3/8/2022 2200 by Monica Norris RN  Head of Bed (HOB) Positioning: HOB at 30-45 degrees  Taken 3/8/2022 2100 by Monica Norris RN  Head of Bed (HOB) Positioning: HOB at 30-45 degrees  Taken 3/8/2022 2000 by Monica Norris RN  Head of Bed (HOB) Positioning: HOB at 30-45 degrees  Taken 3/8/2022 1900 by Monica Norris RN  Pressure Reduction Techniques:   frequent weight shift encouraged   heels elevated off bed   pressure points protected  Head of Bed (HOB) Positioning: HOB at 30 degrees  Pressure Reduction Devices:   pressure-redistributing mattress utilized   specialty bed utilized  Skin Protection:   adhesive use limited   incontinence pads utilized   protective footwear used   transparent dressing maintained   tubing/devices free from skin contact  Intervention: Promote and Optimize Oral Intake  Recent Flowsheet Documentation  Taken 3/8/2022 2300 by Monica Norris RN  Oral Nutrition Promotion: rest periods promoted  Taken 3/8/2022 1900 by Monica Norris RN  Oral Nutrition Promotion: rest periods promoted     Problem: Fall Injury Risk  Goal: Absence of Fall and Fall-Related Injury  Intervention: Identify and Manage Contributors to Fall Injury Risk  Recent Flowsheet Documentation  Taken 3/8/2022 2300 by Monica Norris RN  Medication Review/Management: medications reviewed  Taken 3/8/2022 1900 by Monica Norris RN  Medication Review/Management: medications reviewed  Self-Care Promotion: independence encouraged  Intervention: Promote Injury-Free Environment  Recent Flowsheet Documentation  Taken 3/8/2022 2300 by Monica Norris RN  Safety Promotion/Fall Prevention:   activity supervised   assistive device/personal items within reach   clutter free environment maintained   fall prevention  program maintained   lighting adjusted   muscle strengthening facilitated   nonskid shoes/slippers when out of bed   room organization consistent   safety round/check completed   toileting scheduled  Taken 3/8/2022 1900 by Monica Norris RN  Safety Promotion/Fall Prevention:   activity supervised   assistive device/personal items within reach   clutter free environment maintained   fall prevention program maintained   lighting adjusted   nonskid shoes/slippers when out of bed   room organization consistent   safety round/check completed   toileting scheduled   muscle strengthening facilitated     Problem: Skin Injury Risk Increased  Goal: Skin Health and Integrity  Intervention: Optimize Skin Protection  Recent Flowsheet Documentation  Taken 3/9/2022 0000 by Monica Norris RN  Head of Bed (HOB) Positioning: HOB at 20 degrees  Taken 3/8/2022 2300 by Monica Norris RN  Pressure Reduction Techniques:   heels elevated off bed   frequent weight shift encouraged   pressure points protected  Head of Bed (HOB) Positioning: HOB at 20 degrees  Pressure Reduction Devices:   specialty bed utilized   pressure-redistributing mattress utilized  Skin Protection:   adhesive use limited   incontinence pads utilized   protective footwear used   transparent dressing maintained   tubing/devices free from skin contact  Taken 3/8/2022 2200 by Monica Norris RN  Head of Bed (HOB) Positioning: HOB at 30-45 degrees  Taken 3/8/2022 2100 by Monica Norris RN  Head of Bed (HOB) Positioning: HOB at 30-45 degrees  Taken 3/8/2022 2000 by Monica Norris RN  Head of Bed (HOB) Positioning: HOB at 30-45 degrees  Taken 3/8/2022 1900 by Monica Norris RN  Pressure Reduction Techniques:   frequent weight shift encouraged   heels elevated off bed   pressure points protected  Head of Bed (HOB) Positioning: HOB at 30 degrees  Pressure Reduction Devices:   pressure-redistributing mattress utilized   specialty bed utilized  Skin Protection:   adhesive use limited    incontinence pads utilized   protective footwear used   transparent dressing maintained   tubing/devices free from skin contact  Intervention: Promote and Optimize Oral Intake  Recent Flowsheet Documentation  Taken 3/8/2022 2300 by Monica Norris RN  Oral Nutrition Promotion: rest periods promoted  Taken 3/8/2022 1900 by Monica Norris RN  Oral Nutrition Promotion: rest periods promoted     Problem: Diabetes Comorbidity  Goal: Blood Glucose Level Within Desired Range  Intervention: Maintain Glycemic Control  Recent Flowsheet Documentation  Taken 3/8/2022 2300 by Monica Norris RN  Glycemic Management:   blood glucose monitored   insulin dose matched to carbohydrate intake  Taken 3/8/2022 1900 by Monica Norris RN  Glycemic Management:   blood glucose monitored   insulin dose matched to carbohydrate intake     Problem: Skin Injury Risk Increased  Goal: Skin Health and Integrity  Intervention: Optimize Skin Protection  Recent Flowsheet Documentation  Taken 3/9/2022 0000 by Monica Norris RN  Head of Bed (HOB) Positioning: HOB at 20 degrees  Taken 3/8/2022 2300 by Monica Norris RN  Pressure Reduction Techniques:   heels elevated off bed   frequent weight shift encouraged   pressure points protected  Head of Bed (HOB) Positioning: HOB at 20 degrees  Pressure Reduction Devices:   specialty bed utilized   pressure-redistributing mattress utilized  Skin Protection:   adhesive use limited   incontinence pads utilized   protective footwear used   transparent dressing maintained   tubing/devices free from skin contact  Taken 3/8/2022 2200 by Monica Norris RN  Head of Bed (HOB) Positioning: HOB at 30-45 degrees  Taken 3/8/2022 2100 by Monica Norris RN  Head of Bed (HOB) Positioning: HOB at 30-45 degrees  Taken 3/8/2022 2000 by Monica Norris RN  Head of Bed ([facility]) Positioning: HOB at 30-45 degrees  Taken 3/8/2022 1900 by Monica Norris RN  Pressure Reduction Techniques:   frequent weight shift encouraged   heels elevated off bed    pressure points protected  Head of Bed (HOB) Positioning: HOB at 30 degrees  Pressure Reduction Devices:   pressure-redistributing mattress utilized   specialty bed utilized  Skin Protection:   adhesive use limited   incontinence pads utilized   protective footwear used   transparent dressing maintained   tubing/devices free from skin contact  Intervention: Promote and Optimize Oral Intake  Recent Flowsheet Documentation  Taken 3/8/2022 2300 by Monica Norris RN  Oral Nutrition Promotion: rest periods promoted  Taken 3/8/2022 1900 by Monica Norris RN  Oral Nutrition Promotion: rest periods promoted     Problem: Fluid Imbalance (Pneumonia)  Goal: Fluid Balance  Intervention: Monitor and Manage Fluid Balance  Recent Flowsheet Documentation  Taken 3/8/2022 2300 by Monica Norris RN  Fluid/Electrolyte Management: fluids provided  Taken 3/8/2022 1900 by Monica Norris RN  Fluid/Electrolyte Management: fluids provided     Problem: Skin Injury Risk Increased  Goal: Skin Health and Integrity  Intervention: Promote and Optimize Oral Intake  Recent Flowsheet Documentation  Taken 3/8/2022 2300 by Moinca Norris RN  Oral Nutrition Promotion: rest periods promoted  Taken 3/8/2022 1900 by Monica Norris RN  Oral Nutrition Promotion: rest periods promoted  Intervention: Optimize Skin Protection  Recent Flowsheet Documentation  Taken 3/9/2022 0000 by Monica Norris RN  Head of Bed (HOB) Positioning: HOB at 20 degrees  Taken 3/8/2022 2300 by Monica Norris RN  Pressure Reduction Techniques:   heels elevated off bed   frequent weight shift encouraged   pressure points protected  Head of Bed (HOB) Positioning: HOB at 20 degrees  Pressure Reduction Devices:   specialty bed utilized   pressure-redistributing mattress utilized  Skin Protection:   adhesive use limited   incontinence pads utilized   protective footwear used   transparent dressing maintained   tubing/devices free from skin contact  Taken 3/8/2022 2200 by Monica Norris RN  Head of  Bed (HOB) Positioning: HOB at 30-45 degrees  Taken 3/8/2022 2100 by Monica Norris RN  Head of Bed (HOB) Positioning: HOB at 30-45 degrees  Taken 3/8/2022 2000 by Monica Norris RN  Head of Bed (HOB) Positioning: HOB at 30-45 degrees  Taken 3/8/2022 1900 by Monica Norris RN  Pressure Reduction Techniques:   frequent weight shift encouraged   heels elevated off bed   pressure points protected  Head of Bed (HOB) Positioning: HOB at 30 degrees  Pressure Reduction Devices:   pressure-redistributing mattress utilized   specialty bed utilized  Skin Protection:   adhesive use limited   incontinence pads utilized   protective footwear used   transparent dressing maintained   tubing/devices free from skin contact   Goal Outcome Evaluation:  Plan of Care Reviewed With: patient        Progress: improving  Outcome Evaluation: Seems to do well during the day. At night becomes confused at times and takes oxygen off. Put back on heated high flow at 40L 50% from 11 L high flow on 3/6/22. No changes in oxygen status today. Montiored closely by RN.

## 2022-03-09 NOTE — PLAN OF CARE
Goal Outcome Evaluation:              Outcome Evaluation: PT: Patient performs sit to stand with SBA and verbal cues for hand placement.  Patient performs gait 2x30 feet with seated rest break.  Patient's O2 sats decreased to 77-80% during mobility trials, requires 1-1.5 minutes to recover to 87%.  Patient continues to be limited by O2 sats and decreased activity tolerance.  Will continue to follow for therapy needs.

## 2022-03-09 NOTE — PLAN OF CARE
Goal Outcome Evaluation:  Plan of Care Reviewed With: patient        Progress: improving  Outcome Evaluation: Pt has been up to chair this shift as he prefers to be up to his chair.  Pt has worked on his laptop quite a bit this shift and is currently watching tv.  Pt remains on 10L humidified highflow O2 today and his sats are maintaining well.  Nurse noted this morning when pt was talking to other staff in his room or his family per his phone, his sats would drop in the upper 80s with 85% being the lowest.  Pt would revocer to 90% within 1 minute.  This afternoon this nurse has noted that the pt's sats are staying in the low-mid 90s while talking with staff members or family members.  Pt is still tachypneic with SOA with exertion.  Some tachycardia noted during these episodes with a HR of 101-108.  Pt is alert and oriented with no episodes of confusion noted this shift.  Chair alarm remains in place and functioning but pt has not attempted to get up without help.  Pt utilizes call light when he does need assistance or has a question.  Pt's appetite is great.  Pt ate 100% of his breakfast and lunch and all of his Boost with both meals.  Continues on Accuchecks TID with SSI and scheduled Levemir.  Pt utilizes urinal and has had good UOP this shift.  BSC in place.  Pt is wishing to get up and use the bathroom in his room, but this nurse suggested the pt let nursing walk with him to the bathroom to ensure his safety.  Pt is agreeable.  Pt did with PT this AM.  IV Solumedrol D/C'd and pt had his first dose of PO Prednisone this AM.  Pt refused colace and miralax due to very large BM he had on 3/8.  Pt continues on Eliquis.  Pt's WBC is trending down.  Crackles noted to bilateral bases of lungs.  Pt does require standby assist.  Pt is very pleasant and cooperative with care.  Once pt eats dinner, the plan is to go walking in the room with his nurse.  Call light within reach.  Pt has denied pain or discomfort this entire  shift.

## 2022-03-09 NOTE — THERAPY TREATMENT NOTE
Acute Care - Speech Language Pathology   Swallow Treatment Note DILAN Ward     Patient Name: Dontae Bonds Jr.  : 1950  MRN: [id number]  Today's Date: 3/9/2022  Onset of Illness/Injury or Date of Surgery: 22     Referring Physician: Dr Matt      Admit Date: 2022    Visit Dx:     ICD-10-CM ICD-9-CM   1. Pneumonia of both lungs due to infectious organism, unspecified part of lung  J18.9 483.8   2. Acute respiratory failure without hypercapnia (HCC)  J96.00 518.81     Patient Active Problem List   Diagnosis   • Personal history of colonic polyps   • Family history of colon cancer   • Pneumonia due to COVID-19 virus   • Pneumonia of both lungs due to infectious organism   • Hypertension   • Hypokalemia     Past Medical History:   Diagnosis Date   • Hyperlipidemia    • Hypertension      History reviewed. No pertinent surgical history.    SLP Recommendation and Plan     SLP Diet Recommendation: soft textures, chopped, thin liquids, nutritional supplements needed, other (see comments) (small portions w/snacks and supplements in between) (22)  Recommended Precautions and Strategies: upright posture during/after eating, small bites of food and sips of liquid, no straw, 3 second prep, general aspiration precautions, reflux precautions, fatigue precautions (22)  SLP Rec. for Method of Medication Administration: meds whole, with pudding or applesauce, as tolerated (22)     Monitor for Signs of Aspiration: yes, elevated WBC count, gurgly voice, upper respiratory (22)        Anticipated Discharge Disposition (SLP): home with home health, home with assist (22)     Therapy Frequency (Swallow): daily, 3 days per week (22)  Predicted Duration Therapy Intervention (Days): 1 week, until discharge (22)     Daily Summary of Progress (SLP): progress toward functional goals as expected, prepare for discharge (22)         Patient/Family  Concerns, Anticipated Discharge Disposition (SLP): No concerns r/t swallowing per pt. (03/09/22 1500)     Treatment Assessment (SLP): Pt continues with good progress, tolerance of current diet and supplements. Able to recall diet level but not use of compensatory strategies consistently. (03/09/22 1500)  Plan for Continued Treatment (SLP): continue treatment per plan of care, other (see comments) (1-2 more visits before discharge) (03/09/22 1500)           SWALLOW EVALUATION (last 72 hours)     SLP Adult Swallow Evaluation     Row Name 03/09/22 1500                   Rehab Evaluation    Document Type therapy note (daily note)  -AD        Subjective Information complains of  boredom  -AD        Patient Observations alert;cooperative;agree to therapy  -AD        Patient/Family/Caregiver Comments/Observations Pt reports he is going stir crazy and reports nothing to do. Upright in a recliner.  -AD        Patient Effort good  -AD        Symptoms Noted During/After Treatment none  -AD                  Pain Scale: Numbers Pre/Post-Treatment    Pre/Posttreatment Pain Comment --  no pain reported  -AD                  SLP Treatment Clinical Impressions    Treatment Assessment (SLP) Pt continues with good progress, tolerance of current diet and supplements. Able to recall diet level but not use of compensatory strategies consistently.  -AD        Daily Summary of Progress (SLP) progress toward functional goals as expected;prepare for discharge  -AD        Barriers to Overall Progress (SLP) Medically complex  -AD        Plan for Continued Treatment (SLP) continue treatment per plan of care;other (see comments)  1-2 more visits before discharge  -AD        Care Plan Review care plan/treatment goals reviewed;risks/benefits reviewed;current/potential barriers reviewed;patient/other agree to care plan  -AD                  Recommendations    Therapy Frequency (Swallow) daily;3 days per week  -AD        Predicted Duration Therapy  Intervention (Days) 1 week;until discharge  -AD        SLP Diet Recommendation soft textures;chopped;thin liquids;nutritional supplements needed;other (see comments)  small portions w/snacks and supplements in between  -AD        Recommended Precautions and Strategies upright posture during/after eating;small bites of food and sips of liquid;no straw;3 second prep;general aspiration precautions;reflux precautions;fatigue precautions  -AD        Oral Care Recommendations Oral Care before breakfast, after meals and PRN;Toothbrush  -AD        SLP Rec. for Method of Medication Administration meds whole;with pudding or applesauce;as tolerated  -AD        Monitor for Signs of Aspiration yes;elevated WBC count;gurgly voice;upper respiratory  -AD        Anticipated Discharge Disposition (SLP) home with home health;home with assist  -AD        Patient/Family Concerns, Anticipated Discharge Disposition (SLP) No concerns r/t swallowing per pt.  -AD                  Oral Nutrition/Hydration Goal (SLP)    Oral Nutrition/Hydration Goal, SLP Pt will demonstrate tolerance of the least restrictive diet w/o s/s of aspiration or complications such as aspiration pneumonia.  -AD        Time Frame (Oral Nutrition/Hydration Goal, SLP) short term goal (STG);1 week;by discharge  -AD        Barriers (Oral Nutrition/Hydration Goal, SLP) medically complex  -AD        Progress/Outcomes (Oral Nutrition/Hydration Goal, SLP) good progress toward goal;goal ongoing  tolerating current diet; no changes recommended; pt likes smaller portions and snacks/supplements  -AD                  Swallow Management Recall Goal 1 (SLP)    Activity (Swallow Management Recall Goal 1, SLP) recall of;safe diet level/texture;compensatory swallow strategies/techniques  -AD        [place]/Accuracy (Swallow Management Recall Goal 1, SLP) with moderate cues (50-74% accuracy)  -AD        Time Frame (Swallow Management Recall Goal 1, SLP) short term goal (STG);1  week;by discharge  -AD        Barriers (Swallow Management Recall Goal 1, SLP) medically complex  -AD        Progress/Outcomes (Swallow Management Recall Goal 1, SLP) continuing progress toward goal;goal partially met  met recall fo diet level; continues to need prompts for prepping/small bites and drinks.  -AD                  Swallow Compensatory Strategies Goal 1 (SLP)    Activity (Swallow Compensatory Strategies/Techniques Goal 1, SLP) compensatory strategies;during meal intake;during p.o. trials;small cup sips;extra swallow per bolus;other (see comments)  -AD        [place]/Accuracy (Swallow Compensatory Strategies/Techniques Goal 1, SLP) with moderate cues (50-74% accuracy)  -AD        Time Frame (Swallow Compensatory Strategies/Techniques Goal 1, SLP) short term goal (STG);1 week;by discharge  -AD        Barriers (Swallow Compensatory Strategies/Techniques Goal 1, SLP) medically complex; cognitive status  -AD        Progress/Outcomes (Swallow Compensatory Strategies/Techniques Goal 1, SLP) other (see comments)  goal not targeted during this session.  -AD              User Key  (r) = Recorded By, (t) = Taken By, (c) = Cosigned By    Initials Name Effective Dates    AD Daniela Robertson MS CCC-SLP 06/16/21 -                 EDUCATION  The patient has been educated in the following areas:   Dysphagia (Swallowing Impairment) Modified Diet Instruction compensatory strategies.  Pt verbalizes understanding. Reinforcement need for prepping/small drinks/bites.         SLP GOALS     Row Name 03/09/22 1500             Oral Nutrition/Hydration Goal (SLP)    Oral Nutrition/Hydration Goal, SLP Pt will demonstrate tolerance of the least restrictive diet w/o s/s of aspiration or complications such as aspiration pneumonia.  -AD      Time Frame (Oral Nutrition/Hydration Goal, SLP) short term goal (STG);1 week;by discharge  -AD      Barriers (Oral Nutrition/Hydration Goal, SLP) medically complex  -AD       Progress/Outcomes (Oral Nutrition/Hydration Goal, SLP) good progress toward goal;goal ongoing  tolerating current diet; no changes recommended; pt likes smaller portions and snacks/supplements  -AD              Swallow Management Recall Goal 1 (SLP)    Activity (Swallow Management Recall Goal 1, SLP) recall of;safe diet level/texture;compensatory swallow strategies/techniques  -AD      [place]/Accuracy (Swallow Management Recall Goal 1, SLP) with moderate cues (50-74% accuracy)  -AD      Time Frame (Swallow Management Recall Goal 1, SLP) short term goal (STG);1 week;by discharge  -AD      Barriers (Swallow Management Recall Goal 1, SLP) medically complex  -AD      Progress/Outcomes (Swallow Management Recall Goal 1, SLP) continuing progress toward goal;goal partially met  met recall fo diet level; continues to need prompts for prepping/small bites and drinks.  -AD              Swallow Compensatory Strategies Goal 1 (SLP)    Activity (Swallow Compensatory Strategies/Techniques Goal 1, SLP) compensatory strategies;during meal intake;during p.o. trials;small cup sips;extra swallow per bolus;other (see comments)  -AD      [place]/Accuracy (Swallow Compensatory Strategies/Techniques Goal 1, SLP) with moderate cues (50-74% accuracy)  -AD      Time Frame (Swallow Compensatory Strategies/Techniques Goal 1, SLP) short term goal (STG);1 week;by discharge  -AD      Barriers (Swallow Compensatory Strategies/Techniques Goal 1, SLP) medically complex; cognitive status  -AD      Progress/Outcomes (Swallow Compensatory Strategies/Techniques Goal 1, SLP) other (see comments)  goal not targeted during this session.  -AD            User Key  (r) = Recorded By, (t) = Taken By, (c) = Cosigned By    Initials Name Provider Type    Daniela Burgess MS CCC-SLP Speech and Language Pathologist                   Time Calculation:    Time Calculation- SLP     Row Name 03/09/22 8455             Time Calculation- SLP    SLP Start  Time 1430  -AD      SLP Stop Time 1500  -AD      SLP Time Calculation (min) 30 min  -AD      Total Timed Code Minutes- SLP 0 minute(s)  -AD      SLP Non-Billable Time (min) 0 min  -AD      SLP Received On 03/09/22  -AD              Untimed Charges    30272-MS Treatment Swallow Minutes 30  -AD              Total Minutes    Untimed Charges Total Minutes 30  -AD       Total Minutes 30  -AD            User Key  (r) = Recorded By, (t) = Taken By, (c) = Cosigned By    Initials Name Provider Type    Daniela Burgess, MS CCC-SLP Speech and Language Pathologist                Therapy Charges for Today     Code Description Service Date Service Provider Modifiers Qty    [id number] HC ST TREATMENT SWALLOW 2 3/9/2022 Daniela Robertson MS CCC-SLP GN 1               Daniela Robertson MS CCC-SLP  3/9/2022

## 2022-03-09 NOTE — PROGRESS NOTES
[facility] INPATIENT PROGRESS NOTE         [facility] ICU    3/9/2022      PATIENT IDENTIFICATION:  Name: Dontae Bonds Jr. ADMIT: 2022   : 1950  PCP: Tiffany Burciaga MD    MRN: [id number] LOS: 16 days   AGE/SEX: 71 y.o. male  ROOM: ICU1/                     LOS 16    Reason for visit: Respiratory failure      SUBJECTIVE:      On 10L oxygen.  No new issues overnight.        Objective   OBJECTIVE:    Vital Sign Min/Max for last 24 hours  Temp  Min: 97.5 °F (36.4 °C)  Max: 98.5 °F (36.9 °C)   BP  Min: 103/66  Max: 165/35   Pulse  Min: 53  Max: 114   Resp  Min: 16  Max: 30   SpO2  Min: 85 %  Max: 99 %   No data recorded   Weight  Min: 75.7 kg (166 lb 12.8 oz)  Max: 75.7 kg (166 lb 12.8 oz)                   FiO2 (%): 41 %     Body mass index is 25.57 kg/m².    Intake/Output Summary (Last 24 hours) at 3/9/2022 0733  Last data filed at 3/9/2022 0600  Gross per 24 hour   Intake 540 ml   Output 1650 ml   Net -1110 ml         Exam:  GEN:  No distress, appears stated age  EYES:   PERRL, anicteric sclerae  ENT:    External ears/nose normal, OP clear  NECK:  No adenopathy, midline trachea  LUNGS: Normal chest on inspection, palpation and crackles at bases on auscultation  CV:  Normal S1S2, without murmur  ABD:  Nontender, nondistended, no hepatosplenomegaly, +BS  EXT:  No edema.  No cyanosis or clubbing.  No mottling and normal cap refill.    Assessment     Scheduled meds:  apixaban, 5 mg, Oral, Q12H  atorvastatin, 40 mg, Oral, Nightly  docusate sodium, 100 mg, Oral, BID  ferrous sulfate, 324 mg, Oral, Daily With Breakfast  insulin aspart, 0-24 Units, Subcutaneous, TID With Meals  insulin detemir, 7 Units, Subcutaneous, Daily  ipratropium-albuterol, 3 mL, Nebulization, 4x Daily - RT  methylPREDNISolone sodium succinate, 40 mg, Intravenous, Q8H  polyethylene glycol, 17 g, Oral, Daily      IV meds:                         Data Review:  Results from last 7 days   Lab Units 22  0517 22  0515  03/06/22  0500 03/04/22  0530   SODIUM mmol/L 136 139 140 137   POTASSIUM mmol/L 4.8 4.6 4.2 4.4   CHLORIDE mmol/L 101 104 104 99   CO2 mmol/L 29.2* 30.4* 27.4 32.0*   BUN mg/dL 23 23 28* 21   CREATININE mg/dL 0.49* 0.52* 0.46* 0.50*   GLUCOSE mg/dL 168* 165* 172* 120*   CALCIUM mg/dL 9.5 9.7 9.5 9.4         Estimated Creatinine Clearance: 90.7 mL/min (A) (by C-G formula based on SCr of 0.49 mg/dL (L)).  Results from last 7 days   Lab Units 03/09/22  0542 03/08/22  0517 03/07/22  0515   WBC 10*3/mm3 25.40* 29.10* 33.95*   HEMOGLOBIN g/dL 11.4* 11.2* 11.6*   PLATELETS 10*3/mm3 378 410 433         Results from last 7 days   Lab Units 03/07/22  0515 03/06/22  0500 03/04/22  0530   ALT (SGPT) U/L 92* 84* 91*   AST (SGOT) U/L 27 29 31         Results from last 7 days   Lab Units 03/07/22  0515 03/04/22  0530   PROCALCITONIN ng/mL 0.06 0.06         Glucose   Date/Time Value Ref Range Status   03/08/2022 1706 171 (H) 70 - 130 mg/dL Final     Comment:     Meter: [id number] : CHRIS Sears RN   03/08/2022 1140 249 (H) 70 - 130 mg/dL Final     Comment:     Meter: [id number] : CHRIS Sears RN   03/08/2022 0713 183 (H) 70 - 130 mg/dL Final     Comment:     Meter: [id number] : CHRIS Sears RN   03/07/2022 1808 174 (H) 70 - 130 mg/dL Final     Comment:     Meter: [id number] : [id number] Aroldo Medina RN (Validator)   03/07/2022 1201 285 (H) 70 - 130 mg/dL Final     Comment:     Meter: [id number] : [id number] Aroldo Medina RN (Validator)   03/06/2022 1758 250 (H) 70 - 130 mg/dL Final     Comment:     Meter: [id number] : [id number] Aroldo Medina RN (Validator)   03/06/2022 1203 165 (H) 70 - 130 mg/dL Final     Comment:     Meter: [id number] : [id number] Aroldo Medina RN (Validator)     Most recent chest x-ray 3/9 reviewed shows bilateral airspace disease and coarse interstitial changes with fibrotic progress.          Microbiology reviewed              Active  Hospital Problems    Diagnosis  POA   • **Pneumonia of both lungs due to infectious organism [J18.9]  Yes   • Hypertension [I10]  Yes   • Hypokalemia [E87.6]  Yes      Resolved Hospital Problems   No resolved problems to display.         ASSESSMENT:    Acute hypoxemic respiratory failure  Recent COVID-19 pneumonia  Bacterial superimposed pneumonia  Encephalopathy: Improving  Anemia  Alcohol abuse  Worsening leukocytosis      PLAN:    He is in a fibrotic phase of his recent Covid pneumonia and possible bacterial pneumonia on top of that.  Status post antibiotic course.    Continue tapering steroid.  Change to p.o.  Weaning oxygen as able.  On full anticoagulation.  Guarded prognosis.        Fabiano Dunbar MD  Pulmonary and Critical Care Medicine  [facility], [facility]  3/9/2022    07:33 EST

## 2022-03-09 NOTE — THERAPY TREATMENT NOTE
Acute Care - Physical Therapy Treatment Note  DILAN Ward     Patient Name: Dontae Bonds Jr.  : 1950  MRN: [id number]  Today's Date: 3/9/2022   Onset of Illness/Injury or Date of Surgery: 22  Visit Dx:     ICD-10-CM ICD-9-CM   1. Pneumonia of both lungs due to infectious organism, unspecified part of lung  J18.9 483.8   2. Acute respiratory failure without hypercapnia (HCC)  J96.00 518.81     Patient Active Problem List   Diagnosis   • Personal history of colonic polyps   • Family history of colon cancer   • Pneumonia due to COVID-19 virus   • Pneumonia of both lungs due to infectious organism   • Hypertension   • Hypokalemia     Past Medical History:   Diagnosis Date   • Hyperlipidemia    • Hypertension      History reviewed. No pertinent surgical history.  PT Assessment (last 12 hours)     PT Evaluation and Treatment     Row Name 22          Physical Therapy Time and Intention    Subjective Information complains of;dyspnea  -     Document Type therapy note (daily note)  -     Mode of Treatment physical therapy  -     Patient Effort good  -     Symptoms Noted During/After Treatment shortness of breath  -     Row Name 22          General Information    Patient Observations alert;agree to therapy;cooperative  -     Patient/Family/Caregiver Comments/Observations pt sitting in recliner, agrees to therapy  -     Existing Precautions/Restrictions fall;oxygen therapy device and L/min  10 liters humidified high flow  -     Barriers to Rehab medically complex  -     Comment, General Information decreased O2 sats with activity  -     Row Name 22          Pain    Pre/Posttreatment Pain Comment no c/o pain  -     Row Name 22          Cognition    Follows Commands (Cognition) follows one-step commands  -     Personal Safety Interventions gait belt;nonskid shoes/slippers when out of bed  -     Row Name 22          Bed Mobility     Comment, (Bed Mobility) deferred up in chair  -     Row Name 03/09/22 0913          Transfers    Transfers sit-stand transfer;stand-sit transfer  -     Comment, (Transfers) cues for hand placement  -     Sit-Stand [place] (Transfers) standby assist  -     Stand-Sit [place] (Transfers) standby assist;verbal cues  -     Row Name 03/09/22 0913          Sit-Stand Transfer    Assistive Device (Sit-Stand Transfers) walker, front-wheeled  -     Row Name 03/09/22 0913          Stand-Sit Transfer    Assistive Device (Stand-Sit Transfers) walker, front-wheeled  cues for hand placement  -     Row Name 03/09/22 0913          Gait/Stairs (Locomotion)    [place] Level (Gait) contact guard;verbal cues  -     Assistive Device (Gait) walker, front-wheeled  -     Distance in Feet (Gait) 2x30 feet with seated rest break  -     Pattern (Gait) swing-through  -     Deviations/Abnormal Patterns (Gait) base of support, narrow;jaison decreased  -     Bilateral Gait Deviations forward flexed posture  -     Comment, (Gait/Stairs) pt requires verbal cues for awareness of O2 line and safety during direction changes  -     Row Name 03/09/22 0913          Plan of Care Review    Outcome Evaluation PT: Patient performs sit to stand with SBA and verbal cues for hand placement.  Patient performs gait 2x30 feet with seated rest break.  Patient's O2 sats decreased to 77-80% during mobility trials, requires 1-1.5 minutes to recover to 87%.  Patient continues to be limited by O2 sats and decreased activity tolerance.  Will continue to follow for therapy needs.  -     Row Name 03/09/22 0913          Positioning and Restraints    Pre-Treatment Position sitting in chair/recliner  -     Post Treatment Position chair  -JW     In Chair reclined;call light within reach;encouraged to call for assist;exit alarm on  -     Row Name 03/09/22 0913          Progress Summary (PT)    Progress Toward Functional Goals (PT)  progress toward functional goals is good  -           User Key  (r) = Recorded By, (t) = Taken By, (c) = Cosigned By    Initials Name Provider Type    Tami Kitchen, MARY Physical Therapist                Physical Therapy Education                 Title: PT OT SLP Therapies (In Progress)     Topic: Physical Therapy (In Progress)     Point: Mobility training (Done)     Learning Progress Summary           Patient Acceptance, E,TB, VU by BRIANA at 3/9/2022 1020    Acceptance, E,TB, VU by BRIANA at 3/8/2022 1003    Acceptance, E,TB, VU by BRIANA at 3/7/2022 0919                   Point: Home exercise program (Not Started)     Learner Progress:  Not documented in this visit.                      User Key     Initials Effective Dates Name Provider Type Discipline     06/16/21 -  Tami Hodgson, MARY Physical Therapist PT              PT Recommendation and Plan  Anticipated Discharge Disposition (PT):  (will continue to assess as medical status improves and O2 requirements decreased)  Planned Therapy Interventions (PT): balance training, bed mobility training, gait training, home exercise program, strengthening, transfer training, patient/family education  Therapy Frequency (PT): daily  Progress Summary (PT)  Progress Toward Functional Goals (PT): progress toward functional goals is good  Outcome Evaluation: PT: Patient performs sit to stand with SBA and verbal cues for hand placement.  Patient performs gait 2x30 feet with seated rest break.  Patient's O2 sats decreased to 77-80% during mobility trials, requires 1-1.5 minutes to recover to 87%.  Patient continues to be limited by O2 sats and decreased activity tolerance.  Will continue to follow for therapy needs.   Outcome Measures     Row Name 03/09/22 0913 03/08/22 0930 03/07/22 0848       How much help from another person do you currently need...    Turning from your back to your side while in flat bed without using bedrails? 3  -JW 3  -JW 3  -JW    Moving from lying on back  to sitting on the side of a flat bed without bedrails? 3  -JW 3  -JW 3  -JW    Moving to and from a bed to a chair (including a wheelchair)? 3  -JW 3  -JW 3  -JW    Standing up from a chair using your arms (e.g., wheelchair, bedside chair)? 3  -JW 3  -JW 3  -JW    Climbing 3-5 steps with a railing? 2  -JW 1  -JW 1  -JW    To walk in hospital room? 3  -JW 1  -JW 1  -JW    AM-PAC 6 Clicks Score (PT) 17  -JW 14  -JW 14  -JW       Functional Assessment    Outcome Measure Options AM-PAC 6 Clicks Basic Mobility (PT)  -JW AM-PAC 6 Clicks Basic Mobility (PT)  -JW AM-PAC 6 Clicks Basic Mobility (PT)  -JW          User Key  (r) = Recorded By, (t) = Taken By, (c) = Cosigned By    Initials Name Provider Type    Tami Kitchen, PT Physical Therapist                 Time Calculation:    PT Charges     Row Name 03/09/22 1021             Time Calculation    Start Time 0913  -JW      Stop Time 0934  -JW      Time Calculation (min) 21 min  -JW      PT Received On 03/09/22  -JW      PT - Next Appointment 03/10/22  -JW              Timed Charges    82810 - Gait Training Minutes  21  -JW              Total Minutes    Timed Charges Total Minutes 21  -JW       Total Minutes 21  -JW            User Key  (r) = Recorded By, (t) = Taken By, (c) = Cosigned By    Initials Name Provider Type    Tami Kitchen, PT Physical Therapist              Therapy Charges for Today     Code Description Service Date Service Provider Modifiers Qty    97442532935 HC PT THER PROC EA 15 MIN 3/8/2022 Tami Hodgson, PT GP 1    67234457380 HC GAIT TRAINING EA 15 MIN 3/9/2022 Tami Hodgson, PT GP 1          PT G-Codes  Outcome Measure Options: AM-PAC 6 Clicks Basic Mobility (PT)  AM-PAC 6 Clicks Score (PT): 17    Tami Hodgson PT  3/9/2022

## 2022-03-09 NOTE — CASE MANAGEMENT/SOCIAL WORK
Continued Stay Note  DILAN Ward     Patient Name: Dontae Bonds Jr.  MRN: [id number]  Today's Date: 3/9/2022    Admit Date: 2/21/2022     Discharge Plan     Row Name 03/09/22 1314       Plan    [facility]     Patient/Family in Agreement with Plan yes    Plan Comments Spoke with patient at bedside today regarding discharge plans. He is currently sitting up  in the chair and continues to get winded when talking. Patient states that they are continuing to wean his oxygen and when he is discharged he plans on returning home and continuing with [facility] . Patient is currently on 10L humidified HF NC. Patient had no other questions or concerns regarding discharge plans. CM will continue to follow for needs.               Discharge Codes    No documentation.                     Toma Marc RN

## 2022-03-09 NOTE — PROGRESS NOTES
"Hospitalist Team      Patient Care Team:  Tiffany Burciaga MD as PCP - General (Internal Medicine)      Chief Complaint: Follow-up Acute Hypoxic Respiratory Failure    Subjective    In chair and feels stable. No acute issues. Still with significant NOLASCO but he is wanting to be more active. No fever.     Objective    Vital Signs  Temp:  [97.5 °F (36.4 °C)-98.5 °F (36.9 °C)] 98.2 °F (36.8 °C)  Heart Rate:  [] 89  Resp:  [16-32] 32  BP: (103-165)/(35-93) 121/91  Oxygen Therapy  SpO2: (!) 82 %  Pulse Oximetry Type: Continuous  Device (Oxygen Therapy): ventilator  Device (Oxygen Therapy): humidified, high-flow nasal cannula  $ High Flow Nasal Cannula Set-Up: yes  Flow (L/min): 10  Oxygen Concentration (%): 40  ETCO2 (mmHg): 32 mmHg  Probe Placed On (Pulse Ox): Left:, forehead  Probe Removed From (Pulse Ox): forehead, Left:}    Flowsheet Rows    Flowsheet Row First Filed Value   Admission Height 172.7 cm (68\") Documented at 02/21/2022 1843   Admission Weight 78.9 kg (174 lb) Documented at 02/21/2022 1825          Physical Exam:    General: NAD  Lungs: Manage sounds at the bases, mild tachypnea noted  CV: Regular rate and rhythm.  No murmur appreciated.  Radial pulses are 2+ and symmetric.  Abdomen: Soft and non-tender w/ active bowel sounds.  MSK: No C/C/E.  Neuro: CN II-XII grossly intact.  Psych: AO x2 mood stable.    Results Review:     I reviewed the patient's new clinical results.    Lab Results (last 24 hours)     Procedure Component Value Units Date/Time    POC Glucose Once [[id number]]  (Abnormal) Collected: 03/09/22 0829    Specimen: Blood Updated: 03/09/22 0835     Glucose 165 mg/dL      Comment: Meter: [id number] : [id number] Stefania Kim RN       CBC & Differential [[id number]]  (Abnormal) Collected: 03/09/22 0542    Specimen: Blood from Arm, Right Updated: 03/09/22 0555    Narrative:      The following orders were created for panel order CBC & Differential.  Procedure                        " Patient presents in office today with reported pain in numbness in left upper chest, neck    Current pain level reported = 1/10        Abnormality         Status                     ---------                               -----------         ------                     CBC Auto Differential[[id number]]        Abnormal            Final result                 Please view results for these tests on the individual orders.    CBC Auto Differential [[id number]]  (Abnormal) Collected: 03/09/22 0542    Specimen: Blood from Arm, Right Updated: 03/09/22 0555     WBC 25.40 10*3/mm3      RBC 3.84 10*6/mm3      Hemoglobin 11.4 g/dL      Hematocrit 35.9 %      MCV 93.5 fL      MCH 29.7 pg      MCHC 31.8 g/dL      RDW 14.4 %      RDW-SD 48.5 fl      MPV 10.2 fL      Platelets 378 10*3/mm3      Neutrophil % 87.2 %      Lymphocyte % 3.5 %      Monocyte % 4.3 %      Eosinophil % 0.0 %      Basophil % 0.2 %      Immature Grans % 4.8 %      Neutrophils, Absolute 22.15 10*3/mm3      Lymphocytes, Absolute 0.90 10*3/mm3      Monocytes, Absolute 1.08 10*3/mm3      Eosinophils, Absolute 0.00 10*3/mm3      Basophils, Absolute 0.04 10*3/mm3      Immature Grans, Absolute 1.23 10*3/mm3      nRBC 0.1 /100 WBC     POC Glucose Once [[id number]]  (Abnormal) Collected: 03/08/22 1706    Specimen: Blood Updated: 03/08/22 1714     Glucose 171 mg/dL      Comment: Meter: [id number] : CHRIS Sears RN       POC Glucose Once [[id number]]  (Abnormal) Collected: 03/08/22 1140    Specimen: Blood Updated: 03/08/22 1149     Glucose 249 mg/dL      Comment: Meter: [id number] : CHRIS Sears RN             Imaging Results (Last 24 Hours)     Procedure Component Value Units Date/Time    XR Chest 1 View [[id number]] Collected: 03/09/22 0540     Updated: 03/09/22 0542    Narrative:      CR Chest 1 Vw    INDICATION:   Follow-up fibrosis related to Covid pneumonia.     COMPARISON:    3/5/2022    FINDINGS:  Single portable AP view(s) of the chest.    Heart size is stable. Coarse bilateral infiltrates are again identified. There appears slightly more dense overall which  may be a function of radiographic technique although slight worsening is not excluded. No pneumothorax is identified.       Impression:      Increased density of bilateral infiltrates may be a function of radiographic technique or may indicate slight worsening. Otherwise stable.    Signer Name: Corbin Duran MD   Signed: 3/9/2022 5:40 AM   Workstation Name: [email or web address]    [facility]          Medication Review:   I have reviewed the patient's current medication list    Current Facility-Administered Medications:   •  acetaminophen (TYLENOL) tablet 650 mg, 650 mg, Oral, Q4H PRN, 650 mg at 02/27/22 0311 **OR** acetaminophen (TYLENOL) suppository 650 mg, 650 mg, Rectal, Q4H PRN, Alvarado, Birrilla, DO  •  [COMPLETED] apixaban (ELIQUIS) tablet 10 mg, 10 mg, Oral, Q12H, 10 mg at 03/07/22 2113 **FOLLOWED BY** apixaban (ELIQUIS) tablet 5 mg, 5 mg, Oral, Q12H, Osvaldo Gibson DO, 5 mg at 03/09/22 0839  •  atorvastatin (LIPITOR) tablet 40 mg, 40 mg, Oral, Nightly, Alvarado, Birrilla, DO, 40 mg at 03/08/22 2111  •  dextrose (D50W) (25 g/50 mL) IV injection 25 g, 25 g, Intravenous, Q15 Min PRN, Cash Arguello Jr., MD  •  dextrose (GLUTOSE) oral gel 15 g, 15 g, Oral, Q15 Min PRN, Cash Arguello Jr., MD  •  docusate sodium (COLACE) liquid 100 mg, 100 mg, Oral, BID, Alvarado, Birrilla, DO, 100 mg at 03/08/22 0753  •  ferrous sulfate EC tablet 324 mg, 324 mg, Oral, Daily With Breakfast, Alvarado, Birrilla, DO, 324 mg at 03/09/22 0839  •  glucagon (GLUCAGEN) injection 1 mg, 1 mg, Intramuscular, Q15 Min PRN, Cash Arguello Jr., MD  •  HYDROcodone-homatropine (HYCODAN) 5-1.5 MG/5ML syrup 5 mL, 5 mL, Oral, Q4H PRN, Osvaldo Gibson DO, 5 mL at 03/08/22 0219  •  insulin aspart (novoLOG) injection 0-24 Units, 0-24 Units, Subcutaneous, TID With Meals, Darrick Matt MD, 4 Units at 03/09/22 0838  •  insulin detemir (LEVEMIR) injection 7 Units, 7 Units, Subcutaneous, Daily, Darrikc Matt MD, 7  Units at 03/09/22 0838  •  ipratropium-albuterol (DUO-NEB) nebulizer solution 3 mL, 3 mL, Nebulization, Q6H PRN, Alvarado, Birrilla, DO  •  ipratropium-albuterol (DUO-NEB) nebulizer solution 3 mL, 3 mL, Nebulization, 4x Daily - RT, Darrick Matt MD, 3 mL at 03/09/22 0710  •  Magnesium Sulfate 2 gram Bolus, followed by 8 gram infusion (total Mg dose 10 grams)- Mg less than or equal to 1mg/dL, 2 g, Intravenous, PRN **OR** Magnesium Sulfate 2 gram / 50mL Infusion (GIVE X 3 BAGS TO EQUAL 6GM TOTAL DOSE) - Mg 1.1 - 1.5 mg/dl, 2 g, Intravenous, PRN **OR** Magnesium Sulfate 4 gram infusion- Mg 1.6-1.9 mg/dL, 4 g, Intravenous, PRN, Alvarado, Birrilla, DO  •  OLANZapine (zyPREXA) injection 5 mg, 5 mg, Intramuscular, Q8H PRN, Trevon Chandler, DO, 5 mg at 02/28/22 1709  •  ondansetron (ZOFRAN) tablet 4 mg, 4 mg, Oral, Q6H PRN **OR** ondansetron (ZOFRAN) injection 4 mg, 4 mg, Intravenous, Q6H PRN, Alvarado, Birrilla, DO  •  polyethylene glycol (MIRALAX) packet 17 g, 17 g, Oral, Daily, Alvarado, Birrilla, DO, 17 g at 03/08/22 0754  •  potassium chloride (K-DUR,KLOR-CON) CR tablet 40 mEq, 40 mEq, Oral, PRN **OR** potassium chloride (KLOR-CON) packet 40 mEq, 40 mEq, Oral, PRN **OR** potassium chloride 10 mEq in 100 mL IVPB, 10 mEq, Intravenous, Q1H PRN, Alvarado, Birrilla, DO, Last Rate: 100 mL/hr at 02/22/22 0936, 10 mEq at 02/22/22 0936  •  predniSONE (DELTASONE) tablet 40 mg, 40 mg, Oral, Daily With Breakfast, Fabiano Dunbar MD, 40 mg at 03/09/22 0841  •  [COMPLETED] Insert peripheral IV, , , Once **AND** sodium chloride 0.9 % flush 10 mL, 10 mL, Intravenous, PRN, Alvarado, Birrilla, DO, 10 mL at 03/07/22 1616  •  sodium chloride 0.9 % infusion 40 mL, 40 mL, Intravenous, PRN, Alvarado, Birrilla, DO, 100 mL at 03/02/22 0228  •  sodium chloride nasal spray 2 spray, 2 spray, Each Nare, PRN, Osvaldo Gibson, DO, 2 spray at 03/06/22 0818      Assessment/Plan     Acute Hypoxic Respiratory Failure due to bacterial/aspiration  Pneumonia/Post-COVID Fibrosis:   -now on 10L HFNC  -aggressive pulmonary toilet  -tapering steroids to PO  -Pulmonary following appreciate assistance     Leukocytosis    -Seems steroid induced, continues to downtrend   -status post antibiotic course, will hold further antibiotics for now.   -Remains afebrile     Acute LLE DVT:   -On Eliquis.     Steroid-induced Hyperglycemia:   -Continue basal insulin and sliding scale     Dyslipidemia:  - No acute issues.  Continue Lipitor.    Acute metabolic encephalopathy secondary to infection and suspected acute alcohol withdrawal  -Neurology has signed off   -Status improved but mild confusion at times remains  -Continue as needed Zyprexa    Prognosis guarded    Plan for disposition: Depending on ability to wean oxygen to a sustainable level on nasal cannula    Trevon Chandler, DO  03/09/22  09:52 EST

## 2022-03-10 LAB
GLUCOSE BLDC GLUCOMTR-MCNC: 145 MG/DL (ref 70–130)
GLUCOSE BLDC GLUCOMTR-MCNC: 173 MG/DL (ref 70–130)
GLUCOSE BLDC GLUCOMTR-MCNC: 84 MG/DL (ref 70–130)
QT INTERVAL: 352 MS

## 2022-03-10 PROCEDURE — 94761 N-INVAS EAR/PLS OXIMETRY MLT: CPT

## 2022-03-10 PROCEDURE — 94669 MECHANICAL CHEST WALL OSCILL: CPT

## 2022-03-10 PROCEDURE — 97110 THERAPEUTIC EXERCISES: CPT

## 2022-03-10 PROCEDURE — 99232 SBSQ HOSP IP/OBS MODERATE 35: CPT | Performed by: INTERNAL MEDICINE

## 2022-03-10 PROCEDURE — 63710000001 INSULIN ASPART PER 5 UNITS: Performed by: INTERNAL MEDICINE

## 2022-03-10 PROCEDURE — 63710000001 INSULIN DETEMIR PER 5 UNITS: Performed by: HOSPITALIST

## 2022-03-10 PROCEDURE — 97116 GAIT TRAINING THERAPY: CPT

## 2022-03-10 PROCEDURE — 82962 GLUCOSE BLOOD TEST: CPT

## 2022-03-10 PROCEDURE — 94799 UNLISTED PULMONARY SVC/PX: CPT

## 2022-03-10 PROCEDURE — 63710000001 PREDNISONE PER 5 MG: Performed by: INTERNAL MEDICINE

## 2022-03-10 RX ORDER — PREDNISONE 10 MG/1
30 TABLET ORAL
Status: DISCONTINUED | OUTPATIENT
Start: 2022-03-11 | End: 2022-03-14

## 2022-03-10 RX ADMIN — APIXABAN 5 MG: 2.5 TABLET, FILM COATED ORAL at 22:27

## 2022-03-10 RX ADMIN — IPRATROPIUM BROMIDE AND ALBUTEROL SULFATE 3 ML: .5; 2.5 SOLUTION RESPIRATORY (INHALATION) at 11:25

## 2022-03-10 RX ADMIN — ATORVASTATIN CALCIUM 40 MG: 40 TABLET, FILM COATED ORAL at 22:27

## 2022-03-10 RX ADMIN — INSULIN DETEMIR 7 UNITS: 100 INJECTION, SOLUTION SUBCUTANEOUS at 08:23

## 2022-03-10 RX ADMIN — FERROUS SULFATE TAB EC 324 MG (65 MG FE EQUIVALENT) 324 MG: 324 (65 FE) TABLET DELAYED RESPONSE at 08:24

## 2022-03-10 RX ADMIN — APIXABAN 5 MG: 2.5 TABLET, FILM COATED ORAL at 08:24

## 2022-03-10 RX ADMIN — HYDROCODONE BITARTRATE AND HOMATROPINE METHYLBROMIDE 5 ML: 5; 1.5 SOLUTION ORAL at 01:05

## 2022-03-10 RX ADMIN — IPRATROPIUM BROMIDE AND ALBUTEROL SULFATE 3 ML: .5; 2.5 SOLUTION RESPIRATORY (INHALATION) at 19:38

## 2022-03-10 RX ADMIN — IPRATROPIUM BROMIDE AND ALBUTEROL SULFATE 3 ML: .5; 2.5 SOLUTION RESPIRATORY (INHALATION) at 07:14

## 2022-03-10 RX ADMIN — PREDNISONE 40 MG: 10 TABLET ORAL at 08:23

## 2022-03-10 RX ADMIN — INSULIN ASPART 2 UNITS: 100 INJECTION, SOLUTION INTRAVENOUS; SUBCUTANEOUS at 11:59

## 2022-03-10 NOTE — PLAN OF CARE
Goal Outcome Evaluation:              Outcome Evaluation: PT: Patient performs sit to stand with SBA and gait with rolling walker x100 feet with SBA, requiring 3 seated rest breaks.  Patient on 8 liters via high flow nasal cannula for initial portions of gait.  Final gait trial completed with non rebreather mask on 15 liters per RT recommendation.  Patient's O2 sats with activity fluctuate between 80-82%, recovering to 88-89% within 1-1.5 minutes.  Patient continues to be limited in overall gait distance by respiratory status, will continue to follow for therapy needs.

## 2022-03-10 NOTE — PROGRESS NOTES
Adult Nutrition  Assessment/PES    Patient Name:  Dontae Bonds Jr.  YOB: 1950  MRN: [id number]  Admit Date:  2/21/2022    Assessment Date:  3/10/2022    Comments:  Pt continues to tolerated diet with good intake meals/supplements.     Reason for Assessment     Row Name 03/10/22 1423          Reason for Assessment    Reason For Assessment follow-up protocol     Diagnosis --  acute hypoxic respiratory failure, recent COVID-19 pneumonia, bacterial superimposed penumonia, encephalopathy: improving, history of alcohol abuse                Nutrition/Diet History     Row Name 03/10/22 1424          Nutrition/Diet History    Typical Intake (Food/Fluid/EN/PN) pt continues to report that he is eating mostly all of his foods at meals and between meal snacks/supplements                  Labs/Tests/Procedures/Meds     Row Name 03/10/22 1425          Labs/Procedures/Meds    Lab Results Reviewed reviewed            Medications    Pertinent Medications Reviewed reviewed     Pertinent Medications Comments prednisone                    Nutrition Prescription Ordered     Row Name 03/10/22 1427          Nutrition Prescription PO    Current PO Diet Soft Texture  small meals, no straws     Texture Chopped     Supplement Boost Plus (Ensure Enlive, Ensure Plus)     Supplement Frequency 3 times a day                Evaluation of Received Nutrient/Fluid Intake     Row Name 03/10/22 1430          PO Evaluation    Number of Meals 6     % PO Intake 100%                     Problem/Interventions:   Problem 1     Row Name 03/10/22 1430          Nutrition Diagnoses Problem 1    Problem 1 Inadequate Nutrient Intake     Etiology (related to) Medical Diagnosis;Factors Affecting Nutrition     Signs/Symptoms (evidenced by) Report/Observation     Resolved? Yes                Problem 2     Row Name 03/10/22 1431          Nutrition Diagnoses Problem 2    Problem 2 Swallowing Difficulty     Etiology (related to) Medical Diagnosis      Signs/Symptoms (evidenced by) Report/Observation     Swallow eval status Done                    Intervention Goal     Row Name 03/10/22 1431          Intervention Goal    PO Intake % 80 %  or greater of meals/any supplements ordered     Weight No significant weight loss                Nutrition Intervention     Row Name 03/10/22 1432          Nutrition Intervention    RD/Tech Action Follow Tx progress;Encourage intake;Interview for preference                  Education/Evaluation     Row Name 03/10/22 1432          Monitor/Evaluation    Monitor I&O;PO intake;Pertinent labs;Weight;Skin status                 Electronically signed by:  Araseli Wick RD  03/10/22 14:33 EST

## 2022-03-10 NOTE — SIGNIFICANT NOTE
03/10/22 1048   OTHER   Discipline physical therapist   Rehab Time/Intention   Session Not Performed other (see comments)  (nsg requests therapist return later in PM as patient had just laid down and needed to rest after being up throughout the night.)

## 2022-03-10 NOTE — PROGRESS NOTES
[facility] INPATIENT PROGRESS NOTE         [facility] ICU    3/10/2022      PATIENT IDENTIFICATION:  Name: Dontae Bonds Jr. ADMIT: 2022   : 1950  PCP: Tiffany Burciaga MD    MRN: [id number] LOS: 17 days   AGE/SEX: 71 y.o. male  ROOM: ICU6/1                     LOS 17    Reason for visit: Respiratory failure      SUBJECTIVE:      On 8L oxygen.  No new issues overnight.        Objective   OBJECTIVE:    Vital Sign Min/Max for last 24 hours  Temp  Min: 97.4 °F (36.3 °C)  Max: 97.8 °F (36.6 °C)   BP  Min: 109/73  Max: 148/91   Pulse  Min: 68  Max: 108   Resp  Min: 18  Max: 34   SpO2  Min: 82 %  Max: 98 %   No data recorded   No data recorded                   FiO2 (%): 41 %     Body mass index is 25.57 kg/m².    Intake/Output Summary (Last 24 hours) at 3/10/2022 0847  Last data filed at 3/10/2022 0833  Gross per 24 hour   Intake 2631 ml   Output 2300 ml   Net 331 ml         Exam:  GEN:  No distress, appears stated age  NECK:  No adenopathy, midline trachea  LUNGS: Nonlabored, crackles bases  CV:  Normal S1S2, without murmur      Assessment     Scheduled meds:  apixaban, 5 mg, Oral, Q12H  atorvastatin, 40 mg, Oral, Nightly  docusate sodium, 100 mg, Oral, BID  ferrous sulfate, 324 mg, Oral, Daily With Breakfast  insulin aspart, 0-24 Units, Subcutaneous, TID With Meals  insulin detemir, 7 Units, Subcutaneous, Daily  ipratropium-albuterol, 3 mL, Nebulization, 4x Daily - RT  polyethylene glycol, 17 g, Oral, Daily  predniSONE, 40 mg, Oral, Daily With Breakfast      IV meds:                         Data Review:  Results from last 7 days   Lab Units 22  1557 22  0517 22  0515 22  0500 22  0530   SODIUM mmol/L 135* 136 139 140 137   POTASSIUM mmol/L 4.8 4.8 4.6 4.2 4.4   CHLORIDE mmol/L 99 101 104 104 99   CO2 mmol/L 27.0 29.2* 30.4* 27.4 32.0*   BUN mg/dL 27* 23 23 28* 21   CREATININE mg/dL 0.56* 0.49* 0.52* 0.46* 0.50*   GLUCOSE mg/dL 252* 168* 165* 172* 120*   CALCIUM  mg/dL 9.4 9.5 9.7 9.5 9.4         Estimated Creatinine Clearance: 90.7 mL/min (A) (by C-G formula based on SCr of 0.56 mg/dL (L)).  Results from last 7 days   Lab Units 03/09/22  0542 03/08/22  0517 03/07/22  0515   WBC 10*3/mm3 25.40* 29.10* 33.95*   HEMOGLOBIN g/dL 11.4* 11.2* 11.6*   PLATELETS 10*3/mm3 378 410 433         Results from last 7 days   Lab Units 03/07/22  0515 03/06/22  0500 03/04/22  0530   ALT (SGPT) U/L 92* 84* 91*   AST (SGOT) U/L 27 29 31         Results from last 7 days   Lab Units 03/07/22  0515 03/04/22  0530   PROCALCITONIN ng/mL 0.06 0.06         Glucose   Date/Time Value Ref Range Status   03/10/2022 0744 84 70 - 130 mg/dL Final     Comment:     Meter: [id number] : [id number] Abbe Victoria RN   03/09/2022 1615 204 (H) 70 - 130 mg/dL Final     Comment:     Meter: [id number] : [id number] Stefania Kim RN   03/09/2022 1204 199 (H) 70 - 130 mg/dL Final     Comment:     Meter: [id number] : [id number] Stefania Kim RN   03/09/2022 0829 165 (H) 70 - 130 mg/dL Final     Comment:     Meter: [id number] : [id number] Stefania Kim RN   03/08/2022 1706 171 (H) 70 - 130 mg/dL Final     Comment:     Meter: [id number] : [id number] Byron Sears RN   03/08/2022 1140 249 (H) 70 - 130 mg/dL Final     Comment:     Meter: [id number] : RWLAURA Sears RN   03/08/2022 0713 183 (H) 70 - 130 mg/dL Final     Comment:     Meter: [id number] : [id number] Matthews Renu RN     Most recent chest x-ray 3/9 reviewed shows bilateral airspace disease and coarse interstitial changes with fibrotic progress.          Microbiology reviewed              Active Hospital Problems    Diagnosis  POA   • **Pneumonia of both lungs due to infectious organism [J18.9]  Yes   • Hypertension [I10]  Yes   • Hypokalemia [E87.6]  Yes      Resolved Hospital Problems   No resolved problems to display.         ASSESSMENT:    Acute hypoxemic respiratory failure  Recent COVID-19  pneumonia  Bacterial superimposed pneumonia  Encephalopathy: Improving  Anemia  Alcohol abuse  Worsening leukocytosis      PLAN:    He is in a fibrotic phase of his recent Covid pneumonia and possible bacterial pneumonia on top of that.  Status post antibiotic course.    Continue tapering steroid.  Changed to p.o.  Weaning oxygen as able.  On full anticoagulation.  Slowly making some progress.  Following peripherally for pulmonary issues.      Fabiano Dunbar MD  Pulmonary and Critical Care Medicine  [facility], [facility]  3/10/2022    08:47 EST

## 2022-03-10 NOTE — CASE MANAGEMENT/SOCIAL WORK
Continued Stay Note  DILAN Ward     Patient Name: Dontae Bonds Jr.  MRN: [id number]  Today's Date: 3/10/2022    Admit Date: 2/21/2022     Discharge Plan     Row Name 03/10/22 1245       Plan    Plan home with [facility]     Patient/Family in Agreement with Plan yes    Plan Comments Followed up with patient today and his wife and son are present in room. Patient states that he is now on 8L of oxygen via humidified HF NC and that the doctor said if he does well and continues to be weaned down to around 6L he would be going home in the next couple of days. He states he plans on returning home with his wife and [employer]  and his wife is in agreement to this. Patient and his family had no other questions or concerns regarding discharge plans. CM will continue to follow.               Discharge Codes    No documentation.                     Toma Marc RN

## 2022-03-10 NOTE — PLAN OF CARE
Goal Outcome Evaluation:  Plan of Care Reviewed With: patient        Progress: improving  Outcome Evaluation: rested fair overnight, weaning oxygen as tolerated, currently on 8L HFNC. SR on the monitor, afebrile, b/p stable.

## 2022-03-10 NOTE — PLAN OF CARE
Goal Outcome Evaluation:  Plan of Care Reviewed With: patient        Progress: improving  Outcome Evaluation: PT continues on high flow NC and fluctuates between 8L-10L to maintain SATS 88-92%; pt has has increased confusion today that me be attributed to the pt not sleeping overnight; pt had eaten and drank well and had good UOP and 2 BMs per shift; all other VSS; pt worked with PT today; pt is motivated to get to the point that he may be able to go home.

## 2022-03-10 NOTE — PROGRESS NOTES
"Hospitalist Team      Patient Care Team:  Tiffany Burciaga MD as PCP - General (Internal Medicine)      Chief Complaint: Follow-up Acute Hypoxic Respiratory Failure    Subjective    Overall improved. Breathing better and was able to take 3 short walks yesterday although with some NOLASCO. Nothing acute per nurse.     Objective    Vital Signs  Temp:  [97.4 °F (36.3 °C)-97.8 °F (36.6 °C)] 97.4 °F (36.3 °C)  Heart Rate:  [] 82  Resp:  [18-34] 20  BP: (109-148)/(73-98) 139/97  Oxygen Therapy  SpO2: (!) 89 %  Pulse Oximetry Type: Continuous  Device (Oxygen Therapy): ventilator  Device (Oxygen Therapy): humidified, high-flow nasal cannula  $ High Flow Nasal Cannula Set-Up: yes  Flow (L/min): 8  Oxygen Concentration (%): 40  ETCO2 (mmHg): 32 mmHg  Probe Placed On (Pulse Ox): Left:, forehead  Probe Removed From (Pulse Ox): forehead, Left:}    Flowsheet Rows    Flowsheet Row First Filed Value   Admission Height 172.7 cm (68\") Documented at 02/21/2022 1843   Admission Weight 78.9 kg (174 lb) Documented at 02/21/2022 1825          Physical Exam:    General: NAD  Lungs: diminished at the bases but noted some increasing air movement  CV: RRR  Abdomen: Soft and non-tender w/ active bowel sounds.  MSK: No C/C/E.  Neuro: CN II-XII grossly intact.  Psych: AO x2 mood stable.    Results Review:     I reviewed the patient's new clinical results.    Lab Results (last 24 hours)     Procedure Component Value Units Date/Time    POC Glucose Once [[id number]]  (Normal) Collected: 03/10/22 0744    Specimen: Blood Updated: 03/10/22 0752     Glucose 84 mg/dL      Comment: Meter: [id number] : [id number] Abbe Victoria RN       Basic Metabolic Panel [[id number]]  (Abnormal) Collected: 03/09/22 1557    Specimen: Blood Updated: 03/09/22 1623     Glucose 252 mg/dL      BUN 27 mg/dL      Creatinine 0.56 mg/dL      Sodium 135 mmol/L      Potassium 4.8 mmol/L      Chloride 99 mmol/L      CO2 27.0 mmol/L      Calcium 9.4 mg/dL      " BUN/Creatinine Ratio 48.2     Anion Gap 9.0 mmol/L      eGFR 105.4 mL/min/1.73      Comment: National Kidney Foundation and American Society of Nephrology (ASN) Task Force recommended calculation based on the Chronic Kidney Disease Epidemiology Collaboration (CKD-EPI) equation refit without adjustment for race.       Narrative:      GFR Normal >60  Chronic Kidney Disease <60  Kidney Failure <15      Magnesium [[id number]]  (Normal) Collected: 03/09/22 1557    Specimen: Blood Updated: 03/09/22 1623     Magnesium 2.1 mg/dL     POC Glucose Once [[id number]]  (Abnormal) Collected: 03/09/22 1615    Specimen: Blood Updated: 03/09/22 1621     Glucose 204 mg/dL      Comment: Meter: [id number] : [id number] Stefania Kim RN       POC Glucose Once [[id number]]  (Abnormal) Collected: 03/09/22 1204    Specimen: Blood Updated: 03/09/22 1211     Glucose 199 mg/dL      Comment: Meter: [id number] : [id number] Stefania Kim RN             Imaging Results (Last 24 Hours)     ** No results found for the last 24 hours. **          Medication Review:   I have reviewed the patient's current medication list    Current Facility-Administered Medications:   •  acetaminophen (TYLENOL) tablet 650 mg, 650 mg, Oral, Q4H PRN, 650 mg at 02/27/22 0311 **OR** acetaminophen (TYLENOL) suppository 650 mg, 650 mg, Rectal, Q4H PRN, AlvaradoOlerijaya, DO  •  [COMPLETED] apixaban (ELIQUIS) tablet 10 mg, 10 mg, Oral, Q12H, 10 mg at 03/07/22 2113 **FOLLOWED BY** apixaban (ELIQUIS) tablet 5 mg, 5 mg, Oral, Q12H, Osvaldo Gibson DO, 5 mg at 03/10/22 0824  •  atorvastatin (LIPITOR) tablet 40 mg, 40 mg, Oral, Nightly, Cameron Alvarado DO, 40 mg at 03/09/22 2013  •  dextrose (D50W) (25 g/50 mL) IV injection 25 g, 25 g, Intravenous, Q15 Min PRN, Cash Arguello Jr., MD  •  dextrose (GLUTOSE) oral gel 15 g, 15 g, Oral, Q15 Min PRN, Cash Arguello Jr., MD  •  docusate sodium (COLACE) liquid 100 mg, 100 mg, Oral, BID, Cameron Alvarado,  DO, 100 mg at 03/09/22 2013  •  ferrous sulfate EC tablet 324 mg, 324 mg, Oral, Daily With Breakfast, Cameron Alvarado DO, 324 mg at 03/10/22 0824  •  glucagon (GLUCAGEN) injection 1 mg, 1 mg, Intramuscular, Q15 Min PRN, Cash Arguello Jr., MD  •  HYDROcodone-homatropine (HYCODAN) 5-1.5 MG/5ML syrup 5 mL, 5 mL, Oral, Q4H PRN, Osvaldo Gibson DO, 5 mL at 03/10/22 0105  •  insulin aspart (novoLOG) injection 0-24 Units, 0-24 Units, Subcutaneous, TID With Meals, Darrick Matt MD, 8 Units at 03/09/22 1729  •  insulin detemir (LEVEMIR) injection 7 Units, 7 Units, Subcutaneous, Daily, Darrick Matt MD, 7 Units at 03/10/22 0823  •  ipratropium-albuterol (DUO-NEB) nebulizer solution 3 mL, 3 mL, Nebulization, Q6H PRN, Cameron Alvarado, DO  •  ipratropium-albuterol (DUO-NEB) nebulizer solution 3 mL, 3 mL, Nebulization, 4x Daily - RT, Darrick Matt MD, 3 mL at 03/10/22 0714  •  Magnesium Sulfate 2 gram Bolus, followed by 8 gram infusion (total Mg dose 10 grams)- Mg less than or equal to 1mg/dL, 2 g, Intravenous, PRN **OR** Magnesium Sulfate 2 gram / 50mL Infusion (GIVE X 3 BAGS TO EQUAL 6GM TOTAL DOSE) - Mg 1.1 - 1.5 mg/dl, 2 g, Intravenous, PRN **OR** Magnesium Sulfate 4 gram infusion- Mg 1.6-1.9 mg/dL, 4 g, Intravenous, PRN, Ole Alvaradorilla, DO  •  OLANZapine (zyPREXA) injection 5 mg, 5 mg, Intramuscular, Q8H PRN, Trevon Chandler DO, 5 mg at 02/28/22 1709  •  ondansetron (ZOFRAN) tablet 4 mg, 4 mg, Oral, Q6H PRN **OR** ondansetron (ZOFRAN) injection 4 mg, 4 mg, Intravenous, Q6H PRN, Alvarado, Birrilla, DO  •  polyethylene glycol (MIRALAX) packet 17 g, 17 g, Oral, Daily, Alvarado, Birrilla, DO, 17 g at 03/08/22 0754  •  potassium chloride (K-DUR,KLOR-CON) CR tablet 40 mEq, 40 mEq, Oral, PRN **OR** potassium chloride (KLOR-CON) packet 40 mEq, 40 mEq, Oral, PRN **OR** potassium chloride 10 mEq in 100 mL IVPB, 10 mEq, Intravenous, Q1H PRN, Alvarado, Birrilla, DO, Last Rate: 100 mL/hr at 02/22/22 0936, 10 mEq at  02/22/22 0936  •  [START ON 3/11/2022] predniSONE (DELTASONE) tablet 30 mg, 30 mg, Oral, Daily With Breakfast, Fabiano Dunbar MD  •  [COMPLETED] Insert peripheral IV, , , Once **AND** sodium chloride 0.9 % flush 10 mL, 10 mL, Intravenous, PRN, Alvarado, Birrilla, DO, 10 mL at 03/07/22 1616  •  sodium chloride 0.9 % infusion 40 mL, 40 mL, Intravenous, PRN, Alvarado, Birrilla, DO, 100 mL at 03/02/22 0228  •  sodium chloride nasal spray 2 spray, 2 spray, Each Nare, PRN, Osvaldo Gibson, DO, 2 spray at 03/06/22 0818      Assessment/Plan     Acute Hypoxic Respiratory Failure due to bacterial/aspiration Pneumonia/Post-COVID Fibrosis:   -now on 8L HFNC  -aggressive pulmonary toilet  -PO steroids   -Pulmonary following appreciate assistance     Leukocytosis    -likely steroid induced, downtrending   -status post antibiotic course, will hold further antibiotics for now.   -Remains afebrile     Acute LLE DVT:   -On Eliquis.     Steroid-induced Hyperglycemia:   -BG lower this am,   -as now on PO steroids will change to SSI alone     Dyslipidemia:  - No acute issues.  Continue Lipitor.    Acute metabolic encephalopathy secondary to infection and suspected acute alcohol withdrawal  -Neurology  signed off   -resolved   -Continue as needed Zyprexa    Prognosis guarded    Plan for disposition: would like to wean O2 around 6L if able     Trevon Chandler DO  03/10/22  10:15 EST

## 2022-03-11 LAB
GLUCOSE BLDC GLUCOMTR-MCNC: 134 MG/DL (ref 70–130)
GLUCOSE BLDC GLUCOMTR-MCNC: 197 MG/DL (ref 70–130)
GLUCOSE BLDC GLUCOMTR-MCNC: 88 MG/DL (ref 70–130)

## 2022-03-11 PROCEDURE — 94799 UNLISTED PULMONARY SVC/PX: CPT

## 2022-03-11 PROCEDURE — 99232 SBSQ HOSP IP/OBS MODERATE 35: CPT | Performed by: INTERNAL MEDICINE

## 2022-03-11 PROCEDURE — 97116 GAIT TRAINING THERAPY: CPT

## 2022-03-11 PROCEDURE — 63710000001 INSULIN ASPART PER 5 UNITS: Performed by: INTERNAL MEDICINE

## 2022-03-11 PROCEDURE — 63710000001 PREDNISONE PER 5 MG: Performed by: INTERNAL MEDICINE

## 2022-03-11 PROCEDURE — 82962 GLUCOSE BLOOD TEST: CPT

## 2022-03-11 PROCEDURE — 94761 N-INVAS EAR/PLS OXIMETRY MLT: CPT

## 2022-03-11 RX ADMIN — INSULIN ASPART 2 UNITS: 100 INJECTION, SOLUTION INTRAVENOUS; SUBCUTANEOUS at 11:58

## 2022-03-11 RX ADMIN — ATORVASTATIN CALCIUM 40 MG: 40 TABLET, FILM COATED ORAL at 21:09

## 2022-03-11 RX ADMIN — FERROUS SULFATE TAB EC 324 MG (65 MG FE EQUIVALENT) 324 MG: 324 (65 FE) TABLET DELAYED RESPONSE at 08:01

## 2022-03-11 RX ADMIN — IPRATROPIUM BROMIDE AND ALBUTEROL SULFATE 3 ML: .5; 2.5 SOLUTION RESPIRATORY (INHALATION) at 19:46

## 2022-03-11 RX ADMIN — IPRATROPIUM BROMIDE AND ALBUTEROL SULFATE 3 ML: .5; 2.5 SOLUTION RESPIRATORY (INHALATION) at 12:43

## 2022-03-11 RX ADMIN — APIXABAN 5 MG: 2.5 TABLET, FILM COATED ORAL at 21:09

## 2022-03-11 RX ADMIN — IPRATROPIUM BROMIDE AND ALBUTEROL SULFATE 3 ML: .5; 2.5 SOLUTION RESPIRATORY (INHALATION) at 17:10

## 2022-03-11 RX ADMIN — IPRATROPIUM BROMIDE AND ALBUTEROL SULFATE 3 ML: .5; 2.5 SOLUTION RESPIRATORY (INHALATION) at 08:49

## 2022-03-11 RX ADMIN — APIXABAN 5 MG: 2.5 TABLET, FILM COATED ORAL at 08:01

## 2022-03-11 RX ADMIN — PREDNISONE 30 MG: 10 TABLET ORAL at 08:01

## 2022-03-11 NOTE — PROGRESS NOTES
"Hospitalist Team      Patient Care Team:  Tiffany Burciaga MD as PCP - General (Internal Medicine)      Chief Complaint: Follow-up Acute Hypoxic Respiratory Failure    Subjective    Continues to slowly improve.  Still with NOLASCO and desaturations with exercise and movement    Objective    Vital Signs  Temp:  [96.5 °F (35.8 °C)-98.4 °F (36.9 °C)] 96.5 °F (35.8 °C)  Heart Rate:  [] 115  Resp:  [22-32] 22  BP: (119-159)/() 120/78  Oxygen Therapy  SpO2: (!) 88 %  Pulse Oximetry Type: Continuous  Device (Oxygen Therapy): ventilator  Device (Oxygen Therapy): high-flow nasal cannula  $ High Flow Nasal Cannula Set-Up: yes  Flow (L/min): 10  Oxygen Concentration (%): 40  ETCO2 (mmHg): 32 mmHg  Probe Placed On (Pulse Ox): Left:, forehead  Probe Removed From (Pulse Ox): forehead, Left:}    Flowsheet Rows    Flowsheet Row First Filed Value   Admission Height 172.7 cm (68\") Documented at 02/21/2022 1843   Admission Weight 78.9 kg (174 lb) Documented at 02/21/2022 1825          Physical Exam:    General: NAD  Lungs: Remains diminished at the bases  CV: RRR  Abdomen: Soft and non-tender w/ active bowel sounds.  MSK: No C/C/E.  Neuro: CN II-XII grossly intact.  Psych: AO x2 mood stable.    Results Review:     I reviewed the patient's new clinical results.    Lab Results (last 24 hours)     Procedure Component Value Units Date/Time    POC Glucose Once [[id number]]  (Normal) Collected: 03/11/22 0744    Specimen: Blood Updated: 03/11/22 0750     Glucose 88 mg/dL      Comment: Meter: [id number] : [id number] Tracy Valladares RN       POC Glucose Once [[id number]]  (Abnormal) Collected: 03/10/22 1704    Specimen: Blood Updated: 03/10/22 1709     Glucose 145 mg/dL      Comment: Meter: [id number] : [id number] Abbe Victoria RN       POC Glucose Once [[id number]]  (Abnormal) Collected: 03/10/22 1154    Specimen: Blood Updated: 03/10/22 1201     Glucose 173 mg/dL      Comment: Meter: [id number] : [id number] Mcadams" Naldo Victoria RN             Imaging Results (Last 24 Hours)     ** No results found for the last 24 hours. **          Medication Review:   I have reviewed the patient's current medication list    Current Facility-Administered Medications:   •  acetaminophen (TYLENOL) tablet 650 mg, 650 mg, Oral, Q4H PRN, 650 mg at 02/27/22 0311 **OR** acetaminophen (TYLENOL) suppository 650 mg, 650 mg, Rectal, Q4H PRN, AlvaradoOlerilla, DO  •  [COMPLETED] apixaban (ELIQUIS) tablet 10 mg, 10 mg, Oral, Q12H, 10 mg at 03/07/22 2113 **FOLLOWED BY** apixaban (ELIQUIS) tablet 5 mg, 5 mg, Oral, Q12H, Osvaldo Gibson DO, 5 mg at 03/11/22 0801  •  atorvastatin (LIPITOR) tablet 40 mg, 40 mg, Oral, Nightly, Alvarado, Olerijelani, DO, 40 mg at 03/10/22 2227  •  dextrose (D50W) (25 g/50 mL) IV injection 25 g, 25 g, Intravenous, Q15 Min PRN, Cash Arguello Jr., MD  •  dextrose (GLUTOSE) oral gel 15 g, 15 g, Oral, Q15 Min PRN, Cash Arguello Jr., MD  •  docusate sodium (COLACE) liquid 100 mg, 100 mg, Oral, BID, Alvarado, Olerilla, DO, 100 mg at 03/09/22 2013  •  ferrous sulfate EC tablet 324 mg, 324 mg, Oral, Daily With Breakfast, Alvarado, Birrilla, DO, 324 mg at 03/11/22 0801  •  glucagon (GLUCAGEN) injection 1 mg, 1 mg, Intramuscular, Q15 Min PRN, Cash Arguello Jr., MD  •  HYDROcodone-homatropine (HYCODAN) 5-1.5 MG/5ML syrup 5 mL, 5 mL, Oral, Q4H PRN, Osvaldo Gibson DO, 5 mL at 03/10/22 0105  •  insulin aspart (novoLOG) injection 0-9 Units, 0-9 Units, Subcutaneous, TID Geraldine WHEELER Lee M, DO, 2 Units at 03/10/22 1159  •  ipratropium-albuterol (DUO-NEB) nebulizer solution 3 mL, 3 mL, Nebulization, Q6H PRN, Cameron Alvarado DO  •  ipratropium-albuterol (DUO-NEB) nebulizer solution 3 mL, 3 mL, Nebulization, 4x Daily - RT, Darrick Matt MD, 3 mL at 03/11/22 0898  •  Magnesium Sulfate 2 gram Bolus, followed by 8 gram infusion (total Mg dose 10 grams)- Mg less than or equal to 1mg/dL, 2 g, Intravenous, PRN **OR** Magnesium Sulfate  2 gram / 50mL Infusion (GIVE X 3 BAGS TO EQUAL 6GM TOTAL DOSE) - Mg 1.1 - 1.5 mg/dl, 2 g, Intravenous, PRN **OR** Magnesium Sulfate 4 gram infusion- Mg 1.6-1.9 mg/dL, 4 g, Intravenous, PRN, Alvarado, Birrilla, DO  •  OLANZapine (zyPREXA) injection 5 mg, 5 mg, Intramuscular, Q8H PRN, Trevon Chandler, DO, 5 mg at 02/28/22 1709  •  ondansetron (ZOFRAN) tablet 4 mg, 4 mg, Oral, Q6H PRN **OR** ondansetron (ZOFRAN) injection 4 mg, 4 mg, Intravenous, Q6H PRN, Alvarado, Birrilla, DO  •  polyethylene glycol (MIRALAX) packet 17 g, 17 g, Oral, Daily, Alvarado, Birrilla, DO, 17 g at 03/08/22 0754  •  potassium chloride (K-DUR,KLOR-CON) CR tablet 40 mEq, 40 mEq, Oral, PRN **OR** potassium chloride (KLOR-CON) packet 40 mEq, 40 mEq, Oral, PRN **OR** potassium chloride 10 mEq in 100 mL IVPB, 10 mEq, Intravenous, Q1H PRN, Alvarado, Birrilla, DO, Last Rate: 100 mL/hr at 02/22/22 0936, 10 mEq at 02/22/22 0936  •  predniSONE (DELTASONE) tablet 30 mg, 30 mg, Oral, Daily With Breakfast, Fabiano Dunbar MD, 30 mg at 03/11/22 0801  •  [COMPLETED] Insert peripheral IV, , , Once **AND** sodium chloride 0.9 % flush 10 mL, 10 mL, Intravenous, PRN, Avlarado, Birrilla, DO, 10 mL at 03/07/22 1616  •  sodium chloride 0.9 % infusion 40 mL, 40 mL, Intravenous, PRN, Alvarado, Birrilla, DO, 100 mL at 03/02/22 0228  •  sodium chloride nasal spray 2 spray, 2 spray, Each Nare, PRN, Osvaldo Gibson, DO, 2 spray at 03/06/22 0818      Assessment/Plan     Acute Hypoxic Respiratory Failure due to bacterial/aspiration Pneumonia/Post-COVID Fibrosis:   -On 7 L high flow at rest today, higher amounts with exertion  -aggressive pulmonary toilet  -PO steroids   -Pulmonary following appreciate assistance     Leukocytosis    -likely steroid induced, downtrended  -status post antibiotic course, will hold further antibiotics for now.   -Remains afebrile     Acute LLE DVT:   -On Eliquis.     Steroid-induced Hyperglycemia:   -BG now stable  -Sessile on     Dyslipidemia:  - No  acute issues.  Continue Lipitor.    Acute metabolic encephalopathy secondary to infection and suspected acute alcohol withdrawal  -Neurology  signed off   -Overall resolved, still with periodic confusion  -Continue as needed Zyprexa    Prognosis guarded    Plan for disposition: would like to wean O2 around 6L if able     Trevon Chandler, DO  03/11/22  11:00 EST

## 2022-03-11 NOTE — PLAN OF CARE
Goal Outcome Evaluation:  Plan of Care Reviewed With: patient        Progress: improving  Outcome Evaluation: Pt remains on high flow NC 7-9L and maintaining SATS; All other VSS; pt has eaten and drank well with good UOP and a BM this shift; pt continues to work with PT and improving but still requires 15L on a nonrebrether mask to recover when walking; pt ambulates short distances in the room without the need to add the nonrebrether; pt remains hopeful that he will be able to go home in the future.

## 2022-03-11 NOTE — PLAN OF CARE
Goal Outcome Evaluation:  Plan of Care Reviewed With: patient        Progress: no change  Outcome Evaluation: HF N/C decreased to 7L. Is doing well. Confusion improved. Is plesant and cooperating. VSS. UOP fair.

## 2022-03-11 NOTE — CASE MANAGEMENT/SOCIAL WORK
Continued Stay Note  DILAN Ward     Patient Name: Dontae Bonds Jr.  MRN: [id number]  Today's Date: 3/11/2022    Admit Date: 2/21/2022     Discharge Plan     Row Name 03/11/22 1337       Plan    Plan Comments I spoke with the patient in his room with his permission.  He was sitting up in the recliner.  He advised that he is looking forward to going home and understands that his oxygen demand is too high.  We discussed his desire for a motorized wheel chair.  The patient also confirmed that when he discharges [facility]  CM will continue to follow.               Discharge Codes    No documentation.                     Jasmin Yan RN

## 2022-03-11 NOTE — PROGRESS NOTES
Nutrition Services    Patient Name:  Dontae Bonds Jr.  YOB: 1950  MRN: [id number]  Admit Date:  2/21/2022    Received call pt wanted to talk about his menu.    Pt reports he has gained 15-20# in the past week that he had lost ( although records intake he is within 4 # of first measured weight 2/21/22). He reports he wants to do low carb.  He asked for black coffee and 2 servings eggs at am meal. Wants to skip lunch, explained this isn't good choice and we have to serve him a meal, he said he would select the protein and a veggie. Also asked me to leave off mashed potatoes and dessert tonight.   Pt reports food is so good here he has eaten really well.    Pt noted to be on steroids still at this time.    Uncertain if pt has realistic goals for healing.     Could consider changing supplement to Boost Glucose Control as well, however noted BG levels haven't been super high.    Will cont to follow and monitor.           Electronically signed by:  Silke Mcconnell RD  03/11/22 14:04 EST

## 2022-03-11 NOTE — PROGRESS NOTES
[facility] INPATIENT PROGRESS NOTE         [facility] ICU    3/11/2022      PATIENT IDENTIFICATION:  Name: Dontae Bonds Jr. ADMIT: 2022   : 1950  PCP: Tiffany Burciaga MD    MRN: [id number] LOS: 18 days   AGE/SEX: 71 y.o. male  ROOM: ICU6/1                     LOS 18    Reason for visit: Respiratory failure      SUBJECTIVE:      On7L oxygen.  Noted episodic confusion.      Objective   OBJECTIVE:    Vital Sign Min/Max for last 24 hours  Temp  Min: 97.4 °F (36.3 °C)  Max: 98.4 °F (36.9 °C)   BP  Min: 119/80  Max: 159/102   Pulse  Min: 75  Max: 120   Resp  Min: 20  Max: 32   SpO2  Min: 84 %  Max: 99 %   No data recorded   Weight  Min: 75 kg (165 lb 7 oz)  Max: 75 kg (165 lb 7 oz)                   FiO2 (%): 41 %     Body mass index is 25.37 kg/m².    Intake/Output Summary (Last 24 hours) at 3/11/2022 0755  Last data filed at 3/11/2022 0725  Gross per 24 hour   Intake 1320 ml   Output 3250 ml   Net -1930 ml         Exam:  GEN:  No distress, appears stated age  NECK:  No adenopathy, midline trachea  LUNGS: Nonlabored, crackles bases  CV:  Normal S1S2, without murmur      Assessment     Scheduled meds:  apixaban, 5 mg, Oral, Q12H  atorvastatin, 40 mg, Oral, Nightly  docusate sodium, 100 mg, Oral, BID  ferrous sulfate, 324 mg, Oral, Daily With Breakfast  insulin aspart, 0-9 Units, Subcutaneous, TID AC  ipratropium-albuterol, 3 mL, Nebulization, 4x Daily - RT  polyethylene glycol, 17 g, Oral, Daily  predniSONE, 30 mg, Oral, Daily With Breakfast      IV meds:                         Data Review:  Results from last 7 days   Lab Units 22  1557 22  0517 22  0515 22  0500   SODIUM mmol/L 135* 136 139 140   POTASSIUM mmol/L 4.8 4.8 4.6 4.2   CHLORIDE mmol/L 99 101 104 104   CO2 mmol/L 27.0 29.2* 30.4* 27.4   BUN mg/dL 27* 23 23 28*   CREATININE mg/dL 0.56* 0.49* 0.52* 0.46*   GLUCOSE mg/dL 252* 168* 165* 172*   CALCIUM mg/dL 9.4 9.5 9.7 9.5         Estimated Creatinine Clearance:  89.8 mL/min (A) (by C-G formula based on SCr of 0.56 mg/dL (L)).  Results from last 7 days   Lab Units 03/09/22  0542 03/08/22  0517 03/07/22  0515   WBC 10*3/mm3 25.40* 29.10* 33.95*   HEMOGLOBIN g/dL 11.4* 11.2* 11.6*   PLATELETS 10*3/mm3 378 410 433         Results from last 7 days   Lab Units 03/07/22  0515 03/06/22  0500   ALT (SGPT) U/L 92* 84*   AST (SGOT) U/L 27 29         Results from last 7 days   Lab Units 03/07/22  0515   PROCALCITONIN ng/mL 0.06         Glucose   Date/Time Value Ref Range Status   03/11/2022 0744 88 70 - 130 mg/dL Final     Comment:     Meter: [id number] : [id number] Tracy Valladares RN   03/10/2022 1704 145 (H) 70 - 130 mg/dL Final     Comment:     Meter: [id number] : [id number] Abbe Victoria RN   03/10/2022 1154 173 (H) 70 - 130 mg/dL Final     Comment:     Meter: [id number] : [id number] Abbe Victoria RN   03/10/2022 0744 84 70 - 130 mg/dL Final     Comment:     Meter: [id number] : [id number] Abbe Victoria RN   03/09/2022 1615 204 (H) 70 - 130 mg/dL Final     Comment:     Meter: [id number] : [id number] Stefania Kim RN   03/09/2022 1204 199 (H) 70 - 130 mg/dL Final     Comment:     Meter: [id number] : [id number] Stefania Kim RN   03/09/2022 0829 165 (H) 70 - 130 mg/dL Final     Comment:     Meter: [id number] : [id number] Stefania Kim RN     Most recent chest x-ray 3/9 reviewed shows bilateral airspace disease and coarse interstitial changes with fibrotic progress.          Microbiology reviewed              Active Hospital Problems    Diagnosis  POA   • **Pneumonia of both lungs due to infectious organism [J18.9]  Yes   • Hypertension [I10]  Yes   • Hypokalemia [E87.6]  Yes      Resolved Hospital Problems   No resolved problems to display.         ASSESSMENT:    Acute hypoxemic respiratory failure  Recent COVID-19 pneumonia  Bacterial superimposed pneumonia  Encephalopathy: Improving  Anemia  Alcohol abuse  Worsening  leukocytosis      PLAN:    He is in a fibrotic phase of his recent Covid pneumonia and possible bacterial pneumonia on top of that.  Status post antibiotic course.    Continue tapering steroid.  Changed to p.o. I do not feel that this is really doing much.  Weaning oxygen as able.  On full anticoagulation.  Slowly making some progress.  Following peripherally for pulmonary issues.      Fabiano Dunbar MD  Pulmonary and Critical Care Medicine  [facility], [facility]  3/11/2022    07:55 EST

## 2022-03-11 NOTE — PLAN OF CARE
Goal Outcome Evaluation:              Outcome Evaluation: PT: Patient performs sit to stand transfers with SBA and gait 2x78 feet with rolling walker, SBA.  Patient on non-rebreather mask at 15 liters during mobility activities, O2 sats decrease to 81-82% with activity.  Patient recovers to 88-89% after 1-1.5 minutes.  Will continue to follow for therapy needs.

## 2022-03-11 NOTE — THERAPY TREATMENT NOTE
Acute Care - Physical Therapy Treatment Note  [facility]IL[facility]     Patient Name: Dontae Bonds Jr.  : 1950  MRN: [id number]  Today's Date: 3/11/2022   Onset of Illness/Injury or Date of Surgery: 22  Visit Dx:     ICD-10-CM ICD-9-CM   1. Pneumonia of both lungs due to infectious organism, unspecified part of lung  J18.9 483.8   2. Acute respiratory failure without hypercapnia (HCC)  J96.00 518.81     Patient Active Problem List   Diagnosis   • Personal history of colonic polyps   • Family history of colon cancer   • Pneumonia due to COVID-19 virus   • Pneumonia of both lungs due to infectious organism   • Hypertension   • Hypokalemia     Past Medical History:   Diagnosis Date   • Hyperlipidemia    • Hypertension      History reviewed. No pertinent surgical history.  PT Assessment (last 12 hours)     PT Evaluation and Treatment     Row Name 22          Physical Therapy Time and Intention    Subjective Information complains of;dyspnea  -     Document Type therapy note (daily note)  -     Mode of Treatment physical therapy  -     Patient Effort good  -     Symptoms Noted During/After Treatment shortness of breath  -     Row Name 22          General Information    Patient Observations alert;cooperative;agree to therapy  -     Patient/Family/Caregiver Comments/Observations pt in recliner, agrees to therapy  -     Existing Precautions/Restrictions fall;oxygen therapy device and L/min  humidified high flow at rest, 15 liters non rebreather with mobility  -     Comment, General Information decreased O2 sats with activity  -     Row Name 22          Pain    Pre/Posttreatment Pain Comment no c/o pain  -     Row Name 22          Cognition    Personal Safety Interventions gait belt;nonskid shoes/slippers when out of bed  -     Row Name 22          Bed Mobility    Comment, (Bed Mobility) deferred up in chair  -     Row Name 22           Transfers    Transfers sit-stand transfer;stand-sit transfer  -     Sit-Stand [place] (Transfers) supervision  -     Stand-Sit [place] (Transfers) supervision  -     Row Name 03/11/22 0830          Sit-Stand Transfer    Assistive Device (Sit-Stand Transfers) walker, front-wheeled  -JW     Row Name 03/11/22 0830          Stand-Sit Transfer    Assistive Device (Stand-Sit Transfers) walker, front-wheeled  -JW     Row Name 03/11/22 0830          Gait/Stairs (Locomotion)    [place] Level (Gait) standby assist  -     Assistive Device (Gait) walker, front-wheeled  -JW     Distance in Feet (Gait) 78 feet x2 with seated rest break  -     Pattern (Gait) swing-through  -     Deviations/Abnormal Patterns (Gait) base of support, narrow  -JW     Bilateral Gait Deviations forward flexed posture  -     Comment, (Gait/Stairs) pt requires verbal cues for overall gait speed during mobility.  requires awareness for O2 line during direction changes  -     Row Name 03/11/22 0830          Plan of Care Review    Outcome Evaluation PT: Patient performs sit to stand transfers with SBA and gait 2x78 feet with rolling walker, SBA.  Patient on non-rebreather mask at 15 liters during mobility activities, O2 sats decrease to 81-82% with activity.  Patient recovers to 88-89% after 1-1.5 minutes.  Will continue to follow for therapy needs.  -     Row Name 03/11/22 0830          Positioning and Restraints    Pre-Treatment Position sitting in chair/recliner  -     Post Treatment Position chair  -JW     In Chair reclined;call light within reach;encouraged to call for assist;notified nsg  -     Row Name 03/11/22 0830          Progress Summary (PT)    Progress Toward Functional Goals (PT) progress toward functional goals is gradual  -     Barriers to Overall Progress (PT) respiratory status  -           User Key  (r) = Recorded By, (t) = Taken By, (c) = Cosigned By    Initials Name Provider Type    BRIANA Hodgson  Tami, PT Physical Therapist                Physical Therapy Education                 Title: PT OT SLP Therapies (In Progress)     Topic: Physical Therapy (In Progress)     Point: Mobility training (Done)     Learning Progress Summary           Patient Acceptance, E,TB, VU by BRIANA at 3/11/2022 1016    Acceptance, E,TB, VU by BRIANA at 3/10/2022 1503    Acceptance, E,TB, VU by BRIANA at 3/9/2022 1020    Acceptance, E,TB, VU by BRIANA at 3/8/2022 1003    Acceptance, E,TB, VU by RBIANA at 3/7/2022 0919                   Point: Home exercise program (Not Started)     Learner Progress:  Not documented in this visit.                      User Key     Initials Effective Dates Name Provider Type Discipline     06/16/21 -  Tami Hodgson, PT Physical Therapist PT              PT Recommendation and Plan  Anticipated Discharge Disposition (PT):  (will continue to assess as medical status improves and O2 requirements decreased)  Planned Therapy Interventions (PT): balance training, bed mobility training, gait training, home exercise program, strengthening, transfer training, patient/family education  Therapy Frequency (PT): daily  Progress Summary (PT)  Progress Toward Functional Goals (PT): progress toward functional goals is gradual  Barriers to Overall Progress (PT): respiratory status  Outcome Evaluation: PT: Patient performs sit to stand transfers with SBA and gait 2x78 feet with rolling walker, SBA.  Patient on non-rebreather mask at 15 liters during mobility activities, O2 sats decrease to 81-82% with activity.  Patient recovers to 88-89% after 1-1.5 minutes.  Will continue to follow for therapy needs.   Outcome Measures     Row Name 03/11/22 0830 03/10/22 1357 03/09/22 0913       How much help from another person do you currently need...    Turning from your back to your side while in flat bed without using bedrails? 3  -JW 3  -JW 3  -JW    Moving from lying on back to sitting on the side of a flat bed without bedrails? 3  -JW 3  -JW  3  -JW    Moving to and from a bed to a chair (including a wheelchair)? 3  -JW 3  -JW 3  -JW    Standing up from a chair using your arms (e.g., wheelchair, bedside chair)? 3  -JW 3  -JW 3  -JW    Climbing 3-5 steps with a railing? 2  -JW 2  -JW 2  -JW    To walk in hospital room? 3  -JW 3  -JW 3  -JW    AM-PAC 6 Clicks Score (PT) 17  -JW 17  -JW 17  -JW       Functional Assessment    Outcome Measure Options AM-PAC 6 Clicks Basic Mobility (PT)  -JW AM-PAC 6 Clicks Basic Mobility (PT)  -JW AM-PAC 6 Clicks Basic Mobility (PT)  -JW          User Key  (r) = Recorded By, (t) = Taken By, (c) = Cosigned By    Initials Name Provider Type    Tami Kitchen, PT Physical Therapist                 Time Calculation:    PT Charges     Row Name 03/11/22 1017             Time Calculation    Start Time 0830  -JW      Stop Time 0850  -JW      Time Calculation (min) 20 min  -JW      PT Received On 03/11/22  -JW      PT - Next Appointment 03/12/22  -JW              Timed Charges    43217 - Gait Training Minutes  20  -JW              Total Minutes    Timed Charges Total Minutes 20  -JW       Total Minutes 20  -JW            User Key  (r) = Recorded By, (t) = Taken By, (c) = Cosigned By    Initials Name Provider Type    Tami Kitchen, PT Physical Therapist              Therapy Charges for Today     Code Description Service Date Service Provider Modifiers Qty    46150834566 HC PT THER PROC EA 15 MIN 3/10/2022 Tami Hodgson, PT GP 1    55648733436 HC GAIT TRAINING EA 15 MIN 3/10/2022 Tami Hodgson, PT GP 1    12057248269 HC GAIT TRAINING EA 15 MIN 3/11/2022 Tami Hodgson, PT GP 1          PT G-Codes  Outcome Measure Options: AM-PAC 6 Clicks Basic Mobility (PT)  AM-PAC 6 Clicks Score (PT): Sosa Hodgson, MARY  3/11/2022

## 2022-03-12 LAB
ANION GAP SERPL CALCULATED.3IONS-SCNC: 7.3 MMOL/L (ref 5–15)
BUN SERPL-MCNC: 22 MG/DL (ref 8–23)
BUN/CREAT SERPL: 43.1 (ref 7–25)
CALCIUM SPEC-SCNC: 9.1 MG/DL (ref 8.6–10.5)
CHLORIDE SERPL-SCNC: 100 MMOL/L (ref 98–107)
CO2 SERPL-SCNC: 29.7 MMOL/L (ref 22–29)
CREAT SERPL-MCNC: 0.51 MG/DL (ref 0.76–1.27)
DEPRECATED RDW RBC AUTO: 51.1 FL (ref 37–54)
EGFRCR SERPLBLD CKD-EPI 2021: 108.4 ML/MIN/1.73
ERYTHROCYTE [DISTWIDTH] IN BLOOD BY AUTOMATED COUNT: 15.5 % (ref 12.3–15.4)
GLUCOSE BLDC GLUCOMTR-MCNC: 101 MG/DL (ref 70–130)
GLUCOSE BLDC GLUCOMTR-MCNC: 151 MG/DL (ref 70–130)
GLUCOSE BLDC GLUCOMTR-MCNC: 178 MG/DL (ref 70–130)
GLUCOSE SERPL-MCNC: 132 MG/DL (ref 65–99)
HCT VFR BLD AUTO: 41.6 % (ref 37.5–51)
HGB BLD-MCNC: 13.3 G/DL (ref 13–17.7)
MCH RBC QN AUTO: 29.8 PG (ref 26.6–33)
MCHC RBC AUTO-ENTMCNC: 32 G/DL (ref 31.5–35.7)
MCV RBC AUTO: 93.1 FL (ref 79–97)
PLATELET # BLD AUTO: 348 10*3/MM3 (ref 140–450)
PMV BLD AUTO: 10.2 FL (ref 6–12)
POTASSIUM SERPL-SCNC: 3.9 MMOL/L (ref 3.5–5.2)
RBC # BLD AUTO: 4.47 10*6/MM3 (ref 4.14–5.8)
SODIUM SERPL-SCNC: 137 MMOL/L (ref 136–145)
WBC NRBC COR # BLD: 19.53 10*3/MM3 (ref 3.4–10.8)

## 2022-03-12 PROCEDURE — 97116 GAIT TRAINING THERAPY: CPT

## 2022-03-12 PROCEDURE — 94799 UNLISTED PULMONARY SVC/PX: CPT

## 2022-03-12 PROCEDURE — 94761 N-INVAS EAR/PLS OXIMETRY MLT: CPT

## 2022-03-12 PROCEDURE — 97110 THERAPEUTIC EXERCISES: CPT

## 2022-03-12 PROCEDURE — 82962 GLUCOSE BLOOD TEST: CPT

## 2022-03-12 PROCEDURE — 99232 SBSQ HOSP IP/OBS MODERATE 35: CPT | Performed by: INTERNAL MEDICINE

## 2022-03-12 PROCEDURE — 63710000001 PREDNISONE PER 5 MG: Performed by: INTERNAL MEDICINE

## 2022-03-12 PROCEDURE — 63710000001 INSULIN ASPART PER 5 UNITS: Performed by: INTERNAL MEDICINE

## 2022-03-12 PROCEDURE — 80048 BASIC METABOLIC PNL TOTAL CA: CPT | Performed by: INTERNAL MEDICINE

## 2022-03-12 PROCEDURE — 85027 COMPLETE CBC AUTOMATED: CPT | Performed by: INTERNAL MEDICINE

## 2022-03-12 RX ORDER — TAMSULOSIN HYDROCHLORIDE 0.4 MG/1
0.4 CAPSULE ORAL DAILY
Status: DISCONTINUED | OUTPATIENT
Start: 2022-03-12 | End: 2022-03-14 | Stop reason: HOSPADM

## 2022-03-12 RX ORDER — CHOLECALCIFEROL (VITAMIN D3) 125 MCG
5 CAPSULE ORAL NIGHTLY PRN
Status: DISCONTINUED | OUTPATIENT
Start: 2022-03-12 | End: 2022-03-14 | Stop reason: HOSPADM

## 2022-03-12 RX ADMIN — TAMSULOSIN HYDROCHLORIDE 0.4 MG: 0.4 CAPSULE ORAL at 15:12

## 2022-03-12 RX ADMIN — IPRATROPIUM BROMIDE AND ALBUTEROL SULFATE 3 ML: .5; 2.5 SOLUTION RESPIRATORY (INHALATION) at 07:37

## 2022-03-12 RX ADMIN — PREDNISONE 30 MG: 10 TABLET ORAL at 09:14

## 2022-03-12 RX ADMIN — APIXABAN 5 MG: 2.5 TABLET, FILM COATED ORAL at 09:14

## 2022-03-12 RX ADMIN — MELATONIN TAB 5 MG 5 MG: 5 TAB at 21:09

## 2022-03-12 RX ADMIN — FERROUS SULFATE TAB EC 324 MG (65 MG FE EQUIVALENT) 324 MG: 324 (65 FE) TABLET DELAYED RESPONSE at 09:14

## 2022-03-12 RX ADMIN — ATORVASTATIN CALCIUM 40 MG: 40 TABLET, FILM COATED ORAL at 20:32

## 2022-03-12 RX ADMIN — INSULIN ASPART 2 UNITS: 100 INJECTION, SOLUTION INTRAVENOUS; SUBCUTANEOUS at 16:56

## 2022-03-12 RX ADMIN — HYDROCODONE BITARTRATE AND HOMATROPINE METHYLBROMIDE 5 ML: 5; 1.5 SOLUTION ORAL at 19:20

## 2022-03-12 RX ADMIN — IPRATROPIUM BROMIDE AND ALBUTEROL SULFATE 3 ML: .5; 2.5 SOLUTION RESPIRATORY (INHALATION) at 14:56

## 2022-03-12 RX ADMIN — IPRATROPIUM BROMIDE AND ALBUTEROL SULFATE 3 ML: .5; 2.5 SOLUTION RESPIRATORY (INHALATION) at 20:03

## 2022-03-12 RX ADMIN — APIXABAN 5 MG: 2.5 TABLET, FILM COATED ORAL at 20:32

## 2022-03-12 NOTE — THERAPY DISCHARGE NOTE
Acute Care - Speech Language Pathology Discharge Summary   [facility]       Patient Name: Dontae Bonds Jr.  : 1950  MRN: [id number]    Today's Date: 3/12/2022  Onset of Illness/Injury or Date of Surgery: 22       Referring Physician: Dr Matt        Admit Date: 2022      SLP Recommendation and Plan    Visit Dx:    ICD-10-CM ICD-9-CM   1. Pneumonia of both lungs due to infectious organism, unspecified part of lung  J18.9 483.8   2. Acute respiratory failure without hypercapnia (HCC)  J96.00 518.81       Pt is tolerating his current diet without reported s/s or aspiration and no complications reported. Good po intake also reported. Pt with no further needs identified r/t his swallowing at this time. Intermittent use of compensatory strategies but understanding of his diet level at this time. No further needs r/t swallowing indicated at this time. Will discontinue therapy.          SLP GOALS     Row Name 22 1500             Oral Nutrition/Hydration Goal (SLP)    Oral Nutrition/Hydration Goal, SLP Pt will demonstrate tolerance of the least restrictive diet w/o s/s of aspiration or complications such as aspiration pneumonia.  -AD      Time Frame (Oral Nutrition/Hydration Goal, SLP) short term goal (STG);1 week;by discharge  -AD      Barriers (Oral Nutrition/Hydration Goal, SLP) medically complex  -AD      Progress/Outcomes (Oral Nutrition/Hydration Goal, SLP) good progress toward goal;goal ongoing  tolerating current diet; no changes recommended; pt likes smaller portions and snacks/supplements  -AD              Swallow Management Recall Goal 1 (SLP)    Activity (Swallow Management Recall Goal 1, SLP) recall of;safe diet level/texture;compensatory swallow strategies/techniques  -AD      [place]/Accuracy (Swallow Management Recall Goal 1, SLP) with moderate cues (50-74% accuracy)  -AD      Time Frame (Swallow Management Recall Goal 1, SLP) short term goal (STG);1 week;by discharge  -AD       Barriers (Swallow Management Recall Goal 1, SLP) medically complex  -AD      Progress/Outcomes (Swallow Management Recall Goal 1, SLP) continuing progress toward goal;goal partially met  met recall fo diet level; continues to need prompts for prepping/small bites and drinks.  -AD              Swallow Compensatory Strategies Goal 1 (SLP)    Activity (Swallow Compensatory Strategies/Techniques Goal 1, SLP) compensatory strategies;during meal intake;during p.o. trials;small cup sips;extra swallow per bolus;other (see comments)  -AD      [place]/Accuracy (Swallow Compensatory Strategies/Techniques Goal 1, SLP) with moderate cues (50-74% accuracy)  -AD      Time Frame (Swallow Compensatory Strategies/Techniques Goal 1, SLP) short term goal (STG);1 week;by discharge  -AD      Barriers (Swallow Compensatory Strategies/Techniques Goal 1, SLP) medically complex; cognitive status  -AD      Progress/Outcomes (Swallow Compensatory Strategies/Techniques Goal 1, SLP) other (see comments)  goal not targeted during this session.  -AD            User Key  (r) = Recorded By, (t) = Taken By, (c) = Cosigned By    Initials Name Provider Type    AD Daniela Robertson MS CCC-SLP Speech and Language Pathologist                        SLP Discharge Summary  Anticipated Discharge Disposition (SLP): home with home health  Reason for Discharge: all goals and outcomes met, no further needs identified  Progress Toward Achieving Short/long Term Goals: goals partially met within established timelines, has achieved all goals related to function and safety      Daniela Robertson MS CCC-SLP  3/12/2022   How Severe Are Your Spot(S)?: mild What Is The Reason For Today's Visit?: Full Body Skin Examination What Is The Reason For Today's Visit? (Being Monitored For X): concerning skin lesions on an annual basis

## 2022-03-12 NOTE — PROGRESS NOTES
"Hospitalist Team      Patient Care Team:  Tiffany Burciaga MD as PCP - General (Internal Medicine)      Chief Complaint: Follow-up Acute Hypoxic Respiratory Failure    Subjective    Less O2 requirements with rest but per pt and nurse still with High O2 requirements with exertion. No other new issues.    Objective    Vital Signs  Temp:  [97.4 °F (36.3 °C)-98.2 °F (36.8 °C)] 98.2 °F (36.8 °C)  Heart Rate:  [74-95] 87  Resp:  [20-28] 28  BP: (115-128)/(78-91) 116/91  Oxygen Therapy  SpO2: 94 %  Pulse Oximetry Type: Continuous  Device (Oxygen Therapy): ventilator  Device (Oxygen Therapy): high-flow nasal cannula  $ High Flow Nasal Cannula Set-Up: yes  Flow (L/min): 6  Oxygen Concentration (%): 40  ETCO2 (mmHg): 32 mmHg  Probe Placed On (Pulse Ox): Left:, forehead  Probe Removed From (Pulse Ox): forehead, Left:}    Flowsheet Rows    Flowsheet Row First Filed Value   Admission Height 172.7 cm (68\") Documented at 02/21/2022 1843   Admission Weight 78.9 kg (174 lb) Documented at 02/21/2022 1825          Physical Exam:    General: NAD  Lungs: some improving aeration noted   CV: RRR  Abdomen: Soft and non-tender w/ active bowel sounds.  MSK: No C/C/E.  Neuro: CN II-XII grossly intact.  Psych: AO x2 mood stable.    Results Review:     I reviewed the patient's new clinical results.    Lab Results (last 24 hours)     Procedure Component Value Units Date/Time    POC Glucose Once [[id number]]  (Normal) Collected: 03/12/22 0808    Specimen: Blood Updated: 03/12/22 0813     Glucose 101 mg/dL      Comment: Meter: [id number] : [id number] Elinor Benavides RN       Basic Metabolic Panel [[id number]]  (Abnormal) Collected: 03/12/22 0507    Specimen: Blood Updated: 03/12/22 0538     Glucose 132 mg/dL      BUN 22 mg/dL      Creatinine 0.51 mg/dL      Sodium 137 mmol/L      Potassium 3.9 mmol/L      Chloride 100 mmol/L      CO2 29.7 mmol/L      Calcium 9.1 mg/dL      BUN/Creatinine Ratio 43.1     Anion Gap 7.3 mmol/L      eGFR 108.4 " mL/min/1.73      Comment: National Kidney Foundation and American Society of Nephrology (ASN) Task Force recommended calculation based on the Chronic Kidney Disease Epidemiology Collaboration (CKD-EPI) equation refit without adjustment for race.       Narrative:      GFR Normal >60  Chronic Kidney Disease <60  Kidney Failure <15      CBC (No Diff) [[id number]]  (Abnormal) Collected: 03/12/22 0507    Specimen: Blood Updated: 03/12/22 0522     WBC 19.53 10*3/mm3      RBC 4.47 10*6/mm3      Hemoglobin 13.3 g/dL      Hematocrit 41.6 %      MCV 93.1 fL      MCH 29.8 pg      MCHC 32.0 g/dL      RDW 15.5 %      RDW-SD 51.1 fl      MPV 10.2 fL      Platelets 348 10*3/mm3     POC Glucose Once [[id number]]  (Abnormal) Collected: 03/11/22 1714    Specimen: Blood Updated: 03/11/22 1720     Glucose 134 mg/dL      Comment: Meter: [id number] : [id number] Abbe Victoria RN       POC Glucose Once [[id number]]  (Abnormal) Collected: 03/11/22 1131    Specimen: Blood Updated: 03/11/22 1138     Glucose 197 mg/dL      Comment: Meter: [id number] : [id number] Tracy Valladares RN             Imaging Results (Last 24 Hours)     ** No results found for the last 24 hours. **          Medication Review:   I have reviewed the patient's current medication list    Current Facility-Administered Medications:   •  acetaminophen (TYLENOL) tablet 650 mg, 650 mg, Oral, Q4H PRN, 650 mg at 02/27/22 0311 **OR** acetaminophen (TYLENOL) suppository 650 mg, 650 mg, Rectal, Q4H PRN, Alvarado, Birrilla, DO  •  [COMPLETED] apixaban (ELIQUIS) tablet 10 mg, 10 mg, Oral, Q12H, 10 mg at 03/07/22 2113 **FOLLOWED BY** apixaban (ELIQUIS) tablet 5 mg, 5 mg, Oral, Q12H, Osvaldo Gibson DO, 5 mg at 03/12/22 0914  •  atorvastatin (LIPITOR) tablet 40 mg, 40 mg, Oral, Nightly, Alvarado, Birrilla, DO, 40 mg at 03/11/22 2109  •  dextrose (D50W) (25 g/50 mL) IV injection 25 g, 25 g, Intravenous, Q15 Min PRN, Cash Arguello Jr., MD  •  dextrose (GLUTOSE) oral gel 15  g, 15 g, Oral, Q15 Min PRN, Cash Arguello Jr., MD  •  docusate sodium (COLACE) liquid 100 mg, 100 mg, Oral, BID, Ole Alvaradorilla, DO, 100 mg at 03/09/22 2013  •  ferrous sulfate EC tablet 324 mg, 324 mg, Oral, Daily With Breakfast, Ole Alvaradorilla, DO, 324 mg at 03/12/22 0914  •  glucagon (GLUCAGEN) injection 1 mg, 1 mg, Intramuscular, Q15 Min PRN, Cash Arguello Jr., MD  •  HYDROcodone-homatropine (HYCODAN) 5-1.5 MG/5ML syrup 5 mL, 5 mL, Oral, Q4H PRN, Osvaldo Gibson DO, 5 mL at 03/10/22 0105  •  insulin aspart (novoLOG) injection 0-9 Units, 0-9 Units, Subcutaneous, TID AC, Trevon Chandler, DO, 2 Units at 03/11/22 1158  •  ipratropium-albuterol (DUO-NEB) nebulizer solution 3 mL, 3 mL, Nebulization, Q6H PRN, Alvarado Birrilla, DO  •  ipratropium-albuterol (DUO-NEB) nebulizer solution 3 mL, 3 mL, Nebulization, 4x Daily - RT, Darrick Matt MD, 3 mL at 03/12/22 0737  •  Magnesium Sulfate 2 gram Bolus, followed by 8 gram infusion (total Mg dose 10 grams)- Mg less than or equal to 1mg/dL, 2 g, Intravenous, PRN **OR** Magnesium Sulfate 2 gram / 50mL Infusion (GIVE X 3 BAGS TO EQUAL 6GM TOTAL DOSE) - Mg 1.1 - 1.5 mg/dl, 2 g, Intravenous, PRN **OR** Magnesium Sulfate 4 gram infusion- Mg 1.6-1.9 mg/dL, 4 g, Intravenous, PRN, Alvarado, Birrilla, DO  •  OLANZapine (zyPREXA) injection 5 mg, 5 mg, Intramuscular, Q8H PRN, Trevon Chandler DO, 5 mg at 02/28/22 1709  •  ondansetron (ZOFRAN) tablet 4 mg, 4 mg, Oral, Q6H PRN **OR** ondansetron (ZOFRAN) injection 4 mg, 4 mg, Intravenous, Q6H PRN, Alvarado, Birrilla, DO  •  polyethylene glycol (MIRALAX) packet 17 g, 17 g, Oral, Daily, Alvarado, Birrilla, DO, 17 g at 03/08/22 0754  •  potassium chloride (K-DUR,KLOR-CON) CR tablet 40 mEq, 40 mEq, Oral, PRN **OR** potassium chloride (KLOR-CON) packet 40 mEq, 40 mEq, Oral, PRN **OR** potassium chloride 10 mEq in 100 mL IVPB, 10 mEq, Intravenous, Q1H PRN, Alvarado, Birrilla, DO, Last Rate: 100 mL/hr at 02/22/22 0936, 10 mEq at  02/22/22 0936  •  predniSONE (DELTASONE) tablet 30 mg, 30 mg, Oral, Daily With Breakfast, Fabiano Dunbar MD, 30 mg at 03/12/22 0914  •  [COMPLETED] Insert peripheral IV, , , Once **AND** sodium chloride 0.9 % flush 10 mL, 10 mL, Intravenous, PRN, Alvarado, Birrilla, DO, 10 mL at 03/07/22 1616  •  sodium chloride 0.9 % infusion 40 mL, 40 mL, Intravenous, PRN, Alvarado, Birrilla, DO, 100 mL at 03/02/22 0228  •  sodium chloride nasal spray 2 spray, 2 spray, Each Nare, PRN, Osvaldo Gibson, DO, 2 spray at 03/06/22 0818      Assessment/Plan     Acute Hypoxic Respiratory Failure due to bacterial/aspiration Pneumonia/Post-COVID Fibrosis:   -On 6 L high flow at rest, unfortunately still with high requirement w/exrtion  -aggressive pulmonary toilet  -PO steroids weaning  -Pulmonary following appreciate assistance     Leukocytosis    -likely steroid induced, downtrending  -status post antibiotic course,  -Remains afebrile     Acute LLE DVT:   -On Eliquis.     Steroid-induced Hyperglycemia:   -BG now stable  -SSI     Dyslipidemia:  - No acute issues.  Continue Lipitor.    Acute metabolic encephalopathy secondary to infection and suspected acute alcohol withdrawal  -Neurology  signed off   -Overall resolved, still with periodic confusion  -Continue as needed Zyprexa    Plan for disposition: exercise O2 will have to be better before d/c     Trevon Chandler DO  03/12/22  10:43 EST

## 2022-03-12 NOTE — THERAPY TREATMENT NOTE
Acute Care - Physical Therapy Progress Note  DILAN Ward     Patient Name: Dontae Bonds Jr.  : 1950  MRN: [id number]  Today's Date: 3/12/2022   Onset of Illness/Injury or Date of Surgery: 22  Visit Dx:     ICD-10-CM ICD-9-CM   1. Pneumonia of both lungs due to infectious organism, unspecified part of lung  J18.9 483.8   2. Acute respiratory failure without hypercapnia (HCC)  J96.00 518.81     Patient Active Problem List   Diagnosis   • Personal history of colonic polyps   • Family history of colon cancer   • Pneumonia due to COVID-19 virus   • Pneumonia of both lungs due to infectious organism   • Hypertension   • Hypokalemia     Past Medical History:   Diagnosis Date   • Hyperlipidemia    • Hypertension      History reviewed. No pertinent surgical history.  PT Assessment (last 12 hours)     PT Evaluation and Treatment     Row Name 22 0846          Physical Therapy Time and Intention    Subjective Information complains of;dyspnea  -EA     Document Type therapy note (daily note)  -EA     Mode of Treatment physical therapy  -EA     Patient Effort good  -EA     Symptoms Noted During/After Treatment shortness of breath  -EA     Row Name 22 0846          General Information    Patient Profile Reviewed yes  -EA     Patient Observations alert;cooperative;agree to therapy  -EA     Patient/Family/Caregiver Comments/Observations in recliner  -EA     Existing Precautions/Restrictions fall;oxygen therapy device and L/min  6L at rest, 88%, RN present  -EA     Row Name 22 0846          Pain    Pre/Posttreatment Pain Comment none  -EA     Row Name 22 0846          Cognition    Personal Safety Interventions gait belt;nonskid shoes/slippers when out of bed  -EA     Row Name 22 0846          Transfers    Transfers sit-stand transfer;stand-sit transfer  -EA     Sit-Stand [place] (Transfers) supervision  -EA     Stand-Sit [place] (Transfers) supervision  -EA     Row Name 22 0846           Sit-Stand Transfer    Assistive Device (Sit-Stand Transfers) walker, front-wheeled  -EA     Row Name 03/12/22 0846          Stand-Sit Transfer    Assistive Device (Stand-Sit Transfers) walker, front-wheeled  -EA     Row Name 03/12/22 0846          Gait/Stairs (Locomotion)    [place] Level (Gait) standby assist  -EA     Assistive Device (Gait) walker, front-wheeled  -EA     Distance in Feet (Gait) 80x2 with standing rest breaks thoughout  -EA     Deviations/Abnormal Patterns (Gait) base of support, narrow  -EA     Bilateral Gait Deviations forward flexed posture  -EA     Comment, (Gait/Stairs) Verbal and tactile cues to shoulders for proper breathing strategies. supplemental 0xygen on 15L non rebreather with 02 sats ranging from 84-88%. Rn turned patient to 10 L and patient ranged yonn47-91% , then needed to sit to recover. RN in room, she placed patient back to 6L but only recovered to 87-88% at times. Sitting x 5mins.  -EA     Row Name 03/12/22 0846          Safety Issues, Functional Mobility    Safety Issues Affecting Function (Mobility) awareness of need for assistance;insight into deficits/self-awareness  -EA     Impairments Affecting Function (Mobility) shortness of breath  -EA     Comment, Safety Issues/Impairments (Mobility) oxygen saturation/SOA- awareness for recovery times  -EA     Row Name 03/12/22 0846          Motor Skills    Therapeutic Exercise other (see comments)  1min marching, pre gait activities  -EA     Row Name 03/12/22 0846          Plan of Care Review    Plan of Care Reviewed With patient  -EA     Progress improving  -EA     Outcome Evaluation Patient sitting in recliner, oxygen at 6L at rest satting at 88%. Discussed with RN increased needs of oxygen with mobility. Agreed to place non-rebreather on at 15L during standing and gait tasks. Patient ambulated 80ft x2 in hallway with 4 standing rest breaks . The last 50ft RN turned to 10L, continued to further desat to 80-84%.  REcovery time is slow, RN placed back on 6L in room, after 5mins patient at 87%. Rn present as therapist left room.  -EA     Row Name 03/12/22 0846          Vital Signs    Pre SpO2 (%) 87  -EA     O2 Delivery Pre Treatment supplemental O2  6L  -EA     Intra SpO2 (%) 85  -EA     O2 Delivery Intra Treatment supplemental O2  15L non rebreather  -EA     Post SpO2 (%) 87  -EA     O2 Delivery Post Treatment supplemental O2  6L per RN  -EA     Row Name 03/12/22 0846          Positioning and Restraints    Pre-Treatment Position sitting in chair/recliner  -EA     Post Treatment Position chair  -EA     In Chair sitting;call light within reach;encouraged to call for assist  -EA     Row Name 03/12/22 0846          Therapy Assessment/Plan (PT)    Patient/Family Therapy Goals Statement (PT) go home soon  -EA     Rehab Potential (PT) good, to achieve stated therapy goals  -EA     Activity Limitations Related to Problem List (PT) other (see comments)  oxygen requirements  -EA     Row Name 03/12/22 0846          Progress Summary (PT)    Progress Toward Functional Goals (PT) progress toward functional goals is good  -EA     Barriers to Overall Progress (PT) respiratory status  -EA     Row Name 03/12/22 0846          Therapy Plan Review/Discharge Plan (PT)    Therapy Plan Review (PT) care plan/treatment goals reviewed;patient  -EA           User Key  (r) = Recorded By, (t) = Taken By, (c) = Cosigned By    Initials Name Provider Type    Kiara Kelly PTA Physical Therapy Assistant                Physical Therapy Education                 Title: PT OT SLP Therapies (Done)     Topic: Physical Therapy (Done)     Point: Mobility training (Done)     Learning Progress Summary           Patient Acceptance, E, VU,NR by VIVIAN at 3/12/2022 1049    Acceptance, E,TB, VU by BRIANA at 3/11/2022 1016    Acceptance, E,TB, VU by BRIANA at 3/10/2022 1503    Acceptance, E,TB, VU by BRIANA at 3/9/2022 1020    Acceptance, E,TB, VU by BRIANA at 3/8/2022 1003     Acceptance, E,TB, VU by BRIANA at 3/7/2022 0919                   Point: Home exercise program (Done)     Learning Progress Summary           Patient Acceptance, E, VU,NR by VIVIAN at 3/12/2022 1049                               User Key     Initials Effective Dates Name Provider Type Discipline     06/16/21 -  Tami Hodgson, PT Physical Therapist PT    VIVIAN 06/20/21 -  Kiara Nielson, PTA Physical Therapy Assistant PT              PT Recommendation and Plan  Anticipated Discharge Disposition (PT): other (see comments) (will continue to assess as medical status)  Planned Therapy Interventions (PT): balance training, gait training, home exercise program, strengthening, transfer training, patient/family education  Therapy Frequency (PT): daily  Progress Summary (PT)  Progress Toward Functional Goals (PT): progress toward functional goals is good  Barriers to Overall Progress (PT): respiratory status  Plan of Care Reviewed With: patient  Progress: improving  Outcome Evaluation: Patient sitting in recliner, oxygen at 6L at rest satting at 88%. Discussed with RN increased needs of oxygen with mobility. Agreed to place non-rebreather on at 15L during standing and gait tasks. Patient ambulated 80ft x2 in hallway with 4 standing rest breaks . The last 50ft RN turned to 10L, continued to further desat to 80-84%. REcovery time is slow, RN placed back on 6L in room, after 5mins patient at 87%. Rn present as therapist left room.   Outcome Measures     Row Name 03/12/22 0846 03/11/22 0830 03/10/22 1266       How much help from another person do you currently need...    Turning from your back to your side while in flat bed without using bedrails? 3  -EA 3  -JW 3  -JW    Moving from lying on back to sitting on the side of a flat bed without bedrails? 3  -EA 3  -JW 3  -JW    Moving to and from a bed to a chair (including a wheelchair)? 3  -EA 3  -JW 3  -JW    Standing up from a chair using your arms (e.g., wheelchair, bedside chair)? 3   -EA 3  -JW 3  -JW    Climbing 3-5 steps with a railing? 2  -EA 2  -JW 2  -JW    To walk in hospital room? 3  -EA 3  -JW 3  -JW    AM-PAC 6 Clicks Score (PT) 17  -EA 17  -JW 17  -JW       Functional Assessment    Outcome Measure Options AM-PAC 6 Clicks Basic Mobility (PT)  -EA AM-PAC 6 Clicks Basic Mobility (PT)  -JW AM-PAC 6 Clicks Basic Mobility (PT)  -JW          User Key  (r) = Recorded By, (t) = Taken By, (c) = Cosigned By    Initials Name Provider Type    Tami Kitchen, PT Physical Therapist    Kiara Kelly, ANGELINA Physical Therapy Assistant                 Time Calculation:    PT Charges     Row Name 03/12/22 1051             Time Calculation    Start Time 0846  -EA      Stop Time 0913  -EA      Time Calculation (min) 27 min  -EA            User Key  (r) = Recorded By, (t) = Taken By, (c) = Cosigned By    Initials Name Provider Type    Kiara Kelly PTA Physical Therapy Assistant              Therapy Charges for Today     Code Description Service Date Service Provider Modifiers Qty    08581645734 HC GAIT TRAINING EA 15 MIN 3/12/2022 Kiara Nielson, PTA GP 1    82537334153 HC PT THER PROC EA 15 MIN 3/12/2022 Kiara Nielson, PTA GP 1          PT G-Codes  Outcome Measure Options: AM-PAC 6 Clicks Basic Mobility (PT)  AM-PAC 6 Clicks Score (PT): Sosa Nielson PTA  3/12/2022

## 2022-03-12 NOTE — PROGRESS NOTES
LOS: 19 days   Patient Care Team:  Tiffany Burciaga MD as PCP - General (Internal Medicine)    Subjective   Following up hypoxic respiratory failure.  I am picking up care again from Dr. Dunbar patient was had COVID-19 pneumonia with respiratory failure secondary bacterial pneumonia, acute left lower extremity DVT, metabolic encephalopathy with suspected alcohol withdrawal complement.  Patient is awake and alert he reports a little bit of dry cough, his breathing is improving he worked with physical therapy yesterday, he would like to do more therapy than he is getting.  He is not having any pain.    Review of Systems:          Objective     Vital Signs  Vital Sign Min/Max for last 24 hours  Temp  Min: 96.5 °F (35.8 °C)  Max: 98.2 °F (36.8 °C)   BP  Min: 115/78  Max: 128/78   Pulse  Min: 74  Max: 115   Resp  Min: 20  Max: 24   SpO2  Min: 88 %  Max: 95 %   Flow (L/min)  Min: 6  Max: 10   No data recorded        Ventilator/Non-Invasive Ventilation Settings (From admission, onward)             Start     Ordered    02/21/22 2201  Ventilator - AC/PC; (16); (100); 90%; 5; 20  Continuous,   Status:  Canceled        Question Answer Comment   Vent Mode AC/PC    Breath rate  16   FiO2  100   FiO2 titrate for Sp02% =/> 90%    PEEP 5    Inspiratory Pressure 20        02/21/22 2202                             Body mass index is 25.37 kg/m².  I/O last 3 completed shifts:  In: 2634 [P.O.:2634]  Out: 3250 [Urine:3250]  I/O this shift:  In: -   Out: 650 [Urine:650]        Physical Exam:  General Appearance: Well-developed white male he is resting in bed on 6 L nasal cannula oxygen saturations between 91 and 94% and he does not appear in acute distress.  He can talk pretty much nonstop without getting dyspneic.  Eyes: Conjunctiva are clear and anicteric  ENT: Mucous membranes are little bit dry no erythema no exudates  Neck: Trachea midline no jugular venous distention  Lungs: He is actually moving air very well I do  not hear any crackles or wheezes, no dullness and he is not using accessory muscles on 6 L O2 at rest  Cardiac: Regular rate rhythm no murmur  Abdomen: Soft no palpable hepatosplenomegaly  : Not examined  Musculoskeletal: Mild thoracic kyphosis  Skin: Warm dry no jaundice no petechiae  Neuro: He is alert and oriented he is cooperative following commands moving extremities well  Extremities/P Vascular: No clubbing no cyanosis no edema palpable radial dorsalis pedis pulses  MSE: Seems to be in pretty good spirits right now       Labs:  Results from last 7 days   Lab Units 03/12/22  0507 03/09/22  1557 03/08/22  0517 03/07/22  0515 03/06/22  0500   GLUCOSE mg/dL 132* 252* 168* 165* 172*   SODIUM mmol/L 137 135* 136 139 140   POTASSIUM mmol/L 3.9 4.8 4.8 4.6 4.2   MAGNESIUM mg/dL  --  2.1  --   --   --    CO2 mmol/L 29.7* 27.0 29.2* 30.4* 27.4   CHLORIDE mmol/L 100 99 101 104 104   ANION GAP mmol/L 7.3 9.0 5.8 4.6* 8.6   CREATININE mg/dL 0.51* 0.56* 0.49* 0.52* 0.46*   BUN mg/dL 22 27* 23 23 28*   BUN / CREAT RATIO  43.1* 48.2* 46.9* 44.2* 60.9*   CALCIUM mg/dL 9.1 9.4 9.5 9.7 9.5   ALK PHOS U/L  --   --   --  49 47   TOTAL PROTEIN g/dL  --   --   --  6.1 6.3   ALT (SGPT) U/L  --   --   --  92* 84*   AST (SGOT) U/L  --   --   --  27 29   BILIRUBIN mg/dL  --   --   --  0.3 0.3   ALBUMIN g/dL  --   --   --  3.10* 3.00*   GLOBULIN gm/dL  --   --   --  3.0 3.3     Estimated Creatinine Clearance: 89.8 mL/min (A) (by C-G formula based on SCr of 0.51 mg/dL (L)).      Results from last 7 days   Lab Units 03/12/22  0507 03/09/22  0542 03/08/22  0517 03/07/22  0515   WBC 10*3/mm3 19.53* 25.40* 29.10* 33.95*   RBC 10*6/mm3 4.47 3.84* 3.74* 3.89*   HEMOGLOBIN g/dL 13.3 11.4* 11.2* 11.6*   HEMATOCRIT % 41.6 35.9* 34.9* 36.6*   MCV fL 93.1 93.5 93.3 94.1   MCH pg 29.8 29.7 29.9 29.8   MCHC g/dL 32.0 31.8 32.1 31.7   RDW % 15.5* 14.4 14.4 14.6   RDW-SD fl 51.1 48.5 48.7 49.2   MPV fL 10.2 10.2 10.2 10.1   PLATELETS 10*3/mm3 348 378  410 433   NEUTROPHIL % %  --  87.2* 91.0* 92.6*   LYMPHOCYTE % %  --  3.5* 2.5* 3.2*   MONOCYTES % %  --  4.3* 3.0* 1.8*   EOSINOPHIL % %  --  0.0* 0.0* 0.0*   BASOPHIL % %  --  0.2 0.1 0.1   IMM GRAN % %  --  4.8* 3.4* 2.3*   NEUTROS ABS 10*3/mm3  --  22.15* 26.44* 31.42*  32.25*   LYMPHS ABS 10*3/mm3  --  0.90 0.74 1.08   MONOS ABS 10*3/mm3  --  1.08* 0.88 0.61   EOS ABS 10*3/mm3  --  0.00 0.00 0.00   BASOS ABS 10*3/mm3  --  0.04 0.04 0.05   IMMATURE GRANS (ABS) 10*3/mm3  --  1.23* 1.00* 0.79*   NRBC /100 WBC  --  0.1 0.0 0.0                     Results from last 7 days   Lab Units 03/07/22  0515   PROCALCITONIN ng/mL 0.06         Microbiology Results (last 10 days)     ** No results found for the last 240 hours. **              apixaban, 5 mg, Oral, Q12H  atorvastatin, 40 mg, Oral, Nightly  docusate sodium, 100 mg, Oral, BID  ferrous sulfate, 324 mg, Oral, Daily With Breakfast  insulin aspart, 0-9 Units, Subcutaneous, TID AC  ipratropium-albuterol, 3 mL, Nebulization, 4x Daily - RT  polyethylene glycol, 17 g, Oral, Daily  predniSONE, 30 mg, Oral, Daily With Breakfast           Diagnostics:  Adult Transthoracic Echo Complete w/ Color, Spectral and Contrast if Necessary Per Protocol    Result Date: 2/24/2022  · Estimated left ventricular EF = 60% Left ventricular systolic function is normal. · Left ventricular diastolic function was normal. · Mild aortic valve stenosis is present. · Peak velocity of the flow distal to the aortic valve is 288.1 cm/s. Aortic valve maximum pressure gradient is 33.2 mmHg. Aortic valve mean pressure gradient is 18.4 mmHg. Aortic valve dimensionless index is 0.4 . · Estimated right ventricular systolic pressure from tricuspid regurgitation is mildly elevated (35-45 mmHg). Calculated right ventricular systolic pressure from tricuspid regurgitation is 37 mmHg.      CT Head Without Contrast    Result Date: 2/22/2022  HISTORY: Mental status change of uncertain cause. Patient has pneumonia  on a ventilator with respiratory failure. Covid positive TECHNIQUE: CT head without contrast. Radiation dose reduction techniques included automated exposure control or exposure modulation based on body size. Radiation audit for number of CT and nuclear cardiology exams performed in the last year: 5.  COMPARISON: Head CT from 2/1/2022 FINDINGS: There is no displaced calvarial fracture. The visualized paranasal sinuses and mastoid air cells are clear. There is no evidence for acute intracranial hemorrhage or extra axial fluid collection. There is mild generalized atrophy. There are vascular calcifications at the base of the brain. There is mild white matter low-attenuation that is nonspecific but likely due to small vessel disease. No acute cortical infarct is suspected. No intracranial mass affect. Findings not significantly changed.     No acute intracranial abnormalities appreciated. Again there is atrophy and probable sequelae of small vessel disease. Signer Name: Jeanna Epperson MD  Signed: 2/22/2022 2:08 PM  Workstation Name: [id number]  [facility] Video Swallow With Speech Single Contrast    Result Date: 2/28/2022  VIDEO SWALLOWING STUDY  HISTORY: Difficulty swallowing. Status post extubation.  TECHNIQUE: Video swallowing study was conducted by the speech pathologist in my presence and recorded on digital video disc.  Fluoroscopy time 1.9 minutes. A single spot image was recorded for documentation purposes.  FINDINGS: No laryngeal penetration or aspiration of contrast was seen with any consistency of barium contrast. There is premature spillage of thin liquid contrast into the pyriform sinuses. For a full report, please refer to speech pathology.  This report was finalized on 2/28/2022 1:48 PM by Dr. Corbin Duran MD.      XR Chest 1 View    Result Date: 3/9/2022  CR Chest 1 Vw INDICATION: Follow-up fibrosis related to Covid pneumonia. COMPARISON:  3/5/2022 FINDINGS: Single  portable AP view(s) of the chest. Heart size is stable. Coarse bilateral infiltrates are again identified. There appears slightly more dense overall which may be a function of radiographic technique although slight worsening is not excluded. No pneumothorax is identified.     Increased density of bilateral infiltrates may be a function of radiographic technique or may indicate slight worsening. Otherwise stable. Signer Name: Corbin Duran MD  Signed: 3/9/2022 5:40 AM  Workstation Name: [email or web address]  [facility]    XR Chest 1 View    Result Date: 3/5/2022  CR Chest 1 Vw INDICATION: Recent COVID-19 infection. Follow-up pneumonia. COMPARISON:  Chest film 3/1/2022 FINDINGS: Portable AP view(s) of the chest. External ECG leads are present. Stable cardiac and mediastinal aortic contours. There is somewhat coarse interstitial change in both lungs similar to prior study. No acute superimposed airspace change, pleural effusion or pneumothorax.     No significant change from 3/1/2022. Coarse bilateral interstitial changes persist without significant change. Signer Name: Melba Meade MD  Signed: 3/5/2022 10:41 AM  Workstation Name: [id number]  [facility]    XR Chest 1 View    Result Date: 3/1/2022  XR CHEST 1 VW-: 3/1/2022 1:08 PM  INDICATION: Increasing shortness of air today. Requiring more oxygen according to the nurse.  COMPARISON:  02/25/2022.  FINDINGS: Single Portable AP view(s) of the chest. An enteric tube is no longer visible. Right-sided PICC line has been removed. Stable cardiac silhouette. Shallow lung expansion with bronchovascular crowding. Peripheral predominant airspace disease in both lungs remains present but has partially regressed compared with the prior study. There is no new consolidation. No pneumothorax or effusion. Thoracic spondylosis. Oral contrast material present at the hepatic flexure and splenic flexure.       1. Bilateral pneumonia. Imaging  findings favor atypical infection including Covid 19. Partial regression compared to the prior study. No pneumothorax.  This report was finalized on 3/1/2022 1:30 PM by Dr. Raghu Huang MD.      XR Chest 1 View    Result Date: 2/25/2022  AP PORTABLE CHEST, 02/25/2022  HISTORY: Dobbhoff tube placement  COMPARISON: Yesterday  TECHNIQUE: AP portable chest x-ray.  FINDINGS: Diffuse bilateral infiltrates are stable. PIC catheter stable. There is a new Dobbhoff tube. The tip is in the lower half of the esophagus.      Tip of the Dobbhoff tube is in the lower half of the esophagus and needs to be advanced. I discussed these findings with the patient's nurse in the ICU  This report was finalized on 2/25/2022 3:38 PM by Dr. Nahum Lester MD.      XR Chest 1 View    Result Date: 2/24/2022  CR Chest 1 Vw INDICATION: Covid positive with increased oxygen need COMPARISON:  February 23, 2022 FINDINGS: Portable AP view(s) of the chest. Interval extubation. Severe bilateral airspace disease. Cardiomediastinal silhouette is obscured. Overall similar appearance of the lung fields as compared to the prior.     Extensive multifocal airspace disease. Interval extubation. Signer Name: CHAPARRITA ROUSE MD  Signed: 2/24/2022 7:19 PM  Workstation Name: [facility]  [facility]    XR Chest 1 View    Result Date: 2/23/2022  CHEST X-RAY, 02/23/2022 (14:54)     HISTORY: Dobbhoff tube replacement.  TECHNIQUE: AP portable chest x-ray.  FINDINGS: The replaced Dobbhoff tube tip is now positioned in the distal thoracic esophagus about 6 cm above the esophagogastric junction. It may now be safely fully advanced into the stomach.  The remainder of the examination is negative and unchanged.      Dobbhoff tube tip is within the lower thoracic esophagus on stage I of 2 Dobbhoff placement series.  This report was finalized on 2/23/2022 3:13 PM by Dr. Obey Lindsey MD.      XR Chest 1 View    Result Date: 2/23/2022  CHEST X-RAY,  02/23/2022 (13:03)     HISTORY: Assess Dobbhoff tube position.  TECHNIQUE: AP portable chest x-ray.  FINDINGS: The Dobbhoff tube is coiled in the hypopharynx. This should be removed and replaced.  ETT and PICC remain in good position. Chest x-ray unchanged since the earlier study. No visible pneumomediastinum or pneumothorax.      Dobbhoff tube is coiled in the hypopharynx at 1303 on 02/23/2022.  This report was finalized on 2/23/2022 1:21 PM by Dr. Obey Lindsey MD.      XR Chest 1 View    Result Date: 2/23/2022  CHEST X-RAY, 02/23/2022     HISTORY: Dobbhoff feeding tube placement.  TECHNIQUE: AP portable chest x-ray (10:20).  COMPARISON: *  Chest x-ray, 02/23/2022 (05:03).  FINDINGS: The Dobbhoff tube tip is superimposed over the mid thoracic trachea alongside the endotracheal tube. Tip is about 3.5 cm above the billie. This may lie within the trachea or within the adjacent esophagus.  ETT and PICC remain in good position.  Dense multifocal airspace infiltrates are unchanged since earlier today. No pneumothorax or pneumomediastinum. Low lung volumes.      Dobbhoff tube tip is superimposed over the mid thoracic trachea as noted above.  This report was finalized on 2/23/2022 10:43 AM by Dr. Obey Lindsey MD.      XR Chest 1 View    Result Date: 2/23/2022  CR Chest 1 Vw INDICATION: Follow-up pneumonia. Covid positive. COMPARISON:  2/22/2022 FINDINGS: Single portable AP view(s) of the chest. Endotracheal tube is in good position in the lower trachea. Right arm approach PICC is at the right atrial level. Heart size is stable. Coarse bilateral pulmonary infiltrates are not significantly changed. No pneumothorax.     Tubes and lines as above. No significant change in pulmonary infiltrates. No pneumothorax. Signer Name: Corbin Duran MD  Signed: 2/23/2022 5:28 AM  Workstation Name: [facility]  [facility]    XR Chest 1 View    Result Date: 2/22/2022  CHEST X-RAY, 02/22/2022     HISTORY:  71-year-old male hospital inpatient with acute respiratory failure, hypoxia.  TECHNIQUE: AP portable chest x-ray.  COMPARISON: *  Chest x-ray, 02/21/2022.  FINDINGS: Endotracheal tube remains in good position with tip in the lower thoracic trachea about 2.8 cm above the billie.  New right arm PICC in good position with tip in the lower SVC.  Dense diffuse multifocal airspace infiltrates are unchanged. Lung volumes are low. There is no visible pneumomediastinum, pneumothorax or pleural effusion.      1.  ETT and PICC in good position. 2.  Dense diffuse multifocal airspace infiltrates are unchanged. 3.  No significant change since yesterday.  This report was finalized on 2/22/2022 2:40 PM by Dr. Obey Lindsey MD.      XR Chest 1 View    Result Date: 2/21/2022  CR Chest 1 Vw INDICATION: Postintubation COMPARISON:  Earlier in the day 2/21/2022 FINDINGS: Portable AP view(s) of the chest. Interval intubation with the tip of the endotracheal tube terminating chest above the level the billie but projecting towards the level of the right mainstem bronchus (approximately 1.1 cm above the billie. Other findings including cardiomegaly and widespread multifocal infiltrates in the lungs are unchanged.     Interval intubation with the tip of the endotracheal tube terminating just above the level the billie but projecting towards the level of the right mainstem bronchus (approximately 1.1 cm above the billie. Other findings including cardiomegaly and widespread multifocal infiltrates in the lungs are unchanged Signer Name: CHAPARRITA ROUSE MD  Signed: 2/21/2022 10:01 PM  Workstation Name: [facility]  [facility]    XR Chest 1 View    Result Date: 2/21/2022  CR Chest 1 Vw INDICATION: Shortness of air COMPARISON:  January 29, 2022 FINDINGS: Portable AP view(s) of the chest.  Cardiomediastinal contours are obscured. Marked worsening in multifocal consolidative changes in the lungs, could represent multifocal  pneumonia versus pulmonary edema. No pleural effusion. No pneumothorax. No acute bony abnormality.     Marked worsening in multifocal consolidative changes in the lungs, could represent multifocal pneumonia versus pulmonary edema. Signer Name: CHAPARRITA ROUSE MD  Signed: 2/21/2022 7:25 PM  Workstation Name: [email or web address]  [facility] Venous Doppler Lower Extremity Bilateral (duplex)    Result Date: 2/22/2022  VENOUS DOPPLER ULTRASOUND, BILATERAL LOWER EXTREMITIES, COMPLETE, 02/22/2022  HISTORY: 71-year-old male hospital inpatient acute hypoxemic respiratory failure. Pneumonia. Prior COVID-19 infection. Elevated d-dimer.  TECHNIQUE: Venous Doppler ultrasound examination of both legs was performed using grey-scale, spectral Doppler, and color flow Doppler ultrasound imaging. Imaged segments included the CFV, FV, proximal DFV, PopV, deep calf veins and greater saphenous vein.  FINDINGS: LEFT: Occlusive DVT in the left lower leg within the popliteal vein at the knee and within the peroneal veins in the calf. Anterior and posterior tibial veins remain patent. Above the popliteal vein, there is no left leg DVT including the CFV, FV and PFV. Note is made of superficial venous thrombus within the left lesser saphenous vein. Greater saphenous vein is patent.  RIGHT: No right leg DVT is seen. Deep veins are patent from the groin to the lower calf. The greater saphenous vein is also patent. However, lesser saphenous vein within the calf shows occlusive SVT.       1.  Occlusive left leg DVT at and below the knee within the popliteal and peroneal veins. 2.  No additional DVT is seen on the right or left. 3.  Occlusive SVT within the right and left lesser saphenous vein in each calf. Greater saphenous veins are patent.  This report was finalized on 2/22/2022 10:47 AM by Dr. Obey Lindsey MD.      CT Angiogram Chest    Result Date: 2/21/2022  CTA Chest INDICATION: Worsening shortness of air, cough,  elevated d-dimer, Covid diagnosis one month ago TECHNIQUE: CT angiogram of the chest with IV contrast. 3-D reconstructions were obtained and reviewed.   Radiation dose reduction techniques included automated exposure control or exposure modulation based on body size. Count of known CT and cardiac nuc med studies performed in previous 12 months: 3. COMPARISON: January 27, 2022 FINDINGS: Respiratory motion severely degrades evaluation but allowing for this no convincing evidence of pulmonary arterial occlusion to suggest pulmonary embolus. Normal size thoracic aorta. Normal heart size. No pericardial effusion. Extensive and near diffuse consolidative changes throughout the lungs with trace bilateral pleural effusions. There is superimposed interseptal thickening and groundglass opacities. This is worsened as compared to the exam of January 27, 2022. No acute upper abdominal abnormality allowing for limited field-of-view and contrast bolus timing. Cyst in the right kidney. No acute osseous abnormality.     1. Severe motion degradation compromises evaluation for pulmonary embolus but no convincing evidence of pulmonary embolus is seen. 2. Marked near diffuse consolidative changes with superimposed septal thickening and groundglass opacities, worse as compared to the exam of January 27, 2022, possibly representing multifocal pneumonia, superimposed edema, or could be associated with Covid 19. Signer Name: CHAPARRITA ROUSE MD  Signed: 2/21/2022 7:59 PM  Workstation Name: [facility]  [facility]    XR Abdomen KUB    Result Date: 2/25/2022  XR ABDOMEN KUB-: 2/25/2022 3:54 PM  INDICATION: Evaluate Dobbhoff tube placement  COMPARISON: 02/23/2022.  FINDINGS: AP radiographs of the abdomen. The renal shadows are symmetric. No renal calculi. No bladder calculi.  The bowel gas pattern is nonobstructive. There are postoperative changes lumbar spine. The tip of the Dobbhoff tube is in the fundus of the stomach.  Bowel gas pattern is normal.      Tip of Dobbhoff tube is in the fundus of the stomach  This report was finalized on 2/25/2022 4:02 PM by Dr. Nahum Lester MD.      XR Abdomen KUB    Result Date: 2/23/2022  CR Abdomen 1 Vw INDICATION: Dobbhoff tube repositioning COMPARISON: Earlier in the day finger 20/3/2022 FINDINGS: AP radiograph(s) of the abdomen. Dobbhoff tube has been slightly retracted, tip now lies in the expected radiographic location of the stomach. The previously seen kinking has resolved.     Dobbhoff tube has been slightly retracted, tip now lies in the expected radiographic location of the stomach. The previously seen kinking has resolved. Signer Name: CHAPARRITA ROUSE MD  Signed: 2/23/2022 7:37 PM  Workstation Name: [facility]  [facility]    XR Abdomen KUB    Result Date: 2/23/2022  CR Abdomen 1 Vw INDICATION: Dobbhoff tube placed COMPARISON: None available FINDINGS: AP radiograph(s) of the abdomen. There are 2 radiographs submitted, one timed stamped 2:57 PM and the second time stamp 4:22 PM. On the first radiograph tip of the Dobbhoff tube is at midline, cannot confirm placement beyond the GE junction. The second radiograph shows the Dobbhoff tube more advanced in the left upper quadrant with the tip points cephalad. It appears to demonstrate a sharp acute angle/kinking just proximal to the hyperdense distal component. The bowel gas pattern is nonobstructive. No acute osseous abnormalities. No radiopaque foreign body.     There are 2 radiographs submitted, one timed stamped 2:57 PM and the second time stamp 4:22 PM. On the first radiograph tip of the Dobbhoff tube is at midline, cannot confirm placement beyond the GE junction. The second radiograph shows the Dobbhoff tube more advanced in the left upper quadrant with the tip points cephalad. It appears to demonstrate a sharp acute angle/kinking just proximal to the hyperdense distal component. Provided radiographs do not support  postpyloric position if desired. The kinking is more than that typically seen and could affect function. Signer Name: CHAPARRITA ROUSE MD  Signed: 2/23/2022 4:43 PM  Workstation Name: [facility]  [facility]    XR Abdomen KUB    Result Date: 2/23/2022  ABDOMEN, LIMITED, 02/23/2022     HISTORY: Dobbhoff tube placement.  TECHNIQUE: AP radiograph of the upper abdomen.  FINDINGS: No Dobbhoff tube is not within the upper abdomen or lower chest.  Note, previous chest x-ray questioned possible tracheal position of the Dobbhoff tube.      No visible Dobbhoff tube within the lower chest or abdomen.  This report was finalized on 2/23/2022 12:15 PM by Dr. Obey Lindsey MD.      Results for orders placed during the hospital encounter of 02/21/22    Adult Transthoracic Echo Complete w/ Color, Spectral and Contrast if Necessary Per Protocol    Interpretation Summary  · Estimated left ventricular EF = 60% Left ventricular systolic function is normal.  · Left ventricular diastolic function was normal.  · Mild aortic valve stenosis is present.  · Peak velocity of the flow distal to the aortic valve is 288.1 cm/s. Aortic valve maximum pressure gradient is 33.2 mmHg. Aortic valve mean pressure gradient is 18.4 mmHg. Aortic valve dimensionless index is 0.4 .  · Estimated right ventricular systolic pressure from tricuspid regurgitation is mildly elevated (35-45 mmHg). Calculated right ventricular systolic pressure from tricuspid regurgitation is 37 mmHg.          Active Hospital Problems    Diagnosis  POA   • **Pneumonia of both lungs due to infectious organism [J18.9]  Yes   • Hypertension [I10]  Yes   • Hypokalemia [E87.6]  Yes      Resolved Hospital Problems   No resolved problems to display.         Assessment/Plan     1. COVID-19 infection and pneumonia patient has been felt to be in the recovery/fibrotic stage, starting to slowly wean steroids she was decreased to 30 mg yesterday we will give him a couple  of days on this and then continue to wean.  2. Secondary bacterial infection he has completed antibiotic therapy  3. Acute hypoxic respiratory failure he still on high flow nasal cannula but he has been slowly weaning down continue to wean as tolerated  4. Encephalopathy metabolic there may be a component of alcohol withdrawal.  5. Acute left lower extremity DVT patient is fully anticoagulated with Eliquis.  6. Leukocytosis may be related to steroids it has been improving continue to monitor    Plan for disposition: From a pulmonary standpoint he could probably transfer out of the ICU when okay from other aspects.    Cash Arguello Jr, MD  03/12/22  06:14 EST    Time:

## 2022-03-12 NOTE — PLAN OF CARE
Goal Outcome Evaluation:  Plan of Care Reviewed With: patient        Progress: improving  Outcome Evaluation: Weaned O2 to 6L HFNC at rest. Increased to 8 9L with activity. Recovery time much improved. Less confusion, less reorientation needed. Not much sleep tonight but is resting well at this time. VSS. Labs pending.

## 2022-03-12 NOTE — PLAN OF CARE
"Goal Outcome Evaluation:  Plan of Care Reviewed With: patient        Progress: improving  Outcome Evaluation: Patient had a fair day, he walked in the rey with nurse/PT. He did have 10-15 liters of O2 and his non-rebreather on. Walked half length of rey and his o2 sat dropped to 79%. Has been sitting on the side of the bed, up in chair now. Flomax was started back up, as he takes at home. Requesting Melatonin for hs. Patient states \" he did not sleep very well last night.\"Bowels moved today, denies pain, urine clear per urinal.b/p 113/72,  afebrile, resp 24, o2 high flow 6 l, and o2 sat 90%  "

## 2022-03-12 NOTE — PLAN OF CARE
Goal Outcome Evaluation:  Plan of Care Reviewed With: patient        Progress: improving  Outcome Evaluation: Patient sitting in recliner, oxygen at 6L at rest satting at 88%. Discussed with RN increased needs of oxygen with mobility. Agreed to place non-rebreather on at 15L during standing and gait tasks. Patient ambulated 80ft x2 in hallway with 4 standing rest breaks . The last 50ft RN turned to 10L, continued to further desat to 80-84%. REcovery time is slow, RN placed back on 6L in room, after 5mins patient at 87%. Rn present as therapist left room, MD was also present and notified

## 2022-03-13 LAB
GLUCOSE BLDC GLUCOMTR-MCNC: 117 MG/DL (ref 70–130)
GLUCOSE BLDC GLUCOMTR-MCNC: 159 MG/DL (ref 70–130)
GLUCOSE BLDC GLUCOMTR-MCNC: 194 MG/DL (ref 70–130)

## 2022-03-13 PROCEDURE — 94799 UNLISTED PULMONARY SVC/PX: CPT

## 2022-03-13 PROCEDURE — 97116 GAIT TRAINING THERAPY: CPT

## 2022-03-13 PROCEDURE — 99232 SBSQ HOSP IP/OBS MODERATE 35: CPT | Performed by: INTERNAL MEDICINE

## 2022-03-13 PROCEDURE — 63710000001 PREDNISONE PER 5 MG: Performed by: INTERNAL MEDICINE

## 2022-03-13 PROCEDURE — 94761 N-INVAS EAR/PLS OXIMETRY MLT: CPT

## 2022-03-13 PROCEDURE — 82962 GLUCOSE BLOOD TEST: CPT

## 2022-03-13 PROCEDURE — 63710000001 INSULIN ASPART PER 5 UNITS: Performed by: INTERNAL MEDICINE

## 2022-03-13 RX ADMIN — TAMSULOSIN HYDROCHLORIDE 0.4 MG: 0.4 CAPSULE ORAL at 08:48

## 2022-03-13 RX ADMIN — IPRATROPIUM BROMIDE AND ALBUTEROL SULFATE 3 ML: .5; 2.5 SOLUTION RESPIRATORY (INHALATION) at 16:04

## 2022-03-13 RX ADMIN — APIXABAN 5 MG: 2.5 TABLET, FILM COATED ORAL at 08:47

## 2022-03-13 RX ADMIN — INSULIN ASPART 2 UNITS: 100 INJECTION, SOLUTION INTRAVENOUS; SUBCUTANEOUS at 17:28

## 2022-03-13 RX ADMIN — INSULIN ASPART 2 UNITS: 100 INJECTION, SOLUTION INTRAVENOUS; SUBCUTANEOUS at 12:10

## 2022-03-13 RX ADMIN — APIXABAN 5 MG: 2.5 TABLET, FILM COATED ORAL at 20:34

## 2022-03-13 RX ADMIN — ATORVASTATIN CALCIUM 40 MG: 40 TABLET, FILM COATED ORAL at 20:34

## 2022-03-13 RX ADMIN — IPRATROPIUM BROMIDE AND ALBUTEROL SULFATE 3 ML: .5; 2.5 SOLUTION RESPIRATORY (INHALATION) at 07:20

## 2022-03-13 RX ADMIN — MELATONIN TAB 5 MG 5 MG: 5 TAB at 20:34

## 2022-03-13 RX ADMIN — PREDNISONE 30 MG: 10 TABLET ORAL at 08:47

## 2022-03-13 RX ADMIN — HYDROCODONE BITARTRATE AND HOMATROPINE METHYLBROMIDE 5 ML: 5; 1.5 SOLUTION ORAL at 21:20

## 2022-03-13 RX ADMIN — IPRATROPIUM BROMIDE AND ALBUTEROL SULFATE 3 ML: .5; 2.5 SOLUTION RESPIRATORY (INHALATION) at 19:42

## 2022-03-13 RX ADMIN — FERROUS SULFATE TAB EC 324 MG (65 MG FE EQUIVALENT) 324 MG: 324 (65 FE) TABLET DELAYED RESPONSE at 08:47

## 2022-03-13 NOTE — CASE MANAGEMENT/SOCIAL WORK
Continued Stay Note  DILAN Ward     Patient Name: Dontae Bonds Jr.  MRN: [id number]  Today's Date: 3/13/2022    Admit Date: 2/21/2022     Discharge Plan     Row Name 03/13/22 1338       Plan    Plan Home with [facility]     Patient/Family in Agreement with Plan yes    Plan Comments Per chart patient is currently on 6L humidified HF of oxygen via NC, however, still requiring a higher amount of oxygen with any activity. Patient plans on retuning home at discharge with [facility]  to follow and family to help as needed. CM will continue to follow for needs.               Discharge Codes    No documentation.                     Toma Marc RN

## 2022-03-13 NOTE — PLAN OF CARE
Goal Outcome Evaluation:  Plan of Care Reviewed With: patient        Progress: improving  Outcome Evaluation: Slept most of night. Weaned O2 to 4L HFNC while at rest. VSS. Continues to improve. Little to no confusion.

## 2022-03-13 NOTE — PROGRESS NOTES
LOS: 20 days   Patient Care Team:  Tiffany Burciaga MD as PCP - General (Internal Medicine)    Subjective   Following up hypoxic respiratory failure.  Patient reports he is doing better looks like his saturations are really pretty good on 4 L at rest mid 90s or higher but he still desaturates very rapidly with any exertion he sitting up in a chair working on the computer when I walked and his sats were in the mid 90s on 10 L just talking he drops into the mid 80s.  He has no pain.  Review of Systems:          Objective     Vital Signs  Vital Sign Min/Max for last 24 hours  Temp  Min: 97.7 °F (36.5 °C)  Max: 98.2 °F (36.8 °C)   BP  Min: 109/75  Max: 120/84   Pulse  Min: 68  Max: 112   Resp  Min: 20  Max: 36   SpO2  Min: 86 %  Max: 99 %   Flow (L/min)  Min: 4  Max: 6   No data recorded        Ventilator/Non-Invasive Ventilation Settings (From admission, onward)             Start     Ordered    02/21/22 2201  Ventilator - AC/PC; (16); (100); 90%; 5; 20  Continuous,   Status:  Canceled        Question Answer Comment   Vent Mode AC/PC    Breath rate  16   FiO2  100   FiO2 titrate for Sp02% =/> 90%    PEEP 5    Inspiratory Pressure 20        02/21/22 2202                             Body mass index is 25.37 kg/m².  I/O last 3 completed shifts:  In: 2234 [P.O.:2234]  Out: 1750 [Urine:1750]  No intake/output data recorded.        Physical Exam:  General Appearance: Well-developed white male he does not appear in acute distress  Eyes: Conjunctiva are clear and anicteric  ENT: Mucous membranes are little bit dry no erythema no exudates  Neck: Trachea midline no jugular venous distention  Lungs: I hear some very fine dry crackles at the very bases bilaterally otherwise clear if he talks a whole lot he is using accessory muscles  Cardiac: Regular rate rhythm no murmur  Abdomen: Soft no palpable hepatosplenomegaly  : Not examined  Musculoskeletal: Mild thoracic kyphosis  Skin: Warm dry no jaundice no  petechiae  Neuro: He is alert and oriented he is cooperative following commands moving extremities well  Extremities/P Vascular: No clubbing no cyanosis no edema palpable radial dorsalis pedis pulses  MSE: Seems to be in pretty good spirits right now       Labs:  Results from last 7 days   Lab Units 03/12/22  0507 03/09/22  1557 03/08/22  0517 03/07/22  0515   GLUCOSE mg/dL 132* 252* 168* 165*   SODIUM mmol/L 137 135* 136 139   POTASSIUM mmol/L 3.9 4.8 4.8 4.6   MAGNESIUM mg/dL  --  2.1  --   --    CO2 mmol/L 29.7* 27.0 29.2* 30.4*   CHLORIDE mmol/L 100 99 101 104   ANION GAP mmol/L 7.3 9.0 5.8 4.6*   CREATININE mg/dL 0.51* 0.56* 0.49* 0.52*   BUN mg/dL 22 27* 23 23   BUN / CREAT RATIO  43.1* 48.2* 46.9* 44.2*   CALCIUM mg/dL 9.1 9.4 9.5 9.7   ALK PHOS U/L  --   --   --  49   TOTAL PROTEIN g/dL  --   --   --  6.1   ALT (SGPT) U/L  --   --   --  92*   AST (SGOT) U/L  --   --   --  27   BILIRUBIN mg/dL  --   --   --  0.3   ALBUMIN g/dL  --   --   --  3.10*   GLOBULIN gm/dL  --   --   --  3.0     Estimated Creatinine Clearance: 89.8 mL/min (A) (by C-G formula based on SCr of 0.51 mg/dL (L)).      Results from last 7 days   Lab Units 03/12/22  0507 03/09/22  0542 03/08/22  0517 03/07/22  0515   WBC 10*3/mm3 19.53* 25.40* 29.10* 33.95*   RBC 10*6/mm3 4.47 3.84* 3.74* 3.89*   HEMOGLOBIN g/dL 13.3 11.4* 11.2* 11.6*   HEMATOCRIT % 41.6 35.9* 34.9* 36.6*   MCV fL 93.1 93.5 93.3 94.1   MCH pg 29.8 29.7 29.9 29.8   MCHC g/dL 32.0 31.8 32.1 31.7   RDW % 15.5* 14.4 14.4 14.6   RDW-SD fl 51.1 48.5 48.7 49.2   MPV fL 10.2 10.2 10.2 10.1   PLATELETS 10*3/mm3 348 378 410 433   NEUTROPHIL % %  --  87.2* 91.0* 92.6*   LYMPHOCYTE % %  --  3.5* 2.5* 3.2*   MONOCYTES % %  --  4.3* 3.0* 1.8*   EOSINOPHIL % %  --  0.0* 0.0* 0.0*   BASOPHIL % %  --  0.2 0.1 0.1   IMM GRAN % %  --  4.8* 3.4* 2.3*   NEUTROS ABS 10*3/mm3  --  22.15* 26.44* 31.42*  32.25*   LYMPHS ABS 10*3/mm3  --  0.90 0.74 1.08   MONOS ABS 10*3/mm3  --  1.08* 0.88 0.61   EOS  ABS 10*3/mm3  --  0.00 0.00 0.00   BASOS ABS 10*3/mm3  --  0.04 0.04 0.05   IMMATURE GRANS (ABS) 10*3/mm3  --  1.23* 1.00* 0.79*   NRBC /100 WBC  --  0.1 0.0 0.0                     Results from last 7 days   Lab Units 03/07/22  0515   PROCALCITONIN ng/mL 0.06         Microbiology Results (last 10 days)     ** No results found for the last 240 hours. **              apixaban, 5 mg, Oral, Q12H  atorvastatin, 40 mg, Oral, Nightly  docusate sodium, 100 mg, Oral, BID  ferrous sulfate, 324 mg, Oral, Daily With Breakfast  insulin aspart, 0-9 Units, Subcutaneous, TID AC  ipratropium-albuterol, 3 mL, Nebulization, 4x Daily - RT  polyethylene glycol, 17 g, Oral, Daily  predniSONE, 30 mg, Oral, Daily With Breakfast  tamsulosin, 0.4 mg, Oral, Daily           Diagnostics:  Adult Transthoracic Echo Complete w/ Color, Spectral and Contrast if Necessary Per Protocol    Result Date: 2/24/2022  · Estimated left ventricular EF = 60% Left ventricular systolic function is normal. · Left ventricular diastolic function was normal. · Mild aortic valve stenosis is present. · Peak velocity of the flow distal to the aortic valve is 288.1 cm/s. Aortic valve maximum pressure gradient is 33.2 mmHg. Aortic valve mean pressure gradient is 18.4 mmHg. Aortic valve dimensionless index is 0.4 . · Estimated right ventricular systolic pressure from tricuspid regurgitation is mildly elevated (35-45 mmHg). Calculated right ventricular systolic pressure from tricuspid regurgitation is 37 mmHg.      CT Head Without Contrast    Result Date: 2/22/2022  HISTORY: Mental status change of uncertain cause. Patient has pneumonia on a ventilator with respiratory failure. Covid positive TECHNIQUE: CT head without contrast. Radiation dose reduction techniques included automated exposure control or exposure modulation based on body size. Radiation audit for number of CT and nuclear cardiology exams performed in the last year: 5.  COMPARISON: Head CT from 2/1/2022  FINDINGS: There is no displaced calvarial fracture. The visualized paranasal sinuses and mastoid air cells are clear. There is no evidence for acute intracranial hemorrhage or extra axial fluid collection. There is mild generalized atrophy. There are vascular calcifications at the base of the brain. There is mild white matter low-attenuation that is nonspecific but likely due to small vessel disease. No acute cortical infarct is suspected. No intracranial mass affect. Findings not significantly changed.     No acute intracranial abnormalities appreciated. Again there is atrophy and probable sequelae of small vessel disease. Signer Name: Jeanna Epperson MD  Signed: 2/22/2022 2:08 PM  Workstation Name: [id number]  [facility] Video Swallow With Speech Single Contrast    Result Date: 2/28/2022  VIDEO SWALLOWING STUDY  HISTORY: Difficulty swallowing. Status post extubation.  TECHNIQUE: Video swallowing study was conducted by the speech pathologist in my presence and recorded on digital video disc.  Fluoroscopy time 1.9 minutes. A single spot image was recorded for documentation purposes.  FINDINGS: No laryngeal penetration or aspiration of contrast was seen with any consistency of barium contrast. There is premature spillage of thin liquid contrast into the pyriform sinuses. For a full report, please refer to speech pathology.  This report was finalized on 2/28/2022 1:48 PM by Dr. Corbin Duran MD.      XR Chest 1 View    Result Date: 3/9/2022  CR Chest 1 Vw INDICATION: Follow-up fibrosis related to Covid pneumonia. COMPARISON:  3/5/2022 FINDINGS: Single portable AP view(s) of the chest. Heart size is stable. Coarse bilateral infiltrates are again identified. There appears slightly more dense overall which may be a function of radiographic technique although slight worsening is not excluded. No pneumothorax is identified.     Increased density of bilateral infiltrates may be a function of  radiographic technique or may indicate slight worsening. Otherwise stable. Signer Name: Corbin Duran MD  Signed: 3/9/2022 5:40 AM  Workstation Name: [email or web address]  [facility]    XR Chest 1 View    Result Date: 3/5/2022  CR Chest 1 Vw INDICATION: Recent COVID-19 infection. Follow-up pneumonia. COMPARISON:  Chest film 3/1/2022 FINDINGS: Portable AP view(s) of the chest. External ECG leads are present. Stable cardiac and mediastinal aortic contours. There is somewhat coarse interstitial change in both lungs similar to prior study. No acute superimposed airspace change, pleural effusion or pneumothorax.     No significant change from 3/1/2022. Coarse bilateral interstitial changes persist without significant change. Signer Name: Melba Meade MD  Signed: 3/5/2022 10:41 AM  Workstation Name: [id number]  [facility]    XR Chest 1 View    Result Date: 3/1/2022  XR CHEST 1 VW-: 3/1/2022 1:08 PM  INDICATION: Increasing shortness of air today. Requiring more oxygen according to the nurse.  COMPARISON:  02/25/2022.  FINDINGS: Single Portable AP view(s) of the chest. An enteric tube is no longer visible. Right-sided PICC line has been removed. Stable cardiac silhouette. Shallow lung expansion with bronchovascular crowding. Peripheral predominant airspace disease in both lungs remains present but has partially regressed compared with the prior study. There is no new consolidation. No pneumothorax or effusion. Thoracic spondylosis. Oral contrast material present at the hepatic flexure and splenic flexure.       1. Bilateral pneumonia. Imaging findings favor atypical infection including Covid 19. Partial regression compared to the prior study. No pneumothorax.  This report was finalized on 3/1/2022 1:30 PM by Dr. Raghu Huang MD.      XR Chest 1 View    Result Date: 2/25/2022  AP PORTABLE CHEST, 02/25/2022  HISTORY: Dobbhoff tube placement  COMPARISON: Yesterday  TECHNIQUE: AP  portable chest x-ray.  FINDINGS: Diffuse bilateral infiltrates are stable. PIC catheter stable. There is a new Dobbhoff tube. The tip is in the lower half of the esophagus.      Tip of the Dobbhoff tube is in the lower half of the esophagus and needs to be advanced. I discussed these findings with the patient's nurse in the ICU  This report was finalized on 2/25/2022 3:38 PM by Dr. Nahum Lester MD.      XR Chest 1 View    Result Date: 2/24/2022  CR Chest 1 Vw INDICATION: Covid positive with increased oxygen need COMPARISON:  February 23, 2022 FINDINGS: Portable AP view(s) of the chest. Interval extubation. Severe bilateral airspace disease. Cardiomediastinal silhouette is obscured. Overall similar appearance of the lung fields as compared to the prior.     Extensive multifocal airspace disease. Interval extubation. Signer Name: CHAPARRITA ROUSE MD  Signed: 2/24/2022 7:19 PM  Workstation Name: [facility]  [facility]    XR Chest 1 View    Result Date: 2/23/2022  CHEST X-RAY, 02/23/2022 (14:54)     HISTORY: Dobbhoff tube replacement.  TECHNIQUE: AP portable chest x-ray.  FINDINGS: The replaced Dobbhoff tube tip is now positioned in the distal thoracic esophagus about 6 cm above the esophagogastric junction. It may now be safely fully advanced into the stomach.  The remainder of the examination is negative and unchanged.      Dobbhoff tube tip is within the lower thoracic esophagus on stage I of 2 Dobbhoff placement series.  This report was finalized on 2/23/2022 3:13 PM by Dr. Obey Lindsey MD.      XR Chest 1 View    Result Date: 2/23/2022  CHEST X-RAY, 02/23/2022 (13:03)     HISTORY: Assess Dobbhoff tube position.  TECHNIQUE: AP portable chest x-ray.  FINDINGS: The Dobbhoff tube is coiled in the hypopharynx. This should be removed and replaced.  ETT and PICC remain in good position. Chest x-ray unchanged since the earlier study. No visible pneumomediastinum or pneumothorax.      Dobbhoff tube  is coiled in the hypopharynx at 1303 on 02/23/2022.  This report was finalized on 2/23/2022 1:21 PM by Dr. Obey Lindsey MD.      XR Chest 1 View    Result Date: 2/23/2022  CHEST X-RAY, 02/23/2022     HISTORY: Dobbhoff feeding tube placement.  TECHNIQUE: AP portable chest x-ray (10:20).  COMPARISON: *  Chest x-ray, 02/23/2022 (05:03).  FINDINGS: The Dobbhoff tube tip is superimposed over the mid thoracic trachea alongside the endotracheal tube. Tip is about 3.5 cm above the billie. This may lie within the trachea or within the adjacent esophagus.  ETT and PICC remain in good position.  Dense multifocal airspace infiltrates are unchanged since earlier today. No pneumothorax or pneumomediastinum. Low lung volumes.      Dobbhoff tube tip is superimposed over the mid thoracic trachea as noted above.  This report was finalized on 2/23/2022 10:43 AM by Dr. Obey Lindsey MD.      XR Chest 1 View    Result Date: 2/23/2022  CR Chest 1 Vw INDICATION: Follow-up pneumonia. Covid positive. COMPARISON:  2/22/2022 FINDINGS: Single portable AP view(s) of the chest. Endotracheal tube is in good position in the lower trachea. Right arm approach PICC is at the right atrial level. Heart size is stable. Coarse bilateral pulmonary infiltrates are not significantly changed. No pneumothorax.     Tubes and lines as above. No significant change in pulmonary infiltrates. No pneumothorax. Signer Name: Corbin Duran MD  Signed: 2/23/2022 5:28 AM  Workstation Name: [facility]  [facility]    XR Chest 1 View    Result Date: 2/22/2022  CHEST X-RAY, 02/22/2022     HISTORY: 71-year-old male hospital inpatient with acute respiratory failure, hypoxia.  TECHNIQUE: AP portable chest x-ray.  COMPARISON: *  Chest x-ray, 02/21/2022.  FINDINGS: Endotracheal tube remains in good position with tip in the lower thoracic trachea about 2.8 cm above the billie.  New right arm PICC in good position with tip in the lower SVC.   Dense diffuse multifocal airspace infiltrates are unchanged. Lung volumes are low. There is no visible pneumomediastinum, pneumothorax or pleural effusion.      1.  ETT and PICC in good position. 2.  Dense diffuse multifocal airspace infiltrates are unchanged. 3.  No significant change since yesterday.  This report was finalized on 2/22/2022 2:40 PM by Dr. Obey Lindsey MD.      XR Chest 1 View    Result Date: 2/21/2022  CR Chest 1 Vw INDICATION: Postintubation COMPARISON:  Earlier in the day 2/21/2022 FINDINGS: Portable AP view(s) of the chest. Interval intubation with the tip of the endotracheal tube terminating chest above the level the billie but projecting towards the level of the right mainstem bronchus (approximately 1.1 cm above the billie. Other findings including cardiomegaly and widespread multifocal infiltrates in the lungs are unchanged.     Interval intubation with the tip of the endotracheal tube terminating just above the level the billie but projecting towards the level of the right mainstem bronchus (approximately 1.1 cm above the billie. Other findings including cardiomegaly and widespread multifocal infiltrates in the lungs are unchanged Signer Name: CHAPARRITA ROUSE MD  Signed: 2/21/2022 10:01 PM  Workstation Name: [facility]  [facility]    XR Chest 1 View    Result Date: 2/21/2022  CR Chest 1 Vw INDICATION: Shortness of air COMPARISON:  January 29, 2022 FINDINGS: Portable AP view(s) of the chest.  Cardiomediastinal contours are obscured. Marked worsening in multifocal consolidative changes in the lungs, could represent multifocal pneumonia versus pulmonary edema. No pleural effusion. No pneumothorax. No acute bony abnormality.     Marked worsening in multifocal consolidative changes in the lungs, could represent multifocal pneumonia versus pulmonary edema. Signer Name: CHAPARRITA ROUSE MD  Signed: 2/21/2022 7:25 PM  Workstation [facility]: [facility]  [facility]   [facility] Venous Doppler Lower Extremity Bilateral (duplex)    Result Date: 2/22/2022  VENOUS DOPPLER ULTRASOUND, BILATERAL LOWER EXTREMITIES, COMPLETE, 02/22/2022  HISTORY: 71-year-old male hospital inpatient acute hypoxemic respiratory failure. Pneumonia. Prior COVID-19 infection. Elevated d-dimer.  TECHNIQUE: Venous Doppler ultrasound examination of both legs was performed using grey-scale, spectral Doppler, and color flow Doppler ultrasound imaging. Imaged segments included the CFV, FV, proximal DFV, PopV, deep calf veins and greater saphenous vein.  FINDINGS: LEFT: Occlusive DVT in the left lower leg within the popliteal vein at the knee and within the peroneal veins in the calf. Anterior and posterior tibial veins remain patent. Above the popliteal vein, there is no left leg DVT including the CFV, FV and PFV. Note is made of superficial venous thrombus within the left lesser saphenous vein. Greater saphenous vein is patent.  RIGHT: No right leg DVT is seen. Deep veins are patent from the groin to the lower calf. The greater saphenous vein is also patent. However, lesser saphenous vein within the calf shows occlusive SVT.       1.  Occlusive left leg DVT at and below the knee within the popliteal and peroneal veins. 2.  No additional DVT is seen on the right or left. 3.  Occlusive SVT within the right and left lesser saphenous vein in each calf. Greater saphenous veins are patent.  This report was finalized on 2/22/2022 10:47 AM by Dr. Obey Lindsey MD.      CT Angiogram Chest    Result Date: 2/21/2022  CTA Chest INDICATION: Worsening shortness of air, cough, elevated d-dimer, Covid diagnosis one month ago TECHNIQUE: CT angiogram of the chest with IV contrast. 3-D reconstructions were obtained and reviewed.   Radiation dose reduction techniques included automated exposure control or exposure modulation based on body size. Count of known CT and cardiac nuc med studies performed in previous 12 months:  3. COMPARISON: January 27, 2022 FINDINGS: Respiratory motion severely degrades evaluation but allowing for this no convincing evidence of pulmonary arterial occlusion to suggest pulmonary embolus. Normal size thoracic aorta. Normal heart size. No pericardial effusion. Extensive and near diffuse consolidative changes throughout the lungs with trace bilateral pleural effusions. There is superimposed interseptal thickening and groundglass opacities. This is worsened as compared to the exam of January 27, 2022. No acute upper abdominal abnormality allowing for limited field-of-view and contrast bolus timing. Cyst in the right kidney. No acute osseous abnormality.     1. Severe motion degradation compromises evaluation for pulmonary embolus but no convincing evidence of pulmonary embolus is seen. 2. Marked near diffuse consolidative changes with superimposed septal thickening and groundglass opacities, worse as compared to the exam of January 27, 2022, possibly representing multifocal pneumonia, superimposed edema, or could be associated with Covid 19. Signer Name: CHAPARRITA ROUSE MD  Signed: 2/21/2022 7:59 PM  Workstation Name: [id number]  [facility]    XR Abdomen KUB    Result Date: 2/25/2022  XR ABDOMEN KUB-: 2/25/2022 3:54 PM  INDICATION: Evaluate Dobbhoff tube placement  COMPARISON: 02/23/2022.  FINDINGS: AP radiographs of the abdomen. The renal shadows are symmetric. No renal calculi. No bladder calculi.  The bowel gas pattern is nonobstructive. There are postoperative changes lumbar spine. The tip of the Dobbhoff tube is in the fundus of the stomach. Bowel gas pattern is normal.      Tip of Dobbhoff tube is in the fundus of the stomach  This report was finalized on 2/25/2022 4:02 PM by Dr. Nahum Lester MD.      XR Abdomen KUB    Result Date: 2/23/2022  CR Abdomen 1 Vw INDICATION: Dobbhoff tube repositioning COMPARISON: Earlier in the day finger 20/3/2022 FINDINGS: AP radiograph(s) of the  abdomen. Dobbhoff tube has been slightly retracted, tip now lies in the expected radiographic location of the stomach. The previously seen kinking has resolved.     Dobbhoff tube has been slightly retracted, tip now lies in the expected radiographic location of the stomach. The previously seen kinking has resolved. Signer Name: CHAPARRITA ROUSE MD  Signed: 2/23/2022 7:37 PM  Workstation Name: [facility]  [facility]    XR Abdomen KUB    Result Date: 2/23/2022  CR Abdomen 1 Vw INDICATION: Dobbhoff tube placed COMPARISON: None available FINDINGS: AP radiograph(s) of the abdomen. There are 2 radiographs submitted, one timed stamped 2:57 PM and the second time stamp 4:22 PM. On the first radiograph tip of the Dobbhoff tube is at midline, cannot confirm placement beyond the GE junction. The second radiograph shows the Dobbhoff tube more advanced in the left upper quadrant with the tip points cephalad. It appears to demonstrate a sharp acute angle/kinking just proximal to the hyperdense distal component. The bowel gas pattern is nonobstructive. No acute osseous abnormalities. No radiopaque foreign body.     There are 2 radiographs submitted, one timed stamped 2:57 PM and the second time stamp 4:22 PM. On the first radiograph tip of the Dobbhoff tube is at midline, cannot confirm placement beyond the GE junction. The second radiograph shows the Dobbhoff tube more advanced in the left upper quadrant with the tip points cephalad. It appears to demonstrate a sharp acute angle/kinking just proximal to the hyperdense distal component. Provided radiographs do not support postpyloric position if desired. The kinking is more than that typically seen and could affect function. Signer Name: CHAPARRITA ROUSE MD  Signed: 2/23/2022 4:43 PM  Workstation Name: [facility]  [facility]    XR Abdomen KUB    Result Date: 2/23/2022  ABDOMEN, LIMITED, 02/23/2022     HISTORY: Dobbhoff tube placement.   TECHNIQUE: AP radiograph of the upper abdomen.  FINDINGS: No Dobbhoff tube is not within the upper abdomen or lower chest.  Note, previous chest x-ray questioned possible tracheal position of the Dobbhoff tube.      No visible Dobbhoff tube within the lower chest or abdomen.  This report was finalized on 2/23/2022 12:15 PM by Dr. Obey Lindsey MD.      Results for orders placed during the hospital encounter of 02/21/22    Adult Transthoracic Echo Complete w/ Color, Spectral and Contrast if Necessary Per Protocol    Interpretation Summary  · Estimated left ventricular EF = 60% Left ventricular systolic function is normal.  · Left ventricular diastolic function was normal.  · Mild aortic valve stenosis is present.  · Peak velocity of the flow distal to the aortic valve is 288.1 cm/s. Aortic valve maximum pressure gradient is 33.2 mmHg. Aortic valve mean pressure gradient is 18.4 mmHg. Aortic valve dimensionless index is 0.4 .  · Estimated right ventricular systolic pressure from tricuspid regurgitation is mildly elevated (35-45 mmHg). Calculated right ventricular systolic pressure from tricuspid regurgitation is 37 mmHg.          Active Hospital Problems    Diagnosis  POA   • **Pneumonia of both lungs due to infectious organism [J18.9]  Yes   • Hypertension [I10]  Yes   • Hypokalemia [E87.6]  Yes      Resolved Hospital Problems   No resolved problems to display.         Assessment/Plan     1. COVID-19 infection and pneumonia patient has been felt to be in the recovery/fibrotic stage, starting to slowly wean steroids he was decreased to 30 mg 3/11 we will give him a couple of days on this and then continue to wean.  2. Secondary bacterial infection he has completed antibiotic therapy  3. Acute hypoxic respiratory failure he still on high flow nasal cannula but he has been slowly weaning down continue to wean as tolerated.  Biggest issue remains exertional tolerance his oxygenation is good and he did improve before  he can really go home safely.  4. Encephalopathy metabolic there may be a component of alcohol withdrawal this all seems to have resolved.  5. Acute left lower extremity DVT patient is fully anticoagulated with Eliquis.  6. Leukocytosis may be related to steroids it has been improving continue to monitor    Plan for disposition: From a pulmonary standpoint he could probably transfer out of the ICU when okay from other aspects.    Cash Arguello Jr, MD  03/13/22  06:15 EDT    Time:

## 2022-03-13 NOTE — THERAPY TREATMENT NOTE
Patient Name: Dontae Bonds Jr.  : 1950    MRN: [id number]                              Today's Date: 3/13/2022       Admit Date: 2022    Visit Dx:     ICD-10-CM ICD-9-CM   1. Pneumonia of both lungs due to infectious organism, unspecified part of lung  J18.9 483.8   2. Acute respiratory failure without hypercapnia (HCC)  J96.00 518.81     Patient Active Problem List   Diagnosis   • Personal history of colonic polyps   • Family history of colon cancer   • Pneumonia due to COVID-19 virus   • Pneumonia of both lungs due to infectious organism   • Hypertension   • Hypokalemia     Past Medical History:   Diagnosis Date   • Hyperlipidemia    • Hypertension      History reviewed. No pertinent surgical history.   General Information     Row Name 22 1120          Physical Therapy Time and Intention    Document Type therapy note (daily note)  -EA     Mode of Treatment physical therapy  -EA     Row Name 22 1120          General Information    Patient Profile Reviewed yes  -EA     Existing Precautions/Restrictions fall;oxygen therapy device and L/min  6L at rest, 15L during mobility . Nasal canula  -EA     Barriers to Rehab medically complex  oxygen requirements  -EA     Row Name 22 112          Cognition    Orientation Status (Cognition) oriented x 4  -EA     Row Name 22 112          Safety Issues, Functional Mobility    Safety Issues Affecting Function (Mobility) insight into deficits/self-awareness;awareness of need for assistance  -EA     Impairments Affecting Function (Mobility) endurance/activity tolerance;shortness of breath  -EA     Comment, Safety Issues/Impairments (Mobility) oxygen requirements during mobility. decreased o2 saturation  -EA           User Key  (r) = Recorded By, (t) = Taken By, (c) = Cosigned By    Initials Name Provider Type    Kiara Kelly PTA Physical Therapy Assistant               Mobility     Row Name 22 112          Sit-Stand Transfer     Sit-Stand [place] (Transfers) standby assist  -EA     Assistive Device (Sit-Stand Transfers) walker, front-wheeled  -EA     Row Name 03/13/22 1122          Gait/Stairs (Locomotion)    [place] Level (Gait) contact guard;verbal cues;other (see comments)  vc's for breathing sequence, PLB  -EA     Distance in Feet (Gait) 30-50ft x5 trials with multiple standing rest breaks required due to decreased oxygen saturation. RN and RT with patient assisting with oxygen tank and monitor for o2 sats  -EA           User Key  (r) = Recorded By, (t) = Taken By, (c) = Cosigned By    Initials Name Provider Type    Kiara Kelly PTA Physical Therapy Assistant               Obj/Interventions     Row Name 03/13/22 1126          Motor Skills    Therapeutic Exercise hip  -EA     Row Name 03/13/22 1126          Hip (Therapeutic Exercise)    Hip (Therapeutic Exercise) strengthening exercise  -EA     Hip Strengthening (Therapeutic Exercise) --  marches and heel raises 2x 10 reps  -EA           User Key  (r) = Recorded By, (t) = Taken By, (c) = Cosigned By    Initials Name Provider Type    Kiara Kelly PTA Physical Therapy Assistant               Goals/Plan     Row Name 03/13/22 1138          Patient Education Goal (PT)    Activity (Patient Education Goal, PT) i want to walk  -EA     Barriers (Patient Education Goal, PT) oxygen saturation during mobility  -EA     Progress/Outcome (Patient Education Goal, PT) continuing progress toward goal  -EA           User Key  (r) = Recorded By, (t) = Taken By, (c) = Cosigned By    Initials Name Provider Type    Kiara Kelly PTA Physical Therapy Assistant               Clinical Impression     Row Name 03/13/22 1126          Pain    Pretreatment Pain Rating 0/10 - no pain  -EA     Posttreatment Pain Rating 0/10 - no pain  -EA     Row Name 03/13/22 1126          Plan of Care Review    Plan of Care Reviewed With patient  -EA     Progress improving  -EA     Outcome Evaluation Patient  reports feeling better today. States he was told he might be able to go home in a couple days. On 6L at rest with 90%oxygen saturation. Was told by RN to start at 6L for mobility but once patient was standing on 6L he desatted to 79-80% with 30 second marching in place. PTA requested to call Respiratory therapist to clarify orders due to apropriate protocol and confusion on what patient required at this time. Patient required 15L per nasal canula today with gait in hallways 30-50ft at a time x5 trials, several standing rest breaks for PLB and monitoring oxygen saturation. Patient did not use non-rebreather today. Oxygen saturation ranged from 82-87% and mostly stayed at 84% saturation throughout. RT shasta and RN Makayla aware and  present with patient. Recovered on 10L once sitting and remained at 85%-89% , RT in room preparing patient for breathing treatment as PTA left room.  -EA     Row Name 03/13/22 1126          Therapy Assessment/Plan (PT)    Patient/Family Therapy Goals Statement (PT) I want to go home  -EA     Criteria for Skilled Interventions Met (PT) skilled treatment is necessary  -EA     Row Name 03/13/22 1126          Vital Signs    Posttreatment Heart Rate (beats/min) 119  -EA     Posttreatment Resp Rate (breaths/min) 22  22-30  -EA     Pre SpO2 (%) 90  6L at rest heated high flow  -EA     O2 Delivery Pre Treatment supplemental O2  -EA     Intra SpO2 (%) 84  ranged from 79-87% on 10-15L  -EA     O2 Delivery Intra Treatment supplemental O2  ranged from 79-87% during mobility out of chair on 10-15L.  -EA     Post SpO2 (%) 87  10L for recovery then decreased to 8L by RT  -EA     O2 Delivery Post Treatment supplemental O2  -EA     Row Name 03/13/22 1126          Positioning and Restraints    Pre-Treatment Position sitting in chair/recliner  -EA     Post Treatment Position chair  -EA     In Chair notified nsg;with other staff;patient within staff view;sitting  -EA           User Key  (r) = Recorded By, (t) =  Taken By, (c) = Cosigned By    Initials Name Provider Type    Kiara Kelly PTA Physical Therapy Assistant               Outcome Measures     Row Name 03/13/22 1139          How much help from another person do you currently need...    Turning from your back to your side while in flat bed without using bedrails? 3  -EA     Moving from lying on back to sitting on the side of a flat bed without bedrails? 3  -EA     Moving to and from a bed to a chair (including a wheelchair)? 3  -EA     Standing up from a chair using your arms (e.g., wheelchair, bedside chair)? 3  -EA     Climbing 3-5 steps with a railing? 2  -EA     To walk in hospital room? 3  -EA     AM-PAC 6 Clicks Score (PT) 17  -     Row Name 03/13/22 1139          Functional Assessment    Outcome Measure Options AM-PAC 6 Clicks Basic Mobility (PT)  -EA           User Key  (r) = Recorded By, (t) = Taken By, (c) = Cosigned By    Initials Name Provider Type    Kiara Kelly PTA Physical Therapy Assistant                             Physical Therapy Education                 Title: PT OT SLP Therapies (Done)     Topic: Physical Therapy (Done)     Point: Mobility training (Done)     Learning Progress Summary           Patient Acceptance, E,TB,D, VU by  at 3/13/2022 1141    Acceptance, E, VU,NR by  at 3/12/2022 1049    Acceptance, E,TB, VU by  at 3/11/2022 1016    Acceptance, E,TB, VU by  at 3/10/2022 1503    Acceptance, E,TB, VU by  at 3/9/2022 1020    Acceptance, E,TB, VU by  at 3/8/2022 1003    Acceptance, E,TB, VU by  at 3/7/2022 0919                   Point: Home exercise program (Done)     Learning Progress Summary           Patient Acceptance, E,TB,D, VU by  at 3/13/2022 1141    Acceptance, E, VU,NR by  at 3/12/2022 1049                               User Key     Initials Effective Dates Name Provider Type Discipline     06/16/21 -  Tami Hodgson, PT Physical Therapist PT    VIVIAN 06/20/21 -  Kiara Nielson PTA Physical Therapy  Assistant PT              PT Recommendation and Plan  Planned Therapy Interventions (PT): home exercise program, gait training, patient/family education, strengthening  Progress Summary (PT)  Progress Toward Functional Goals (PT): progress toward functional goals is good  Barriers to Overall Progress (PT): respiratory status  Plan of Care Reviewed With: patient  Progress: improving  Outcome Evaluation: Patient reports feeling better today. States he was told he might be able to go home in a couple days. On 6L at rest with 90%oxygen saturation. Was told by RN to start at 6L for mobility but once patient was standing on 6L he desatted to 79-80% with 30 second marching in place. PTA requested to call Respiratory therapist to clarify orders due to apropriate protocol and confusion on what patient required at this time. Patient required 15L per nasal canula today with gait in hallways 30-50ft at a time x5 trials, several standing rest breaks for PLB and monitoring oxygen saturation. Patient did not use non-rebreather today. Oxygen saturation ranged from 82-87% and mostly stayed at 84% saturation throughout. RT shasta and RN Makayla aware and  present with patient. Recovered on 10L once sitting and remained at 85%-89% , RT in room preparing patient for breathing treatment as PTA left room.     Time Calculation:     Therapy Charges for Today     Code Description Service Date Service Provider Modifiers Qty    24136928917 HC GAIT TRAINING EA 15 MIN 3/12/2022 Kiara Nielson, PTA GP 1    75756401318 HC PT THER PROC EA 15 MIN 3/12/2022 Kiara Nielson, PTA GP 1    12033749847 HC GAIT TRAINING EA 15 MIN 3/13/2022 Kiara Nielson, PTA GP 2          PT G-Codes  Outcome Measure Options: AM-PAC 6 Clicks Basic Mobility (PT)  AM-PAC 6 Clicks Score (PT): 17    Kiara Nielson PTA  3/13/2022

## 2022-03-13 NOTE — PLAN OF CARE
Goal Outcome Evaluation:  Plan of Care Reviewed With: patient        Progress: improving  Outcome Evaluation: Patient has been up in the chair most of the day. Wife was here to visit. Walked in the rey with PT, and 8-10 liters high flow o2, sat did drop to 84%. Yesterday was 79%. Alert & oriented, appetite good. Denies pain. O2 sat 95%, and o2 currently 6l humidified high flow, b/p 117/76, 97,9 temp, resp 20, labored with exertion, .

## 2022-03-13 NOTE — PLAN OF CARE
Goal Outcome Evaluation:  Plan of Care Reviewed With: patient        Progress: improving  Outcome Evaluation: Patient reports feeling better today. States he was told he might be able to go home in a couple days. On 6L at rest with 90%oxygen saturation. Was told by RN to start at 6L for mobility but once patient was standing on 6L he desatted to 79-80% with 30 second marching in place. PTA requested to call Respiratory therapist to clarify orders due to apropriate protocol and confusion on what patient required at this time. Patient required 15L per nasal canula today with gait in hallways 30-50ft with RW at a time x ~5 trials, several standing rest breaks for PLB and monitoring oxygen saturation. Patient did not use non-rebreather today. Oxygen saturation ranged from 82-87% and mostly stayed at 84% saturation throughout. RT shasta and RN Makayla aware and  present with patient. Recovered on 10L once sitting and remained at 85%-89% , RT in room preparing patient for breathing treatment as PTA left room. Transfers with supervision

## 2022-03-13 NOTE — PROGRESS NOTES
"Hospitalist Team      Patient Care Team:  Tiffany Burciaga MD as PCP - General (Internal Medicine)      Chief Complaint: Follow-up Acute Hypoxic Respiratory Failure    Subjective    O2 at rest stable. However still with [place] and higher exertional O2 requirements. No other new issues     Objective    Vital Signs  Temp:  [97.8 °F (36.6 °C)-98.2 °F (36.8 °C)] 97.9 °F (36.6 °C)  Heart Rate:  [] 106  Resp:  [20-36] 20  BP: (109-120)/(72-88) 113/77  Oxygen Therapy  SpO2: (!) 87 %  Pulse Oximetry Type: Continuous  Device (Oxygen Therapy): ventilator  Device (Oxygen Therapy): humidified, high-flow nasal cannula  $ High Flow Nasal Cannula Set-Up: yes  Flow (L/min): 6  Oxygen Concentration (%): 40  ETCO2 (mmHg): 32 mmHg  Probe Placed On (Pulse Ox): Left:, forehead  Probe Removed From (Pulse Ox): forehead, Left:}    Flowsheet Rows    Flowsheet Row First Filed Value   Admission Height 172.7 cm (68\") Documented at 02/21/2022 1843   Admission Weight 78.9 kg (174 lb) Documented at 02/21/2022 1825          Physical Exam:    General: NAD  Lungs: some improving aeration noted   CV: RRR  Abdomen: Soft and non-tender w/ active bowel sounds.  MSK: No C/C/E.  Neuro: CN II-XII grossly intact.  Psych: AO x2, no anxiety    Results Review:     I reviewed the patient's new clinical results.    Lab Results (last 24 hours)     Procedure Component Value Units Date/Time    POC Glucose Once [[id number]]  (Normal) Collected: 03/13/22 0738    Specimen: Blood Updated: 03/13/22 0744     Glucose 117 mg/dL      Comment: Meter: [id number] : [id number] Elinor Benavides RN       POC Glucose Once [[id number]]  (Abnormal) Collected: 03/12/22 1652    Specimen: Blood Updated: 03/12/22 1703     Glucose 178 mg/dL      Comment: Meter: [id number] : [id number] Elinor Benavides RN       POC Glucose Once [[id number]]  (Abnormal) Collected: 03/12/22 1143    Specimen: Blood Updated: 03/12/22 1150     Glucose 151 mg/dL      Comment: Treated Patient Meter: [id number] " : [id number] Elinor Benavides RN             Imaging Results (Last 24 Hours)     ** No results found for the last 24 hours. **          Medication Review:   I have reviewed the patient's current medication list    Current Facility-Administered Medications:   •  acetaminophen (TYLENOL) tablet 650 mg, 650 mg, Oral, Q4H PRN, 650 mg at 02/27/22 0311 **OR** acetaminophen (TYLENOL) suppository 650 mg, 650 mg, Rectal, Q4H PRN, AlvaradoOelrijelani, DO  •  [COMPLETED] apixaban (ELIQUIS) tablet 10 mg, 10 mg, Oral, Q12H, 10 mg at 03/07/22 2113 **FOLLOWED BY** apixaban (ELIQUIS) tablet 5 mg, 5 mg, Oral, Q12H, Osvaldo Gibson DO, 5 mg at 03/13/22 0847  •  atorvastatin (LIPITOR) tablet 40 mg, 40 mg, Oral, Nightly, Cameron Alvarado DO, 40 mg at 03/12/22 2032  •  dextrose (D50W) (25 g/50 mL) IV injection 25 g, 25 g, Intravenous, Q15 Min PRN, Cash Arguello Jr., MD  •  dextrose (GLUTOSE) oral gel 15 g, 15 g, Oral, Q15 Min PRN, Cash Arguello Jr., MD  •  docusate sodium (COLACE) liquid 100 mg, 100 mg, Oral, BID, Alvarado, Olerilla, DO, 100 mg at 03/09/22 2013  •  ferrous sulfate EC tablet 324 mg, 324 mg, Oral, Daily With Breakfast, Alvarado, Birrilla, DO, 324 mg at 03/13/22 0847  •  glucagon (GLUCAGEN) injection 1 mg, 1 mg, Intramuscular, Q15 Min PRN, Cash Arguello Jr., MD  •  HYDROcodone-homatropine (HYCODAN) 5-1.5 MG/5ML syrup 5 mL, 5 mL, Oral, Q4H PRN, Osvaldo Gibson DO, 5 mL at 03/12/22 1920  •  insulin aspart (novoLOG) injection 0-9 Units, 0-9 Units, Subcutaneous, TID Geraldine WHEELER Lee M, DO, 2 Units at 03/12/22 1656  •  ipratropium-albuterol (DUO-NEB) nebulizer solution 3 mL, 3 mL, Nebulization, Q6H PRN, Cameron Alvarado DO  •  ipratropium-albuterol (DUO-NEB) nebulizer solution 3 mL, 3 mL, Nebulization, 4x Daily - RT, Darrick Matt MD, 3 mL at 03/13/22 0720  •  Magnesium Sulfate 2 gram Bolus, followed by 8 gram infusion (total Mg dose 10 grams)- Mg less than or equal to 1mg/dL, 2 g, Intravenous, PRN **OR**  Magnesium Sulfate 2 gram / 50mL Infusion (GIVE X 3 BAGS TO EQUAL 6GM TOTAL DOSE) - Mg 1.1 - 1.5 mg/dl, 2 g, Intravenous, PRN **OR** Magnesium Sulfate 4 gram infusion- Mg 1.6-1.9 mg/dL, 4 g, Intravenous, PRN, Alvarado, Birrilla, DO  •  melatonin tablet 5 mg, 5 mg, Oral, Nightly PRN, Trevon Chandler, DO, 5 mg at 03/12/22 2109  •  OLANZapine (zyPREXA) injection 5 mg, 5 mg, Intramuscular, Q8H PRN, Trevon Chandler, DO, 5 mg at 02/28/22 1709  •  ondansetron (ZOFRAN) tablet 4 mg, 4 mg, Oral, Q6H PRN **OR** ondansetron (ZOFRAN) injection 4 mg, 4 mg, Intravenous, Q6H PRN, Alvarado, Birrilla, DO  •  polyethylene glycol (MIRALAX) packet 17 g, 17 g, Oral, Daily, Alvarado, Birrilla, DO, 17 g at 03/08/22 0754  •  potassium chloride (K-DUR,KLOR-CON) CR tablet 40 mEq, 40 mEq, Oral, PRN **OR** potassium chloride (KLOR-CON) packet 40 mEq, 40 mEq, Oral, PRN **OR** potassium chloride 10 mEq in 100 mL IVPB, 10 mEq, Intravenous, Q1H PRN, Alvarado, Birrilla, DO, Last Rate: 100 mL/hr at 02/22/22 0936, 10 mEq at 02/22/22 0936  •  predniSONE (DELTASONE) tablet 30 mg, 30 mg, Oral, Daily With Breakfast, Fabiano Dunbar MD, 30 mg at 03/13/22 0847  •  [COMPLETED] Insert peripheral IV, , , Once **AND** sodium chloride 0.9 % flush 10 mL, 10 mL, Intravenous, PRN, Alvarado, Birrilla, DO, 10 mL at 03/07/22 1616  •  sodium chloride 0.9 % infusion 40 mL, 40 mL, Intravenous, PRN, Alvarado, Birrilla, DO, 100 mL at 03/02/22 0228  •  sodium chloride nasal spray 2 spray, 2 spray, Each Nare, PRN, Osvaldo Gibson, , 2 spray at 03/06/22 0818  •  tamsulosin (FLOMAX) 24 hr capsule 0.4 mg, 0.4 mg, Oral, Daily, Trevon Chandler, , 0.4 mg at 03/13/22 0848      Assessment/Plan     Acute Hypoxic Respiratory Failure due to bacterial/aspiration Pneumonia/Post-COVID Fibrosis:   -On 4-6 L high flow at rest, unfortunately still with high requirement w/exrtion  -aggressive pulmonary toilet  -weaning PO steroid   -Pulmonary following appreciate assistance     Leukocytosis     -likely steroid induced, downtrending  -status post antibiotic course  -Remains afebrile     Acute LLE DVT:   -On Eliquis.     Steroid-induced Hyperglycemia:   -BG now stable  -SSI     Dyslipidemia:  - No acute issues.  Continue Lipitor.    Acute metabolic encephalopathy secondary to infection and suspected acute alcohol withdrawal  -Neurology  signed off   -Overall resolved, still with periodic confusion  -Continue as needed Zyprexa    Plan for disposition: exertional O2 will have to be better before d/c     Trevon Chandler DO  03/13/22  10:33 EDT

## 2022-03-14 ENCOUNTER — READMISSION MANAGEMENT (OUTPATIENT)
Dept: CALL CENTER | Facility: HOSPITAL | Age: 72
End: 2022-03-14

## 2022-03-14 VITALS
OXYGEN SATURATION: 94 % | RESPIRATION RATE: 22 BRPM | TEMPERATURE: 97.8 F | BODY MASS INDEX: 25.07 KG/M2 | DIASTOLIC BLOOD PRESSURE: 79 MMHG | HEART RATE: 106 BPM | HEIGHT: 68 IN | WEIGHT: 165.44 LBS | SYSTOLIC BLOOD PRESSURE: 117 MMHG

## 2022-03-14 PROBLEM — J18.9 PNEUMONIA OF BOTH LUNGS DUE TO INFECTIOUS ORGANISM: Status: RESOLVED | Noted: 2022-02-21 | Resolved: 2022-03-14

## 2022-03-14 LAB — GLUCOSE BLDC GLUCOMTR-MCNC: 134 MG/DL (ref 70–130)

## 2022-03-14 PROCEDURE — 82962 GLUCOSE BLOOD TEST: CPT

## 2022-03-14 PROCEDURE — 94618 PULMONARY STRESS TESTING: CPT

## 2022-03-14 PROCEDURE — 99239 HOSP IP/OBS DSCHRG MGMT >30: CPT | Performed by: INTERNAL MEDICINE

## 2022-03-14 PROCEDURE — 63710000001 PREDNISONE PER 5 MG: Performed by: INTERNAL MEDICINE

## 2022-03-14 PROCEDURE — 97116 GAIT TRAINING THERAPY: CPT

## 2022-03-14 RX ORDER — TAMSULOSIN HYDROCHLORIDE 0.4 MG/1
0.4 CAPSULE ORAL DAILY
Qty: 30 CAPSULE | Refills: 0 | Status: SHIPPED | OUTPATIENT
Start: 2022-03-15 | End: 2022-03-14 | Stop reason: SDUPTHER

## 2022-03-14 RX ORDER — CHOLECALCIFEROL (VITAMIN D3) 125 MCG
10 CAPSULE ORAL NIGHTLY PRN
Qty: 30 TABLET | Refills: 0 | Status: SHIPPED | OUTPATIENT
Start: 2022-03-14

## 2022-03-14 RX ORDER — CHOLECALCIFEROL (VITAMIN D3) 125 MCG
10 CAPSULE ORAL NIGHTLY PRN
Qty: 30 TABLET | Refills: 0 | Status: SHIPPED | OUTPATIENT
Start: 2022-03-14 | End: 2022-03-14 | Stop reason: SDUPTHER

## 2022-03-14 RX ORDER — METHYLPREDNISOLONE 4 MG/1
1 TABLET ORAL DAILY
Qty: 21 EACH | Refills: 0 | Status: SHIPPED | OUTPATIENT
Start: 2022-03-14 | End: 2022-03-14 | Stop reason: SDUPTHER

## 2022-03-14 RX ORDER — METHYLPREDNISOLONE 4 MG/1
1 TABLET ORAL DAILY
Qty: 21 EACH | Refills: 0 | Status: ON HOLD | OUTPATIENT
Start: 2022-03-14 | End: 2022-10-26

## 2022-03-14 RX ORDER — OLANZAPINE 5 MG/1
5 TABLET ORAL EVERY 8 HOURS PRN
Qty: 30 TABLET | Refills: 0 | Status: ON HOLD | OUTPATIENT
Start: 2022-03-14 | End: 2022-10-26

## 2022-03-14 RX ORDER — PREDNISONE 10 MG/1
20 TABLET ORAL
Status: DISCONTINUED | OUTPATIENT
Start: 2022-03-14 | End: 2022-03-14 | Stop reason: HOSPADM

## 2022-03-14 RX ORDER — TAMSULOSIN HYDROCHLORIDE 0.4 MG/1
0.4 CAPSULE ORAL DAILY
Qty: 30 CAPSULE | Refills: 0 | Status: SHIPPED | OUTPATIENT
Start: 2022-03-15

## 2022-03-14 RX ORDER — OLANZAPINE 5 MG/1
5 TABLET ORAL EVERY 8 HOURS PRN
Qty: 30 TABLET | Refills: 0 | Status: SHIPPED | OUTPATIENT
Start: 2022-03-14 | End: 2022-03-14 | Stop reason: SDUPTHER

## 2022-03-14 RX ORDER — DOCUSATE SODIUM 100 MG/1
100 CAPSULE, LIQUID FILLED ORAL 2 TIMES DAILY
Status: DISCONTINUED | OUTPATIENT
Start: 2022-03-14 | End: 2022-03-14 | Stop reason: HOSPADM

## 2022-03-14 RX ADMIN — TAMSULOSIN HYDROCHLORIDE 0.4 MG: 0.4 CAPSULE ORAL at 08:45

## 2022-03-14 RX ADMIN — POLYETHYLENE GLYCOL 3350 17 G: 17 POWDER, FOR SOLUTION ORAL at 08:47

## 2022-03-14 RX ADMIN — PREDNISONE 20 MG: 10 TABLET ORAL at 08:46

## 2022-03-14 RX ADMIN — HYDROCODONE BITARTRATE AND HOMATROPINE METHYLBROMIDE 5 ML: 5; 1.5 SOLUTION ORAL at 03:28

## 2022-03-14 RX ADMIN — Medication 10 ML: at 08:46

## 2022-03-14 RX ADMIN — DOCUSATE SODIUM 100 MG: 100 CAPSULE, LIQUID FILLED ORAL at 08:47

## 2022-03-14 RX ADMIN — FERROUS SULFATE TAB EC 324 MG (65 MG FE EQUIVALENT) 324 MG: 324 (65 FE) TABLET DELAYED RESPONSE at 08:45

## 2022-03-14 RX ADMIN — APIXABAN 5 MG: 2.5 TABLET, FILM COATED ORAL at 08:45

## 2022-03-14 NOTE — DISCHARGE PLACEMENT REQUEST
"Troy Thompson Jr. (71 y.o. Male)             Date of Birth   1950    Social Security Number       Address   [address] [address]    Home Phone       MRN   [id number]       Hindu   Bahai    Marital Status                               Admission Date   2/21/22    Admission Type   Emergency    Admitting Provider   Cameron Alvarado DO    Attending Provider   Cameron Alvarado DO    Department, Room/Bed   [facility] ICU, ICU6/1       Discharge Date       Discharge Disposition       Discharge Destination                               Attending Provider: Cameron Alvarado DO    Allergies: Penicillins    Isolation: None   Infection: COVID (History) (02/01/22)   Code Status: CPR   Advance Care Planning Activity    Ht: 172 cm (67.72\")   Wt: 75 kg (165 lb 7 oz)    Admission Cmt: None   Principal Problem: Pneumonia of both lungs due to infectious organism [J18.9]                 Active Insurance as of 2/21/2022     Primary Coverage     Payor Plan Insurance Group Employer/Plan Group    MEDICARE MEDICARE A & B      Payor Plan Address Payor Plan Phone Number Payor Plan Fax Number Effective Dates    [address] [phone]  9/1/2015 - None Entered    [facility] [address]       Subscriber Name Subscriber Birth Date Member ID       TROY THOMPSON JR. 1950 [id number]           Secondary Coverage     Payor Plan Insurance Group Employer/Plan Group    [facility] [employer]AN N     Payor Plan Address Payor Plan Phone Number Payor Plan Fax Number Effective Dates    [facility] [phone]  9/1/2015 - None Entered    [address]       [place] [address]       Subscriber Name Subscriber Birth Date Member ID       TROY THOMPSON JR. 1950 [id number]                 Emergency Contacts      (Rel.) Home Phone Work Phone Mobile Phone    Araseli Thompson (Spouse) -- -- [phone]    CammieTroy wilder (Son) -- [phone]            "

## 2022-03-14 NOTE — DISCHARGE INSTR - LAB
Left voicemail with Dr. Alarcon office to call pt back to make hospital follow up appointment. [phone]    [facility] will call you to make a follow up appointment. If you do not hear back from them by Tuesday or Wednesday, please call the office to make an appointment. [phone]

## 2022-03-14 NOTE — PROGRESS NOTES
"Enter Query Response Below      Query Response:       Sepsis rule in treated, resolved.        If applicable, please update the problem list.        Patient: Dontae Bonds Jr.        : 1950  Account: [id number]           Admit Date:         Options to Respond to Query:    1. Access the Encounter     a. From the To-Do Side bar, click Respond With Note.     b. Click New Note     c. Answer query within the yellow box.                d. Update the Problem List if applicable.         Patient  admitted for treatment of pneumonia and post COVID fibrosis. Initial vital signs include heart rate 114 and respirations 36/min.  Patient had a Ferritin of 1110, ESR of 50-56, and C reactive protein 20.18. Pulmonary progress note on  states patient has required intubation, mechanical ventilation and has been febrile to 102.5 on . Patient had a positive for COVID result on  respiratory panel. On  pulmonary progress note listed \"Pneumonia probably bacterial secondary infection if MRSA swab is negative could probably discontinue vancomycin continue cefepime\" and \"Shock questionable septic he still requiring vasopressor therapy so this may be a component of sepsis or could still be related to sedation.\" On  APN/Hospitalist progress note includes sepsis with septic shock secondary to acute hypoxic respiratory failure secondary to bilateral bacterial pneumonia superimposed on recent COVID pneumonia\". \"SOFA score of resp=3, CV=4 GCS=3 total 10 points\"   On  and  attending MD progress notes included that patient previously met sepsis criteria. On  WBC count increased from 13 to 17. By  patient's WBC count reached 33.95. Patient treated with IV Cefpime and Vancomycin. Discharge Summary does not include diagnosis of sepsis.     After study, can the diagnosis of sepsis be clarified as    ruled in, treated and resolved    ruled out    other (please " specify)_________________    unable to determine      By submitting this query, we are merely seeking further clarification of documentation to accurately reflect all conditions that you are monitoring, evaluating, treating or that extend the hospitalization or utilize additional resources of care. Please utilize your independent clinical judgment when addressing the question(s) above.     This query and your response, once completed, will be entered into the legal medical record.    Sincerely,  Ceci ROLLINS RN CDI CCDS  Clinical Documentation Integrity Program   [email or web address]

## 2022-03-14 NOTE — CASE MANAGEMENT/SOCIAL WORK
Continued Stay Note  DILAN OlivaAltamont     Patient Name: Dontae Bonds Jr.  MRN: [id number]  Today's Date: 3/14/2022    Admit Date: 2/21/2022     Discharge Plan     Row Name 03/14/22 1445       Plan    Plan Comments I was advised that the patient would discharge home today.  I called [facility] and spoke with Jasmin and she advised that they would like to ensure delivery of the high flow concentrator before the patient leaves.  She further advised that they will deliver in the next 1/2 hour.  I called and advised SHARONA Angulo the patients primary nurse of [facility].  I then called Emerita from [facility] and advised her of the discharge. I also spoke with the patients wife and she requested Meds to Bed, IMM was also updated.  I updated the patients information in [facility] and advised Dr. Gibson of [facility].  The patient will discharge home with [facility] for DME and [facility] for home health.  CM will continue to follow.               Discharge Codes    No documentation.                     Jasmin Yan RN

## 2022-03-14 NOTE — PROGRESS NOTES
LOS: 21 days   Patient Care Team:  Tiffany Burciaga MD as PCP - General (Internal Medicine)    Subjective   Following up hypoxic respiratory failure.  Patient says he is doing pretty well did not sleep very well last night he is an early morning person he is already dressed and up working on the computer.  He is getting anxious to get home.  He notes no real change in his condition over the last probably week or so  Review of Systems:          Objective     Vital Signs  Vital Sign Min/Max for last 24 hours  Temp  Min: 97.4 °F (36.3 °C)  Max: 97.9 °F (36.6 °C)   BP  Min: 101/67  Max: 126/83   Pulse  Min: 71  Max: 122   Resp  Min: 20  Max: 30   SpO2  Min: 78 %  Max: 98 %   Flow (L/min)  Min: 5  Max: 6   No data recorded        Ventilator/Non-Invasive Ventilation Settings (From admission, onward)             Start     Ordered    02/21/22 2201  Ventilator - AC/PC; (16); (100); 90%; 5; 20  Continuous,   Status:  Canceled        Question Answer Comment   Vent Mode AC/PC    Breath rate  16   FiO2  100   FiO2 titrate for Sp02% =/> 90%    PEEP 5    Inspiratory Pressure 20        02/21/22 2202                             Body mass index is 25.37 kg/m².  I/O last 3 completed shifts:  In: 1960 [P.O.:1960]  Out: 1950 [Urine:1950]  I/O this shift:  In: 480 [P.O.:480]  Out: 525 [Urine:525]        Physical Exam:  General Appearance: Well-developed white male he does not appear in acute distress  Eyes: Conjunctiva are clear and anicteric  ENT: Mucous membranes are little bit dry no erythema no exudates  Neck: Trachea midline no jugular venous distention  Lungs: Bibasilar crackles if he talks a whole lot he will have to stop and work for few breaths but looks very comfortable at rest on 6 L O2 his oxygen saturations are in the mid to upper 90s at rest dropped to the low 90s talking  Cardiac: Regular rate rhythm no murmur  Abdomen: Soft no palpable hepatosplenomegaly  : Not examined  Musculoskeletal: Mild thoracic  kyphosis  Skin: Warm dry no jaundice no petechiae  Neuro: He is alert and oriented he is cooperative following commands moving extremities well  Extremities/P Vascular: No clubbing no cyanosis no edema palpable radial dorsalis pedis pulses  MSE: Seems to be in pretty good spirits right now       Labs:  Results from last 7 days   Lab Units 03/12/22  0507 03/09/22  1557 03/08/22  0517   GLUCOSE mg/dL 132* 252* 168*   SODIUM mmol/L 137 135* 136   POTASSIUM mmol/L 3.9 4.8 4.8   MAGNESIUM mg/dL  --  2.1  --    CO2 mmol/L 29.7* 27.0 29.2*   CHLORIDE mmol/L 100 99 101   ANION GAP mmol/L 7.3 9.0 5.8   CREATININE mg/dL 0.51* 0.56* 0.49*   BUN mg/dL 22 27* 23   BUN / CREAT RATIO  43.1* 48.2* 46.9*   CALCIUM mg/dL 9.1 9.4 9.5     Estimated Creatinine Clearance: 89.8 mL/min (A) (by C-G formula based on SCr of 0.51 mg/dL (L)).      Results from last 7 days   Lab Units 03/12/22  0507 03/09/22  0542 03/08/22  0517   WBC 10*3/mm3 19.53* 25.40* 29.10*   RBC 10*6/mm3 4.47 3.84* 3.74*   HEMOGLOBIN g/dL 13.3 11.4* 11.2*   HEMATOCRIT % 41.6 35.9* 34.9*   MCV fL 93.1 93.5 93.3   MCH pg 29.8 29.7 29.9   MCHC g/dL 32.0 31.8 32.1   RDW % 15.5* 14.4 14.4   RDW-SD fl 51.1 48.5 48.7   MPV fL 10.2 10.2 10.2   PLATELETS 10*3/mm3 348 378 410   NEUTROPHIL % %  --  87.2* 91.0*   LYMPHOCYTE % %  --  3.5* 2.5*   MONOCYTES % %  --  4.3* 3.0*   EOSINOPHIL % %  --  0.0* 0.0*   BASOPHIL % %  --  0.2 0.1   IMM GRAN % %  --  4.8* 3.4*   NEUTROS ABS 10*3/mm3  --  22.15* 26.44*   LYMPHS ABS 10*3/mm3  --  0.90 0.74   MONOS ABS 10*3/mm3  --  1.08* 0.88   EOS ABS 10*3/mm3  --  0.00 0.00   BASOS ABS 10*3/mm3  --  0.04 0.04   IMMATURE GRANS (ABS) 10*3/mm3  --  1.23* 1.00*   NRBC /100 WBC  --  0.1 0.0                             Microbiology Results (last 10 days)     ** No results found for the last 240 hours. **              apixaban, 5 mg, Oral, Q12H  atorvastatin, 40 mg, Oral, Nightly  docusate sodium, 100 mg, Oral, BID  ferrous sulfate, 324 mg, Oral, Daily  With Breakfast  insulin aspart, 0-9 Units, Subcutaneous, TID AC  polyethylene glycol, 17 g, Oral, Daily  predniSONE, 30 mg, Oral, Daily With Breakfast  tamsulosin, 0.4 mg, Oral, Daily           Diagnostics:  Adult Transthoracic Echo Complete w/ Color, Spectral and Contrast if Necessary Per Protocol    Result Date: 2/24/2022  · Estimated left ventricular EF = 60% Left ventricular systolic function is normal. · Left ventricular diastolic function was normal. · Mild aortic valve stenosis is present. · Peak velocity of the flow distal to the aortic valve is 288.1 cm/s. Aortic valve maximum pressure gradient is 33.2 mmHg. Aortic valve mean pressure gradient is 18.4 mmHg. Aortic valve dimensionless index is 0.4 . · Estimated right ventricular systolic pressure from tricuspid regurgitation is mildly elevated (35-45 mmHg). Calculated right ventricular systolic pressure from tricuspid regurgitation is 37 mmHg.      CT Head Without Contrast    Result Date: 2/22/2022  HISTORY: Mental status change of uncertain cause. Patient has pneumonia on a ventilator with respiratory failure. Covid positive TECHNIQUE: CT head without contrast. Radiation dose reduction techniques included automated exposure control or exposure modulation based on body size. Radiation audit for number of CT and nuclear cardiology exams performed in the last year: 5.  COMPARISON: Head CT from 2/1/2022 FINDINGS: There is no displaced calvarial fracture. The visualized paranasal sinuses and mastoid air cells are clear. There is no evidence for acute intracranial hemorrhage or extra axial fluid collection. There is mild generalized atrophy. There are vascular calcifications at the base of the brain. There is mild white matter low-attenuation that is nonspecific but likely due to small vessel disease. No acute cortical infarct is suspected. No intracranial mass affect. Findings not significantly changed.     No acute intracranial abnormalities appreciated. Again  there is atrophy and probable sequelae of small vessel disease. Signer Name: Jeanna Epperson MD  Signed: 2/22/2022 2:08 PM  Workstation Name: [id number]  [facility] Video Swallow With Speech Single Contrast    Result Date: 2/28/2022  VIDEO SWALLOWING STUDY  HISTORY: Difficulty swallowing. Status post extubation.  TECHNIQUE: Video swallowing study was conducted by the speech pathologist in my presence and recorded on digital video disc.  Fluoroscopy time 1.9 minutes. A single spot image was recorded for documentation purposes.  FINDINGS: No laryngeal penetration or aspiration of contrast was seen with any consistency of barium contrast. There is premature spillage of thin liquid contrast into the pyriform sinuses. For a full report, please refer to speech pathology.  This report was finalized on 2/28/2022 1:48 PM by Dr. Corbin Duran MD.      XR Chest 1 View    Result Date: 3/9/2022  CR Chest 1 Vw INDICATION: Follow-up fibrosis related to Covid pneumonia. COMPARISON:  3/5/2022 FINDINGS: Single portable AP view(s) of the chest. Heart size is stable. Coarse bilateral infiltrates are again identified. There appears slightly more dense overall which may be a function of radiographic technique although slight worsening is not excluded. No pneumothorax is identified.     Increased density of bilateral infiltrates may be a function of radiographic technique or may indicate slight worsening. Otherwise stable. Signer Name: Corbin Duran MD  Signed: 3/9/2022 5:40 AM  Workstation Name: [email or web address]  [facility]    XR Chest 1 View    Result Date: 3/5/2022  CR Chest 1 Vw INDICATION: Recent COVID-19 infection. Follow-up pneumonia. COMPARISON:  Chest film 3/1/2022 FINDINGS: Portable AP view(s) of the chest. External ECG leads are present. Stable cardiac and mediastinal aortic contours. There is somewhat coarse interstitial change in both lungs similar to prior study. No acute  superimposed airspace change, pleural effusion or pneumothorax.     No significant change from 3/1/2022. Coarse bilateral interstitial changes persist without significant change. Signer Name: Melba Meade MD  Signed: 3/5/2022 10:41 AM  Workstation Name: [id number]  [facility] [place]    XR Chest 1 View    Result Date: 3/1/2022  XR CHEST 1 VW-: 3/1/2022 1:08 PM  INDICATION: Increasing shortness of air today. Requiring more oxygen according to the nurse.  COMPARISON:  02/25/2022.  FINDINGS: Single Portable AP view(s) of the chest. An enteric tube is no longer visible. Right-sided PICC line has been removed. Stable cardiac silhouette. Shallow lung expansion with bronchovascular crowding. Peripheral predominant airspace disease in both lungs remains present but has partially regressed compared with the prior study. There is no new consolidation. No pneumothorax or effusion. Thoracic spondylosis. Oral contrast material present at the hepatic flexure and splenic flexure.       1. Bilateral pneumonia. Imaging findings favor atypical infection including Covid 19. Partial regression compared to the prior study. No pneumothorax.  This report was finalized on 3/1/2022 1:30 PM by Dr. Raghu Huang MD.      XR Chest 1 View    Result Date: 2/25/2022  AP PORTABLE CHEST, 02/25/2022  HISTORY: Dobbhoff tube placement  COMPARISON: Yesterday  TECHNIQUE: AP portable chest x-ray.  FINDINGS: Diffuse bilateral infiltrates are stable. PIC catheter stable. There is a new Dobbhoff tube. The tip is in the lower half of the esophagus.      Tip of the Dobbhoff tube is in the lower half of the esophagus and needs to be advanced. I discussed these findings with the patient's nurse in the ICU  This report was finalized on 2/25/2022 3:38 PM by Dr. Nahum Lester MD.      XR Chest 1 View    Result Date: 2/24/2022  CR Chest 1 Vw INDICATION: Covid positive with increased oxygen need COMPARISON:  February 23, 2022 FINDINGS: Portable  AP view(s) of the chest. Interval extubation. Severe bilateral airspace disease. Cardiomediastinal silhouette is obscured. Overall similar appearance of the lung fields as compared to the prior.     Extensive multifocal airspace disease. Interval extubation. Signer Name: CHAPARRITA ROUSE MD  Signed: 2/24/2022 7:19 PM  Workstation [facility]: [facility]  [facility]    XR Chest 1 View    Result Date: 2/23/2022  CHEST X-RAY, 02/23/2022 (14:54)     HISTORY: Dobbhoff tube replacement.  TECHNIQUE: AP portable chest x-ray.  FINDINGS: The replaced Dobbhoff tube tip is now positioned in the distal thoracic esophagus about 6 cm above the esophagogastric junction. It may now be safely fully advanced into the stomach.  The remainder of the examination is negative and unchanged.      Dobbhoff tube tip is within the lower thoracic esophagus on stage I of 2 Dobbhoff placement series.  This report was finalized on 2/23/2022 3:13 PM by Dr. Obey Lindsey MD.      XR Chest 1 View    Result Date: 2/23/2022  CHEST X-RAY, 02/23/2022 (13:03)     HISTORY: Assess Dobbhoff tube position.  TECHNIQUE: AP portable chest x-ray.  FINDINGS: The Dobbhoff tube is coiled in the hypopharynx. This should be removed and replaced.  ETT and PICC remain in good position. Chest x-ray unchanged since the earlier study. No visible pneumomediastinum or pneumothorax.      Dobbhoff tube is coiled in the hypopharynx at 1303 on 02/23/2022.  This report was finalized on 2/23/2022 1:21 PM by Dr. Obey Lindsey MD.      XR Chest 1 View    Result Date: 2/23/2022  CHEST X-RAY, 02/23/2022     HISTORY: Dobbhoff feeding tube placement.  TECHNIQUE: AP portable chest x-ray (10:20).  COMPARISON: *  Chest x-ray, 02/23/2022 (05:03).  FINDINGS: The Dobbhoff tube tip is superimposed over the mid thoracic trachea alongside the endotracheal tube. Tip is about 3.5 cm above the billie. This may lie within the trachea or within the adjacent esophagus.  ETT and  PICC remain in good position.  Dense multifocal airspace infiltrates are unchanged since earlier today. No pneumothorax or pneumomediastinum. Low lung volumes.      Dobbhoff tube tip is superimposed over the mid thoracic trachea as noted above.  This report was finalized on 2/23/2022 10:43 AM by Dr. Obey Lindsey MD.      XR Chest 1 View    Result Date: 2/23/2022  CR Chest 1 Vw INDICATION: Follow-up pneumonia. Covid positive. COMPARISON:  2/22/2022 FINDINGS: Single portable AP view(s) of the chest. Endotracheal tube is in good position in the lower trachea. Right arm approach PICC is at the right atrial level. Heart size is stable. Coarse bilateral pulmonary infiltrates are not significantly changed. No pneumothorax.     Tubes and lines as above. No significant change in pulmonary infiltrates. No pneumothorax. Signer Name: Corbin Duran MD  Signed: 2/23/2022 5:28 AM  Workstation Name: [facility]  [facility]    XR Chest 1 View    Result Date: 2/22/2022  CHEST X-RAY, 02/22/2022     HISTORY: 71-year-old male hospital inpatient with acute respiratory failure, hypoxia.  TECHNIQUE: AP portable chest x-ray.  COMPARISON: *  Chest x-ray, 02/21/2022.  FINDINGS: Endotracheal tube remains in good position with tip in the lower thoracic trachea about 2.8 cm above the billie.  New right arm PICC in good position with tip in the lower SVC.  Dense diffuse multifocal airspace infiltrates are unchanged. Lung volumes are low. There is no visible pneumomediastinum, pneumothorax or pleural effusion.      1.  ETT and PICC in good position. 2.  Dense diffuse multifocal airspace infiltrates are unchanged. 3.  No significant change since yesterday.  This report was finalized on 2/22/2022 2:40 PM by Dr. Obey Lindsey MD.      XR Chest 1 View    Result Date: 2/21/2022  CR Chest 1 Vw INDICATION: Postintubation COMPARISON:  Earlier in the day 2/21/2022 FINDINGS: Portable AP view(s) of the chest. Interval  intubation with the tip of the endotracheal tube terminating chest above the level the billie but projecting towards the level of the right mainstem bronchus (approximately 1.1 cm above the billie. Other findings including cardiomegaly and widespread multifocal infiltrates in the lungs are unchanged.     Interval intubation with the tip of the endotracheal tube terminating just above the level the billie but projecting towards the level of the right mainstem bronchus (approximately 1.1 cm above the billie. Other findings including cardiomegaly and widespread multifocal infiltrates in the lungs are unchanged Signer Name: CHAPARRITA ROUSE MD  Signed: 2/21/2022 10:01 PM  Workstation Name: [facility]  [facility]    XR Chest 1 View    Result Date: 2/21/2022  CR Chest 1 Vw INDICATION: Shortness of air COMPARISON:  January 29, 2022 FINDINGS: Portable AP view(s) of the chest.  Cardiomediastinal contours are obscured. Marked worsening in multifocal consolidative changes in the lungs, could represent multifocal pneumonia versus pulmonary edema. No pleural effusion. No pneumothorax. No acute bony abnormality.     Marked worsening in multifocal consolidative changes in the lungs, could represent multifocal pneumonia versus pulmonary edema. Signer Name: CHAPARRITA ROUSE MD  Signed: 2/21/2022 7:25 PM  Workstation Name: [facility]  [facility] Venous Doppler Lower Extremity Bilateral (duplex)    Result Date: 2/22/2022  VENOUS DOPPLER ULTRASOUND, BILATERAL LOWER EXTREMITIES, COMPLETE, 02/22/2022  HISTORY: 71-year-old male hospital inpatient acute hypoxemic respiratory failure. Pneumonia. Prior COVID-19 infection. Elevated d-dimer.  TECHNIQUE: Venous Doppler ultrasound examination of both legs was performed using grey-scale, spectral Doppler, and color flow Doppler ultrasound imaging. Imaged segments included the CFV, FV, proximal DFV, PopV, deep calf veins and greater saphenous vein.   FINDINGS: LEFT: Occlusive DVT in the left lower leg within the popliteal vein at the knee and within the peroneal veins in the calf. Anterior and posterior tibial veins remain patent. Above the popliteal vein, there is no left leg DVT including the CFV, FV and PFV. Note is made of superficial venous thrombus within the left lesser saphenous vein. Greater saphenous vein is patent.  RIGHT: No right leg DVT is seen. Deep veins are patent from the groin to the lower calf. The greater saphenous vein is also patent. However, lesser saphenous vein within the calf shows occlusive SVT.       1.  Occlusive left leg DVT at and below the knee within the popliteal and peroneal veins. 2.  No additional DVT is seen on the right or left. 3.  Occlusive SVT within the right and left lesser saphenous vein in each calf. Greater saphenous veins are patent.  This report was finalized on 2/22/2022 10:47 AM by Dr. Obey Lindsey MD.      CT Angiogram Chest    Result Date: 2/21/2022  CTA Chest INDICATION: Worsening shortness of air, cough, elevated d-dimer, Covid diagnosis one month ago TECHNIQUE: CT angiogram of the chest with IV contrast. 3-D reconstructions were obtained and reviewed.   Radiation dose reduction techniques included automated exposure control or exposure modulation based on body size. Count of known CT and cardiac nuc med studies performed in previous 12 months: 3. COMPARISON: January 27, 2022 FINDINGS: Respiratory motion severely degrades evaluation but allowing for this no convincing evidence of pulmonary arterial occlusion to suggest pulmonary embolus. Normal size thoracic aorta. Normal heart size. No pericardial effusion. Extensive and near diffuse consolidative changes throughout the lungs with trace bilateral pleural effusions. There is superimposed interseptal thickening and groundglass opacities. This is worsened as compared to the exam of January 27, 2022. No acute upper abdominal abnormality allowing for  limited field-of-view and contrast bolus timing. Cyst in the right kidney. No acute osseous abnormality.     1. Severe motion degradation compromises evaluation for pulmonary embolus but no convincing evidence of pulmonary embolus is seen. 2. Marked near diffuse consolidative changes with superimposed septal thickening and groundglass opacities, worse as compared to the exam of January 27, 2022, possibly representing multifocal pneumonia, superimposed edema, or could be associated with Covid 19. Signer Name: CHAPARRITA ROUSE MD  Signed: 2/21/2022 7:59 PM  Workstation Name: [facility]  [facility]    XR Abdomen KUB    Result Date: 2/25/2022  XR ABDOMEN KUB-: 2/25/2022 3:54 PM  INDICATION: Evaluate Dobbhoff tube placement  COMPARISON: 02/23/2022.  FINDINGS: AP radiographs of the abdomen. The renal shadows are symmetric. No renal calculi. No bladder calculi.  The bowel gas pattern is nonobstructive. There are postoperative changes lumbar spine. The tip of the Dobbhoff tube is in the fundus of the stomach. Bowel gas pattern is normal.      Tip of Dobbhoff tube is in the fundus of the stomach  This report was finalized on 2/25/2022 4:02 PM by Dr. Nahum Lester MD.      XR Abdomen KUB    Result Date: 2/23/2022  CR Abdomen 1 Vw INDICATION: Dobbhoff tube repositioning COMPARISON: Earlier in the day finger 20/3/2022 FINDINGS: AP radiograph(s) of the abdomen. Dobbhoff tube has been slightly retracted, tip now lies in the expected radiographic location of the stomach. The previously seen kinking has resolved.     Dobbhoff tube has been slightly retracted, tip now lies in the expected radiographic location of the stomach. The previously seen kinking has resolved. Signer Name: CHAPARRITA ROUSE MD  Signed: 2/23/2022 7:37 PM  Workstation Name: [facility]  [facility]    XR Abdomen KUB    Result Date: 2/23/2022  CR Abdomen 1 Vw INDICATION: Dobbhoff tube placed COMPARISON: None available  FINDINGS: AP radiograph(s) of the abdomen. There are 2 radiographs submitted, one timed stamped 2:57 PM and the second time stamp 4:22 PM. On the first radiograph tip of the Dobbhoff tube is at midline, cannot confirm placement beyond the GE junction. The second radiograph shows the Dobbhoff tube more advanced in the left upper quadrant with the tip points cephalad. It appears to demonstrate a sharp acute angle/kinking just proximal to the hyperdense distal component. The bowel gas pattern is nonobstructive. No acute osseous abnormalities. No radiopaque foreign body.     There are 2 radiographs submitted, one timed stamped 2:57 PM and the second time stamp 4:22 PM. On the first radiograph tip of the Dobbhoff tube is at midline, cannot confirm placement beyond the GE junction. The second radiograph shows the Dobbhoff tube more advanced in the left upper quadrant with the tip points cephalad. It appears to demonstrate a sharp acute angle/kinking just proximal to the hyperdense distal component. Provided radiographs do not support postpyloric position if desired. The kinking is more than that typically seen and could affect function. Signer Name: CHAPARRITA ROUSE MD  Signed: 2/23/2022 4:43 PM  Workstation Name: [facility]  [facility]    XR Abdomen KUB    Result Date: 2/23/2022  ABDOMEN, LIMITED, 02/23/2022     HISTORY: Dobbhoff tube placement.  TECHNIQUE: AP radiograph of the upper abdomen.  FINDINGS: No Dobbhoff tube is not within the upper abdomen or lower chest.  Note, previous chest x-ray questioned possible tracheal position of the Dobbhoff tube.      No visible Dobbhoff tube within the lower chest or abdomen.  This report was finalized on 2/23/2022 12:15 PM by Dr. Obey Lindsey MD.      Results for orders placed during the hospital encounter of 02/21/22    Adult Transthoracic Echo Complete w/ Color, Spectral and Contrast if Necessary Per Protocol    Interpretation Summary  · Estimated  left ventricular EF = 60% Left ventricular systolic function is normal.  · Left ventricular diastolic function was normal.  · Mild aortic valve stenosis is present.  · Peak velocity of the flow distal to the aortic valve is 288.1 cm/s. Aortic valve maximum pressure gradient is 33.2 mmHg. Aortic valve mean pressure gradient is 18.4 mmHg. Aortic valve dimensionless index is 0.4 .  · Estimated right ventricular systolic pressure from tricuspid regurgitation is mildly elevated (35-45 mmHg). Calculated right ventricular systolic pressure from tricuspid regurgitation is 37 mmHg.          Active Hospital Problems    Diagnosis  POA   • **Pneumonia of both lungs due to infectious organism [J18.9]  Yes   • Hypertension [I10]  Yes   • Hypokalemia [E87.6]  Yes      Resolved Hospital Problems   No resolved problems to display.         Assessment/Plan     1. COVID-19 infection and pneumonia patient has been felt to be in the recovery/fibrotic stage, starting to slowly wean steroids he was decreased he was decreased to 20 mg today  2. Secondary bacterial infection he has completed antibiotic therapy  3. Acute hypoxic respiratory failure he still on high flow nasal cannula but he has been slowly weaning down continue to wean as tolerated.  Biggest issue remains exertional tolerance he is probably going to need high flow oxygen for exertion at discharge.  4. Encephalopathy metabolic there may be a component of alcohol withdrawal this all seems to have resolved.  5. Acute left lower extremity DVT patient is fully anticoagulated with Eliquis.  6. Leukocytosis may be related to steroids it has been improving continue to monitor    Plan for disposition: From a pulmonary standpoint he could probably transfer out of the ICU when okay from other aspects.  He is probably getting pretty close to going home he is going need a high flow concentrator probably a 15 L to use with exertion at discharge.  He tells me he has great support at home  and somebody will be able to be with him all the time    Cash Arguello Jr, MD  03/14/22  06:23 EDT    Time:

## 2022-03-14 NOTE — PLAN OF CARE
Goal Outcome Evaluation:              Outcome Evaluation: PT: Patient performs sit to/from stand transfers with modified independence and gait x160 feet with rolling walker, requiring assistance for O2 tank management only.  Patient displays awareness of O2 line during direction changes and displays no balance loss during mobility.  Patient's O2 sats decrease to 84% on 10 liters O2 with activity, recovers to 90% within 30 seconds.  Patient reports no concerns regarding mobility and has consistently performed transfers/mobility without physical assistance from therapist.  At this time, recommend continued ambulation with nursing staff as tolerated, no further skilled PT needs at this time.  Discussed with patient who is in agreement with plan.  Recommend home health PT at discharge when patient returns home.

## 2022-03-14 NOTE — DISCHARGE SUMMARY
Dontae Almendarezmeño Peterson.  1950  [id number]    Hospitalists Discharge Summary    Date of Admission: 2/21/2022  Date of Discharge:  3/14/2022    History of Present Illness from [facility] on admit: Patient is a 72 y/o male who was recently admitted here from 1/21/2022-2/2/2022 for respiratory failure due to COVID 19. He was discharged home on supplemental oxygen. Patient's symptoms had initially improved, but had no resolved. Over the past 2 days his shortness of breath has been progressively worsening and he went to his PCP today who turned up his oxygen to 5L and sent him to the ED. Patient was found to be tachypnic, hypoxic and ultimately agreed to intubation.     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     Secondary Discharge Diagnoses: Steroid-induced leukocytosis.  Acute left lower extremity DVT-stable on Eliquis.  Steroid-induced hyperglycemia-managed with insulin sliding scale while inpatient, anticipate improvement as steroids are weaned.  Dyslipidemia-without acute issue on home Lipitor.  Acute metabolic encephalopathy secondary to infection and suspected alcohol withdrawal-improved and managed with thiamine as well as as needed Zyprexa.     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  Patient was also found to have left lower extremity DVT and has been treated with Eliquis throughout his stay.  Patient was known to us for previous admission from his COVID-19 pneumonia, had a lengthy hospitalization and was eventually weaned to a level of oxygen that could be accommodated at home, he then returned when oxygen requirement worsened.  Imaging showed concurrent pneumonia and he was treated with broad-spectrum antibiotics including cefepime, pulmonary has assisted with his care throughout his  hospitalization.  Patient required intubation initially due to his severe dyspnea.  He was quickly extubated, however we have had difficulty weaning his oxygen requirement and at one point hospice therapy was discussed however patient has gradually improved again and required 6 L of oxygen at rest and 10 L on exertion per walking oximetry testing on day of discharge. He has stable support at home through his wife and his son.  Patient has also been treated with steroids throughout his hospitalization which have been weaned to oral prednisone.  I have supplied him with Medrol dose pack at time of discharge.  His encephalopathy has steadily improved with as needed Zyprexa which was also prescribed at discharge.  He has not had any signs of alcohol withdrawal. On 3/14/2022 patient's condition had improved. They were deemed stable for discharge. They were advised to take all medications as prescribed, follow up with PCP within 1 week. If there are any issues patient should contact PCP and/or return to the ED for follow up. Patient was agreeable to the plan and subsequently discharged at this time.     PCP  Patient Care Team:  Tiffany Burciaga MD as PCP - General (Internal Medicine)    Consults:   Consults     Date and Time Order Name Status Description    2/22/2022  2:40 PM Inpatient Neurology Consult General Completed     2/21/2022 11:51 PM Inpatient Pulmonology Consult      2/1/2022  7:36 AM Inpatient Neurology Consult General Completed     1/27/2022  9:23 AM Inpatient Pulmonology Consult Completed           Operations and Procedures Performed:       Adult Transthoracic Echo Complete w/ Color, Spectral and Contrast if Necessary Per Protocol    Result Date: 2/24/2022  Narrative: · Estimated left ventricular EF = 60% Left ventricular systolic function is normal. · Left ventricular diastolic function was normal. · Mild aortic valve stenosis is present. · Peak velocity of the flow distal to the aortic valve is 288.1  cm/s. Aortic valve maximum pressure gradient is 33.2 mmHg. Aortic valve mean pressure gradient is 18.4 mmHg. Aortic valve dimensionless index is 0.4 . · Estimated right ventricular systolic pressure from tricuspid regurgitation is mildly elevated (35-45 mmHg). Calculated right ventricular systolic pressure from tricuspid regurgitation is 37 mmHg.      CT Head Without Contrast    Result Date: 2/22/2022  Narrative: HISTORY: Mental status change of uncertain cause. Patient has pneumonia on a ventilator with respiratory failure. Covid positive TECHNIQUE: CT head without contrast. Radiation dose reduction techniques included automated exposure control or exposure modulation based on body size. Radiation audit for number of CT and nuclear cardiology exams performed in the last year: 5.  COMPARISON: Head CT from 2/1/2022 FINDINGS: There is no displaced calvarial fracture. The visualized paranasal sinuses and mastoid air cells are clear. There is no evidence for acute intracranial hemorrhage or extra axial fluid collection. There is mild generalized atrophy. There are vascular calcifications at the base of the brain. There is mild white matter low-attenuation that is nonspecific but likely due to small vessel disease. No acute cortical infarct is suspected. No intracranial mass affect. Findings not significantly changed.     Impression: No acute intracranial abnormalities appreciated. Again there is atrophy and probable sequelae of small vessel disease. Signer Name: Jeanna Epperson MD  Signed: 2/22/2022 2:08 PM  Workstation Name: [id number]  [facility] Video Swallow With Speech Single Contrast    Result Date: 2/28/2022  Narrative: VIDEO SWALLOWING STUDY  HISTORY: Difficulty swallowing. Status post extubation.  TECHNIQUE: Video swallowing study was conducted by the speech pathologist in my presence and recorded on digital video disc.  Fluoroscopy time 1.9 minutes. A single spot image was recorded  for documentation purposes.  FINDINGS: No laryngeal penetration or aspiration of contrast was seen with any consistency of barium contrast. There is premature spillage of thin liquid contrast into the pyriform sinuses. For a full report, please refer to speech pathology.  This report was finalized on 2/28/2022 1:48 PM by Dr. Corbin Duran MD.      XR Chest 1 View    Result Date: 3/9/2022  Narrative: CR Chest 1 Vw INDICATION: Follow-up fibrosis related to Covid pneumonia. COMPARISON:  3/5/2022 FINDINGS: Single portable AP view(s) of the chest. Heart size is stable. Coarse bilateral infiltrates are again identified. There appears slightly more dense overall which may be a function of radiographic technique although slight worsening is not excluded. No pneumothorax is identified.     Impression: Increased density of bilateral infiltrates may be a function of radiographic technique or may indicate slight worsening. Otherwise stable. Signer Name: Corbin Duran MD  Signed: 3/9/2022 5:40 AM  Workstation [facility]: [facility]  [facility]    XR Chest 1 View    Result Date: 3/5/2022  Narrative: CR Chest 1 Vw INDICATION: Recent COVID-19 infection. Follow-up pneumonia. COMPARISON:  Chest film 3/1/2022 FINDINGS: Portable AP view(s) of the chest. External ECG leads are present. Stable cardiac and mediastinal aortic contours. There is somewhat coarse interstitial change in both lungs similar to prior study. No acute superimposed airspace change, pleural effusion or pneumothorax.     Impression: No significant change from 3/1/2022. Coarse bilateral interstitial changes persist without significant change. Signer Name: Melba Meade MD  Signed: 3/5/2022 10:41 AM  Workstation Name: [id number]  [facility]    XR Chest 1 View    Result Date: 3/1/2022  Narrative: XR CHEST 1 VW-: 3/1/2022 1:08 PM  INDICATION: Increasing shortness of air today. Requiring more oxygen according to the  nurse.  COMPARISON:  02/25/2022.  FINDINGS: Single Portable AP view(s) of the chest. An enteric tube is no longer visible. Right-sided PICC line has been removed. Stable cardiac silhouette. Shallow lung expansion with bronchovascular crowding. Peripheral predominant airspace disease in both lungs remains present but has partially regressed compared with the prior study. There is no new consolidation. No pneumothorax or effusion. Thoracic spondylosis. Oral contrast material present at the hepatic flexure and splenic flexure.      Impression:  1. Bilateral pneumonia. Imaging findings favor atypical infection including Covid 19. Partial regression compared to the prior study. No pneumothorax.  This report was finalized on 3/1/2022 1:30 PM by Dr. Raghu Huang MD.      XR Chest 1 View    Result Date: 2/25/2022  Narrative: AP PORTABLE CHEST, 02/25/2022  HISTORY: Dobbhoff tube placement  COMPARISON: Yesterday  TECHNIQUE: AP portable chest x-ray.  FINDINGS: Diffuse bilateral infiltrates are stable. PIC catheter stable. There is a new Dobbhoff tube. The tip is in the lower half of the esophagus.      Impression: Tip of the Dobbhoff tube is in the lower half of the esophagus and needs to be advanced. I discussed these findings with the patient's nurse in the ICU  This report was finalized on 2/25/2022 3:38 PM by Dr. Nahum Lester MD.      XR Chest 1 View    Result Date: 2/24/2022  Narrative: CR Chest 1 Vw INDICATION: Covid positive with increased oxygen need COMPARISON:  February 23, 2022 FINDINGS: Portable AP view(s) of the chest. Interval extubation. Severe bilateral airspace disease. Cardiomediastinal silhouette is obscured. Overall similar appearance of the lung fields as compared to the prior.     Impression: Extensive multifocal airspace disease. Interval extubation. Signer Name: CHAPARRITA ROUSE MD  Signed: 2/24/2022 7:19 PM  Workstation [facility]: [email or web address]  [facility]    XR Chest 1 View    Result Date:  2/23/2022  Narrative: CHEST X-RAY, 02/23/2022 (14:54)     HISTORY: Dobbhoff tube replacement.  TECHNIQUE: AP portable chest x-ray.  FINDINGS: The replaced Dobbhoff tube tip is now positioned in the distal thoracic esophagus about 6 cm above the esophagogastric junction. It may now be safely fully advanced into the stomach.  The remainder of the examination is negative and unchanged.      Impression: Dobbhoff tube tip is within the lower thoracic esophagus on stage I of 2 Dobbhoff placement series.  This report was finalized on 2/23/2022 3:13 PM by Dr. Obey Lindsey MD.      XR Chest 1 View    Result Date: 2/23/2022  Narrative: CHEST X-RAY, 02/23/2022 (13:03)     HISTORY: Assess Dobbhoff tube position.  TECHNIQUE: AP portable chest x-ray.  FINDINGS: The Dobbhoff tube is coiled in the hypopharynx. This should be removed and replaced.  ETT and PICC remain in good position. Chest x-ray unchanged since the earlier study. No visible pneumomediastinum or pneumothorax.      Impression: Dobbhoff tube is coiled in the hypopharynx at 1303 on 02/23/2022.  This report was finalized on 2/23/2022 1:21 PM by Dr. Obey Lindsey MD.      XR Chest 1 View    Result Date: 2/23/2022  Narrative: CHEST X-RAY, 02/23/2022     HISTORY: Dobbhoff feeding tube placement.  TECHNIQUE: AP portable chest x-ray (10:20).  COMPARISON: *  Chest x-ray, 02/23/2022 (05:03).  FINDINGS: The Dobbhoff tube tip is superimposed over the mid thoracic trachea alongside the endotracheal tube. Tip is about 3.5 cm above the billie. This may lie within the trachea or within the adjacent esophagus.  ETT and PICC remain in good position.  Dense multifocal airspace infiltrates are unchanged since earlier today. No pneumothorax or pneumomediastinum. Low lung volumes.      Impression: Dobbhoff tube tip is superimposed over the mid thoracic trachea as noted above.  This report was finalized on 2/23/2022 10:43 AM by Dr. Obey Lindsey MD.      XR Chest 1  View    Result Date: 2/23/2022  Narrative: CR Chest 1 Vw INDICATION: Follow-up pneumonia. Covid positive. COMPARISON:  2/22/2022 FINDINGS: Single portable AP view(s) of the chest. Endotracheal tube is in good position in the lower trachea. Right arm approach PICC is at the right atrial level. Heart size is stable. Coarse bilateral pulmonary infiltrates are not significantly changed. No pneumothorax.     Impression: Tubes and lines as above. No significant change in pulmonary infiltrates. No pneumothorax. Signer Name: Corbin Duran MD  Signed: 2/23/2022 5:28 AM  Workstation Name: [facility]  [facility]    XR Chest 1 View    Result Date: 2/22/2022  Narrative: CHEST X-RAY, 02/22/2022     HISTORY: 71-year-old male hospital inpatient with acute respiratory failure, hypoxia.  TECHNIQUE: AP portable chest x-ray.  COMPARISON: *  Chest x-ray, 02/21/2022.  FINDINGS: Endotracheal tube remains in good position with tip in the lower thoracic trachea about 2.8 cm above the billie.  New right arm PICC in good position with tip in the lower SVC.  Dense diffuse multifocal airspace infiltrates are unchanged. Lung volumes are low. There is no visible pneumomediastinum, pneumothorax or pleural effusion.      Impression: 1.  ETT and PICC in good position. 2.  Dense diffuse multifocal airspace infiltrates are unchanged. 3.  No significant change since yesterday.  This report was finalized on 2/22/2022 2:40 PM by Dr. Obey Lindsey MD.      XR Chest 1 View    Result Date: 2/21/2022  Narrative: CR Chest 1 Vw INDICATION: Postintubation COMPARISON:  Earlier in the day 2/21/2022 FINDINGS: Portable AP view(s) of the chest. Interval intubation with the tip of the endotracheal tube terminating chest above the level the billie but projecting towards the level of the right mainstem bronchus (approximately 1.1 cm above the billie. Other findings including cardiomegaly and widespread multifocal infiltrates in the lungs  are unchanged.     Impression: Interval intubation with the tip of the endotracheal tube terminating just above the level the billie but projecting towards the level of the right mainstem bronchus (approximately 1.1 cm above the billie. Other findings including cardiomegaly and widespread multifocal infiltrates in the lungs are unchanged Signer Name: CHAPARRITA ROUSE MD  Signed: 2/21/2022 10:01 PM  Workstation Name: [facility]  [facility]    XR Chest 1 View    Result Date: 2/21/2022  Narrative: CR Chest 1 Vw INDICATION: Shortness of air COMPARISON:  January 29, 2022 FINDINGS: Portable AP view(s) of the chest.  Cardiomediastinal contours are obscured. Marked worsening in multifocal consolidative changes in the lungs, could represent multifocal pneumonia versus pulmonary edema. No pleural effusion. No pneumothorax. No acute bony abnormality.     Impression: Marked worsening in multifocal consolidative changes in the lungs, could represent multifocal pneumonia versus pulmonary edema. Signer Name: CHAPARRITA ROUSE MD  Signed: 2/21/2022 7:25 PM  Workstation Name: [facility]  [facility]    US Venous Doppler Lower Extremity Bilateral (duplex)    Result Date: 2/22/2022  Narrative: VENOUS DOPPLER ULTRASOUND, BILATERAL LOWER EXTREMITIES, COMPLETE, 02/22/2022  HISTORY: 71-year-old male hospital inpatient acute hypoxemic respiratory failure. Pneumonia. Prior COVID-19 infection. Elevated d-dimer.  TECHNIQUE: Venous Doppler ultrasound examination of both legs was performed using grey-scale, spectral Doppler, and color flow Doppler ultrasound imaging. Imaged segments included the CFV, FV, proximal DFV, PopV, deep calf veins and greater saphenous vein.  FINDINGS: LEFT: Occlusive DVT in the left lower leg within the popliteal vein at the knee and within the peroneal veins in the calf. Anterior and posterior tibial veins remain patent. Above the popliteal vein, there is no left leg DVT  including the CFV, FV and PFV. Note is made of superficial venous thrombus within the left lesser saphenous vein. Greater saphenous vein is patent.  RIGHT: No right leg DVT is seen. Deep veins are patent from the groin to the lower calf. The greater saphenous vein is also patent. However, lesser saphenous vein within the calf shows occlusive SVT.       Impression: 1.  Occlusive left leg DVT at and below the knee within the popliteal and peroneal veins. 2.  No additional DVT is seen on the right or left. 3.  Occlusive SVT within the right and left lesser saphenous vein in each calf. Greater saphenous veins are patent.  This report was finalized on 2/22/2022 10:47 AM by Dr. Obey Lindsey MD.      CT Angiogram Chest    Result Date: 2/21/2022  Narrative: CTA Chest INDICATION: Worsening shortness of air, cough, elevated d-dimer, Covid diagnosis one month ago TECHNIQUE: CT angiogram of the chest with IV contrast. 3-D reconstructions were obtained and reviewed.   Radiation dose reduction techniques included automated exposure control or exposure modulation based on body size. Count of known CT and cardiac nuc med studies performed in previous 12 months: 3. COMPARISON: January 27, 2022 FINDINGS: Respiratory motion severely degrades evaluation but allowing for this no convincing evidence of pulmonary arterial occlusion to suggest pulmonary embolus. Normal size thoracic aorta. Normal heart size. No pericardial effusion. Extensive and near diffuse consolidative changes throughout the lungs with trace bilateral pleural effusions. There is superimposed interseptal thickening and groundglass opacities. This is worsened as compared to the exam of January 27, 2022. No acute upper abdominal abnormality allowing for limited field-of-view and contrast bolus timing. Cyst in the right kidney. No acute osseous abnormality.     Impression: 1. Severe motion degradation compromises evaluation for pulmonary embolus but no convincing  evidence of pulmonary embolus is seen. 2. Marked near diffuse consolidative changes with superimposed septal thickening and groundglass opacities, worse as compared to the exam of January 27, 2022, possibly representing multifocal pneumonia, superimposed edema, or could be associated with Covid 19. Signer Name: CHAPARRITA ROUSE MD  Signed: 2/21/2022 7:59 PM  Workstation Name: [facility]  [facility]    XR Abdomen KUB    Result Date: 2/25/2022  Narrative: XR ABDOMEN KUB-: 2/25/2022 3:54 PM  INDICATION: Evaluate Dobbhoff tube placement  COMPARISON: 02/23/2022.  FINDINGS: AP radiographs of the abdomen. The renal shadows are symmetric. No renal calculi. No bladder calculi.  The bowel gas pattern is nonobstructive. There are postoperative changes lumbar spine. The tip of the Dobbhoff tube is in the fundus of the stomach. Bowel gas pattern is normal.      Impression: Tip of Dobbhoff tube is in the fundus of the stomach  This report was finalized on 2/25/2022 4:02 PM by Dr. Nahum Lester MD.      XR Abdomen KUB    Result Date: 2/23/2022  Narrative: CR Abdomen 1 Vw INDICATION: Dobbhoff tube repositioning COMPARISON: Earlier in the day finger 20/3/2022 FINDINGS: AP radiograph(s) of the abdomen. Dobbhoff tube has been slightly retracted, tip now lies in the expected radiographic location of the stomach. The previously seen kinking has resolved.     Impression: Dobbhoff tube has been slightly retracted, tip now lies in the expected radiographic location of the stomach. The previously seen kinking has resolved. Signer Name: CHAPARRITA ROUSE MD  Signed: 2/23/2022 7:37 PM  Workstation Name: [facility]  [facility]    XR Abdomen KUB    Result Date: 2/23/2022  Narrative: CR Abdomen 1 Vw INDICATION: Dobbhoff tube placed COMPARISON: None available FINDINGS: AP radiograph(s) of the abdomen. There are 2 radiographs submitted, one timed stamped 2:57 PM and the second time stamp 4:22 PM. On the first  radiograph tip of the Dobbhoff tube is at midline, cannot confirm placement beyond the GE junction. The second radiograph shows the Dobbhoff tube more advanced in the left upper quadrant with the tip points cephalad. It appears to demonstrate a sharp acute angle/kinking just proximal to the hyperdense distal component. The bowel gas pattern is nonobstructive. No acute osseous abnormalities. No radiopaque foreign body.     Impression: There are 2 radiographs submitted, one timed stamped 2:57 PM and the second time stamp 4:22 PM. On the first radiograph tip of the Dobbhoff tube is at midline, cannot confirm placement beyond the GE junction. The second radiograph shows the Dobbhoff tube more advanced in the left upper quadrant with the tip points cephalad. It appears to demonstrate a sharp acute angle/kinking just proximal to the hyperdense distal component. Provided radiographs do not support postpyloric position if desired. The kinking is more than that typically seen and could affect function. Signer Name: CHAPARRITA ROUSE MD  Signed: 2/23/2022 4:43 PM  Workstation Name: [email or web address]  [facility]    XR Abdomen KUB    Result Date: 2/23/2022  Narrative: ABDOMEN, LIMITED, 02/23/2022     HISTORY: Dobbhoff tube placement.  TECHNIQUE: AP radiograph of the upper abdomen.  FINDINGS: No Dobbhoff tube is not within the upper abdomen or lower chest.  Note, previous chest x-ray questioned possible tracheal position of the Dobbhoff tube.      Impression: No visible Dobbhoff tube within the lower chest or abdomen.  This report was finalized on 2/23/2022 12:15 PM by Dr. Obey Linsdey MD.        Allergies:  is allergic to penicillins.    Lalo  Reviewed    Discharge Medications:     Discharge Medications      New Medications      Instructions Start Date   apixaban 5 MG tablet tablet  Commonly known as: ELIQUIS   5 mg, Oral, Every 12 Hours Scheduled      melatonin 5 MG tablet tablet   10 mg, Oral, Nightly  PRN      tamsulosin 0.4 MG capsule 24 hr capsule  Commonly known as: FLOMAX   0.4 mg, Oral, Daily   Start Date: March 15, 2022        Changes to Medications      Instructions Start Date   methylPREDNISolone 4 MG dose pack  Commonly known as: MEDROL  What changed:   · how much to take  · how to take this  · when to take this   4 mg, Oral, Daily, Take as directed on package instructions.      OLANZapine 5 MG tablet  Commonly known as: zyPREXA  What changed:   · when to take this  · reasons to take this   5 mg, Oral, Every 8 Hours PRN         Continue These Medications      Instructions Start Date   atorvastatin 40 MG tablet  Commonly known as: LIPITOR   40 mg, Oral, Daily      benzonatate 200 MG capsule  Commonly known as: TESSALON   200 mg, Oral, 3 Times Daily      COQ10 PO   1 tablet, Oral, Daily, Doesn't know dosage       NON FORMULARY   1 tablet, Oral, Daily, Pt doesn't know the name of his prostate medicine       thiamine 100 MG tablet  tablet  Commonly known as: VITAMIN B-1   100 mg, Oral, Daily      VITAMIN D-3 PO   1 capsule, Oral, Daily, Doesn't know strength          Stop These Medications    dexamethasone 4 MG tablet  Commonly known as: DECADRON            Last Lab Results:   Lab Results (most recent)     Procedure Component Value Units Date/Time    POC Glucose Once [[id number]]  (Abnormal) Collected: 03/14/22 1123    Specimen: Blood Updated: 03/14/22 1134     Glucose 134 mg/dL      Comment: Meter: [id number] : [id number] Jing Angulo RN       POC Glucose Once [[id number]]  (Abnormal) Collected: 03/13/22 1658    Specimen: Blood Updated: 03/13/22 1708     Glucose 194 mg/dL      Comment: Meter: [id number] : [id number] Elinor Benavides RN       Basic Metabolic Panel [[id number]]  (Abnormal) Collected: 03/12/22 0507    Specimen: Blood Updated: 03/12/22 0538     Glucose 132 mg/dL      BUN 22 mg/dL      Creatinine 0.51 mg/dL      Sodium 137 mmol/L      Potassium 3.9 mmol/L      Chloride 100 mmol/L      CO2  29.7 mmol/L      Calcium 9.1 mg/dL      BUN/Creatinine Ratio 43.1     Anion Gap 7.3 mmol/L      eGFR 108.4 mL/min/1.73      Comment: National Kidney Foundation and American Society of Nephrology (ASN) Task Force recommended calculation based on the Chronic Kidney Disease Epidemiology Collaboration (CKD-EPI) equation refit without adjustment for race.       Narrative:      GFR Normal >60  Chronic Kidney Disease <60  Kidney Failure <15      CBC (No Diff) [[id number]]  (Abnormal) Collected: 03/12/22 0507    Specimen: Blood Updated: 03/12/22 0522     WBC 19.53 10*3/mm3      RBC 4.47 10*6/mm3      Hemoglobin 13.3 g/dL      Hematocrit 41.6 %      MCV 93.1 fL      MCH 29.8 pg      MCHC 32.0 g/dL      RDW 15.5 %      RDW-SD 51.1 fl      MPV 10.2 fL      Platelets 348 10*3/mm3     Basic Metabolic Panel [[id number]]  (Abnormal) Collected: 03/09/22 1557    Specimen: Blood Updated: 03/09/22 1623     Glucose 252 mg/dL      BUN 27 mg/dL      Creatinine 0.56 mg/dL      Sodium 135 mmol/L      Potassium 4.8 mmol/L      Chloride 99 mmol/L      CO2 27.0 mmol/L      Calcium 9.4 mg/dL      BUN/Creatinine Ratio 48.2     Anion Gap 9.0 mmol/L      eGFR 105.4 mL/min/1.73      Comment: National Kidney Foundation and American Society of Nephrology (ASN) Task Force recommended calculation based on the Chronic Kidney Disease Epidemiology Collaboration (CKD-EPI) equation refit without adjustment for race.       Narrative:      GFR Normal >60  Chronic Kidney Disease <60  Kidney Failure <15      Magnesium [[id number]]  (Normal) Collected: 03/09/22 1557    Specimen: Blood Updated: 03/09/22 1623     Magnesium 2.1 mg/dL     CBC & Differential [[id number]]  (Abnormal) Collected: 03/09/22 0542    Specimen: Blood from Arm, Right Updated: 03/09/22 0555    Narrative:      The following orders were created for panel order CBC & Differential.  Procedure                               Abnormality         Status                     ---------                                -----------         ------                     CBC Auto Differential[[id number]]        Abnormal            Final result                 Please view results for these tests on the individual orders.    CBC Auto Differential [[id number]]  (Abnormal) Collected: 03/09/22 0542    Specimen: Blood from Arm, Right Updated: 03/09/22 0555     WBC 25.40 10*3/mm3      RBC 3.84 10*6/mm3      Hemoglobin 11.4 g/dL      Hematocrit 35.9 %      MCV 93.5 fL      MCH 29.7 pg      MCHC 31.8 g/dL      RDW 14.4 %      RDW-SD 48.5 fl      MPV 10.2 fL      Platelets 378 10*3/mm3      Neutrophil % 87.2 %      Lymphocyte % 3.5 %      Monocyte % 4.3 %      Eosinophil % 0.0 %      Basophil % 0.2 %      Immature Grans % 4.8 %      Neutrophils, Absolute 22.15 10*3/mm3      Lymphocytes, Absolute 0.90 10*3/mm3      Monocytes, Absolute 1.08 10*3/mm3      Eosinophils, Absolute 0.00 10*3/mm3      Basophils, Absolute 0.04 10*3/mm3      Immature Grans, Absolute 1.23 10*3/mm3      nRBC 0.1 /100 WBC     CBC & Differential [[id number]]  (Abnormal) Collected: 03/08/22 0517    Specimen: Blood from Arm, Right Updated: 03/08/22 0537    Narrative:      The following orders were created for panel order CBC & Differential.  Procedure                               Abnormality         Status                     ---------                               -----------         ------                     CBC Auto Differential[[id number]]        Abnormal            Final result                 Please view results for these tests on the individual orders.    CBC Auto Differential [[id number]]  (Abnormal) Collected: 03/08/22 0517    Specimen: Blood from Arm, Right Updated: 03/08/22 0537     WBC 29.10 10*3/mm3      RBC 3.74 10*6/mm3      Hemoglobin 11.2 g/dL      Hematocrit 34.9 %      MCV 93.3 fL      MCH 29.9 pg      MCHC 32.1 g/dL      RDW 14.4 %      RDW-SD 48.7 fl      MPV 10.2 fL      Platelets 410 10*3/mm3      Neutrophil % 91.0 %      Lymphocyte % 2.5 %       "Monocyte % 3.0 %      Eosinophil % 0.0 %      Basophil % 0.1 %      Immature Grans % 3.4 %      Neutrophils, Absolute 26.44 10*3/mm3      Lymphocytes, Absolute 0.74 10*3/mm3      Monocytes, Absolute 0.88 10*3/mm3      Eosinophils, Absolute 0.00 10*3/mm3      Basophils, Absolute 0.04 10*3/mm3      Immature Grans, Absolute 1.00 10*3/mm3      nRBC 0.0 /100 WBC     Procalcitonin [[id number]]  (Normal) Collected: 03/07/22 0515    Specimen: Blood from Arm, Right Updated: 03/07/22 0623     Procalcitonin 0.06 ng/mL     Narrative:      As a Marker for Sepsis (Non-Neonates):    1. <0.5 ng/mL represents a low risk of severe sepsis and/or septic shock.  2. >2 ng/mL represents a high risk of severe sepsis and/or septic shock.    As a Marker for Lower Respiratory Tract Infections that require antibiotic therapy:    PCT on Admission    Antibiotic Therapy       6-12 Hrs later    >0.5                Strongly Recommended  >0.25 - <0.5        Recommended   0.1 - 0.25          Discouraged              Remeasure/reassess PCT  <0.1                Strongly Discouraged     Remeasure/reassess PCT    As 28 day mortality risk marker: \"Change in Procalcitonin Result\" (>80% or <=80%) if Day 0 (or Day 1) and Day 4 values are available. Refer to http://www.[employer]-pct-calculator.com    Change in PCT <=80%  A decrease of PCT levels below or equal to 80% defines a positive change in PCT test result representing a higher risk for 28-day all-cause mortality of patients diagnosed with severe sepsis for septic shock.    Change in PCT >80%  A decrease of PCT levels of more than 80% defines a negative change in PCT result representing a lower risk for 28-day all-cause mortality of patients diagnosed with severe sepsis or septic shock.       Scan Slide [[id number]] Collected: 03/07/22 0515    Specimen: Blood from Arm, Right Updated: 03/07/22 0559     Scan Slide --     Comment: See Manual Differential Results       Manual Differential [[id number]]  (Abnormal) " Collected: 03/07/22 0515    Specimen: Blood from Arm, Right Updated: 03/07/22 0559     Neutrophil % 95.0 %      Lymphocyte % 4.0 %      Monocyte % 1.0 %      Neutrophils Absolute 32.25 10*3/mm3      Lymphocytes Absolute 1.36 10*3/mm3      Monocytes Absolute 0.34 10*3/mm3      RBC Morphology Normal     WBC Morphology Normal     Platelet Morphology Normal    Comprehensive Metabolic Panel [[id number]]  (Abnormal) Collected: 03/07/22 0515    Specimen: Blood from Arm, Right Updated: 03/07/22 0558     Glucose 165 mg/dL      BUN 23 mg/dL      Creatinine 0.52 mg/dL      Sodium 139 mmol/L      Potassium 4.6 mmol/L      Chloride 104 mmol/L      CO2 30.4 mmol/L      Calcium 9.7 mg/dL      Total Protein 6.1 g/dL      Albumin 3.10 g/dL      ALT (SGPT) 92 U/L      AST (SGOT) 27 U/L      Alkaline Phosphatase 49 U/L      Total Bilirubin 0.3 mg/dL      Globulin 3.0 gm/dL      A/G Ratio 1.0 g/dL      BUN/Creatinine Ratio 44.2     Anion Gap 4.6 mmol/L      eGFR 107.8 mL/min/1.73      Comment: National Kidney Foundation and American Society of Nephrology (ASN) Task Force recommended calculation based on the Chronic Kidney Disease Epidemiology Collaboration (CKD-EPI) equation refit without adjustment for race.       Narrative:      GFR Normal >60  Chronic Kidney Disease <60  Kidney Failure <15      Comprehensive Metabolic Panel [[id number]]  (Abnormal) Collected: 03/06/22 0500    Specimen: Blood from Arm, Left Updated: 03/06/22 0533     Glucose 172 mg/dL      BUN 28 mg/dL      Creatinine 0.46 mg/dL      Sodium 140 mmol/L      Potassium 4.2 mmol/L      Chloride 104 mmol/L      CO2 27.4 mmol/L      Calcium 9.5 mg/dL      Total Protein 6.3 g/dL      Albumin 3.00 g/dL      ALT (SGPT) 84 U/L      AST (SGOT) 29 U/L      Alkaline Phosphatase 47 U/L      Total Bilirubin 0.3 mg/dL      Globulin 3.3 gm/dL      A/G Ratio 0.9 g/dL      BUN/Creatinine Ratio 60.9     Anion Gap 8.6 mmol/L      eGFR 111.8 mL/min/1.73      Comment: National Kidney  "Foundation and American Society of Nephrology (ASN) Task Force recommended calculation based on the Chronic Kidney Disease Epidemiology Collaboration (CKD-EPI) equation refit without adjustment for race.       Narrative:      GFR Normal >60  Chronic Kidney Disease <60  Kidney Failure <15      C-reactive Protein [[id number]]  (Abnormal) Collected: 03/04/22 0530    Specimen: Blood Updated: 03/04/22 1325     C-Reactive Protein 2.23 mg/dL     Magnesium [[id number]]  (Normal) Collected: 03/04/22 0530    Specimen: Blood Updated: 03/04/22 0606     Magnesium 1.9 mg/dL     Procalcitonin [[id number]]  (Normal) Collected: 03/04/22 0530    Specimen: Blood Updated: 03/04/22 0605     Procalcitonin 0.06 ng/mL     Narrative:      As a Marker for Sepsis (Non-Neonates):    1. <0.5 ng/mL represents a low risk of severe sepsis and/or septic shock.  2. >2 ng/mL represents a high risk of severe sepsis and/or septic shock.    As a Marker for Lower Respiratory Tract Infections that require antibiotic therapy:    PCT on Admission    Antibiotic Therapy       6-12 Hrs later    >0.5                Strongly Recommended  >0.25 - <0.5        Recommended   0.1 - 0.25          Discouraged              Remeasure/reassess PCT  <0.1                Strongly Discouraged     Remeasure/reassess PCT    As 28 day mortality risk marker: \"Change in Procalcitonin Result\" (>80% or <=80%) if Day 0 (or Day 1) and Day 4 values are available. Refer to http://[email or web address]pct-calculator.com    Change in PCT <=80%  A decrease of PCT levels below or equal to 80% defines a positive change in PCT test result representing a higher risk for 28-day all-cause mortality of patients diagnosed with severe sepsis for septic shock.    Change in PCT >80%  A decrease of PCT levels of more than 80% defines a negative change in PCT result representing a lower risk for 28-day all-cause mortality of patients diagnosed with severe sepsis or septic shock.       C-reactive Protein [[id number]] "  (Abnormal) Collected: 03/02/22 0430    Specimen: Blood Updated: 03/02/22 1121     C-Reactive Protein 4.58 mg/dL     Blood Gas, Arterial - [[id number]]  (Abnormal) Collected: 02/27/22 2222    Specimen: Arterial Blood Updated: 02/27/22 2225     Site Left Radial     Brad's Test Positive     pH, Arterial 7.435 pH units      pCO2, Arterial 51.5 mm Hg      Comment: 83 Value above reference range        pO2, Arterial 65.5 mm Hg      Comment: 84 Value below reference range        HCO3, Arterial 34.6 mmol/L      Comment: 83 Value above reference range        Base Excess, Arterial 8.8 mmol/L      Comment: 83 Value above reference range        O2 Saturation, Arterial 92.6 %      Comment: 84 Value below reference range        Hemoglobin, Blood Gas 12.1 g/dL      Comment: 84 Value below reference range        Temperature 37.0 C      Barometric Pressure for Blood Gas 744 mmHg      Modality Heated HFNC     FIO2 100 %      Flow Rate 60.0 lpm      Ventilator Mode NA     Collected by [id number]     Comment: Meter: [id number]     :  [id number]        pCO2, Temperature Corrected 51.5 mm Hg      pH, Temp Corrected 7.435 pH Units      pO2, Temperature Corrected 65.5 mm Hg     Respiratory Culture - Sputum, NT Suction [[id number]] Collected: 02/25/22 0907    Specimen: Sputum from NT Suction Updated: 02/27/22 1007     Respiratory Culture Light growth (2+) Normal respiratory shivani. No S. aureus or Pseudomonas aeruginosa detected. Final report.     Gram Stain Moderate (3+) WBCs seen      Moderate (3+) Yeast      No Epithelial cells seen      Moderate (3+) Mixed bacterial morphotypes seen on Gram Stain    Blood Culture - Blood, Arm, Left [[id number]]  (Normal) Collected: 02/21/22 1830    Specimen: Blood from Arm, Left Updated: 02/26/22 2215     Blood Culture No growth at 5 days    Blood Culture - Blood, Arm, Right [[id number]]  (Normal) Collected: 02/21/22 2118    Specimen: Blood from Arm, Right Updated: 02/26/22 2130     Blood Culture  No growth at 5 days    Blood Gas, Arterial - [[id number]]  (Abnormal) Collected: 02/24/22 0655    Specimen: Arterial Blood Updated: 02/25/22 0917     Site Right Radial     Brad's Test Positive     pH, Arterial 7.450 pH units      pCO2, Arterial 43.9 mm Hg      pO2, Arterial 75.4 mm Hg      Comment: 84 Value below reference range        HCO3, Arterial 30.4 mmol/L      Comment: 83 Value above reference range        Base Excess, Arterial 5.8 mmol/L      Comment: 83 Value above reference range        O2 Saturation, Arterial 96.0 %      Hemoglobin, Blood Gas 9.7 g/dL      Comment: 84 Value below reference range        Temperature 37.0 C      Barometric Pressure for Blood Gas 745 mmHg      Modality Ventilator     FIO2 40 %      Ventilator Mode Pressure Support     Comment: Corrected result. Previous result was Volume Support on 2/24/2022 at 0704 EST.        PEEP 5.0     PSV 7.0 cmH2O      Collected by [id number]     Comment: Meter: [id number]     :  [id number]        pCO2, Temperature Corrected 43.9 mm Hg      pH, Temp Corrected 7.450 pH Units      pO2, Temperature Corrected 75.4 mm Hg     Sedimentation Rate [[id number]]  (Abnormal) Collected: 02/25/22 0510    Specimen: Blood Updated: 02/25/22 0536     Sed Rate 50 mm/hr     Vancomycin, Trough [[id number]]  (Normal) Collected: 02/25/22 0324    Specimen: Blood Updated: 02/25/22 0345     Vancomycin Trough 14.10 mcg/mL     Narrative:      Therapeutic Ranges for Vancomycin    Vancomycin Random   5.0-40.0 mcg/mL  Vancomycin Trough   5.0-20.0 mcg/mL  Vancomycin Peak     20.0-40.0 mcg/mL    MRSA Screen Culture (Outpatient) - Swab, Nares [[id number]]  (Normal) Collected: 02/23/22 0634    Specimen: Swab from Nares Updated: 02/24/22 2319     MRSA Screen Cx No Methicillin Resistant Staphylococcus aureus isolated    Reticulocytes [[id number]]  (Abnormal) Collected: 02/24/22 0418    Specimen: Blood Updated: 02/24/22 0953     Reticulocyte % 1.95 %      Reticulocyte Absolute 0.0661  10*6/mm3     D-dimer, Quantitative [[id number]]  (Abnormal) Collected: 02/24/22 0418    Specimen: Blood Updated: 02/24/22 0532     D-Dimer, Quantitative 2.38 MCGFEU/mL     Narrative:      Can be elevated in, but is not diagnostic for deep vein thrombosis (DVT) or pulmonary embolis (PE).  It is also elevated in other medical conditions.  Clinical correlation is required.  The negative cut-off value for the D-Dimer is 0.50 mcg FEU/mL for DVT and PE.      Transferrin [[id number]]  (Abnormal) Collected: 02/23/22 0626    Specimen: Blood Updated: 02/23/22 1652     Transferrin 85 mg/dL     Vancomycin, Trough [[id number]]  (Normal) Collected: 02/23/22 1535    Specimen: Blood Updated: 02/23/22 1604     Vancomycin Trough 8.70 mcg/mL     Narrative:      Therapeutic Ranges for Vancomycin    Vancomycin Random   5.0-40.0 mcg/mL  Vancomycin Trough   5.0-20.0 mcg/mL  Vancomycin Peak     20.0-40.0 mcg/mL    Vancomycin, Peak [[id number]]  (Abnormal) Collected: 02/23/22 0626    Specimen: Blood Updated: 02/23/22 0700     Vancomycin Peak 19.90 mcg/mL     Narrative:      Therapeutic Ranges for Vancomycin    Vancomycin Random   5.0-40.0 mcg/mL  Vancomycin Trough   5.0-20.0 mcg/mL  Vancomycin Peak     20.0-40.0 mcg/mL    CBC (No Diff) [[id number]]  (Abnormal) Collected: 02/23/22 0626    Specimen: Blood Updated: 02/23/22 0636     WBC 12.19 10*3/mm3      RBC 3.39 10*6/mm3      Hemoglobin 10.1 g/dL      Hematocrit 32.4 %      MCV 95.6 fL      MCH 29.8 pg      MCHC 31.2 g/dL      RDW 13.4 %      RDW-SD 47.0 fl      MPV 9.5 fL      Platelets 572 10*3/mm3     Folate [[id number]]  (Normal) Collected: 02/22/22 1048    Specimen: Blood Updated: 02/22/22 1656     Folate 18.10 ng/mL      Comment: Specimen hemolyzed.  Results may be affected.       Narrative:      Results may be falsely increased if patient taking Biotin.      Vitamin B12 [[id number]]  (Normal) Collected: 02/22/22 1048    Specimen: Blood Updated: 02/22/22 1654     Vitamin B-12 810 pg/mL      Narrative:      Results may be falsely increased if patient taking Biotin.      Sedimentation Rate [[id number]]  (Abnormal) Collected: 02/22/22 1440    Specimen: Blood Updated: 02/22/22 1522     Sed Rate 56 mm/hr     D-dimer, Quantitative [[id number]]  (Abnormal) Collected: 02/22/22 1440    Specimen: Blood Updated: 02/22/22 1502     D-Dimer, Quantitative 3.44 MCGFEU/mL     Narrative:      Can be elevated in, but is not diagnostic for deep vein thrombosis (DVT) or pulmonary embolis (PE).  It is also elevated in other medical conditions.  Clinical correlation is required.  The negative cut-off value for the D-Dimer is 0.50 mcg FEU/mL for DVT and PE.      Troponin [[id number]]  (Normal) Collected: 02/22/22 1242    Specimen: Blood from Arm, Right Updated: 02/22/22 1501     Troponin T <0.010 ng/mL     Narrative:      Troponin T Reference Range:  <= 0.03 ng/mL-   Negative for AMI  >0.03 ng/mL-     Abnormal for myocardial necrosis.  Clinicians would have to utilize clinical acumen, EKG, Troponin and serial changes to determine if it is an Acute Myocardial Infarction or myocardial injury due to an underlying chronic condition.       Results may be falsely decreased if patient taking Biotin.      Potassium [[id number]]  (Normal) Collected: 02/22/22 1242    Specimen: Blood from Arm, Right Updated: 02/22/22 1500     Potassium 4.4 mmol/L     Acetaminophen Level [[id number]]  (Normal) Collected: 02/22/22 1242    Specimen: Blood from Arm, Right Updated: 02/22/22 1500     Acetaminophen <5.0 mcg/mL     Salicylate Level [[id number]]  (Normal) Collected: 02/22/22 1242    Specimen: Blood from Arm, Right Updated: 02/22/22 1500     Salicylate <0.3 mg/dL     Potassium [[id number]]  (Normal) Collected: 02/22/22 1048    Specimen: Blood Updated: 02/22/22 1313     Potassium 5.0 mmol/L      Comment: Slight hemolysis detected by analyzer. Results may be affected.       Iron Profile [[id number]]  (Abnormal) Collected: 02/22/22 1048    Specimen: Blood  Updated: 02/22/22 1230     Iron 14 mcg/dL      Iron Saturation 12 %      UIBC 100 mcg/dL      TIBC 114 mcg/dL     Urine Drug Screen - Urine, Clean Catch [[id number]]  (Abnormal) Collected: 02/22/22 0053    Specimen: Urine, Clean Catch Updated: 02/22/22 1142     THC, Screen, Urine Negative     Phencyclidine (PCP), Urine Negative     Cocaine Screen, Urine Negative     Methamphetamine, Ur Negative     Opiate Screen Negative     Amphetamine Screen, Urine Negative     Benzodiazepine Screen, Urine Positive     Tricyclic Antidepressants Screen Negative     Methadone Screen, Urine Negative     Barbiturates Screen, Urine Negative     Oxycodone Screen, Urine Negative     Propoxyphene Screen Negative     Buprenorphine, Screen, Urine Negative    Narrative:      Urine drug screen results are to be used for medical purposes only.  They are not to be used for legal purposes such as employment testing.  Negative results do not necessarily mean the complete absence of a subtance, but rather that the result is less than the cutoff for that substance.  Positive results are unconfirmed and considered Preliminary Positive.  [facility] does not automatically confirm Postitive Unconfirmed results.  The physician may request (order) an Unconfirmed Positive result to be sent out for confirmation.      Negative Thresholds for Drugs Screened:    THC screen, urine                          50 ng/ml  Phenycyclidine (PCP), urine                25 ng/ml  Cocaine screen, urine                     150 ng/ml  Methamphetamine, urine                    500 ng/ml  Opiate screen, urine                      100 ng/ml  Amphetamine screen, urine                 500 ng/ml  Benzodiazepine screen, urine              150 ng/ml  Tricyclic Antidepressants screen, urine   300 ng/ml  Methadone screen, urine                   200 ng/ml  Barbiturates screen, urine                200 ng/ml  Oxycodone screen, urine                   100  ng/ml  Propoxyphene screen, urine                300 ng/ml  Buprenorphine screen, urine                10 ng/ml    Respiratory Panel PCR w/COVID-19(SARS-CoV-2) NICOLE/HEIKE/JAKOB/PAD/COR/MAD/ABEL In-House, NP Swab in UTM/VTM, 3-4 HR TAT - Swab, Nasopharynx [[id number]]  (Abnormal) Collected: 02/22/22 0901    Specimen: Swab from Nasopharynx Updated: 02/22/22 1138     ADENOVIRUS, PCR Not Detected     Coronavirus 229E Not Detected     Coronavirus HKU1 Not Detected     Coronavirus NL63 Not Detected     Coronavirus OC43 Not Detected     COVID19 Detected     Human Metapneumovirus Not Detected     Human Rhinovirus/Enterovirus Not Detected     Influenza A PCR Not Detected     Influenza B PCR Not Detected     Parainfluenza Virus 1 Not Detected     Parainfluenza Virus 2 Not Detected     Parainfluenza Virus 3 Not Detected     Parainfluenza Virus 4 Not Detected     RSV, PCR Not Detected     Bordetella pertussis pcr Not Detected     Bordetella parapertussis PCR Not Detected     Chlamydophila pneumoniae PCR Not Detected     Mycoplasma pneumo by PCR Not Detected    Narrative:      In the setting of a positive respiratory panel with a viral infection PLUS a negative procalcitonin without other underlying concern for bacterial infection, consider observing off antibiotics or discontinuation of antibiotics and continue supportive care. If the respiratory panel is positive for atypical bacterial infection (Bordetella pertussis, Chlamydophila pneumoniae, or Mycoplasma pneumoniae), consider antibiotic de-escalation to target atypical bacterial infection.    Ferritin [[id number]]  (Abnormal) Collected: 02/22/22 1048    Specimen: Blood Updated: 02/22/22 1138     Ferritin 1,110.00 ng/mL     Narrative:      Results may be falsely decreased if patient taking Biotin.      Lactic Acid, Plasma [[id number]]  (Normal) Collected: 02/22/22 0459    Specimen: Blood Updated: 02/22/22 0529     Lactate 1.4 mmol/L     Ethanol [[id number]] Collected: 02/22/22 0140     Specimen: Blood Updated: 02/22/22 0208     Ethanol <10 mg/dL      Ethanol % <0.010 %     Legionella Antigen, Urine - Urine, Urine, Clean Catch [[id number]]  (Normal) Collected: 02/22/22 0035    Specimen: Urine, Clean Catch Updated: 02/22/22 0101     LEGIONELLA ANTIGEN, URINE Negative    S. Pneumo Ag Urine or CSF - Urine, Urine, Clean Catch [[id number]]  (Normal) Collected: 02/22/22 0035    Specimen: Urine, Clean Catch Updated: 02/22/22 0101     Strep Pneumo Ag Negative    BNP [[id number]]  (Normal) Collected: 02/21/22 1833    Specimen: Blood Updated: 02/21/22 1906     proBNP 665.8 pg/mL     Narrative:      Among patients with dyspnea, NT-proBNP is highly sensitive for the detection of acute congestive heart failure. In addition NT-proBNP of <300 pg/ml effectively rules out acute congestive heart failure with 99% negative predictive value.    Results may be falsely decreased if patient taking Biotin.      COVID-19 and FLU A/B PCR - Swab, Nasopharynx [[id number]]  (Normal) Collected: 02/21/22 1833    Specimen: Swab from Nasopharynx Updated: 02/21/22 1903     COVID19 Not Detected     Influenza A PCR Not Detected     Influenza B PCR Not Detected    Narrative:      Fact sheet for providers: https://www.fda.gov/media/984651/download    Fact sheet for patients: https://www.fda.gov/media/846139/download    Test performed by PCR.    Troponin [[id number]]  (Normal) Collected: 02/21/22 1833    Specimen: Blood Updated: 02/21/22 1902     Troponin T <0.010 ng/mL     Narrative:      Troponin T Reference Range:  <= 0.03 ng/mL-   Negative for AMI  >0.03 ng/mL-     Abnormal for myocardial necrosis.  Clinicians would have to utilize clinical acumen, EKG, Troponin and serial changes to determine if it is an Acute Myocardial Infarction or myocardial injury due to an underlying chronic condition.       Results may be falsely decreased if patient taking Biotin.          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     COMPARISON:    3/5/2022    FINDINGS:  Single portable AP view(s) of the chest.    Heart size is stable. Coarse bilateral infiltrates are again identified. There appears slightly more dense overall which may be a function of radiographic technique although slight worsening is not excluded. No pneumothorax is identified.       Impression:      Increased density of bilateral infiltrates may be a function of radiographic technique or may indicate slight worsening. Otherwise stable.    Signer Name: Corbin Duran MD   Signed: 3/9/2022 5:40 AM   Workstation Name: [email or web address]    [facility]    XR Chest 1 View [[id number]] Collected: 03/05/22 1041     Updated: 03/05/22 1044    Narrative:      CR Chest 1 Vw    INDICATION:   Recent COVID-19 infection. Follow-up pneumonia.    COMPARISON:    Chest film 3/1/2022    FINDINGS:  Portable AP view(s) of the chest.    External ECG leads are present.    Stable cardiac and mediastinal aortic contours. There is somewhat coarse interstitial change in both lungs similar to prior study. No acute superimposed airspace change, pleural effusion or pneumothorax.       Impression:      No significant change from 3/1/2022. Coarse bilateral interstitial changes persist without significant change.    Signer Name: Melba Meade MD   Signed: 3/5/2022 10:41 AM   Workstation Name: [id number]    [facility]    XR Chest 1 View [[id number]] Collected: 03/01/22 1328     Updated: 03/01/22 1332    Narrative:      XR CHEST 1 VW-: 3/1/2022 1:08 PM     INDICATION:   Increasing shortness of air today. Requiring more oxygen according to  the nurse.      COMPARISON:    02/25/2022.     FINDINGS:  Single Portable AP view(s) of the chest. An enteric tube is no longer  visible.  Right-sided PICC line has been removed. Stable cardiac  silhouette. Shallow lung expansion with bronchovascular crowding.  Peripheral predominant airspace disease in both lungs remains present  but has partially regressed compared with the prior study. There is no  new consolidation. No pneumothorax or effusion. Thoracic spondylosis.  Oral contrast material present at the hepatic flexure and splenic  flexure.       Impression:         1. Bilateral pneumonia. Imaging findings favor atypical infection  including Covid 19. Partial regression compared to the prior study. No  pneumothorax.     This report was finalized on 3/1/2022 1:30 PM by Dr. Rahgu Huang MD.       FL Video Swallow With Speech Single Contrast [[id number]] Collected: 02/28/22 1346     Updated: 02/28/22 1350    Narrative:      VIDEO SWALLOWING STUDY     HISTORY:  Difficulty swallowing. Status post extubation.     TECHNIQUE:  Video swallowing study was conducted by the speech pathologist in my  presence and recorded on digital video disc.     Fluoroscopy time 1.9 minutes. A single spot image was recorded for  documentation purposes.     FINDINGS:  No laryngeal penetration or aspiration of contrast was seen with any  consistency of barium contrast. There is premature spillage of thin  liquid contrast into the pyriform sinuses. For a full report, please  refer to speech pathology.     This report was finalized on 2/28/2022 1:48 PM by Dr. Corbin Duran MD.       XR Abdomen KUB [[id number]] Collected: 02/25/22 1601     Updated: 02/25/22 1604    Narrative:      XR ABDOMEN KUB-: 2/25/2022 3:54 PM     INDICATION:   Evaluate Dobbhoff tube placement     COMPARISON:   02/23/2022.     FINDINGS:  AP radiographs of the abdomen. The renal shadows are symmetric. No renal  calculi. No bladder calculi.     The bowel gas pattern is nonobstructive. There are postoperative changes  lumbar spine. The tip of the Dobbhoff tube is in the fundus of the  stomach. Bowel gas  pattern is normal.       Impression:      Tip of Dobbhoff tube is in the fundus of the stomach     This report was finalized on 2/25/2022 4:02 PM by Dr. Nahum Lester MD.       XR Chest 1 View [[id number]] Collected: 02/25/22 1535     Updated: 02/25/22 1540    Narrative:      AP PORTABLE CHEST, 02/25/2022     HISTORY:  Dobbhoff tube placement     COMPARISON:  Yesterday     TECHNIQUE:  AP portable chest x-ray.     FINDINGS:  Diffuse bilateral infiltrates are stable. PIC catheter stable. There is  a new Dobbhoff tube. The tip is in the lower half of the esophagus.       Impression:      Tip of the Dobbhoff tube is in the lower half of the esophagus and needs  to be advanced. I discussed these findings with the patient's nurse in  the ICU     This report was finalized on 2/25/2022 3:38 PM by Dr. Nahum Lester MD.       XR Chest 1 View [[id number]] Collected: 02/24/22 1919     Updated: 02/24/22 1921    Narrative:      CR Chest 1 Vw    INDICATION:   Covid positive with increased oxygen need     COMPARISON:    February 23, 2022    FINDINGS:  Portable AP view(s) of the chest.    Interval extubation. Severe bilateral airspace disease. Cardiomediastinal silhouette is obscured. Overall similar appearance of the lung fields as compared to the prior.      Impression:      Extensive multifocal airspace disease. Interval extubation.    Signer Name: CHAPARRITA ROUSE MD   Signed: 2/24/2022 7:19 PM   [email or web address]    [facility]    XR Abdomen KUB [[id number]] Collected: 02/23/22 1937     Updated: 02/23/22 1939    Narrative:      CR Abdomen 1 Vw    INDICATION:   Dobbhoff tube repositioning    COMPARISON:   Earlier in the day finger 20/3/2022    FINDINGS:  AP radiograph(s) of the abdomen.    Dobbhoff tube has been slightly retracted, tip now lies in the expected radiographic location of the stomach. The previously seen kinking has resolved.      Impression:      Dobbhoff tube has been slightly retracted, tip  now lies in the expected radiographic location of the stomach. The previously seen kinking has resolved.    Signer Name: CHAPARRITA ROUSE MD   Signed: 2/23/2022 7:37 PM   Workstation Name: [facility]    Radiology [facility]    XR Abdomen KUB [[id number]] Collected: 02/23/22 1643     Updated: 02/23/22 1645    Narrative:      CR Abdomen 1 Vw    INDICATION:   Dobbhoff tube placed    COMPARISON:   None available    FINDINGS:  AP radiograph(s) of the abdomen. There are 2 radiographs submitted, one timed stamped 2:57 PM and the second time stamp 4:22 PM. On the first radiograph tip of the Dobbhoff tube is at midline, cannot confirm placement beyond the GE junction. The second  radiograph shows the Dobbhoff tube more advanced in the left upper quadrant with the tip points cephalad. It appears to demonstrate a sharp acute angle/kinking just proximal to the hyperdense distal component.    The bowel gas pattern is nonobstructive. No acute osseous abnormalities. No radiopaque foreign body.      Impression:      There are 2 radiographs submitted, one timed stamped 2:57 PM and the second time stamp 4:22 PM. On the first radiograph tip of the Dobbhoff tube is at midline, cannot confirm placement beyond the GE junction. The second radiograph shows the Dobbhoff tube  more advanced in the left upper quadrant with the tip points cephalad. It appears to demonstrate a sharp acute angle/kinking just proximal to the hyperdense distal component. Provided radiographs do not support postpyloric position if desired. The  kinking is more than that typically seen and could affect function.    Signer Name: CHAPARRITA ROUSE MD   Signed: 2/23/2022 4:43 PM   Workstation Name: [facility]    Radiology [facility]    XR Chest 1 View [[id number]] Collected: 02/23/22 1511     Updated: 02/23/22 1515    Narrative:      CHEST X-RAY, 02/23/2022 (14:54)         HISTORY:  Dobbhoff tube replacement.     TECHNIQUE:  AP portable chest  x-ray.     FINDINGS:  The replaced Dobbhoff tube tip is now positioned in the distal thoracic  esophagus about 6 cm above the esophagogastric junction. It may now be  safely fully advanced into the stomach.     The remainder of the examination is negative and unchanged.       Impression:      Dobbhoff tube tip is within the lower thoracic esophagus on stage I of 2  Dobbhoff placement series.     This report was finalized on 2/23/2022 3:13 PM by Dr. Obey Lindsey MD.       XR Chest 1 View [[id number]] Collected: 02/23/22 1320     Updated: 02/23/22 1324    Narrative:      CHEST X-RAY, 02/23/2022 (13:03)         HISTORY:  Assess Dobbhoff tube position.     TECHNIQUE:  AP portable chest x-ray.     FINDINGS:  The Dobbhoff tube is coiled in the hypopharynx. This should be removed  and replaced.     ETT and PICC remain in good position. Chest x-ray unchanged since the  earlier study. No visible pneumomediastinum or pneumothorax.       Impression:      Dobbhoff tube is coiled in the hypopharynx at 1303 on 02/23/2022.     This report was finalized on 2/23/2022 1:21 PM by Dr. Obey Lindsey MD.       XR Abdomen KUB [[id number]] Collected: 02/23/22 1213     Updated: 02/23/22 1217    Narrative:      ABDOMEN, LIMITED, 02/23/2022         HISTORY:  Dobbhoff tube placement.     TECHNIQUE:  AP radiograph of the upper abdomen.     FINDINGS:  No Dobbhoff tube is not within the upper abdomen or lower chest.     Note, previous chest x-ray questioned possible tracheal position of the  Dobbhoff tube.       Impression:      No visible Dobbhoff tube within the lower chest or abdomen.     This report was finalized on 2/23/2022 12:15 PM by Dr. Obey Lindsey MD.       XR Chest 1 View [[id number]] Collected: 02/23/22 1041     Updated: 02/23/22 1045    Narrative:      CHEST X-RAY, 02/23/2022         HISTORY:  Dobbhoff feeding tube placement.     TECHNIQUE:  AP portable chest x-ray (10:20).     COMPARISON:  *  Chest x-ray, 02/23/2022  (05:03).     FINDINGS:  The Dobbhoff tube tip is superimposed over the mid thoracic trachea  alongside the endotracheal tube. Tip is about 3.5 cm above the billie.  This may lie within the trachea or within the adjacent esophagus.     ETT and PICC remain in good position.     Dense multifocal airspace infiltrates are unchanged since earlier today.  No pneumothorax or pneumomediastinum. Low lung volumes.       Impression:      Dobbhoff tube tip is superimposed over the mid thoracic trachea as noted  above.     This report was finalized on 2/23/2022 10:43 AM by Dr. Obey Lindsey MD.       XR Chest 1 View [[id number]] Collected: 02/23/22 0528     Updated: 02/23/22 0530    Narrative:      CR Chest 1 Vw    INDICATION:   Follow-up pneumonia. Covid positive.     COMPARISON:    2/22/2022    FINDINGS:  Single portable AP view(s) of the chest.    Endotracheal tube is in good position in the lower trachea. Right arm approach PICC is at the right atrial level. Heart size is stable. Coarse bilateral pulmonary infiltrates are not significantly changed. No pneumothorax.       Impression:      Tubes and lines as above. No significant change in pulmonary infiltrates. No pneumothorax.    Signer Name: Corbin Duran MD   Signed: 2/23/2022 5:28 AM   Workstation Name: [facility]    [facility]    XR Chest 1 View [[id number]] Collected: 02/22/22 1438     Updated: 02/22/22 1442    Narrative:      CHEST X-RAY, 02/22/2022         HISTORY:  71-year-old male hospital inpatient with acute respiratory failure,  hypoxia.     TECHNIQUE:  AP portable chest x-ray.     COMPARISON:  *  Chest x-ray, 02/21/2022.     FINDINGS:  Endotracheal tube remains in good position with tip in the lower  thoracic trachea about 2.8 cm above the billie.     New right arm PICC in good position with tip in the lower SVC.     Dense diffuse multifocal airspace infiltrates are unchanged. Lung  volumes are low. There is no visible  pneumomediastinum, pneumothorax or  pleural effusion.       Impression:      1.  ETT and PICC in good position.  2.  Dense diffuse multifocal airspace infiltrates are unchanged.  3.  No significant change since yesterday.     This report was finalized on 2/22/2022 2:40 PM by Dr. Obey Lindsey MD.       CT Head Without Contrast [[id number]] Collected: 02/22/22 1408     Updated: 02/22/22 1410    Narrative:      HISTORY:   Mental status change of uncertain cause. Patient has pneumonia on a ventilator with respiratory failure. Covid positive    TECHNIQUE:   CT head without contrast. Radiation dose reduction techniques included automated exposure control or exposure modulation based on body size. Radiation audit for number of CT and nuclear cardiology exams performed in the last year: 5.      COMPARISON:   Head CT from 2/1/2022    FINDINGS:   There is no displaced calvarial fracture. The visualized paranasal sinuses and mastoid air cells are clear. There is no evidence for acute intracranial hemorrhage or extra axial fluid collection. There is mild generalized atrophy. There are vascular  calcifications at the base of the brain. There is mild white matter low-attenuation that is nonspecific but likely due to small vessel disease. No acute cortical infarct is suspected. No intracranial mass affect. Findings not significantly changed.      Impression:      No acute intracranial abnormalities appreciated. Again there is atrophy and probable sequelae of small vessel disease.    Signer Name: Jeanna Epperson MD   Signed: 2/22/2022 2:08 PM   Workstation Name: [id number]    [facility] Venous Doppler Lower Extremity Bilateral (duplex) [[id number]] Collected: 02/22/22 1042     Updated: 02/22/22 1049    Narrative:      VENOUS DOPPLER ULTRASOUND, BILATERAL LOWER EXTREMITIES, COMPLETE,  02/22/2022     HISTORY:   71-year-old male hospital inpatient acute hypoxemic respiratory failure.  Pneumonia. Prior  COVID-19 infection. Elevated d-dimer.     TECHNIQUE:   Venous Doppler ultrasound examination of both legs was performed using  grey-scale, spectral Doppler, and color flow Doppler ultrasound imaging.  Imaged segments included the CFV, FV, proximal DFV, PopV, deep calf  veins and greater saphenous vein.     FINDINGS:   LEFT:  Occlusive DVT in the left lower leg within the popliteal vein at the  knee and within the peroneal veins in the calf. Anterior and posterior  tibial veins remain patent. Above the popliteal vein, there is no left  leg DVT including the CFV, FV and PFV. Note is made of superficial  venous thrombus within the left lesser saphenous vein. Greater saphenous  vein is patent.     RIGHT:  No right leg DVT is seen. Deep veins are patent from the groin to the  lower calf. The greater saphenous vein is also patent. However, lesser  saphenous vein within the calf shows occlusive SVT.          Impression:      1.  Occlusive left leg DVT at and below the knee within the popliteal  and peroneal veins.  2.  No additional DVT is seen on the right or left.  3.  Occlusive SVT within the right and left lesser saphenous vein in  each calf. Greater saphenous veins are patent.     This report was finalized on 2/22/2022 10:47 AM by Dr. Obey Lindsey MD.       XR Chest 1 View [[id number]] Collected: 02/21/22 2201     Updated: 02/21/22 2203    Narrative:      CR Chest 1 Vw    INDICATION:   Postintubation     COMPARISON:    Earlier in the day 2/21/2022    FINDINGS:  Portable AP view(s) of the chest.    Interval intubation with the tip of the endotracheal tube terminating chest above the level the billie but projecting towards the level of the right mainstem bronchus (approximately 1.1 cm above the billie.    Other findings including cardiomegaly and widespread multifocal infiltrates in the lungs are unchanged.      Impression:        Interval intubation with the tip of the endotracheal tube terminating just above the  level the billie but projecting towards the level of the right mainstem bronchus (approximately 1.1 cm above the billie.    Other findings including cardiomegaly and widespread multifocal infiltrates in the lungs are unchanged    Signer Name: CHAPARRITA ROUSE MD   Signed: 2/21/2022 10:01 PM   Workstation Name: [email or web address]    [facility] [place] [facility]    CT Angiogram Chest [[id number]] Collected: 02/21/22 1959     Updated: 02/21/22 2001    Narrative:      CTA Chest    INDICATION:   Worsening shortness of air, cough, elevated d-dimer, Covid diagnosis one month ago    TECHNIQUE:   CT angiogram of the chest with IV contrast. 3-D reconstructions were obtained and reviewed.   Radiation dose reduction techniques included automated exposure control or exposure modulation based on body size. Count of known CT and cardiac nuc med studies  performed in previous 12 months: 3.     COMPARISON:   January 27, 2022    FINDINGS:       Respiratory motion severely degrades evaluation but allowing for this no convincing evidence of pulmonary arterial occlusion to suggest pulmonary embolus. Normal size thoracic aorta. Normal heart size. No pericardial effusion.    Extensive and near diffuse consolidative changes throughout the lungs with trace bilateral pleural effusions. There is superimposed interseptal thickening and groundglass opacities. This is worsened as compared to the exam of January 27, 2022.     No acute upper abdominal abnormality allowing for limited field-of-view and contrast bolus timing. Cyst in the right kidney.    No acute osseous abnormality.      Impression:      1. Severe motion degradation compromises evaluation for pulmonary embolus but no convincing evidence of pulmonary embolus is seen.  2. Marked near diffuse consolidative changes with superimposed septal thickening and groundglass opacities, worse as compared to the exam of January 27, 2022, possibly representing multifocal pneumonia, superimposed edema,  or could be associated with  Covid 19.    Signer Name: CHAPARRITA ROUSE MD   Signed: 2/21/2022 7:59 PM   Workstation Name: [facility]    [facility]    XR Chest 1 View [[id number]] Collected: 02/21/22 1925     Updated: 02/21/22 1927    Narrative:      CR Chest 1 Vw    INDICATION:   Shortness of air     COMPARISON:    January 29, 2022    FINDINGS:  Portable AP view(s) of the chest.  Cardiomediastinal contours are obscured. Marked worsening in multifocal consolidative changes in the lungs, could represent multifocal pneumonia versus pulmonary edema. No pleural effusion. No pneumothorax. No acute  bony abnormality.      Impression:      Marked worsening in multifocal consolidative changes in the lungs, could represent multifocal pneumonia versus pulmonary edema.    Signer Name: CHAPARRITA ROUSE MD   Signed: 2/21/2022 7:25 PM   Workstation Name: [facility]    Radiology [facility]          PROCEDURES      Condition on Discharge:  Stable, Improved.     Physical Exam at Discharge  Vital Signs  Temp:  [97.4 °F (36.3 °C)-97.8 °F (36.6 °C)] 97.8 °F (36.6 °C)  Heart Rate:  [] 106  Resp:  [20-30] 22  BP: (101-126)/(67-86) 117/79    Physical Exam  Please see today's progress note    Discharge Disposition  To home    Visiting Nurse:    Yes    Home PT/OT:  Yes    Home Safety Evaluation:  No    DME  Home oxygen, 6L at rest, 10L with exertion via high flow.     Discharge Diet:      Dietary Orders (From admission, onward)     Start     Ordered    03/14/22 0620  DIET MESSAGE Patient has requested a double order of scrambled eggs with cheese and a side of sausage for breakfast. Thank you  Once        Comments: Patient has requested a double order of scrambled eggs with cheese and a side of sausage for breakfast. Thank you    03/14/22 0620 03/04/22 1800  Dietary Nutrition Supplement: Boost Plus (Ensure Enlive, Ensure Plus)  Daily With Breakfast, Lunch & Dinner      Question:  Select Supplement:   Answer:  Boost Plus (Ensure Enlive, Ensure Plus)    03/04/22 1646    02/28/22 1408  Diet Soft Texture; Chopped; Thin; Small Feedings  Diet Effective Now        Comments: Half size portions and allow snacks in between meals. Small portions to keep pt from fatiguing during meals. No straws.   Question Answer Comment   Diet Texture / Consistency Soft Texture    Select Texture: Chopped    Fluid Consistency Thin    Other Modifiers: Small Feedings        02/28/22 1412                Activity at Discharge:  As tolerated    Pre-discharge education  None      Follow-up Appointments  Future Appointments   Date Time Provider Department Center   3/25/2022 12:00 PM Eleanor Church MD [facility] None     Additional Instructions for the Follow-ups that You Need to Schedule     Discharge Follow-up with PCP   As directed       Currently Documented PCP:    Tiffany Burciaga MD    PCP Phone Number:    [phone]     Follow Up Details: 1 week         Discharge Follow-up with Specialty: Pulmonary; 2 Weeks   As directed      Specialty: Pulmonary    Follow Up: 2 Weeks    Follow Up Details: covid fibrosis, hypoxia               Test Results Pending at Discharge      Discharge over 30 min (if over 30 minutes give explanation as to why it took greater than 30 minutes)  Secondary to:   Coordination of care/follow up  Medication reconciliation  D/W patient      As of April 2021, as required by the Federal 21st Century Cures Act, medical records (including provider notes and laboratory/imaging results) are to be made available to patients and/or their designees as soon as the documents are signed/resulted. While the intention is to ensure transparency and to engage patients in their healthcare, this immediate access may create unintended consequences because this document uses language intended for communication between medical providers for interpretation with the entirety of the patient's clinical picture in mind. It is  recommended that patients and/or their designees review all available information with their primary or specialist providers for explanation and to avoid misinterpretation of this information.

## 2022-03-14 NOTE — PROCEDURES
Exercise Oximetry    Patient Name:Dontae Bonds Jr.   MRN: [id number]   Date: 03/14/22             ROOM AIR BASELINE   SpO2% 84   Heart Rate 110   Blood Pressure      EXERCISE ON ROOM AIR SpO2% EXERCISE ON O2 @ 10  LPM SpO2%   1 MINUTE  1 MINUTE on 6L HF N/C 86   2 MINUTES  2 MINUTES on 10L HF N/C  91   3 MINUTES  3 MINUTES on 10LHF N/C 89   4 MINUTES  4 MINUTES on 10L HF N/C  89   5 MINUTES  5 MINUTES on 10L HF N/C 90   6 MINUTES  6 MINUTES               Distance Walked   Distance Walked 300 '   Dyspnea (Hammad Scale)   Dyspnea (Hammad Scale)    Fatigue (Hammad Scale)   Fatigue (Hammad Scale)   SpO2% Post Exercise   SpO2% Post Exercise 92   HR Post Exercise   HR Post Exercise 117   Time to Recovery   Time to Recovery  2 min titrate from 10 to 6L HF N/C     Comments: Pt's SPO2 84% on room air @ rest. Pt was placed back on his 6L HF n/c x 5 min with     a SPO2 91-92% @ rest. Pt was ambulated with gait belt in place with aid of walker. Pt 's SPO2     dropped to 86% on 6L HF N/C after 1 minute of exercise . Pt 's O2 was increased to  10LHF N/C,       with 6 minutes of rest SPO2 increased to 95%. Pt was ambulated 250' & was able to maintain     SPO2 > 88%. Pt was weaned to 6L HF N/C @ rest & was able to maintain SPO2 94%.    Pt tolerated well.      SPO2 was monitored via forehead probe.

## 2022-03-14 NOTE — PLAN OF CARE
Goal Outcome Evaluation:  Plan of Care Reviewed With: patient        Progress: improving  Outcome Evaluation: Pt had difficulty resting overnight. VSS.  O2 weaned to 5L NC overnight. Overall, no acute issues overnight. continue to monitor.

## 2022-03-14 NOTE — THERAPY DISCHARGE NOTE
Acute Care - Physical Therapy Treatment Note/Discharge   [facility]     Patient Name: Dontae Bonds Jr.  : 1950  MRN: [id number]  Today's Date: 3/14/2022   Onset of Illness/Injury or Date of Surgery: 22     Referring Physician: Dr Matt      Admit Date: 2022    Visit Dx:    ICD-10-CM ICD-9-CM   1. Pneumonia of both lungs due to infectious organism, unspecified part of lung  J18.9 483.8   2. Acute respiratory failure without hypercapnia (HCC)  J96.00 518.81     Patient Active Problem List   Diagnosis   • Personal history of colonic polyps   • Family history of colon cancer   • Pneumonia due to COVID-19 virus   • Pneumonia of both lungs due to infectious organism   • Hypertension   • Hypokalemia     Past Medical History:   Diagnosis Date   • Hyperlipidemia    • Hypertension      History reviewed. No pertinent surgical history.    PT Assessment (last 12 hours)     PT Evaluation and Treatment     Row Name 22          Physical Therapy Time and Intention    Subjective Information complains of;dyspnea  -     Document Type discharge treatment  -     Mode of Treatment physical therapy  -     Patient Effort good  -     Symptoms Noted During/After Treatment shortness of breath  -     Row Name 22          General Information    Patient Observations alert;cooperative;agree to therapy  -     Patient/Family/Caregiver Comments/Observations pt sitting in recliner  -     Existing Precautions/Restrictions oxygen therapy device and L/min  -     Row Name 22          Pain    Pretreatment Pain Rating 0/10 - no pain  -     Posttreatment Pain Rating 0/10 - no pain  -     Row Name 22          Cognition    Personal Safety Interventions gait belt;nonskid shoes/slippers when out of bed  -     Row Name 22          Bed Mobility    Comment, (Bed Mobility) deferred up in chair  -     Row Name 22          Transfers    Transfers sit-stand  transfer;stand-sit transfer  -     Sit-Stand [place] (Transfers) modified independence  -     Stand-Sit [place] (Transfers) modified independence  -     Row Name 03/14/22 0923          Sit-Stand Transfer    Assistive Device (Sit-Stand Transfers) walker, front-wheeled  -JW     Row Name 03/14/22 0923          Stand-Sit Transfer    Assistive Device (Stand-Sit Transfers) walker, front-wheeled  -JW     Row Name 03/14/22 0923          Gait/Stairs (Locomotion)    [place] Level (Gait) modified independence  -     Assistive Device (Gait) walker, front-wheeled  -JW     Distance in Feet (Gait) 160  -JW     Pattern (Gait) swing-through  -JW     Row Name 03/14/22 0923          Plan of Care Review    Outcome Evaluation PT: Patient performs sit to/from stand transfers with modified independence and gait x160 feet with rolling walker, requiring assistance for O2 tank management only.  Patient displays awareness of O2 line during direction changes and displays no balance loss during mobility.  Patient's O2 sats decrease to 84% on 10 liters O2 with activity, recovers to 90% within 30 seconds.  Patient reports no concerns regarding mobility and has consistently performed transfers/mobility without physical assistance from therapist.  At this time, recommend continued ambulation with nursing staff as tolerated, no further skilled PT needs at this time.  Discussed with patient who is in agreement with plan.  Recommend home health PT at discharge when patient returns home.  -     Row Name 03/14/22 0923          Positioning and Restraints    Pre-Treatment Position sitting in chair/recliner  -     Post Treatment Position chair  -JW     In Chair notified nsg;call light within reach;encouraged to call for assist  -     Row Name 03/14/22 0923          Progress Summary (PT)    Progress Toward Functional Goals (PT) progress toward functional goals is good;prepare for discharge  -           User Key  (r) = Recorded  By, (t) = Taken By, (c) = Cosigned By    Initials Name Provider Type    Tami Kitchen, PT Physical Therapist                  Physical Therapy Education                 Title: PT OT SLP Therapies (Resolved)     Topic: Physical Therapy (Resolved)     Point: Mobility training (Resolved)     Learning Progress Summary           Patient Acceptance, E,TB, VU by  at 3/14/2022 1232    Acceptance, E,TB,D, VU by  at 3/13/2022 1141    Acceptance, E, VU,NR by EA at 3/12/2022 1049    Acceptance, E,TB, VU by  at 3/11/2022 1016    Acceptance, E,TB, VU by  at 3/10/2022 1503    Acceptance, E,TB, VU by  at 3/9/2022 1020    Acceptance, E,TB, VU by  at 3/8/2022 1003    Acceptance, E,TB, VU by  at 3/7/2022 0919                   Point: Home exercise program (Resolved)     Learning Progress Summary           Patient Acceptance, E,TB,D, VU by VIVIAN at 3/13/2022 1141    Acceptance, E, VU,NR by  at 3/12/2022 1049                               User Key     Initials Effective Dates Name Provider Type Discipline     06/16/21 -  Tami Hodgson, PT Physical Therapist PT    VIVIAN 06/20/21 -  Kiara Nielson PTA Physical Therapy Assistant PT                PT Recommendation and Plan  Anticipated Discharge Disposition (PT): home with home health  Planned Therapy Interventions (PT): balance training, bed mobility training, gait training, home exercise program, strengthening, transfer training, patient/family education  Therapy Frequency (PT): daily  Progress Summary (PT)  Progress Toward Functional Goals (PT): progress toward functional goals is good, prepare for discharge  Barriers to Overall Progress (PT): respiratory status  Outcome Evaluation: PT: Patient performs sit to/from stand transfers with modified independence and gait x160 feet with rolling walker, requiring assistance for O2 tank management only.  Patient displays awareness of O2 line during direction changes and displays no balance loss during mobility.  Patient's O2  sats decrease to 84% on 10 liters O2 with activity, recovers to 90% within 30 seconds.  Patient reports no concerns regarding mobility and has consistently performed transfers/mobility without physical assistance from therapist.  At this time, recommend continued ambulation with nursing staff as tolerated, no further skilled PT needs at this time.  Discussed with patient who is in agreement with plan.  Recommend home health PT at discharge when patient returns home.     Outcome Measures     Row Name 03/14/22 0923 03/12/22 0846          How much help from another person do you currently need...    Turning from your back to your side while in flat bed without using bedrails? 4  -JW 3  -EA     Moving from lying on back to sitting on the side of a flat bed without bedrails? 4  -JW 3  -EA     Moving to and from a bed to a chair (including a wheelchair)? 4  -JW 3  -EA     Standing up from a chair using your arms (e.g., wheelchair, bedside chair)? 4  -JW 3  -EA     Climbing 3-5 steps with a railing? 3  -JW 2  -EA     To walk in hospital room? 3  -JW 3  -EA     AM-PAC 6 Clicks Score (PT) 22  - 17  -EA            Functional Assessment    Outcome Measure Options AM-PAC 6 Clicks Basic Mobility (PT)  -JW AM-PAC 6 Clicks Basic Mobility (PT)  -EA           User Key  (r) = Recorded By, (t) = Taken By, (c) = Cosigned By    Initials Name Provider Type    Tami Kitchen, PT Physical Therapist    Kiara Kelly, PTA Physical Therapy Assistant                 Time Calculation:    PT Charges     Row Name 03/14/22 1234             Time Calculation    Start Time 0923  -      Stop Time 0935  -      Time Calculation (min) 12 min  -      PT Received On 03/14/22  -              Timed Charges    44054 - Gait Training Minutes  12  -              Total Minutes    Timed Charges Total Minutes 12  -       Total Minutes 12  -            User Key  (r) = Recorded By, (t) = Taken By, (c) = Cosigned By    Initials Name Provider  Type    JW Tami Hodgson, PT Physical Therapist              Therapy Charges for Today     Code Description Service Date Service Provider Modifiers Qty    50230848147 HC GAIT TRAINING EA 15 MIN 3/14/2022 Tami Hodgson, PT GP 1          PT G-Codes  Outcome Measure Options: AM-PAC 6 Clicks Basic Mobility (PT)  AM-PAC 6 Clicks Score (PT): 22    PT Discharge Summary  Anticipated Discharge Disposition (PT): home with home health    Tami Hodgson, MARY  3/14/2022

## 2022-03-14 NOTE — PROGRESS NOTES
Adult Nutrition  Assessment/PES    Patient Name:  Dontae Bonds Jr.  YOB: 1950  MRN: [id number]  Admit Date:  2/21/2022    Assessment Date:  3/14/2022    Comments:  Encourage cont good po intake and use oral supplement in between meals.  Will cont to follow.      Reason for Assessment     Row Name 03/14/22 1323          Reason for Assessment    Reason For Assessment follow-up protocol     Diagnosis pulmonary disease  AHRF post covid fibrosis                Nutrition/Diet History     Row Name 03/14/22 1323          Nutrition/Diet History    Typical Intake (Food/Fluid/EN/PN) Spoke w pt up in chair, dressed, glad for home. Reports his smaller food lower CHO he requesting is going well. Pt doesn't hear that his wt only changed 4# overall during admit. Feels like he is close to his baseline. Denies any needs                  Labs/Tests/Procedures/Meds     Row Name 03/14/22 1325          Labs/Procedures/Meds    Lab Results Reviewed reviewed     Lab Results Comments glu 194            Diagnostic Tests/Procedures    Diagnostic Test/Procedure Reviewed reviewed            Medications    Pertinent Medications Reviewed reviewed     Pertinent Medications Comments prednisone, miralax, novolog                    Nutrition Prescription Ordered     Row Name 03/14/22 1325          Nutrition Prescription PO    Current PO Diet Soft Texture  small feedings, prevent fatigue     Texture Chopped     Supplement Boost Plus (Ensure Enlive, Ensure Plus)     Supplement Frequency 3 times a day                Evaluation of Received Nutrient/Fluid Intake     Row Name 03/14/22 1326          Nutrient/Fluid Evaluation    Number of Days Evaluated 3 days  [address] ave x 3 days, 68% needs            PO Evaluation    Number of Meals 9     % PO Intake 86                     Problem/Interventions:     Problem 2     Row Name 03/14/22 1328          Nutrition Diagnoses Problem 2    Problem 2 Swallowing Difficulty     Etiology (related to) Medical  Diagnosis     Signs/Symptoms (evidenced by) Report/Observation                    Intervention Goal     Row Name 03/14/22 1328          Intervention Goal    General Maintain nutrition     PO Maintain intake     PO Intake % 75 %  or greater                Nutrition Intervention     Row Name 03/14/22 1328          Nutrition Intervention    RD/Tech Action Follow Tx progress                  Education/Evaluation     Row Name 03/14/22 1328          Education    Education Other (comment)  encouraged pt to cont small amounts more often to prevent fatigue at home r/t resp status, could cont to use oral supplement in between meals            Monitor/Evaluation    Monitor Per protocol;I&O;PO intake;Supplement intake;Pertinent labs;Weight;Symptoms     Education Follow-up Other (comment)  pt verbalized understanding                 Electronically signed by:  Silke Mcconnell RD  03/14/22 13:29 EDT

## 2022-03-14 NOTE — PROGRESS NOTES
"SERVICE: [facility] HOSPITALIST    CONSULTANTS: Pulmonary    CHIEF COMPLAINT: Shortness of breath    SUBJECTIVE: Patient seen and examined at bedside. Patient reports he continues to quickly become short of breath with any type of exertion including talking.  He is otherwise without acute complaint . patient denies headache, fever or chills, nausea, vomiting, diarrhea, abdominal pain, chest pain or palpitations,  Wheezing, leg pain or weakness.     OBJECTIVE:    Physical exam is largely unchanged from previous exam, except where documented below, examination is accurate as of 3/14/2022    Physical Exam:  General: Patient is awake and alert. Well developed and well nourished. No acute distress noted.   HENT: Head is atraumatic, normocephalic. Hearing is grossly intact. Nose is without obvious congestion and appears patent. Neck is supple and trachea is midline.   Eyes: Vision is grossly intact. Pupils appear equal and round.   Cardiovascular: Heart has regular rate and rhythm with no murmurs, rubs or gallops noted.   Respiratory: Coarse breath sounds diffusely with noted tachypnea but otherwise no wheezing rhonchi or rales  Abdominal/GI: Soft, nontender, bowel sounds present. No rebound or guarding present.   Extremities: No peripheral edema noted.   Musculoskeletal: Spontaneous movement of bilateral upper and lower extremities against gravity noted. No signs of injury or deformity noted.   Skin: Warm and dry.   Psych: Mood and affect are appropriate. Cooperative with exam.   Neuro: No facial asymmetry noted. No focal deficits noted, hearing and vision are grossly intact.     /67 (BP Location: Left arm, Patient Position: Lying)   Pulse 78   Temp 97.6 °F (36.4 °C) (Axillary)   Resp 24   Ht 172 cm (67.72\")   Wt 75 kg (165 lb 7 oz)   SpO2 92%   BMI 25.37 kg/m²     MEDS/LABS REVIEWED AND ORDERED    apixaban, 5 mg, Oral, Q12H  atorvastatin, 40 mg, Oral, Nightly  docusate sodium, 100 mg, " Oral, BID  ferrous sulfate, 324 mg, Oral, Daily With Breakfast  insulin aspart, 0-9 Units, Subcutaneous, TID AC  polyethylene glycol, 17 g, Oral, Daily  predniSONE, 20 mg, Oral, Daily With Breakfast  tamsulosin, 0.4 mg, Oral, Daily          LAB/DIAGNOSTICS:    Lab Results (last 24 hours)     Procedure Component Value Units Date/Time    POC Glucose Once [[id number]]  (Abnormal) Collected: 03/13/22 1658    Specimen: Blood Updated: 03/13/22 1708     Glucose 194 mg/dL      Comment: Meter: [id number] : [id number] Elinor Benavides RN       POC Glucose Once [[id number]]  (Abnormal) Collected: 03/13/22 1136    Specimen: Blood Updated: 03/13/22 1144     Glucose 159 mg/dL      Comment: Meter: [id number] : [id number] Elinor Benavides RN       POC Glucose Once [[id number]]  (Normal) Collected: 03/13/22 0738    Specimen: Blood Updated: 03/13/22 0744     Glucose 117 mg/dL      Comment: Meter: [id number] : [id number] Elinor Benavides RN           ECG 12 Lead   Final Result   HEART RATE= 100  bpm   RR Interval= 608  ms   MT Interval= 222  ms   P Horizontal Axis= -73  deg   P Front Axis= 240  deg   QRSD Interval= 109  ms   QT Interval= 352  ms   QRS Axis= -88  deg   T Wave Axis= -8  deg   - ABNORMAL ECG -   Sinus rhythm   Atrial premature complex   Prolonged MT interval   Inferolateral infarct, old   PACs and prolonged MT interval are new   Electronically Signed By: Adelaida Huddleston ([facility]) 10-Mar-2022 18:27:05   Date and Time of Study: 2022-03-09 15:23:57      ECG 12 Lead   Final Result   HEART RATE= 52  bpm   RR Interval= 1148  ms   MT Interval= 150  ms   P Horizontal Axis= -4  deg   P Front Axis= 47  deg   QRSD Interval= 101  ms   QT Interval= 468  ms   QRS Axis= -28  deg   T Wave Axis= 4  deg   - OTHERWISE NORMAL ECG -   Sinus rhythm - rate has slowed   Borderline left axis deviation   Electronically Signed By: Chapito Nielson ([facility]) 23-Feb-2022 08:47:10   Date and Time of Study: 2022-02-22 14:53:00      ECG 12 Lead   Final Result   HEART RATE=  120  bpm   RR Interval= 500  ms   MS Interval= 129  ms   P Horizontal Axis= 21  deg   P Front Axis= 31  deg   QRSD Interval= 105  ms   QT Interval= 334  ms   QRS Axis= -31  deg   T Wave Axis= -3  deg   - ABNORMAL ECG -   Sinus tachycardia   Inferior infarct, old   NO SIGNIFICANT CHANGE FROM PREVIOUS ECG   Electronically Signed By: Adelaida Huddleston ([facility]) 21-Feb-2022 19:02:23   Date and Time of Study: 2022-02-21 18:27:17        Results for orders placed during the hospital encounter of 02/21/22    Adult Transthoracic Echo Complete w/ Color, Spectral and Contrast if Necessary Per Protocol    Interpretation Summary  · Estimated left ventricular EF = 60% Left ventricular systolic function is normal.  · Left ventricular diastolic function was normal.  · Mild aortic valve stenosis is present.  · Peak velocity of the flow distal to the aortic valve is 288.1 cm/s. Aortic valve maximum pressure gradient is 33.2 mmHg. Aortic valve mean pressure gradient is 18.4 mmHg. Aortic valve dimensionless index is 0.4 .  · Estimated right ventricular systolic pressure from tricuspid regurgitation is mildly elevated (35-45 mmHg). Calculated right ventricular systolic pressure from tricuspid regurgitation is 37 mmHg.    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   -He has had very high oxygen requirements with exertion, will continue to work on weaning this requirement.   -We will continue to work on weaning oral steroids.  I have decreased him to 20 mg today.   -Pulmonary  "is following.  Discussed with them that we were performed walking oximetry testing today, patient will likely require 2 oxygen concentrators to allow for mimicking of high flow nasal cannula on exertion.  Patient is agreeable to this plan.     Leukocytosis  -Appears steroid-induced as patient has no obvious signs of infection, and he has completed his antibiotic course.  He remains afebrile.   -White count has been trending down prior to discontinuation of daily labs.     Acute left lower extremity DVT  -He continues on Eliquis without issue    Steroid-induced hyperglycemia  -Blood glucose has improved, he remains on sliding scale insulin with Accu-Cheks    Dyslipidemia-on home Lipitor without acute issues    Acute metabolic encephalopathy secondary to infection and suspected acute alcohol withdrawal-improved  -Neurology has signed off although he does have periodic confusion  -We will continue as needed Zyprexa      PLAN FOR DISPOSITION: Hoping for discharge home with improvement in exertional oxygen requirement.     Osvaldo Gibson DO  Hospitalist, [facility]  03/14/22  06:44 EDT    As of April 2021, as required by the Federal 21st Century Cures Act, medical records (including provider notes and laboratory/imaging results) are to be made available to patients and/or their designees as soon as the documents are signed/resulted. While the intention is to ensure transparency and to engage patients in their healthcare, this immediate access may create unintended consequences because this document uses language intended for communication between medical providers for interpretation with the entirety of the patient's clinical picture in mind. It is recommended that patients and/or their designees review all available information with their primary or specialist providers for explanation and to avoid misinterpretation of this information.    \"Dictated utilizing Dragon dictation\"    "

## 2022-03-15 NOTE — CASE MANAGEMENT/SOCIAL WORK
Case Management Discharge Note      Final Note: Discharged home with [facility]    Provided Post Acute Provider List?: Refused  Refused Provider List Comment: offered community resources but patient declines the need for them at this time.    Selected Continued Care - Discharged on 3/14/2022 Admission date: 2/21/2022 - Discharge disposition: Home or Self Care    Destination    No services have been selected for the patient.              Durable Medical Equipment Coordination complete.    Service Provider Selected Services Address Phone Fax Patient Preferred    [facility]  Durable Medical Equipment [address], [place] [address] [phone] --          Dialysis/Infusion    No services have been selected for the patient.              Home Medical Care Coordination complete.    Service Provider Selected Services Address Phone Fax Patient Preferred    [facility]  [facility] [address], [place] [id number] [phone] --          Therapy    No services have been selected for the patient.              Community Resources    No services have been selected for the patient.              Community & DME    No services have been selected for the patient.                Selected Continued Care - Prior Encounters Includes selections from prior encounters from 11/23/2021 to 3/14/2022    Discharged on 2/2/2022 Admission date: 1/21/2022 - [place] [facility] [place]     Service Provider Selected Services Address Phone Fax Patient Preferred    [facility]  [facility] [address], [place] [id number] [phone] --                         Final Discharge Disposition Code: 06 - home with home health care

## 2022-03-15 NOTE — OUTREACH NOTE
Prep Survey    Flowsheet Row Responses   [facility] facility patient discharged from? [place]   Is LACE score < 7 ? No   Emergency Room discharge w/ pulse ox? No   Eligibility Readm Mgmt   Discharge diagnosis acute hypoxic resp failure d/t bacterial/aspiration PNA, COVID-19 fibrosis-improving (dx 1/21/22), Acute LLE DVT   Does the patient have one of the following disease processes/diagnoses(primary or secondary)? COPD/Pneumonia   Does the patient have Home health ordered? Yes   What is the Home health agency?  [facility]   Is there a DME ordered? Yes   What DME was ordered? high flow O2 from [facility]   Prep survey completed? Yes          KATHRYN PUCKETT - Registered Nurse

## 2022-03-17 ENCOUNTER — READMISSION MANAGEMENT (OUTPATIENT)
Dept: CALL CENTER | Facility: HOSPITAL | Age: 72
End: 2022-03-17

## 2022-03-17 NOTE — OUTREACH NOTE
COPD/PN Week 1 Survey    Flowsheet Row Responses   [facility] facility patient discharged from? [place]   Does the patient have one of the following disease processes/diagnoses(primary or secondary)? COPD/Pneumonia   Was the primary reason for admission: Pneumonia   Week 1 attempt successful? No   Unsuccessful attempts Attempt 1          CHRIS BURTON - Registered Nurse

## 2022-03-18 ENCOUNTER — TELEPHONE (OUTPATIENT)
Dept: GASTROENTEROLOGY | Facility: CLINIC | Age: 72
End: 2022-03-18

## 2022-03-18 NOTE — TELEPHONE ENCOUNTER
PATIENT CALLED TO CANCEL HIS COLONOSCOPY ON 04/29/2022. WAS DISCHARGED FROM [facility] 4 DAYS AGO.  WAS IN ICU FOR WEEKS.  WILL BE ON OXYGEN THE REST OF MY LIFE.  WILL CALL BACK AT LATER DATE TO RESCHEDULE.  JUST CANCEL FOR NOW.

## 2022-03-22 ENCOUNTER — READMISSION MANAGEMENT (OUTPATIENT)
Dept: CALL CENTER | Facility: HOSPITAL | Age: 72
End: 2022-03-22

## 2022-03-22 NOTE — OUTREACH NOTE
COPD/PN Week 1 Survey    Flowsheet Row Responses   [facility] patient discharged from? [place]   Does the patient have one of the following disease processes/diagnoses(primary or secondary)? COPD/Pneumonia   Was the primary reason for admission: Pneumonia   Week 1 attempt successful? Yes   Call start time 0939   Call end time 0946   Meds reviewed with patient/caregiver? Yes   Is the patient having any side effects they believe may be caused by any medication additions or changes? No   Does the patient have all medications ordered at discharge? Yes   Is the patient taking all medications as directed (includes completed medication regime)? Yes   Does the patient have a primary care provider?  Yes   Does the patient have an appointment with their PCP or specialist within 7 days of discharge? Yes  [MD house call scheduled for 3/22/22]   Has the patient kept scheduled appointments due by today? N/A   What is the Home health agency?  [facility]   Home health comments  PT, OT visits   What DME was ordered? high flow O2 from [employer]   Has all DME been delivered? Yes   Pulse Ox monitoring Intermittent   Pulse Ox device source Patient   O2 Sat comments 93-95% on 6L O2, occasionally increases to 10L if needed   O2 Sat: education provided Sat levels, Monitoring frequency, When to seek care   O2 Sat education comments will go to ED for O2 sats <90%   Psychosocial issues? No   Did the patient receive a copy of their discharge instructions? Yes   Nursing interventions Reviewed instructions with patient   What is the patient's perception of their health status since discharge? Improving   Nursing Interventions Nurse provided patient education   Are the patient's immunizations up to date?  No  [does not get flu shots]   If the patient is a current smoker, are they able to teach back resources for cessation? Not a smoker   Is the patient/caregiver able to teach back the hierarchy of who to call/visit for symptoms/problems?  PCP, Specialist, Home health nurse, Urgent Care, ED, 911 Yes   Is the patient/caregiver able to teach back signs and symptoms of worsening condition: Fever/chills, Shortness of breath, Chest pain   Is the patient/caregiver able to teach back importance of completing antibiotic course of treatment? Yes  [antibiotics finished in hospital]   Week 1 call completed? Yes          MINERVA PUCKETT - Registered Nurse

## 2022-03-25 ENCOUNTER — APPOINTMENT (OUTPATIENT)
Dept: SLEEP MEDICINE | Facility: HOSPITAL | Age: 72
End: 2022-03-25

## 2022-03-30 ENCOUNTER — READMISSION MANAGEMENT (OUTPATIENT)
Dept: CALL CENTER | Facility: HOSPITAL | Age: 72
End: 2022-03-30

## 2022-03-30 NOTE — OUTREACH NOTE
COPD/PN Week 2 Survey    Flowsheet Row Responses   [facility] [place] patient discharged from? [place]   Does the patient have one of the following disease processes/diagnoses(primary or secondary)? COPD/Pneumonia   Was the primary reason for admission: Pneumonia   Week 2 attempt successful? 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 Yes   List who call center can speak with Marie Spouse    Person spoke with today (if not patient) and relationship Araseli Spouse    Meds reviewed with patient/caregiver? Yes   Is the patient taking all medications as directed (includes completed medication regime)? Yes   Does the patient have a primary care provider?  Yes   Has the patient kept scheduled appointments due by today? Yes  [PCP made a house visit last week ]   What is the Home health agency?  [facility]   Home health comments  PT, OT visits   What DME was ordered? high flow O2 from [employer]   Has all DME been delivered? Yes   DME comments 4.5 L O2 at all times per spouse    Pulse Ox monitoring Intermittent   Pulse Ox device source Patient   O2 Sat comments upper 90's    Psychosocial issues? No   What is the patient's perception of their health status since discharge? Improving   Is the patient able to teach back COPD zones? Yes   Patient reports what zone on this call? Green Zone   Green Zone Reports doing well   Green Zone interventions: Take daily medications, Do not smoke, Use oxygen as prescribed   Week 2 call completed? Yes          MICHAEL SHOEMAKER - Registered Nurse

## 2022-04-29 ENCOUNTER — TRANSCRIBE ORDERS (OUTPATIENT)
Dept: ADMINISTRATIVE | Facility: HOSPITAL | Age: 72
End: 2022-04-29

## 2022-04-29 DIAGNOSIS — J84.9 ILD (INTERSTITIAL LUNG DISEASE): Primary | ICD-10-CM

## 2022-06-10 ENCOUNTER — TELEPHONE (OUTPATIENT)
Dept: GASTROENTEROLOGY | Facility: CLINIC | Age: 72
End: 2022-06-10

## 2022-06-10 NOTE — TELEPHONE ENCOUNTER
Patient called to reschedule his colonoscopy that was cancelled 04/29/2022 due to COVID.    Scheduled at [place] 10/26/2022 at 1:45pm - arrive 12:30pm.  Will mail instructions.

## 2022-06-20 ENCOUNTER — TELEPHONE (OUTPATIENT)
Dept: GASTROENTEROLOGY | Facility: CLINIC | Age: 72
End: 2022-06-20

## 2022-08-31 NOTE — TELEPHONE ENCOUNTER
Called to notify no longer taking Eliquis.  Was only to be on it for 6 months and stop end 08/2022.  I have already stopped it.

## 2022-10-11 ENCOUNTER — HOSPITAL ENCOUNTER (OUTPATIENT)
Dept: CT IMAGING | Facility: HOSPITAL | Age: 72
Discharge: HOME | End: 2022-10-11
Admitting: INTERNAL MEDICINE

## 2022-10-11 DIAGNOSIS — J84.9 ILD (INTERSTITIAL LUNG DISEASE): ICD-10-CM

## 2022-10-11 PROCEDURE — 71250 CT THORAX DX C-: CPT

## 2022-10-19 ENCOUNTER — TRANSCRIBE ORDERS (OUTPATIENT)
Dept: ADMINISTRATIVE | Facility: HOSPITAL | Age: 72
End: 2022-10-19

## 2022-10-19 DIAGNOSIS — R91.1 PULMONARY NODULE: ICD-10-CM

## 2022-10-19 DIAGNOSIS — I27.20 PULMONARY HTN: Primary | ICD-10-CM

## 2022-10-25 ENCOUNTER — ANESTHESIA EVENT (OUTPATIENT)
Dept: PERIOP | Facility: HOSPITAL | Age: 72
End: 2022-10-25

## 2022-10-25 RX ORDER — LISINOPRIL 10 MG/1
10 TABLET ORAL DAILY
COMMUNITY

## 2022-10-25 RX ORDER — MULTIPLE VITAMINS W/ MINERALS TAB 9MG-400MCG
1 TAB ORAL DAILY
COMMUNITY

## 2022-10-25 RX ORDER — ASPIRIN 325 MG
325 TABLET ORAL DAILY
COMMUNITY

## 2022-10-26 ENCOUNTER — ANESTHESIA (OUTPATIENT)
Dept: PERIOP | Facility: HOSPITAL | Age: 72
End: 2022-10-26

## 2022-10-26 ENCOUNTER — HOSPITAL ENCOUNTER (OUTPATIENT)
Facility: HOSPITAL | Age: 72
Setting detail: HOSPITAL OUTPATIENT SURGERY
Discharge: HOME | End: 2022-10-26
Attending: INTERNAL MEDICINE | Admitting: INTERNAL MEDICINE

## 2022-10-26 VITALS
TEMPERATURE: 98 F | HEART RATE: 71 BPM | DIASTOLIC BLOOD PRESSURE: 82 MMHG | SYSTOLIC BLOOD PRESSURE: 113 MMHG | WEIGHT: 179.8 LBS | RESPIRATION RATE: 12 BRPM | BODY MASS INDEX: 27.57 KG/M2 | OXYGEN SATURATION: 98 %

## 2022-10-26 PROCEDURE — G0105 COLORECTAL SCRN; HI RISK IND: HCPCS | Performed by: INTERNAL MEDICINE

## 2022-10-26 PROCEDURE — 25010000002 PROPOFOL 200 MG/20ML EMULSION: Performed by: NURSE ANESTHETIST, CERTIFIED REGISTERED

## 2022-10-26 RX ORDER — LIDOCAINE HYDROCHLORIDE 10 MG/ML
0.5 INJECTION, SOLUTION EPIDURAL; INFILTRATION; INTRACAUDAL; PERINEURAL ONCE AS NEEDED
Status: DISCONTINUED | OUTPATIENT
Start: 2022-10-26 | End: 2022-10-26 | Stop reason: HOSPADM

## 2022-10-26 RX ORDER — PROPOFOL 10 MG/ML
INJECTION, EMULSION INTRAVENOUS AS NEEDED
Status: DISCONTINUED | OUTPATIENT
Start: 2022-10-26 | End: 2022-10-26 | Stop reason: SURG

## 2022-10-26 RX ORDER — SODIUM CHLORIDE 0.9 % (FLUSH) 0.9 %
10 SYRINGE (ML) INJECTION AS NEEDED
Status: DISCONTINUED | OUTPATIENT
Start: 2022-10-26 | End: 2022-10-26 | Stop reason: HOSPADM

## 2022-10-26 RX ORDER — SODIUM CHLORIDE 9 MG/ML
40 INJECTION, SOLUTION INTRAVENOUS AS NEEDED
Status: DISCONTINUED | OUTPATIENT
Start: 2022-10-26 | End: 2022-10-26 | Stop reason: HOSPADM

## 2022-10-26 RX ORDER — LIDOCAINE HYDROCHLORIDE 10 MG/ML
INJECTION, SOLUTION EPIDURAL; INFILTRATION; INTRACAUDAL; PERINEURAL AS NEEDED
Status: DISCONTINUED | OUTPATIENT
Start: 2022-10-26 | End: 2022-10-26 | Stop reason: SURG

## 2022-10-26 RX ORDER — SODIUM CHLORIDE 0.9 % (FLUSH) 0.9 %
10 SYRINGE (ML) INJECTION EVERY 12 HOURS SCHEDULED
Status: DISCONTINUED | OUTPATIENT
Start: 2022-10-26 | End: 2022-10-26 | Stop reason: HOSPADM

## 2022-10-26 RX ORDER — SODIUM CHLORIDE, SODIUM LACTATE, POTASSIUM CHLORIDE, CALCIUM CHLORIDE 600; 310; 30; 20 MG/100ML; MG/100ML; MG/100ML; MG/100ML
9 INJECTION, SOLUTION INTRAVENOUS CONTINUOUS
Status: DISCONTINUED | OUTPATIENT
Start: 2022-10-26 | End: 2022-10-26 | Stop reason: HOSPADM

## 2022-10-26 RX ADMIN — PROPOFOL 50 MG: 10 INJECTION, EMULSION INTRAVENOUS at 15:31

## 2022-10-26 RX ADMIN — SODIUM CHLORIDE, POTASSIUM CHLORIDE, SODIUM LACTATE AND CALCIUM CHLORIDE 9 ML/HR: 600; 310; 30; 20 INJECTION, SOLUTION INTRAVENOUS at 13:07

## 2022-10-26 RX ADMIN — PROPOFOL 50 MG: 10 INJECTION, EMULSION INTRAVENOUS at 15:13

## 2022-10-26 RX ADMIN — PROPOFOL 100 MG: 10 INJECTION, EMULSION INTRAVENOUS at 15:02

## 2022-10-26 RX ADMIN — LIDOCAINE HYDROCHLORIDE 100 MG: 10 INJECTION, SOLUTION EPIDURAL; INFILTRATION; INTRACAUDAL; PERINEURAL at 15:02

## 2022-10-26 RX ADMIN — PROPOFOL 50 MG: 10 INJECTION, EMULSION INTRAVENOUS at 15:21

## 2022-10-26 NOTE — ANESTHESIA POSTPROCEDURE EVALUATION
Patient: Dontae Bonds Jr.    Procedure Summary     Date: 10/26/22 Room / Location: [facility] ENDOSCOPY 1 /  [facility] OR    Anesthesia Start: 1457 Anesthesia Stop: 1534    Procedure: COLONOSCOPY Diagnosis:       Personal history of colonic polyps      Family history of colon cancer      Diverticulosis      (Personal history of colonic polyps [Z86.010])      (Family history of colon cancer [Z80.0])    Surgeons: Lucian Muhammad MD Provider: Rik Perry CRNA    Anesthesia Type: MAC ASA Status: 3          Anesthesia Type: MAC    Vitals  Vitals Value Taken Time   /82 10/26/22 1609   Temp     Pulse 71 10/26/22 1609   Resp 12 10/26/22 1609   SpO2 98 % 10/26/22 1609           Post Anesthesia Care and Evaluation    Patient location during evaluation: bedside  Patient participation: complete - patient participated  Level of consciousness: awake and alert  Pain score: 0  Pain management: adequate    Airway patency: patent  Anesthetic complications: No anesthetic complications  PONV Status: none  Cardiovascular status: acceptable  Respiratory status: acceptable  Hydration status: acceptable  No anesthesia care post op

## 2022-10-26 NOTE — ANESTHESIA PREPROCEDURE EVALUATION
Anesthesia Evaluation     Patient summary reviewed and Nursing notes reviewed   no history of anesthetic complications:  NPO Solid Status: > 8 hours  NPO Liquid Status: > 6 hours           Airway   Mallampati: II  TM distance: >3 FB  Neck ROM: full  No difficulty expected  Dental      Pulmonary     breath sounds clear to auscultation  (+) a smoker Former, sleep apnea (severe),   (-) pneumonia    ROS comment: Pulmonary note in media 10/18/202:     Hx pulmonary fibrosis r/t covid  Ground glass abnormalties resolved.   6min walk test sp02 >94%.       In [facility] ICU for 5 weeks   extensive rehab   Now off O2 for 3 months      IMPRESSION:  1.  Moderately severe diffuse chronic interstitial pulmonary fibrosis as detailed above.  2.  Near complete clearance of diffuse airspace opacities seen during admission in February.  3.  Mild scattered retractile bronchiectasis.  4.  Indeterminate 7 mm right upper lobe pulmonary nodule for which 6 month follow-up is recommended.     Signer Name: Obey Lindsey MD   Signed: 10/12/2022 8:02 AM   Workstation Name: [id number]    [facility]  Cardiovascular   Exercise tolerance: good (4-7 METS)    ECG reviewed  PT is on anticoagulation therapy  Rhythm: regular  Rate: normal    (+) hypertension well controlled less than 2 medications, DVT resolved, hyperlipidemia,     ROS comment: ABNORMAL ECG -  Sinus rhythm  Atrial premature complex  Prolonged NV interval  Inferolateral infarct, old  PACs and prolonged NV interval are new  Electronically Signed By: Adelaida Huddleston ([facility]) 10-Mar-2022 18:27:05  Date and Time of Study: 2022-03-09 15:23:57    02/2022 ECHO   Estimated left ventricular EF = 60% Left ventricular systolic function is normal.   Left ventricular diastolic function was normal.   Mild aortic valve stenosis is present.  IMPRESSION:  1. Severe motion degradation compromises evaluation for pulmonary embolus but no convincing evidence of pulmonary embolus is seen.  2.  Marked near diffuse consolidative changes with superimposed septal thickening and groundglass opacities, worse as compared to the exam of January 27, 2022, possibly representing multifocal pneumonia, superimposed edema, or could be associated with  Covid 19.    Neuro/Psych- negative ROS  GI/Hepatic/Renal/Endo      Musculoskeletal     (+) back pain (fusion L5S1), chronic pain,   Abdominal    Substance History   (+) alcohol use (Hx ETOH abuse quite in Jan, 8oz per day ),      OB/GYN          Other - negative ROS                     Anesthesia Plan    ASA 3     MAC     intravenous induction     Anesthetic plan, risks, benefits, and alternatives have been provided, discussed and informed consent has been obtained with: patient.    Use of blood products discussed with patient  Consented to blood products.       CODE STATUS:

## 2023-04-03 ENCOUNTER — HOSPITAL ENCOUNTER (OUTPATIENT)
Dept: CARDIOLOGY | Facility: HOSPITAL | Age: 73
Discharge: HOME | End: 2023-04-03
Payer: MEDICARE

## 2023-04-03 ENCOUNTER — HOSPITAL ENCOUNTER (OUTPATIENT)
Dept: CT IMAGING | Facility: HOSPITAL | Age: 73
Discharge: HOME | End: 2023-04-03
Payer: MEDICARE

## 2023-04-03 VITALS
WEIGHT: 179 LBS | HEART RATE: 75 BPM | DIASTOLIC BLOOD PRESSURE: 82 MMHG | SYSTOLIC BLOOD PRESSURE: 118 MMHG | BODY MASS INDEX: 28.09 KG/M2 | HEIGHT: 67 IN

## 2023-04-03 DIAGNOSIS — R91.1 PULMONARY NODULE: ICD-10-CM

## 2023-04-03 DIAGNOSIS — I27.20 PULMONARY HTN: ICD-10-CM

## 2023-04-03 LAB
AORTIC DIMENSIONLESS INDEX: 0.3 (DI)
ASCENDING AORTA: 3.7 CM
BH CV ECHO MEAS - AO MAX PG: 40.4 MMHG
BH CV ECHO MEAS - AO MEAN PG: 25.2 MMHG
BH CV ECHO MEAS - AO V2 MAX: 318 CM/SEC
BH CV ECHO MEAS - AO V2 VTI: 69.6 CM
BH CV ECHO MEAS - AVA(I,D): 1.13 CM2
BH CV ECHO MEAS - EDV(CUBED): 153.2 ML
BH CV ECHO MEAS - EDV(MOD-SP2): 113 ML
BH CV ECHO MEAS - EDV(MOD-SP4): 116 ML
BH CV ECHO MEAS - EF(MOD-BP): 58.3 %
BH CV ECHO MEAS - EF(MOD-SP2): 57.5 %
BH CV ECHO MEAS - EF(MOD-SP4): 59.5 %
BH CV ECHO MEAS - ESV(CUBED): 41.7 ML
BH CV ECHO MEAS - ESV(MOD-SP2): 48 ML
BH CV ECHO MEAS - ESV(MOD-SP4): 47 ML
BH CV ECHO MEAS - FS: 35.2 %
BH CV ECHO MEAS - IVS/LVPW: 0.8 CM
BH CV ECHO MEAS - IVSD: 0.88 CM
BH CV ECHO MEAS - LAT PEAK E' VEL: 11.6 CM/SEC
BH CV ECHO MEAS - LV DIASTOLIC VOL/BSA (35-75): 60.1 CM2
BH CV ECHO MEAS - LV MASS(C)D: 201.5 GRAMS
BH CV ECHO MEAS - LV MAX PG: 3.1 MMHG
BH CV ECHO MEAS - LV MEAN PG: 1.91 MMHG
BH CV ECHO MEAS - LV SYSTOLIC VOL/BSA (12-30): 24.4 CM2
BH CV ECHO MEAS - LV V1 MAX: 88 CM/SEC
BH CV ECHO MEAS - LV V1 VTI: 19.2 CM
BH CV ECHO MEAS - LVIDD: 5.4 CM
BH CV ECHO MEAS - LVIDS: 3.5 CM
BH CV ECHO MEAS - LVOT AREA: 4.1 CM2
BH CV ECHO MEAS - LVOT DIAM: 2.28 CM
BH CV ECHO MEAS - LVPWD: 1.1 CM
BH CV ECHO MEAS - MED PEAK E' VEL: 7.3 CM/SEC
BH CV ECHO MEAS - MV A DUR: 0.11 SEC
BH CV ECHO MEAS - MV A MAX VEL: 108.5 CM/SEC
BH CV ECHO MEAS - MV DEC SLOPE: 238.5 CM/SEC2
BH CV ECHO MEAS - MV DEC TIME: 150 MSEC
BH CV ECHO MEAS - MV E MAX VEL: 89.1 CM/SEC
BH CV ECHO MEAS - MV E/A: 0.82
BH CV ECHO MEAS - MV MAX PG: 4.8 MMHG
BH CV ECHO MEAS - MV MEAN PG: 1.98 MMHG
BH CV ECHO MEAS - MV P1/2T: 107 MSEC
BH CV ECHO MEAS - MV V2 VTI: 27.5 CM
BH CV ECHO MEAS - MVA(P1/2T): 2.06 CM2
BH CV ECHO MEAS - MVA(VTI): 2.9 CM2
BH CV ECHO MEAS - PA ACC TIME: 0.09 SEC
BH CV ECHO MEAS - PA PR(ACCEL): 39.8 MMHG
BH CV ECHO MEAS - PA V2 MAX: 102.7 CM/SEC
BH CV ECHO MEAS - PULM A REVS DUR: 0.16 SEC
BH CV ECHO MEAS - PULM A REVS VEL: 26.1 CM/SEC
BH CV ECHO MEAS - PULM DIAS VEL: 41.7 CM/SEC
BH CV ECHO MEAS - PULM S/D: 0.97
BH CV ECHO MEAS - PULM SYS VEL: 40.3 CM/SEC
BH CV ECHO MEAS - RAP SYSTOLE: 3 MMHG
BH CV ECHO MEAS - RV MAX PG: 1.5 MMHG
BH CV ECHO MEAS - RV V1 MAX: 61.3 CM/SEC
BH CV ECHO MEAS - RV V1 VTI: 12.4 CM
BH CV ECHO MEAS - RVSP: 24 MMHG
BH CV ECHO MEAS - SI(MOD-SP2): 33.7 ML/M2
BH CV ECHO MEAS - SI(MOD-SP4): 35.8 ML/M2
BH CV ECHO MEAS - SV(LVOT): 78.5 ML
BH CV ECHO MEAS - SV(MOD-SP2): 65 ML
BH CV ECHO MEAS - SV(MOD-SP4): 69 ML
BH CV ECHO MEAS - TAPSE (>1.6): 2.26 CM
BH CV ECHO MEAS - TR MAX PG: 21 MMHG
BH CV ECHO MEAS - TR MAX VEL: 229.4 CM/SEC
BH CV ECHO MEASUREMENTS AVERAGE E/E' RATIO: 9.43
BH CV XLRA - RV BASE: 4 CM
BH CV XLRA - RV LENGTH: 7.6 CM
BH CV XLRA - RV MID: 2.5 CM
BH CV XLRA - TDI S': 12 CM/SEC
LEFT ATRIUM VOLUME INDEX: 23.2 ML/M2
MAXIMAL PREDICTED HEART RATE: 148 BPM
STRESS TARGET HR: 126 BPM

## 2023-04-03 PROCEDURE — 71250 CT THORAX DX C-: CPT

## 2023-04-03 PROCEDURE — 93306 TTE W/DOPPLER COMPLETE: CPT | Performed by: INTERNAL MEDICINE

## 2023-04-03 PROCEDURE — 93306 TTE W/DOPPLER COMPLETE: CPT

## 2023-05-08 ENCOUNTER — TRANSCRIBE ORDERS (OUTPATIENT)
Dept: ADMINISTRATIVE | Facility: HOSPITAL | Age: 73
End: 2023-05-08
Payer: COMMERCIAL

## 2023-05-08 DIAGNOSIS — J84.10 PULMONARY FIBROSIS, UNSPECIFIED: ICD-10-CM

## 2023-05-08 DIAGNOSIS — R91.8 PULMONARY NODULES: ICD-10-CM

## 2023-05-08 DIAGNOSIS — J84.10 PULMONARY FIBROSIS: ICD-10-CM

## 2023-08-16 NOTE — THERAPY TREATMENT NOTE
Acute Care - Physical Therapy Treatment Note  DILAN Ward     Patient Name: Dontae Bonds Jr.  : 1950  MRN: [id number]  Today's Date: 3/10/2022   Onset of Illness/Injury or Date of Surgery: 22  Visit Dx:     ICD-10-CM ICD-9-CM   1. Pneumonia of both lungs due to infectious organism, unspecified part of lung  J18.9 483.8   2. Acute respiratory failure without hypercapnia (HCC)  J96.00 518.81     Patient Active Problem List   Diagnosis   • Personal history of colonic polyps   • Family history of colon cancer   • Pneumonia due to COVID-19 virus   • Pneumonia of both lungs due to infectious organism   • Hypertension   • Hypokalemia     Past Medical History:   Diagnosis Date   • Hyperlipidemia    • Hypertension      History reviewed. No pertinent surgical history.  PT Assessment (last 12 hours)     PT Evaluation and Treatment     Row Name 03/10/22 [facility]          Physical Therapy Time and Intention    Subjective Information complains of;dyspnea  -     Document Type therapy note (daily note)  -     Mode of Treatment physical therapy  -     Patient Effort good  -     Symptoms Noted During/After Treatment shortness of breath  -     Row Name 03/10/22 1350          General Information    Patient Observations alert;cooperative;agree to therapy  -     Patient/Family/Caregiver Comments/Observations pt sitting in recliner, agrees to therapy  -     Existing Precautions/Restrictions fall;oxygen therapy device and L/min  8 liters humidified high flow  -     Comment, General Information decreased O2 sats with activity  -     Row Name 03/10/22 1354          Pain    Pre/Posttreatment Pain Comment no c/o pain  -     Row Name 03/10/22 [facility]          Cognition    Personal Safety Interventions gait belt;nonskid shoes/slippers when out of bed  -     Row Name 03/10/22 1356          Bed Mobility    Comment, (Bed Mobility) deferred up in chair  -     Row Name 03/10/22 1352          Transfers    Transfers  sit-stand transfer;stand-sit transfer  -     Comment, (Transfers) cues for hand placement  -     Sit-Stand [place] (Transfers) standby assist  -     Stand-Sit [place] (Transfers) standby assist  -     Row Name 03/10/22 1358          Sit-Stand Transfer    Assistive Device (Sit-Stand Transfers) walker, front-wheeled  -     Row Name 03/10/22 1359          Stand-Sit Transfer    Assistive Device (Stand-Sit Transfers) walker, front-wheeled  -     Row Name 03/10/22 1351          Gait/Stairs (Locomotion)    [place] Level (Gait) standby assist  -     Assistive Device (Gait) walker, front-wheeled  -     Distance in Feet (Gait) 100 feet with 3 seated rest breaks  -     Pattern (Gait) swing-through  -     Deviations/Abnormal Patterns (Gait) base of support, narrow  -     Bilateral Gait Deviations forward flexed posture  -     Comment, (Gait/Stairs) pt requires verbal cues for proper distance from walker and to decrease overall gait speed during mobility.  requires verbal cues for awareness of O2 line during direction changes  -     Row Name 03/10/22 1355          Safety Issues, Functional Mobility    Safety Issues Affecting Function (Mobility) insight into deficits/self-awareness;positioning of assistive device  -     Row Name 03/10/22 0408          Plan of Care Review    Outcome Evaluation PT: Patient performs sit to stand with SBA and gait with rolling walker x100 feet with SBA, requiring 3 seated rest breaks.  Patient on 8 liters via high flow nasal cannula for initial portions of gait.  Final gait trial completed with non rebreather mask on 15 liters per RT recommendation.  Patient's O2 sats with activity fluctuate between 80-82%, recovering to 88-89% within 1-1.5 minutes.  Patient continues to be limited in overall gait distance by respiratory status, will continue to follow for therapy needs.  -     Row Name 03/10/22 7395          Positioning and Restraints    Pre-Treatment  Position sitting in chair/recliner  -BRIANA     Post Treatment Position chair  -BRIANA     In Chair reclined;call light within reach;encouraged to call for assist;notified nsg  -BRIANA     Row Name 03/10/22 3411          Progress Summary (PT)    Progress Toward Functional Goals (PT) progress toward functional goals is fair  -BRIANA     Row Name 03/10/22 1625          Therapy Plan Review/Discharge Plan (PT)    Patient/Family Concerns, Anticipated Discharge Disposition (PT) pt reports he plans to return home with spouse support and home health PT  -           User Key  (r) = Recorded By, (t) = Taken By, (c) = Cosigned By    Initials Name Provider Type    Tami Kitchen, MARY Physical Therapist                Physical Therapy Education                 Title: PT OT SLP Therapies (In Progress)     Topic: Physical Therapy (In Progress)     Point: Mobility training (Done)     Learning Progress Summary           Patient Acceptance, E,TB, VU by  at 3/10/2022 1503    Acceptance, E,TB, VU by BRIANA at 3/9/2022 1020    Acceptance, E,TB, VU by RBIANA at 3/8/2022 1003    Acceptance, E,TB, VU by BRIANA at 3/7/2022 0919                   Point: Home exercise program (Not Started)     Learner Progress:  Not documented in this visit.                      User Key     Initials Effective Dates Name Provider Type [facility] 06/16/21 -  Tami Hodgson, MARY Physical Therapist PT              PT Recommendation and Plan  Anticipated Discharge Disposition (PT):  (will continue to assess as medical status improves and O2 requirements decreased)  Planned Therapy Interventions (PT): balance training, bed mobility training, gait training, home exercise program, strengthening, transfer training, patient/family education  Therapy Frequency (PT): daily  Progress Summary (PT)  Progress Toward Functional Goals (PT): progress toward functional goals is fair  Outcome Evaluation: PT: Patient performs sit to stand with SBA and gait with rolling walker x100 feet with  SBA, requiring 3 seated rest breaks.  Patient on 8 liters via high flow nasal cannula for initial portions of gait.  Final gait trial completed with non rebreather mask on 15 liters per RT recommendation.  Patient's O2 sats with activity fluctuate between 80-82%, recovering to 88-89% within 1-1.5 minutes.  Patient continues to be limited in overall gait distance by respiratory status, will continue to follow for therapy needs.   Outcome Measures     Row Name 03/10/22 1357 03/09/22 0913 03/08/22 0930       How much help from another person do you currently need...    Turning from your back to your side while in flat bed without using bedrails? 3  -JW 3  -JW 3  -JW    Moving from lying on back to sitting on the side of a flat bed without bedrails? 3  -JW 3  -JW 3  -JW    Moving to and from a bed to a chair (including a wheelchair)? 3  -JW 3  -JW 3  -JW    Standing up from a chair using your arms (e.g., wheelchair, bedside chair)? 3  -JW 3  -JW 3  -JW    Climbing 3-5 steps with a railing? 2  -JW 2  -JW 1  -JW    To walk in hospital room? 3  -JW 3  -JW 1  -JW    AM-PAC 6 Clicks Score (PT) 17  -JW 17  -JW 14  -JW       Functional Assessment    Outcome Measure Options AM-PAC 6 Clicks Basic Mobility (PT)  -JW AM-PAC 6 Clicks Basic Mobility (PT)  -JW AM-PAC 6 Clicks Basic Mobility (PT)  -JW          User Key  (r) = Recorded By, (t) = Taken By, (c) = Cosigned By    Initials Name Provider Type    Tami Kitchen, PT Physical Therapist                 Time Calculation:    PT Charges     Row Name 03/10/22 1503             Time Calculation    Start Time 1357  -JW      Stop Time 1430  -JW      Time Calculation (min) 33 min  -JW      PT Received On 03/10/22  -JW      PT - Next Appointment 03/11/22  -JW              Timed Charges    37184 - PT Therapeutic Exercise Minutes 10  -JW      12053 - Gait Training Minutes  23  -JW              Total Minutes    Timed Charges Total Minutes 33  -JW       Total Minutes 33  -JW             User Key  (r) = Recorded By, (t) = Taken By, (c) = Cosigned By    Initials Name Provider Type    Tami Kitchen, PT Physical Therapist              Therapy Charges for Today     Code Description Service Date Service Provider Modifiers Qty    16517791532 HC GAIT TRAINING EA 15 MIN 3/9/2022 Tami Hodgson, PT GP 1    22609505184 HC PT THER PROC EA 15 MIN 3/10/2022 Tami Hodgson, PT GP 1    97807200270 HC GAIT TRAINING EA 15 MIN 3/10/2022 Tami Hodgson, PT GP 1          PT G-Codes  Outcome Measure Options: AM-PAC 6 Clicks Basic Mobility (PT)  AM-PAC 6 Clicks Score (PT): 17    Tami Hodgson PT  3/10/2022     no concerns

## 2023-11-03 NOTE — PROGRESS NOTES
"SERVICE: [facility] HOSPITALIST    CONSULTANTS: Pulmonary     CHIEF COMPLAINT: Shortness of breath    SUBJECTIVE: Patient seen and examined at bedside. Patient reports no acute issues.  Nursing ports no acute issues overnight.  Patient reports he slept much better overnight.  He is noticeably mentating better today.  patient denies any acute complaints patient denies headache, fever or chills, nausea, vomiting, diarrhea, abdominal pain, chest pain or palpitations, shortness of breath, wheezing or coughing, leg pain or weakness.     OBJECTIVE:    Physical exam is largely unchanged from previous exam in previous note, except where documented below.     Physical Exam:  General: Patient is awake and alert. Well developed and well nourished. No acute distress noted.   HENT: Head is atraumatic, normocephalic. Hearing is grossly intact. Nose is without obvious congestion and appears patent. Neck is supple and trachea is midline.   Eyes: Vision is grossly intact. Pupils appear equal and round.   Cardiovascular: Heart has regular rate and rhythm with no murmurs, rubs or gallops noted.   Respiratory:  lungs are clear to ausculation without wheezes, rhonchi or rales.   Abdominal/GI: Soft, nontender, bowel sounds present. No rebound or guarding present.   Extremities: No peripheral edema noted.   Musculoskeletal: Spontaneous movement of bilateral upper and lower extremities against gravity noted. No signs of injury or deformity noted.   Skin: Warm and dry.   Psych: Mood and affect are appropriate. Cooperative with exam.   Neuro: No facial asymmetry noted. No focal deficits noted, hearing and vision are grossly intact.     /71 (BP Location: Left arm, Patient Position: Sitting)   Pulse 56   Temp 96.7 °F (35.9 °C) (Axillary)   Resp 18   Ht 167.6 cm (66\")   Wt 78.9 kg (174 lb)   SpO2 (!) 89%   BMI 28.08 kg/m²     MEDS/LABS REVIEWED AND ORDERED    ascorbic acid, 500 mg, Oral, Daily  atorvastatin, 10 " mg, Oral, Daily  baricitinib, 4 mg, Oral, Daily  benzonatate, 200 mg, Oral, TID  cholecalciferol, 1,000 Units, Oral, Daily  dexamethasone, 6 mg, Oral, Q12H  enoxaparin, 40 mg, Subcutaneous, Q12H  guaiFENesin, 1,200 mg, Oral, BID  OLANZapine, 5 mg, Oral, Daily With Dinner  sodium chloride, 10 mL, Intravenous, Q12H  thiamine, 200 mg, Oral, Daily  zinc sulfate, 220 mg, Oral, Daily        LAB/DIAGNOSTICS:    Lab Results (last 24 hours)     Procedure Component Value Units Date/Time    Comprehensive Metabolic Panel [[id number]]  (Abnormal) Collected: 02/02/22 0428    Specimen: Blood Updated: 02/02/22 0551     Glucose 151 mg/dL      BUN 28 mg/dL      Creatinine 0.55 mg/dL      Sodium 138 mmol/L      Potassium 4.8 mmol/L      Chloride 106 mmol/L      CO2 25.3 mmol/L      Calcium 8.7 mg/dL      Total Protein 4.8 g/dL      Albumin 2.70 g/dL      ALT (SGPT) 48 U/L      AST (SGOT) 22 U/L      Alkaline Phosphatase 33 U/L      Total Bilirubin 0.5 mg/dL      eGFR Non African Amer 147 mL/min/1.73      Globulin 2.1 gm/dL      A/G Ratio 1.3 g/dL      BUN/Creatinine Ratio 50.9     Anion Gap 6.7 mmol/L     Narrative:      GFR Normal >60  Chronic Kidney Disease <60  Kidney Failure <15      D-dimer, Quantitative [[id number]]  (Abnormal) Collected: 02/02/22 0429    Specimen: Blood Updated: 02/02/22 0543     D-Dimer, Quantitative 0.71 MCGFEU/mL     Narrative:      Can be elevated in, but is not diagnostic for deep vein thrombosis (DVT) or pulmonary embolis (PE).  It is also elevated in other medical conditions.  Clinical correlation is required.  The negative cut-off value for the D-Dimer is 0.50 mcg FEU/mL for DVT and PE.      CBC & Differential [[id number]]  (Abnormal) Collected: 02/02/22 0428    Specimen: Blood Updated: 02/02/22 0525    Narrative:      The following orders were created for panel order CBC & Differential.  Procedure                               Abnormality         Status                     ---------                                -----------         ------                     CBC Auto Differential[[id number]]        Abnormal            Final result                 Please view results for these tests on the individual orders.    CBC Auto Differential [[id number]]  (Abnormal) Collected: 02/02/22 0428    Specimen: Blood Updated: 02/02/22 0525     WBC 16.74 10*3/mm3      RBC 4.04 10*6/mm3      Hemoglobin 12.3 g/dL      Hematocrit 37.3 %      MCV 92.3 fL      MCH 30.4 pg      MCHC 33.0 g/dL      RDW 13.3 %      RDW-SD 45.1 fl      MPV 10.8 fL      Platelets 343 10*3/mm3      Neutrophil % 91.0 %      Lymphocyte % 2.6 %      Monocyte % 3.9 %      Eosinophil % 0.1 %      Basophil % 0.1 %      Immature Grans % 2.3 %      Neutrophils, Absolute 15.24 10*3/mm3      Lymphocytes, Absolute 0.44 10*3/mm3      Monocytes, Absolute 0.65 10*3/mm3      Eosinophils, Absolute 0.01 10*3/mm3      Basophils, Absolute 0.02 10*3/mm3      Immature Grans, Absolute 0.38 10*3/mm3      nRBC 0.0 /100 WBC     C-reactive Protein [[id number]] Collected: 02/02/22 0429    Specimen: Blood Updated: 02/02/22 0518    C-reactive Protein [[id number]]  (Normal) Collected: 02/01/22 0431    Specimen: Blood Updated: 02/01/22 1148     C-Reactive Protein <0.30 mg/dL         ECG 12 Lead   Final Result   HEART RATE= 91  bpm   RR Interval= 656  ms   MA Interval= 153  ms   P Horizontal Axis= 25  deg   P Front Axis= 10  deg   QRSD Interval= 137  ms   QT Interval= 377  ms   QRS Axis= -36  deg   T Wave Axis= -15  deg   - ABNORMAL ECG -   Sinus rhythm   Nonspecific IVCD with LAD   Inferior infarct, old   Electronically Signed By: Mike Haddad ([facility]) 21-Jan-2022 12:18:12   Date and Time of Study: 2022-01-21 11:11:59        Results for orders placed during the hospital encounter of 01/21/22    Adult Transthoracic Echo Complete w/ Color, Spectral and Contrast if Necessary Per Protocol    Interpretation Summary  · Calculated left ventricular EF = 61.6% Estimated left ventricular EF was in agreement  with the calculated left ventricular EF. Left ventricular systolic function is normal.Left ventricular wall thickness is consistent with mild concentric hypertrophy.  · Left ventricular diastolic function was normal.  · Right ventricular systolic function not assessed but visually looks decreased.    CT Head Without Contrast    Result Date: 2/1/2022  No acute intracranial abnormality. Cortical and cerebellar atrophy. Signer Name: ASIF Thomas MD  Signed: 2/1/2022 12:21 PM  Workstation Name: [id number]  [facility]        ASSESSMENT/PLAN:  Please not portions of this assessment/plan may have been copied and pasted, but I have personally seen this patient and reviewed each line of this assessment and plan for accuracy and made updates to reflect my necessary changes on 02/02/22 .     Acute metabolic encephalopathy secondary to suspected alcohol withdrawal vs infectious encephalopathy  -Patient also suspected to have underlying cognitive deficits  -Neurology moved up timing of zyprexa to dinner time and provided with additional thiamine.   -Withdrawal symptoms improving patient now receiving as needed Librium  -will work on weaning decadron.   -As per HPI mentation noticeably improved    Acute hypoxic respiratory failure secondary to COVID-19 pneumonia  -Patient also with new pneumomediastinum  -Steroid-induced leukocytosis  -Patient continues on baricitinib, Decadron and Lovenox for DVT prophylaxis, as above I am beginning to wean his Decadron.   -He has completed remdesivir course  -Patient currently on 6L nasal cannula, with saturation in the mid to upper 90s.  Continue to wean as tolerated.     Hematuria/proteinuria  -Patient will have scheduled follow-up with his PCP at discharge for further monitoring/referrals  -Renal ultrasound showed enlarged kidneys with indeterminant significance and no obstruction    Hypertension-blood pressure has been stable with holding of  "antihypertensives    Suspected TWAN  -Plan for outpatient referral to sleep lab at discharge    PLAN FOR DISPOSITION: Short-term rehab versus discharge home with close follow-up with PCP and and home companion whether this to be his wife or son, will need to be determined.     Osvaldo Gibson DO  Hospitalist, [facility]  02/02/22  07:44 EST    \"Dictated utilizing Dragon dictation\"    " PAST SURGICAL HISTORY:  No significant past surgical history

## 2024-05-06 ENCOUNTER — HOSPITAL ENCOUNTER (OUTPATIENT)
Dept: CT IMAGING | Facility: HOSPITAL | Age: 74
Discharge: HOME | End: 2024-05-06
Admitting: INTERNAL MEDICINE
Payer: MEDICARE

## 2024-05-06 DIAGNOSIS — R91.8 PULMONARY NODULES: ICD-10-CM

## 2024-05-06 DIAGNOSIS — J84.10 PULMONARY FIBROSIS: ICD-10-CM

## 2024-05-06 PROCEDURE — 71250 CT THORAX DX C-: CPT

## 2025-05-13 ENCOUNTER — TRANSCRIBE ORDERS (OUTPATIENT)
Dept: ADMINISTRATIVE | Facility: HOSPITAL | Age: 75
End: 2025-05-13
Payer: COMMERCIAL

## 2025-05-13 DIAGNOSIS — J84.9 ILD (INTERSTITIAL LUNG DISEASE): Primary | ICD-10-CM

## (undated) DEVICE — JACKT LAB F/R KNIT CUFF/COLR XLG BLU

## (undated) DEVICE — KT ORCA ORCAPOD DISP STRL

## (undated) DEVICE — GLV SURG SENSICARE PI MIC PF SZ7.5 LF STRL

## (undated) DEVICE — SAFELINER SUCTION CANISTER 1000CC: Brand: DEROYAL

## (undated) DEVICE — ADAPT CLN BIOGUARD AIR/H2O DISP

## (undated) DEVICE — SYR LL 3CC

## (undated) DEVICE — BW-412T DISP COMBO CLEANING BRUSH: Brand: SINGLE USE COMBINATION CLEANING BRUSH

## (undated) DEVICE — Device